# Patient Record
Sex: MALE | URBAN - METROPOLITAN AREA
[De-identification: names, ages, dates, MRNs, and addresses within clinical notes are randomized per-mention and may not be internally consistent; named-entity substitution may affect disease eponyms.]

---

## 2020-07-15 ENCOUNTER — NURSE TRIAGE (OUTPATIENT)
Dept: FAMILY MEDICINE | Facility: CLINIC | Age: 45
End: 2020-07-15

## 2020-07-15 NOTE — TELEPHONE ENCOUNTER
"COVID-19 SCREENING ASSESSMENT    CRITERIA FOR TESTING: Yes to Any Symptoms     SYMPTOM QUESTIONS  Are you experiencing a cough, fever, shortness of breath, chills, repeated shaking with chills, muscle pain, headache, sore throat, nausea, vomiting, diarrhea, congestion, runny nose, fatigue or new loss of taste or smell? Sore Throat  Describe symptoms: cough, headache, \"tightness in chest\", diarrhea  Date of symptom onset: 07/13/20    RISK CRITERIA  Are you a healthcare worker? No  Are you a ? No  Are you aged 65 or older? No  Are you currently pregnant? N/A  Are you a resident of a health care or long term care facility? No  Do you have chronic comorbidities that include: Yes, HTN  • Pulmonary disease (asthma, COPD, pulmonary fibrosis, restrictive lung disease or any other chronic lung disease)  • Cardiac disease (CHF, hypertension, rhythm disorder, valvular disorder, or any other chronic heart disease)  • Immunosuppressed state (cancer treatment, long term corticosteroids, immunodeficiency, history of organ transplant, on other medications that cause immunosuppression)  • Other chronic illnesses (diabetes, chronic kidney disease, on dialysis, chronic liver disease)  Have you had close contact with a lab confirmed case of coronavirus (COVID-19) in the last 14 days? no  Details on close contact: n/a     Pt is here vacationing with his family, from [place], [place].  Pt and his family planned on traveling on to [place] and leaving this morning.  When pt advised of required isolation and test results may take 7 days, he advised he wanted to check on testing turn around times at another place.      Reason for Disposition  • COVID-19 symptoms per CDC guidelines AND risk factors.    Protocols used: COVID-19 EXPOSURE SCREENING [facility]      "

## 2021-01-28 ENCOUNTER — AMBULATORY - HEALTHEAST (OUTPATIENT)
Dept: NURSING | Facility: CLINIC | Age: 46
End: 2021-01-28

## 2021-02-18 ENCOUNTER — AMBULATORY - HEALTHEAST (OUTPATIENT)
Dept: NURSING | Facility: CLINIC | Age: 46
End: 2021-02-18

## 2021-05-26 ENCOUNTER — RECORDS - HEALTHEAST (OUTPATIENT)
Dept: ADMINISTRATIVE | Facility: CLINIC | Age: 46
End: 2021-05-26

## 2021-05-27 ENCOUNTER — RECORDS - HEALTHEAST (OUTPATIENT)
Dept: ADMINISTRATIVE | Facility: CLINIC | Age: 46
End: 2021-05-27

## 2024-06-01 ENCOUNTER — TRANSFERRED RECORDS (OUTPATIENT)
Dept: HEALTH INFORMATION MANAGEMENT | Facility: CLINIC | Age: 49
End: 2024-06-01

## 2024-06-01 ENCOUNTER — APPOINTMENT (OUTPATIENT)
Dept: NEUROLOGY | Facility: CLINIC | Age: 49
DRG: 003 | End: 2024-06-01
Attending: INTERNAL MEDICINE
Payer: COMMERCIAL

## 2024-06-01 ENCOUNTER — APPOINTMENT (OUTPATIENT)
Dept: GENERAL RADIOLOGY | Facility: CLINIC | Age: 49
DRG: 003 | End: 2024-06-01
Attending: STUDENT IN AN ORGANIZED HEALTH CARE EDUCATION/TRAINING PROGRAM
Payer: COMMERCIAL

## 2024-06-01 ENCOUNTER — APPOINTMENT (OUTPATIENT)
Dept: GENERAL RADIOLOGY | Facility: CLINIC | Age: 49
DRG: 003 | End: 2024-06-01
Attending: INTERNAL MEDICINE
Payer: COMMERCIAL

## 2024-06-01 ENCOUNTER — APPOINTMENT (OUTPATIENT)
Dept: CT IMAGING | Facility: CLINIC | Age: 49
DRG: 003 | End: 2024-06-01
Attending: INTERNAL MEDICINE
Payer: COMMERCIAL

## 2024-06-01 ENCOUNTER — HOSPITAL ENCOUNTER (INPATIENT)
Facility: CLINIC | Age: 49
LOS: 14 days | Discharge: NURSING, REHAB OR LONG-TERM CARE | DRG: 003 | End: 2024-06-15
Attending: INTERNAL MEDICINE | Admitting: STUDENT IN AN ORGANIZED HEALTH CARE EDUCATION/TRAINING PROGRAM
Payer: COMMERCIAL

## 2024-06-01 DIAGNOSIS — E87.6 HYPOKALEMIA: ICD-10-CM

## 2024-06-01 DIAGNOSIS — Z91.89 AT RISK FOR IMPAIRED SKIN INTEGRITY: ICD-10-CM

## 2024-06-01 DIAGNOSIS — L08.9 LOCAL INFECTION OF SKIN AND SUBCUTANEOUS TISSUE: ICD-10-CM

## 2024-06-01 DIAGNOSIS — N40.0 BENIGN PROSTATIC HYPERPLASIA, UNSPECIFIED WHETHER LOWER URINARY TRACT SYMPTOMS PRESENT: ICD-10-CM

## 2024-06-01 DIAGNOSIS — F41.9 ANXIETY: ICD-10-CM

## 2024-06-01 DIAGNOSIS — I25.10 CORONARY ARTERY DISEASE INVOLVING NATIVE CORONARY ARTERY OF NATIVE HEART WITHOUT ANGINA PECTORIS: ICD-10-CM

## 2024-06-01 DIAGNOSIS — R73.9 HYPERGLYCEMIA: ICD-10-CM

## 2024-06-01 DIAGNOSIS — I50.23 ACUTE ON CHRONIC SYSTOLIC HEART FAILURE (H): ICD-10-CM

## 2024-06-01 DIAGNOSIS — Z98.61 STATUS POST PERCUTANEOUS TRANSLUMINAL CORONARY ANGIOPLASTY: ICD-10-CM

## 2024-06-01 DIAGNOSIS — R11.0 NAUSEA: ICD-10-CM

## 2024-06-01 DIAGNOSIS — G47.00 INSOMNIA, UNSPECIFIED TYPE: ICD-10-CM

## 2024-06-01 DIAGNOSIS — K59.03 DRUG-INDUCED CONSTIPATION: ICD-10-CM

## 2024-06-01 DIAGNOSIS — I46.9 CARDIAC ARREST (H): Primary | ICD-10-CM

## 2024-06-01 LAB
ABO/RH(D): NORMAL
ACT BLD: 199 SECONDS (ref 74–150)
ALBUMIN SERPL BCG-MCNC: 3.7 G/DL (ref 3.5–5.2)
ALBUMIN SERPL BCG-MCNC: 3.7 G/DL (ref 3.5–5.2)
ALLEN'S TEST: ABNORMAL
ALLEN'S TEST: ABNORMAL
ALP SERPL-CCNC: 54 U/L (ref 40–150)
ALP SERPL-CCNC: 55 U/L (ref 40–150)
ALT SERPL W P-5'-P-CCNC: 254 U/L (ref 0–70)
ALT SERPL W P-5'-P-CCNC: 258 U/L (ref 0–70)
ANION GAP SERPL CALCULATED.3IONS-SCNC: 14 MMOL/L (ref 7–15)
ANION GAP SERPL CALCULATED.3IONS-SCNC: 15 MMOL/L (ref 7–15)
ANTIBODY SCREEN: NEGATIVE
APTT PPP: >240 SECONDS (ref 22–38)
AST SERPL W P-5'-P-CCNC: 370 U/L (ref 0–45)
AST SERPL W P-5'-P-CCNC: 374 U/L (ref 0–45)
BASE EXCESS BLDA CALC-SCNC: -0.7 MMOL/L (ref -3–3)
BASE EXCESS BLDA CALC-SCNC: -2.5 MMOL/L (ref -3–3)
BASE EXCESS BLDA CALC-SCNC: -3 MMOL/L (ref -3–3)
BASE EXCESS BLDV CALC-SCNC: -2.3 MMOL/L (ref -3–3)
BASOPHILS # BLD AUTO: 0 10E3/UL (ref 0–0.2)
BASOPHILS NFR BLD AUTO: 0 %
BILIRUB SERPL-MCNC: 0.6 MG/DL
BILIRUB SERPL-MCNC: 0.6 MG/DL
BUN SERPL-MCNC: 20.3 MG/DL (ref 6–20)
BUN SERPL-MCNC: 20.3 MG/DL (ref 6–20)
CA-I BLD-MCNC: 4.1 MG/DL (ref 4.4–5.2)
CALCIUM SERPL-MCNC: 7.6 MG/DL (ref 8.6–10)
CALCIUM SERPL-MCNC: 7.7 MG/DL (ref 8.6–10)
CHLORIDE SERPL-SCNC: 103 MMOL/L (ref 98–107)
CHLORIDE SERPL-SCNC: 103 MMOL/L (ref 98–107)
COHGB MFR BLD: 99.7 % (ref 96–97)
COHGB MFR BLD: >100 % (ref 96–97)
CORTIS SERPL-MCNC: 43.5 UG/DL
CREAT SERPL-MCNC: 1.16 MG/DL (ref 0.67–1.17)
CREAT SERPL-MCNC: 1.16 MG/DL (ref 0.67–1.17)
CRP SERPL-MCNC: 3.61 MG/L
D DIMER PPP FEU-MCNC: 8.83 UG/ML FEU (ref 0–0.5)
DEPRECATED HCO3 PLAS-SCNC: 20 MMOL/L (ref 22–29)
DEPRECATED HCO3 PLAS-SCNC: 21 MMOL/L (ref 22–29)
EGFRCR SERPLBLD CKD-EPI 2021: 77 ML/MIN/1.73M2
EGFRCR SERPLBLD CKD-EPI 2021: 77 ML/MIN/1.73M2
EOSINOPHIL # BLD AUTO: 0 10E3/UL (ref 0–0.7)
EOSINOPHIL NFR BLD AUTO: 0 %
ERYTHROCYTE [DISTWIDTH] IN BLOOD BY AUTOMATED COUNT: 12.9 % (ref 10–15)
ERYTHROCYTE [SEDIMENTATION RATE] IN BLOOD BY WESTERGREN METHOD: 12 MM/HR (ref 0–15)
GLUCOSE BLD-MCNC: 257 MG/DL (ref 70–99)
GLUCOSE BLDC GLUCOMTR-MCNC: 194 MG/DL (ref 70–99)
GLUCOSE BLDC GLUCOMTR-MCNC: 237 MG/DL (ref 70–99)
GLUCOSE SERPL-MCNC: 264 MG/DL (ref 70–99)
GLUCOSE SERPL-MCNC: 267 MG/DL (ref 70–99)
HBA1C MFR BLD: 6.1 %
HCO3 BLD-SCNC: 23 MMOL/L (ref 21–28)
HCO3 BLD-SCNC: 24 MMOL/L (ref 21–28)
HCO3 BLDA-SCNC: 24 MMOL/L (ref 21–28)
HCO3 BLDV-SCNC: 25 MMOL/L (ref 21–28)
HCT VFR BLD AUTO: 44.2 % (ref 40–53)
HGB BLD-MCNC: 16 G/DL (ref 13.3–17.7)
HGB FREE PLAS-MCNC: 30 MG/DL
HOLD SPECIMEN: NORMAL
IMM GRANULOCYTES # BLD: 0.1 10E3/UL
IMM GRANULOCYTES NFR BLD: 1 %
INR PPP: 1.24 (ref 0.85–1.15)
LACTATE SERPL-SCNC: 3.5 MMOL/L (ref 0.7–2)
LDH SERPL L TO P-CCNC: 630 U/L (ref 0–250)
LYMPHOCYTES # BLD AUTO: 0.9 10E3/UL (ref 0.8–5.3)
LYMPHOCYTES NFR BLD AUTO: 5 %
MAGNESIUM SERPL-MCNC: 2.2 MG/DL (ref 1.7–2.3)
MCH RBC QN AUTO: 31 PG (ref 26.5–33)
MCHC RBC AUTO-ENTMCNC: 36.2 G/DL (ref 31.5–36.5)
MCV RBC AUTO: 86 FL (ref 78–100)
MONOCYTES # BLD AUTO: 1.4 10E3/UL (ref 0–1.3)
MONOCYTES NFR BLD AUTO: 8 %
NEUTROPHILS # BLD AUTO: 14.4 10E3/UL (ref 1.6–8.3)
NEUTROPHILS NFR BLD AUTO: 86 %
NRBC # BLD AUTO: 0 10E3/UL
NRBC BLD AUTO-RTO: 0 /100
O2/TOTAL GAS SETTING VFR VENT: 100 %
O2/TOTAL GAS SETTING VFR VENT: 50 %
O2/TOTAL GAS SETTING VFR VENT: 60 %
O2/TOTAL GAS SETTING VFR VENT: 60 %
OXYHGB MFR BLDA: 100 % (ref 75–100)
OXYHGB MFR BLDV: 72 %
PCO2 BLD: 38 MM HG (ref 35–45)
PCO2 BLD: 44 MM HG (ref 35–45)
PCO2 BLDA: 45 MM HG (ref 35–45)
PCO2 BLDV: 51 MM HG (ref 40–50)
PH BLD: 7.33 [PH] (ref 7.35–7.45)
PH BLD: 7.4 [PH] (ref 7.35–7.45)
PH BLDA: 7.33 [PH] (ref 7.35–7.45)
PH BLDV: 7.3 [PH] (ref 7.32–7.43)
PLATELET # BLD AUTO: 224 10E3/UL (ref 150–450)
PO2 BLD: 136 MM HG (ref 80–105)
PO2 BLD: 385 MM HG (ref 80–105)
PO2 BLDA: 412 MM HG (ref 80–105)
PO2 BLDV: 44 MM HG (ref 25–47)
POTASSIUM SERPL-SCNC: 3.6 MMOL/L (ref 3.4–5.3)
POTASSIUM SERPL-SCNC: 3.7 MMOL/L (ref 3.4–5.3)
PROCALCITONIN SERPL IA-MCNC: 0.5 NG/ML
PROT SERPL-MCNC: 6.2 G/DL (ref 6.4–8.3)
PROT SERPL-MCNC: 6.2 G/DL (ref 6.4–8.3)
RBC # BLD AUTO: 5.16 10E6/UL (ref 4.4–5.9)
SAO2 % BLDA: 98 % (ref 92–100)
SAO2 % BLDA: 98 % (ref 92–100)
SAO2 % BLDA: >100 % (ref 96–97)
SAO2 % BLDV: 72.7 % (ref 70–75)
SARS-COV-2 RNA RESP QL NAA+PROBE: NEGATIVE
SODIUM SERPL-SCNC: 138 MMOL/L (ref 135–145)
SODIUM SERPL-SCNC: 138 MMOL/L (ref 135–145)
SPECIMEN EXPIRATION DATE: NORMAL
T3 SERPL-MCNC: 111 NG/DL (ref 85–202)
T3FREE SERPL-MCNC: 3.1 PG/ML (ref 2–4.4)
T4 FREE SERPL-MCNC: 1.45 NG/DL (ref 0.9–1.7)
TROPONIN T SERPL HS-MCNC: 4931 NG/L
TSH SERPL DL<=0.005 MIU/L-ACNC: 1.54 UIU/ML (ref 0.3–4.2)
UFH PPP CHRO-ACNC: >1.1 IU/ML
WBC # BLD AUTO: 17 10E3/UL (ref 4–11)

## 2024-06-01 PROCEDURE — 95711 VEEG 2-12 HR UNMONITORED: CPT

## 2024-06-01 PROCEDURE — 272N000232 HC CANNULA, VENOUS FEMORAL, ADULT

## 2024-06-01 PROCEDURE — 85610 PROTHROMBIN TIME: CPT | Performed by: INTERNAL MEDICINE

## 2024-06-01 PROCEDURE — 84484 ASSAY OF TROPONIN QUANT: CPT | Performed by: INTERNAL MEDICINE

## 2024-06-01 PROCEDURE — 87635 SARS-COV-2 COVID-19 AMP PRB: CPT | Performed by: INTERNAL MEDICINE

## 2024-06-01 PROCEDURE — 3E043XZ INTRODUCTION OF VASOPRESSOR INTO CENTRAL VEIN, PERCUTANEOUS APPROACH: ICD-10-PCS | Performed by: STUDENT IN AN ORGANIZED HEALTH CARE EDUCATION/TRAINING PROGRAM

## 2024-06-01 PROCEDURE — 82247 BILIRUBIN TOTAL: CPT | Performed by: INTERNAL MEDICINE

## 2024-06-01 PROCEDURE — 85347 COAGULATION TIME ACTIVATED: CPT

## 2024-06-01 PROCEDURE — 83036 HEMOGLOBIN GLYCOSYLATED A1C: CPT | Performed by: INTERNAL MEDICINE

## 2024-06-01 PROCEDURE — 86140 C-REACTIVE PROTEIN: CPT | Performed by: INTERNAL MEDICINE

## 2024-06-01 PROCEDURE — 71250 CT THORAX DX C-: CPT

## 2024-06-01 PROCEDURE — 80365 DRUG SCREENING OXYCODONE: CPT | Performed by: INTERNAL MEDICINE

## 2024-06-01 PROCEDURE — 82330 ASSAY OF CALCIUM: CPT | Performed by: INTERNAL MEDICINE

## 2024-06-01 PROCEDURE — 80307 DRUG TEST PRSMV CHEM ANLYZR: CPT | Performed by: INTERNAL MEDICINE

## 2024-06-01 PROCEDURE — 999N000128 HC STATISTIC PERIPHERAL IV START W/O US GUIDANCE

## 2024-06-01 PROCEDURE — 83051 HEMOGLOBIN PLASMA: CPT | Performed by: INTERNAL MEDICINE

## 2024-06-01 PROCEDURE — 87641 MR-STAPH DNA AMP PROBE: CPT | Performed by: INTERNAL MEDICINE

## 2024-06-01 PROCEDURE — 85025 COMPLETE CBC W/AUTO DIFF WBC: CPT | Performed by: INTERNAL MEDICINE

## 2024-06-01 PROCEDURE — 272N000053 HC CANNULA, ARTERIAL FEMORAL

## 2024-06-01 PROCEDURE — 86316 IMMUNOASSAY TUMOR OTHER: CPT | Performed by: INTERNAL MEDICINE

## 2024-06-01 PROCEDURE — 84443 ASSAY THYROID STIM HORMONE: CPT | Performed by: INTERNAL MEDICINE

## 2024-06-01 PROCEDURE — 84480 ASSAY TRIIODOTHYRONINE (T3): CPT | Performed by: INTERNAL MEDICINE

## 2024-06-01 PROCEDURE — 74018 RADEX ABDOMEN 1 VIEW: CPT | Mod: 26 | Performed by: RADIOLOGY

## 2024-06-01 PROCEDURE — 94002 VENT MGMT INPAT INIT DAY: CPT

## 2024-06-01 PROCEDURE — 95718 EEG PHYS/QHP 2-12 HR W/VEEG: CPT | Performed by: PSYCHIATRY & NEUROLOGY

## 2024-06-01 PROCEDURE — 70450 CT HEAD/BRAIN W/O DYE: CPT | Mod: 26 | Performed by: STUDENT IN AN ORGANIZED HEALTH CARE EDUCATION/TRAINING PROGRAM

## 2024-06-01 PROCEDURE — 272N000237 HC CARDIOHELP CIRCUIT

## 2024-06-01 PROCEDURE — 83735 ASSAY OF MAGNESIUM: CPT | Performed by: INTERNAL MEDICINE

## 2024-06-01 PROCEDURE — 80361 OPIATES 1 OR MORE: CPT | Performed by: INTERNAL MEDICINE

## 2024-06-01 PROCEDURE — 93005 ELECTROCARDIOGRAM TRACING: CPT

## 2024-06-01 PROCEDURE — 84145 PROCALCITONIN (PCT): CPT | Performed by: INTERNAL MEDICINE

## 2024-06-01 PROCEDURE — 85379 FIBRIN DEGRADATION QUANT: CPT | Performed by: INTERNAL MEDICINE

## 2024-06-01 PROCEDURE — 71250 CT THORAX DX C-: CPT | Mod: 26 | Performed by: RADIOLOGY

## 2024-06-01 PROCEDURE — 85520 HEPARIN ASSAY: CPT | Performed by: INTERNAL MEDICINE

## 2024-06-01 PROCEDURE — 83605 ASSAY OF LACTIC ACID: CPT | Performed by: INTERNAL MEDICINE

## 2024-06-01 PROCEDURE — 82805 BLOOD GASES W/O2 SATURATION: CPT | Performed by: INTERNAL MEDICINE

## 2024-06-01 PROCEDURE — 84481 FREE ASSAY (FT-3): CPT | Performed by: INTERNAL MEDICINE

## 2024-06-01 PROCEDURE — 85396 CLOTTING ASSAY WHOLE BLOOD: CPT | Performed by: INTERNAL MEDICINE

## 2024-06-01 PROCEDURE — 999N000065 XR CHEST PORT 1 VIEW

## 2024-06-01 PROCEDURE — 999N000185 HC STATISTIC TRANSPORT TIME EA 15 MIN

## 2024-06-01 PROCEDURE — 83615 LACTATE (LD) (LDH) ENZYME: CPT | Performed by: INTERNAL MEDICINE

## 2024-06-01 PROCEDURE — 200N000002 HC R&B ICU UMMC

## 2024-06-01 PROCEDURE — C9113 INJ PANTOPRAZOLE SODIUM, VIA: HCPCS | Performed by: INTERNAL MEDICINE

## 2024-06-01 PROCEDURE — 85730 THROMBOPLASTIN TIME PARTIAL: CPT | Performed by: INTERNAL MEDICINE

## 2024-06-01 PROCEDURE — 250N000009 HC RX 250: Performed by: INTERNAL MEDICINE

## 2024-06-01 PROCEDURE — 33947 ECMO/ECLS INITIATION ARTERY: CPT

## 2024-06-01 PROCEDURE — 84155 ASSAY OF PROTEIN SERUM: CPT | Performed by: INTERNAL MEDICINE

## 2024-06-01 PROCEDURE — 33949 ECMO/ECLS DAILY MGMT ARTERY: CPT

## 2024-06-01 PROCEDURE — 80347 BENZODIAZEPINES 13 OR MORE: CPT | Performed by: INTERNAL MEDICINE

## 2024-06-01 PROCEDURE — 87149 DNA/RNA DIRECT PROBE: CPT | Performed by: STUDENT IN AN ORGANIZED HEALTH CARE EDUCATION/TRAINING PROGRAM

## 2024-06-01 PROCEDURE — 85652 RBC SED RATE AUTOMATED: CPT | Performed by: INTERNAL MEDICINE

## 2024-06-01 PROCEDURE — 99207 EEG VIDEO 2-12 HRS UNMONITORED: CPT | Performed by: PSYCHIATRY & NEUROLOGY

## 2024-06-01 PROCEDURE — 74176 CT ABD & PELVIS W/O CONTRAST: CPT | Mod: 26 | Performed by: RADIOLOGY

## 2024-06-01 PROCEDURE — 87076 CULTURE ANAEROBE IDENT EACH: CPT | Performed by: STUDENT IN AN ORGANIZED HEALTH CARE EDUCATION/TRAINING PROGRAM

## 2024-06-01 PROCEDURE — 250N000011 HC RX IP 250 OP 636: Mod: JZ | Performed by: STUDENT IN AN ORGANIZED HEALTH CARE EDUCATION/TRAINING PROGRAM

## 2024-06-01 PROCEDURE — 0HQ0XZZ REPAIR SCALP SKIN, EXTERNAL APPROACH: ICD-10-PCS | Performed by: STUDENT IN AN ORGANIZED HEALTH CARE EDUCATION/TRAINING PROGRAM

## 2024-06-01 PROCEDURE — 84450 TRANSFERASE (AST) (SGOT): CPT | Performed by: INTERNAL MEDICINE

## 2024-06-01 PROCEDURE — 5A1955Z RESPIRATORY VENTILATION, GREATER THAN 96 CONSECUTIVE HOURS: ICD-10-PCS | Performed by: STUDENT IN AN ORGANIZED HEALTH CARE EDUCATION/TRAINING PROGRAM

## 2024-06-01 PROCEDURE — 82533 TOTAL CORTISOL: CPT | Performed by: INTERNAL MEDICINE

## 2024-06-01 PROCEDURE — 36415 COLL VENOUS BLD VENIPUNCTURE: CPT | Performed by: STUDENT IN AN ORGANIZED HEALTH CARE EDUCATION/TRAINING PROGRAM

## 2024-06-01 PROCEDURE — 258N000003 HC RX IP 258 OP 636: Performed by: INTERNAL MEDICINE

## 2024-06-01 PROCEDURE — 84439 ASSAY OF FREE THYROXINE: CPT | Performed by: INTERNAL MEDICINE

## 2024-06-01 PROCEDURE — 70450 CT HEAD/BRAIN W/O DYE: CPT

## 2024-06-01 PROCEDURE — 5A1522G EXTRACORPOREAL OXYGENATION, MEMBRANE, PERIPHERAL VENO-ARTERIAL: ICD-10-PCS | Performed by: STUDENT IN AN ORGANIZED HEALTH CARE EDUCATION/TRAINING PROGRAM

## 2024-06-01 PROCEDURE — 999N000157 HC STATISTIC RCP TIME EA 10 MIN

## 2024-06-01 PROCEDURE — 250N000011 HC RX IP 250 OP 636: Performed by: INTERNAL MEDICINE

## 2024-06-01 PROCEDURE — 86900 BLOOD TYPING SEROLOGIC ABO: CPT | Performed by: INTERNAL MEDICINE

## 2024-06-01 PROCEDURE — 82947 ASSAY GLUCOSE BLOOD QUANT: CPT | Performed by: INTERNAL MEDICINE

## 2024-06-01 PROCEDURE — 999N000065 XR ABDOMEN PORT 1 VIEW

## 2024-06-01 PROCEDURE — 71045 X-RAY EXAM CHEST 1 VIEW: CPT | Mod: 26 | Performed by: RADIOLOGY

## 2024-06-01 PROCEDURE — 272N000555 HC SENSOR NIRS OXIMETER, ADULT

## 2024-06-01 RX ORDER — NALOXONE HYDROCHLORIDE 0.4 MG/ML
0.4 INJECTION, SOLUTION INTRAMUSCULAR; INTRAVENOUS; SUBCUTANEOUS
Status: DISCONTINUED | OUTPATIENT
Start: 2024-06-01 | End: 2024-06-15 | Stop reason: HOSPADM

## 2024-06-01 RX ORDER — MAGNESIUM SULFATE HEPTAHYDRATE 40 MG/ML
2 INJECTION, SOLUTION INTRAVENOUS DAILY PRN
Status: DISCONTINUED | OUTPATIENT
Start: 2024-06-01 | End: 2024-06-02

## 2024-06-01 RX ORDER — NALOXONE HYDROCHLORIDE 0.4 MG/ML
0.2 INJECTION, SOLUTION INTRAMUSCULAR; INTRAVENOUS; SUBCUTANEOUS
Status: DISCONTINUED | OUTPATIENT
Start: 2024-06-01 | End: 2024-06-15 | Stop reason: HOSPADM

## 2024-06-01 RX ORDER — ACETAMINOPHEN 650 MG/1
650 SUPPOSITORY RECTAL EVERY 4 HOURS PRN
Status: DISCONTINUED | OUTPATIENT
Start: 2024-06-01 | End: 2024-06-15 | Stop reason: HOSPADM

## 2024-06-01 RX ORDER — ACETAMINOPHEN 325 MG/1
650 TABLET ORAL EVERY 4 HOURS PRN
Status: DISCONTINUED | OUTPATIENT
Start: 2024-06-01 | End: 2024-06-15 | Stop reason: HOSPADM

## 2024-06-01 RX ORDER — NICOTINE POLACRILEX 4 MG
15-30 LOZENGE BUCCAL
Status: DISCONTINUED | OUTPATIENT
Start: 2024-06-01 | End: 2024-06-15 | Stop reason: HOSPADM

## 2024-06-01 RX ORDER — POTASSIUM CHLORIDE 29.8 MG/ML
20 INJECTION INTRAVENOUS
Status: DISCONTINUED | OUTPATIENT
Start: 2024-06-01 | End: 2024-06-02

## 2024-06-01 RX ORDER — MAGNESIUM SULFATE HEPTAHYDRATE 40 MG/ML
4 INJECTION, SOLUTION INTRAVENOUS EVERY 4 HOURS PRN
Status: DISCONTINUED | OUTPATIENT
Start: 2024-06-01 | End: 2024-06-02

## 2024-06-01 RX ORDER — DEXTROSE MONOHYDRATE 25 G/50ML
25-50 INJECTION, SOLUTION INTRAVENOUS
Status: DISCONTINUED | OUTPATIENT
Start: 2024-06-01 | End: 2024-06-15 | Stop reason: HOSPADM

## 2024-06-01 RX ORDER — MIDAZOLAM HCL IN 0.9 % NACL/PF 1 MG/ML
1-8 PLASTIC BAG, INJECTION (ML) INTRAVENOUS CONTINUOUS
Status: DISCONTINUED | OUTPATIENT
Start: 2024-06-01 | End: 2024-06-06

## 2024-06-01 RX ORDER — NOREPINEPHRINE BITARTRATE 0.06 MG/ML
.01-.6 INJECTION, SOLUTION INTRAVENOUS CONTINUOUS
Status: DISCONTINUED | OUTPATIENT
Start: 2024-06-01 | End: 2024-06-08

## 2024-06-01 RX ORDER — CEFTRIAXONE 2 G/1
2 INJECTION, POWDER, FOR SOLUTION INTRAMUSCULAR; INTRAVENOUS EVERY 24 HOURS
Status: DISCONTINUED | OUTPATIENT
Start: 2024-06-01 | End: 2024-06-05

## 2024-06-01 RX ORDER — HEPARIN SODIUM 10000 [USP'U]/100ML
10-50 INJECTION, SOLUTION INTRAVENOUS CONTINUOUS
Status: DISCONTINUED | OUTPATIENT
Start: 2024-06-01 | End: 2024-06-01

## 2024-06-01 RX ORDER — HEPARIN SODIUM 10000 [USP'U]/100ML
10-50 INJECTION, SOLUTION INTRAVENOUS CONTINUOUS
Status: DISCONTINUED | OUTPATIENT
Start: 2024-06-01 | End: 2024-06-05

## 2024-06-01 RX ORDER — CHLORHEXIDINE GLUCONATE ORAL RINSE 1.2 MG/ML
15 SOLUTION DENTAL EVERY 12 HOURS
Status: DISCONTINUED | OUTPATIENT
Start: 2024-06-01 | End: 2024-06-11

## 2024-06-01 RX ORDER — LIDOCAINE 40 MG/G
CREAM TOPICAL
Status: DISCONTINUED | OUTPATIENT
Start: 2024-06-01 | End: 2024-06-15 | Stop reason: HOSPADM

## 2024-06-01 RX ORDER — ASPIRIN 81 MG/1
81 TABLET, CHEWABLE ORAL DAILY
Status: DISCONTINUED | OUTPATIENT
Start: 2024-06-02 | End: 2024-06-06

## 2024-06-01 RX ORDER — HEPARIN SODIUM 200 [USP'U]/100ML
3 INJECTION, SOLUTION INTRAVENOUS CONTINUOUS
Status: DISCONTINUED | OUTPATIENT
Start: 2024-06-01 | End: 2024-06-05

## 2024-06-01 RX ORDER — AMOXICILLIN 250 MG
1 CAPSULE ORAL 2 TIMES DAILY PRN
Status: DISCONTINUED | OUTPATIENT
Start: 2024-06-03 | End: 2024-06-11

## 2024-06-01 RX ORDER — DEXTROSE MONOHYDRATE 100 MG/ML
INJECTION, SOLUTION INTRAVENOUS CONTINUOUS PRN
Status: DISCONTINUED | OUTPATIENT
Start: 2024-06-01 | End: 2024-06-13

## 2024-06-01 RX ORDER — POLYETHYLENE GLYCOL 3350 17 G/17G
17 POWDER, FOR SOLUTION ORAL DAILY PRN
Status: DISCONTINUED | OUTPATIENT
Start: 2024-06-01 | End: 2024-06-11

## 2024-06-01 RX ORDER — CHLORHEXIDINE GLUCONATE ORAL RINSE 1.2 MG/ML
15 SOLUTION DENTAL 2 TIMES DAILY
Status: DISCONTINUED | OUTPATIENT
Start: 2024-06-01 | End: 2024-06-01

## 2024-06-01 RX ADMIN — CALCIUM CHLORIDE 1 G: 100 INJECTION, SOLUTION INTRAVENOUS at 22:09

## 2024-06-01 RX ADMIN — INSULIN HUMAN 3 UNITS/HR: 1 INJECTION, SOLUTION INTRAVENOUS at 21:13

## 2024-06-01 RX ADMIN — NOREPINEPHRINE BITARTRATE 0.08 MCG/KG/MIN: 0.06 INJECTION, SOLUTION INTRAVENOUS at 20:57

## 2024-06-01 RX ADMIN — MIDAZOLAM IN SODIUM CHLORIDE 8 MG/HR: 1 INJECTION INTRAVENOUS at 22:43

## 2024-06-01 RX ADMIN — PANTOPRAZOLE SODIUM 40 MG: 40 INJECTION, POWDER, FOR SOLUTION INTRAVENOUS at 23:26

## 2024-06-01 RX ADMIN — CEFTRIAXONE SODIUM 2 G: 2 INJECTION, POWDER, FOR SOLUTION INTRAMUSCULAR; INTRAVENOUS at 21:21

## 2024-06-01 RX ADMIN — HEPARIN SODIUM 3 ML/HR: 200 INJECTION, SOLUTION INTRAVENOUS at 21:00

## 2024-06-01 RX ADMIN — AMIODARONE HYDROCHLORIDE 150 MG: 1.5 INJECTION, SOLUTION INTRAVENOUS at 23:30

## 2024-06-01 RX ADMIN — Medication 100 MCG/HR: at 20:54

## 2024-06-01 RX ADMIN — AMIODARONE HYDROCHLORIDE 1 MG/MIN: 50 INJECTION, SOLUTION INTRAVENOUS at 23:00

## 2024-06-01 ASSESSMENT — ACTIVITIES OF DAILY LIVING (ADL)
ADLS_ACUITY_SCORE: 35

## 2024-06-01 NOTE — Clinical Note
The first balloon was inserted into the left anterior descending and left anterior descending diagonal.Max pressure = 12 nick. Total duration = 10 seconds.     Max pressure = 14 nick. Total duration = 10 seconds.    Balloon reinflated a second time: Max pressure = 14 nick. Total duration = 10 seconds.  Balloon reinflated a third time: Max pressure = 12 nick. Total duration = 10 seconds.

## 2024-06-01 NOTE — Clinical Note
Prepped: left groin. Prepped with: ChloraPrep. The patient was draped. .Pre-procedure site marking:Insertion site not predetermined

## 2024-06-01 NOTE — Clinical Note
Stent deployed in the left anterior descending diagonal. Max pressure = 11 nick. Total duration = 10 seconds.

## 2024-06-01 NOTE — LETTER
Transition Communication Hand-off for Care Transitions to Next Level of Care Provider    Name: Cullen Reardon  : 1975  MRN #: [id number]  Primary Care Provider: Teresita Velásquez     Primary Clinic: [address] [place] [id number]     Reason for Hospitalization:  ECMO  Cardiac arrest (H)  Admit Date/Time: 2024  7:31 PM  Discharge Date: 06/15/24   Payor Source: Payor: [facility] / Plan: [employer] [facility] CARE / Product Type: HMO /     Readmission Assessment Measure (ADITI) Risk Score/category: 26    Plan of Care Goals/Milestone Events:   Patient Concerns: n/a   Patient Goals:   Short-term  rehabilitate to independence.    Long-term independence            Reason for Communication Hand-off Referral: Multiple providers/specialties    Discharge Plan:   [facility]  [phone]       Concern for non-adherence with plan of care:   Y/N No  Discharge Needs Assessment:  Needs      Flowsheet Row Most Recent Value   Anticipated Changes Related to Illness inability to care for self, inability to work   Equipment Currently Used at Home none            Already enrolled in Tele-monitoring program and name of program:  n/a  Follow-up specialty is recommended: Yes    Follow-up plan:    Future Appointments   Date Time Provider Department Center   2024  2:30 PM  CRITICAL CARE [facility]       Any outstanding tests or procedures:        Referrals       Future Labs/Procedures    Cardiac Rehab  Referral     Process Instructions:    Advance to Wellness and Exercise for Life (WEL) Program or to another maintenance exercise program upon completion of phase 2 cardiac rehab.        Comments:    Patient may choose their preference of the site for Cardiac Rehab.  If you have not heard from the scheduling office within 2 business days, please call [phone] for [facility], [phone] for [place] and [phone] for [place].    POST ICU [facility] REFERRAL      Comments:    If you have not heard from the scheduling office within 2 business days, please call  [phone] to schedule your appointment.    Please be aware that coverage of these services is subject to the terms and limitations of your health insurance plan.  Call member services at your health plan with any benefit or coverage questions.          Follow-Up with Cardiology ALIZA     Comments:    Follow up appointment should be made in 2 weeks after discharge  [facility] will call you to coordinate your care as prescribed by your provider. If you have concerns about scheduling, please call [phone].                [place] Recommendations:      Cynthia Nye, ALEKSANDR    AVS/Discharge Summary is the source of truth; this is a helpful guide for improved communication of patient story

## 2024-06-01 NOTE — Clinical Note
The first balloon was inserted into the left anterior descending.Max pressure = 12 nick. Total duration = 10 seconds.     Max pressure = 12 nick. Total duration = 10 seconds.    Balloon reinflated a second time: Max pressure = 12 nick. Total duration = 10 seconds.

## 2024-06-01 NOTE — Clinical Note
The first balloon was inserted into the left anterior descending and left anterior descending diagonal.Max pressure = 12 nick. Total duration = 10 seconds.     Max pressure = 12 nick. Total duration = 10 seconds.    Balloon reinflated a second time: Max pressure = 12 nick. Total duration = 10 seconds.

## 2024-06-02 ENCOUNTER — APPOINTMENT (OUTPATIENT)
Dept: CARDIOLOGY | Facility: CLINIC | Age: 49
DRG: 003 | End: 2024-06-02
Attending: INTERNAL MEDICINE
Payer: COMMERCIAL

## 2024-06-02 ENCOUNTER — APPOINTMENT (OUTPATIENT)
Dept: NEUROLOGY | Facility: CLINIC | Age: 49
DRG: 003 | End: 2024-06-02
Attending: INTERNAL MEDICINE
Payer: COMMERCIAL

## 2024-06-02 ENCOUNTER — APPOINTMENT (OUTPATIENT)
Dept: ULTRASOUND IMAGING | Facility: CLINIC | Age: 49
DRG: 003 | End: 2024-06-02
Attending: INTERNAL MEDICINE
Payer: COMMERCIAL

## 2024-06-02 LAB
ACT BLD: 140 SECONDS (ref 74–150)
ACT BLD: 140 SECONDS (ref 74–150)
ACT BLD: 144 SECONDS (ref 74–150)
ACT BLD: 144 SECONDS (ref 74–150)
ACT BLD: 153 SECONDS (ref 74–150)
ACT BLD: 165 SECONDS (ref 74–150)
ACT BLD: 182 SECONDS (ref 74–150)
ACT BLD: 194 SECONDS (ref 74–150)
ACT BLD: 199 SECONDS (ref 74–150)
ACT BLD: 203 SECONDS (ref 74–150)
ALBUMIN SERPL BCG-MCNC: 3.3 G/DL (ref 3.5–5.2)
ALBUMIN SERPL BCG-MCNC: 3.4 G/DL (ref 3.5–5.2)
ALBUMIN SERPL BCG-MCNC: 3.4 G/DL (ref 3.5–5.2)
ALBUMIN SERPL BCG-MCNC: 3.5 G/DL (ref 3.5–5.2)
ALBUMIN UR-MCNC: NEGATIVE MG/DL
ALLEN'S TEST: ABNORMAL
ALP SERPL-CCNC: 42 U/L (ref 40–150)
ALP SERPL-CCNC: 42 U/L (ref 40–150)
ALP SERPL-CCNC: 43 U/L (ref 40–150)
ALP SERPL-CCNC: 44 U/L (ref 40–150)
ALT SERPL W P-5'-P-CCNC: 258 U/L (ref 0–70)
ALT SERPL W P-5'-P-CCNC: 272 U/L (ref 0–70)
ALT SERPL W P-5'-P-CCNC: 277 U/L (ref 0–70)
ALT SERPL W P-5'-P-CCNC: 279 U/L (ref 0–70)
AMPHETAMINES UR QL SCN: ABNORMAL
ANION GAP SERPL CALCULATED.3IONS-SCNC: 10 MMOL/L (ref 7–15)
ANION GAP SERPL CALCULATED.3IONS-SCNC: 13 MMOL/L (ref 7–15)
ANION GAP SERPL CALCULATED.3IONS-SCNC: 9 MMOL/L (ref 7–15)
ANION GAP SERPL CALCULATED.3IONS-SCNC: 9 MMOL/L (ref 7–15)
APPEARANCE UR: ABNORMAL
APTT PPP: 100 SECONDS (ref 22–38)
APTT PPP: 228 SECONDS (ref 22–38)
APTT PPP: 63 SECONDS (ref 22–38)
APTT PPP: >240 SECONDS (ref 22–38)
AST SERPL W P-5'-P-CCNC: 836 U/L (ref 0–45)
AST SERPL W P-5'-P-CCNC: 853 U/L (ref 0–45)
AST SERPL W P-5'-P-CCNC: 950 U/L (ref 0–45)
AST SERPL W P-5'-P-CCNC: 955 U/L (ref 0–45)
AT III ACT/NOR PPP CHRO: 72 % (ref 85–135)
BARBITURATES UR QL SCN: ABNORMAL
BASE EXCESS BLDA CALC-SCNC: -0.3 MMOL/L (ref -3–3)
BASE EXCESS BLDA CALC-SCNC: -0.4 MMOL/L (ref -3–3)
BASE EXCESS BLDA CALC-SCNC: -0.7 MMOL/L (ref -3–3)
BASE EXCESS BLDA CALC-SCNC: 0 MMOL/L (ref -3–3)
BASE EXCESS BLDA CALC-SCNC: 0.1 MMOL/L (ref -3–3)
BASE EXCESS BLDA CALC-SCNC: 0.2 MMOL/L (ref -3–3)
BASE EXCESS BLDA CALC-SCNC: 0.3 MMOL/L (ref -3–3)
BASE EXCESS BLDA CALC-SCNC: 0.5 MMOL/L (ref -3–3)
BASE EXCESS BLDA CALC-SCNC: 0.9 MMOL/L (ref -3–3)
BASE EXCESS BLDA CALC-SCNC: 1.1 MMOL/L (ref -3–3)
BASE EXCESS BLDA CALC-SCNC: 1.1 MMOL/L (ref -3–3)
BASE EXCESS BLDA CALC-SCNC: 2.2 MMOL/L (ref -3–3)
BASE EXCESS BLDA CALC-SCNC: 2.7 MMOL/L (ref -3–3)
BASE EXCESS BLDA CALC-SCNC: 2.9 MMOL/L (ref -3–3)
BASE EXCESS BLDA CALC-SCNC: 2.9 MMOL/L (ref -3–3)
BASE EXCESS BLDV CALC-SCNC: 1.6 MMOL/L (ref -3–3)
BASE EXCESS BLDV CALC-SCNC: 1.6 MMOL/L (ref -3–3)
BASE EXCESS BLDV CALC-SCNC: 1.9 MMOL/L (ref -3–3)
BENZODIAZ UR QL SCN: ABNORMAL
BILIRUB SERPL-MCNC: 0.6 MG/DL
BILIRUB SERPL-MCNC: 0.7 MG/DL
BILIRUB UR QL STRIP: NEGATIVE
BUN SERPL-MCNC: 22.6 MG/DL (ref 6–20)
BUN SERPL-MCNC: 24.1 MG/DL (ref 6–20)
BUN SERPL-MCNC: 25.7 MG/DL (ref 6–20)
BUN SERPL-MCNC: 25.9 MG/DL (ref 6–20)
BZE UR QL SCN: ABNORMAL
CA-I BLD-MCNC: 4.3 MG/DL (ref 4.4–5.2)
CA-I BLD-MCNC: 4.6 MG/DL (ref 4.4–5.2)
CA-I BLD-MCNC: 4.7 MG/DL (ref 4.4–5.2)
CA-I BLD-MCNC: 4.7 MG/DL (ref 4.4–5.2)
CALCIUM SERPL-MCNC: 8.1 MG/DL (ref 8.6–10)
CALCIUM SERPL-MCNC: 8.6 MG/DL (ref 8.6–10)
CANNABINOIDS UR QL SCN: ABNORMAL
CF REDUC 30M P MA P HEP LENFR BLD TEG: 0.5 % (ref 0–8)
CF REDUC 30M P MA P HEP LENFR BLD TEG: 0.9 % (ref 0–8)
CF REDUC 60M P MA P HEPASE LENFR BLD TEG: 2.8 % (ref 0–15)
CF REDUC 60M P MA P HEPASE LENFR BLD TEG: 3.2 % (ref 0–15)
CFT P HPASE BLD TEG: 1.4 MINUTE (ref 1–3)
CFT P HPASE BLD TEG: 1.9 MINUTE (ref 1–3)
CHLORIDE SERPL-SCNC: 106 MMOL/L (ref 98–107)
CHLORIDE SERPL-SCNC: 108 MMOL/L (ref 98–107)
CI (COAGULATION INDEX)(Z): -1.8 (ref -3–3)
CI (COAGULATION INDEX)(Z): 0.4 (ref -3–3)
CLOT ANGLE P HPASE BLD TEG: 60.7 DEGREES (ref 53–72)
CLOT ANGLE P HPASE BLD TEG: 68.3 DEGREES (ref 53–72)
CLOT INIT P HPASE BLD TEG: 5.7 MINUTE (ref 5–10)
CLOT INIT P HPASE BLD TEG: 8.2 MINUTE (ref 5–10)
CLOT STRENGTH P HPASE BLD TEG: 7.3 KD/SC (ref 4.5–11)
CLOT STRENGTH P HPASE BLD TEG: 7.8 KD/SC (ref 4.5–11)
COHGB MFR BLD: 100 % (ref 96–97)
COHGB MFR BLD: 98.1 % (ref 96–97)
COHGB MFR BLD: 98.7 % (ref 96–97)
COHGB MFR BLD: 98.7 % (ref 96–97)
COHGB MFR BLD: 98.8 % (ref 96–97)
COHGB MFR BLD: 98.9 % (ref 96–97)
COHGB MFR BLD: 99 % (ref 96–97)
COHGB MFR BLD: 99 % (ref 96–97)
COHGB MFR BLD: 99.1 % (ref 96–97)
COHGB MFR BLD: 99.4 % (ref 96–97)
COHGB MFR BLD: 99.5 % (ref 96–97)
COHGB MFR BLD: 99.6 % (ref 96–97)
COHGB MFR BLD: 99.9 % (ref 96–97)
COLOR UR AUTO: YELLOW
CREAT SERPL-MCNC: 1.13 MG/DL (ref 0.67–1.17)
CREAT SERPL-MCNC: 1.38 MG/DL (ref 0.67–1.17)
CREAT SERPL-MCNC: 1.42 MG/DL (ref 0.67–1.17)
CREAT SERPL-MCNC: 1.53 MG/DL (ref 0.67–1.17)
CREAT UR-MCNC: 177 MG/DL
CRP SERPL-MCNC: 19.7 MG/L
D DIMER PPP FEU-MCNC: 2.63 UG/ML FEU (ref 0–0.5)
D DIMER PPP FEU-MCNC: 5.02 UG/ML FEU (ref 0–0.5)
DEPRECATED HCO3 PLAS-SCNC: 21 MMOL/L (ref 22–29)
DEPRECATED HCO3 PLAS-SCNC: 23 MMOL/L (ref 22–29)
DEPRECATED HCO3 PLAS-SCNC: 25 MMOL/L (ref 22–29)
DEPRECATED HCO3 PLAS-SCNC: 25 MMOL/L (ref 22–29)
EGFRCR SERPLBLD CKD-EPI 2021: 55 ML/MIN/1.73M2
EGFRCR SERPLBLD CKD-EPI 2021: 61 ML/MIN/1.73M2
EGFRCR SERPLBLD CKD-EPI 2021: 63 ML/MIN/1.73M2
EGFRCR SERPLBLD CKD-EPI 2021: 80 ML/MIN/1.73M2
ERYTHROCYTE [DISTWIDTH] IN BLOOD BY AUTOMATED COUNT: 13.1 % (ref 10–15)
ERYTHROCYTE [DISTWIDTH] IN BLOOD BY AUTOMATED COUNT: 13.3 % (ref 10–15)
ERYTHROCYTE [DISTWIDTH] IN BLOOD BY AUTOMATED COUNT: 13.3 % (ref 10–15)
ERYTHROCYTE [DISTWIDTH] IN BLOOD BY AUTOMATED COUNT: 13.4 % (ref 10–15)
ERYTHROCYTE [SEDIMENTATION RATE] IN BLOOD BY WESTERGREN METHOD: 6 MM/HR (ref 0–15)
FENTANYL UR QL: ABNORMAL
FIBRINOGEN PPP-MCNC: 278 MG/DL (ref 170–490)
FIBRINOGEN PPP-MCNC: 353 MG/DL (ref 170–490)
GLUCOSE BLD-MCNC: 102 MG/DL (ref 70–99)
GLUCOSE BLD-MCNC: 116 MG/DL (ref 70–99)
GLUCOSE BLD-MCNC: 122 MG/DL (ref 70–99)
GLUCOSE BLD-MCNC: 144 MG/DL (ref 70–99)
GLUCOSE BLDC GLUCOMTR-MCNC: 100 MG/DL (ref 70–99)
GLUCOSE BLDC GLUCOMTR-MCNC: 101 MG/DL (ref 70–99)
GLUCOSE BLDC GLUCOMTR-MCNC: 104 MG/DL (ref 70–99)
GLUCOSE BLDC GLUCOMTR-MCNC: 112 MG/DL (ref 70–99)
GLUCOSE BLDC GLUCOMTR-MCNC: 112 MG/DL (ref 70–99)
GLUCOSE BLDC GLUCOMTR-MCNC: 116 MG/DL (ref 70–99)
GLUCOSE BLDC GLUCOMTR-MCNC: 118 MG/DL (ref 70–99)
GLUCOSE BLDC GLUCOMTR-MCNC: 118 MG/DL (ref 70–99)
GLUCOSE BLDC GLUCOMTR-MCNC: 120 MG/DL (ref 70–99)
GLUCOSE BLDC GLUCOMTR-MCNC: 127 MG/DL (ref 70–99)
GLUCOSE BLDC GLUCOMTR-MCNC: 132 MG/DL (ref 70–99)
GLUCOSE BLDC GLUCOMTR-MCNC: 137 MG/DL (ref 70–99)
GLUCOSE BLDC GLUCOMTR-MCNC: 159 MG/DL (ref 70–99)
GLUCOSE SERPL-MCNC: 107 MG/DL (ref 70–99)
GLUCOSE SERPL-MCNC: 122 MG/DL (ref 70–99)
GLUCOSE SERPL-MCNC: 130 MG/DL (ref 70–99)
GLUCOSE SERPL-MCNC: 149 MG/DL (ref 70–99)
GLUCOSE UR STRIP-MCNC: NEGATIVE MG/DL
HCO3 BLD-SCNC: 23 MMOL/L (ref 21–28)
HCO3 BLD-SCNC: 24 MMOL/L (ref 21–28)
HCO3 BLD-SCNC: 25 MMOL/L (ref 21–28)
HCO3 BLD-SCNC: 26 MMOL/L (ref 21–28)
HCO3 BLD-SCNC: 27 MMOL/L (ref 21–28)
HCO3 BLD-SCNC: 28 MMOL/L (ref 21–28)
HCO3 BLDA-SCNC: 26 MMOL/L (ref 21–28)
HCO3 BLDA-SCNC: 27 MMOL/L (ref 21–28)
HCO3 BLDV-SCNC: 28 MMOL/L (ref 21–28)
HCO3 BLDV-SCNC: 30 MMOL/L (ref 21–28)
HCO3 BLDV-SCNC: 33 MMOL/L (ref 21–28)
HCT VFR BLD AUTO: 38.3 % (ref 40–53)
HCT VFR BLD AUTO: 40 % (ref 40–53)
HCT VFR BLD AUTO: 40.1 % (ref 40–53)
HCT VFR BLD AUTO: 40.4 % (ref 40–53)
HGB BLD-MCNC: 13.4 G/DL (ref 13.3–17.7)
HGB BLD-MCNC: 13.6 G/DL (ref 13.3–17.7)
HGB BLD-MCNC: 13.8 G/DL (ref 13.3–17.7)
HGB BLD-MCNC: 14.3 G/DL (ref 13.3–17.7)
HGB FREE PLAS-MCNC: <30 MG/DL
HGB UR QL STRIP: ABNORMAL
INR PPP: 1.12 (ref 0.85–1.15)
INR PPP: 1.15 (ref 0.85–1.15)
INR PPP: 1.16 (ref 0.85–1.15)
INR PPP: 1.2 (ref 0.85–1.15)
KETONES UR STRIP-MCNC: NEGATIVE MG/DL
LACTATE SERPL-SCNC: 1.2 MMOL/L (ref 0.7–2)
LACTATE SERPL-SCNC: 1.4 MMOL/L (ref 0.7–2)
LACTATE SERPL-SCNC: 1.6 MMOL/L (ref 0.7–2)
LACTATE SERPL-SCNC: 2.3 MMOL/L (ref 0.7–2)
LDH SERPL L TO P-CCNC: 1203 U/L (ref 0–250)
LEUKOCYTE ESTERASE UR QL STRIP: NEGATIVE
LVEF ECHO: NORMAL
MAGNESIUM SERPL-MCNC: 2 MG/DL (ref 1.7–2.3)
MAGNESIUM SERPL-MCNC: 2.1 MG/DL (ref 1.7–2.3)
MCF P HPASE BLD TEG: 59.5 MM (ref 50–70)
MCF P HPASE BLD TEG: 60.9 MM (ref 50–70)
MCH RBC QN AUTO: 30.5 PG (ref 26.5–33)
MCH RBC QN AUTO: 30.7 PG (ref 26.5–33)
MCH RBC QN AUTO: 30.8 PG (ref 26.5–33)
MCH RBC QN AUTO: 31.2 PG (ref 26.5–33)
MCHC RBC AUTO-ENTMCNC: 33.9 G/DL (ref 31.5–36.5)
MCHC RBC AUTO-ENTMCNC: 34.5 G/DL (ref 31.5–36.5)
MCHC RBC AUTO-ENTMCNC: 35 G/DL (ref 31.5–36.5)
MCHC RBC AUTO-ENTMCNC: 35.4 G/DL (ref 31.5–36.5)
MCV RBC AUTO: 86 FL (ref 78–100)
MCV RBC AUTO: 89 FL (ref 78–100)
MCV RBC AUTO: 89 FL (ref 78–100)
MCV RBC AUTO: 91 FL (ref 78–100)
MRSA DNA SPEC QL NAA+PROBE: NEGATIVE
MUCOUS THREADS #/AREA URNS LPF: PRESENT /LPF
NITRATE UR QL: NEGATIVE
O2/TOTAL GAS SETTING VFR VENT: 50 %
O2/TOTAL GAS SETTING VFR VENT: 70 %
O2/TOTAL GAS SETTING VFR VENT: 70 %
OPIATES UR QL SCN: ABNORMAL
OXYHGB MFR BLDA: 98 % (ref 75–100)
OXYHGB MFR BLDA: 98 % (ref 75–100)
OXYHGB MFR BLDV: 69 %
OXYHGB MFR BLDV: 69 %
OXYHGB MFR BLDV: 78 %
PCO2 BLD: 32 MM HG (ref 35–45)
PCO2 BLD: 34 MM HG (ref 35–45)
PCO2 BLD: 35 MM HG (ref 35–45)
PCO2 BLD: 35 MM HG (ref 35–45)
PCO2 BLD: 41 MM HG (ref 35–45)
PCO2 BLD: 42 MM HG (ref 35–45)
PCO2 BLD: 42 MM HG (ref 35–45)
PCO2 BLD: 43 MM HG (ref 35–45)
PCO2 BLD: 45 MM HG (ref 35–45)
PCO2 BLD: 46 MM HG (ref 35–45)
PCO2 BLD: 49 MM HG (ref 35–45)
PCO2 BLD: 50 MM HG (ref 35–45)
PCO2 BLD: 52 MM HG (ref 35–45)
PCO2 BLDA: 42 MM HG (ref 35–45)
PCO2 BLDA: 48 MM HG (ref 35–45)
PCO2 BLDV: 49 MM HG (ref 40–50)
PCO2 BLDV: 65 MM HG (ref 40–50)
PCO2 BLDV: 85 MM HG (ref 40–50)
PCP QUAL URINE (ROCHE): ABNORMAL
PEEP: 7 CM H2O
PH BLD: 7.34 [PH] (ref 7.35–7.45)
PH BLD: 7.35 [PH] (ref 7.35–7.45)
PH BLD: 7.35 [PH] (ref 7.35–7.45)
PH BLD: 7.36 [PH] (ref 7.35–7.45)
PH BLD: 7.37 [PH] (ref 7.35–7.45)
PH BLD: 7.39 [PH] (ref 7.35–7.45)
PH BLD: 7.43 [PH] (ref 7.35–7.45)
PH BLD: 7.44 [PH] (ref 7.35–7.45)
PH BLD: 7.44 [PH] (ref 7.35–7.45)
PH BLD: 7.45 [PH] (ref 7.35–7.45)
PH BLD: 7.45 [PH] (ref 7.35–7.45)
PH BLDA: 7.36 [PH] (ref 7.35–7.45)
PH BLDA: 7.39 [PH] (ref 7.35–7.45)
PH BLDV: 7.2 [PH] (ref 7.32–7.43)
PH BLDV: 7.27 [PH] (ref 7.32–7.43)
PH BLDV: 7.36 [PH] (ref 7.32–7.43)
PH UR STRIP: 5 [PH] (ref 5–7)
PHOSPHATE SERPL-MCNC: 3.9 MG/DL (ref 2.5–4.5)
PLATELET # BLD AUTO: 142 10E3/UL (ref 150–450)
PLATELET # BLD AUTO: 165 10E3/UL (ref 150–450)
PLATELET # BLD AUTO: 172 10E3/UL (ref 150–450)
PLATELET # BLD AUTO: 178 10E3/UL (ref 150–450)
PO2 BLD: 108 MM HG (ref 80–105)
PO2 BLD: 109 MM HG (ref 80–105)
PO2 BLD: 110 MM HG (ref 80–105)
PO2 BLD: 110 MM HG (ref 80–105)
PO2 BLD: 114 MM HG (ref 80–105)
PO2 BLD: 114 MM HG (ref 80–105)
PO2 BLD: 116 MM HG (ref 80–105)
PO2 BLD: 118 MM HG (ref 80–105)
PO2 BLD: 149 MM HG (ref 80–105)
PO2 BLD: 91 MM HG (ref 80–105)
PO2 BLD: 97 MM HG (ref 80–105)
PO2 BLD: 98 MM HG (ref 80–105)
PO2 BLD: 99 MM HG (ref 80–105)
PO2 BLDA: 222 MM HG (ref 80–105)
PO2 BLDA: 348 MM HG (ref 80–105)
PO2 BLDV: 38 MM HG (ref 25–47)
PO2 BLDV: 42 MM HG (ref 25–47)
PO2 BLDV: 46 MM HG (ref 25–47)
POTASSIUM SERPL-SCNC: 3.7 MMOL/L (ref 3.4–5.3)
POTASSIUM SERPL-SCNC: 4 MMOL/L (ref 3.4–5.3)
POTASSIUM SERPL-SCNC: 4.2 MMOL/L (ref 3.4–5.3)
POTASSIUM SERPL-SCNC: 4.4 MMOL/L (ref 3.4–5.3)
PROCALCITONIN SERPL IA-MCNC: 1.78 NG/ML
PROT SERPL-MCNC: 5.6 G/DL (ref 6.4–8.3)
PROT SERPL-MCNC: 5.7 G/DL (ref 6.4–8.3)
RBC # BLD AUTO: 4.29 10E6/UL (ref 4.4–5.9)
RBC # BLD AUTO: 4.42 10E6/UL (ref 4.4–5.9)
RBC # BLD AUTO: 4.49 10E6/UL (ref 4.4–5.9)
RBC # BLD AUTO: 4.69 10E6/UL (ref 4.4–5.9)
RBC URINE: 149 /HPF
SA TARGET DNA: POSITIVE
SAO2 % BLDA: 96 % (ref 92–100)
SAO2 % BLDA: 97 % (ref 92–100)
SAO2 % BLDA: 98 % (ref 92–100)
SAO2 % BLDA: 99 % (ref 92–100)
SAO2 % BLDA: >100 % (ref 96–97)
SAO2 % BLDA: >100 % (ref 96–97)
SAO2 % BLDV: 69.6 % (ref 70–75)
SAO2 % BLDV: 69.9 % (ref 70–75)
SAO2 % BLDV: 79.8 % (ref 70–75)
SODIUM SERPL-SCNC: 140 MMOL/L (ref 135–145)
SODIUM SERPL-SCNC: 141 MMOL/L (ref 135–145)
SODIUM SERPL-SCNC: 142 MMOL/L (ref 135–145)
SODIUM SERPL-SCNC: 142 MMOL/L (ref 135–145)
SP GR UR STRIP: 1.02 (ref 1–1.03)
T3 SERPL-MCNC: 92 NG/DL (ref 85–202)
T3FREE SERPL-MCNC: 2.6 PG/ML (ref 2–4.4)
T4 FREE SERPL-MCNC: 1.24 NG/DL (ref 0.9–1.7)
TROPONIN T SERPL HS-MCNC: ABNORMAL NG/L
TSH SERPL DL<=0.005 MIU/L-ACNC: 0.74 UIU/ML (ref 0.3–4.2)
UFH PPP CHRO-ACNC: 0.35 IU/ML
UFH PPP CHRO-ACNC: 0.57 IU/ML
UFH PPP CHRO-ACNC: >1.1 IU/ML
UFH PPP CHRO-ACNC: >1.1 IU/ML
UROBILINOGEN UR STRIP-MCNC: NORMAL MG/DL
WBC # BLD AUTO: 11.2 10E3/UL (ref 4–11)
WBC # BLD AUTO: 12.2 10E3/UL (ref 4–11)
WBC # BLD AUTO: 13 10E3/UL (ref 4–11)
WBC # BLD AUTO: 13.2 10E3/UL (ref 4–11)
WBC URINE: 0 /HPF

## 2024-06-02 PROCEDURE — 85347 COAGULATION TIME ACTIVATED: CPT

## 2024-06-02 PROCEDURE — 86316 IMMUNOASSAY TUMOR OTHER: CPT | Performed by: INTERNAL MEDICINE

## 2024-06-02 PROCEDURE — 93880 EXTRACRANIAL BILAT STUDY: CPT

## 2024-06-02 PROCEDURE — 85730 THROMBOPLASTIN TIME PARTIAL: CPT | Performed by: INTERNAL MEDICINE

## 2024-06-02 PROCEDURE — 82947 ASSAY GLUCOSE BLOOD QUANT: CPT | Performed by: INTERNAL MEDICINE

## 2024-06-02 PROCEDURE — 36415 COLL VENOUS BLD VENIPUNCTURE: CPT | Performed by: STUDENT IN AN ORGANIZED HEALTH CARE EDUCATION/TRAINING PROGRAM

## 2024-06-02 PROCEDURE — 84439 ASSAY OF FREE THYROXINE: CPT | Performed by: INTERNAL MEDICINE

## 2024-06-02 PROCEDURE — 82805 BLOOD GASES W/O2 SATURATION: CPT | Performed by: INTERNAL MEDICINE

## 2024-06-02 PROCEDURE — 85027 COMPLETE CBC AUTOMATED: CPT | Performed by: INTERNAL MEDICINE

## 2024-06-02 PROCEDURE — 87040 BLOOD CULTURE FOR BACTERIA: CPT | Performed by: STUDENT IN AN ORGANIZED HEALTH CARE EDUCATION/TRAINING PROGRAM

## 2024-06-02 PROCEDURE — 81001 URINALYSIS AUTO W/SCOPE: CPT | Performed by: INTERNAL MEDICINE

## 2024-06-02 PROCEDURE — 84145 PROCALCITONIN (PCT): CPT | Performed by: INTERNAL MEDICINE

## 2024-06-02 PROCEDURE — 85384 FIBRINOGEN ACTIVITY: CPT | Performed by: INTERNAL MEDICINE

## 2024-06-02 PROCEDURE — 95720 EEG PHY/QHP EA INCR W/VEEG: CPT | Mod: GC | Performed by: PSYCHIATRY & NEUROLOGY

## 2024-06-02 PROCEDURE — 250N000009 HC RX 250: Performed by: INTERNAL MEDICINE

## 2024-06-02 PROCEDURE — 999N000157 HC STATISTIC RCP TIME EA 10 MIN

## 2024-06-02 PROCEDURE — 85379 FIBRIN DEGRADATION QUANT: CPT | Performed by: INTERNAL MEDICINE

## 2024-06-02 PROCEDURE — 83605 ASSAY OF LACTIC ACID: CPT | Performed by: INTERNAL MEDICINE

## 2024-06-02 PROCEDURE — 93325 DOPPLER ECHO COLOR FLOW MAPG: CPT

## 2024-06-02 PROCEDURE — 258N000003 HC RX IP 258 OP 636: Performed by: INTERNAL MEDICINE

## 2024-06-02 PROCEDURE — 84443 ASSAY THYROID STIM HORMONE: CPT | Performed by: INTERNAL MEDICINE

## 2024-06-02 PROCEDURE — 0T9B80Z DRAINAGE OF BLADDER WITH DRAINAGE DEVICE, VIA NATURAL OR ARTIFICIAL OPENING ENDOSCOPIC: ICD-10-PCS | Performed by: STUDENT IN AN ORGANIZED HEALTH CARE EDUCATION/TRAINING PROGRAM

## 2024-06-02 PROCEDURE — 85300 ANTITHROMBIN III ACTIVITY: CPT | Performed by: INTERNAL MEDICINE

## 2024-06-02 PROCEDURE — 93321 DOPPLER ECHO F-UP/LMTD STD: CPT | Mod: 26 | Performed by: INTERNAL MEDICINE

## 2024-06-02 PROCEDURE — 200N000002 HC R&B ICU UMMC

## 2024-06-02 PROCEDURE — 93880 EXTRACRANIAL BILAT STUDY: CPT | Mod: 26 | Performed by: RADIOLOGY

## 2024-06-02 PROCEDURE — C9113 INJ PANTOPRAZOLE SODIUM, VIA: HCPCS | Performed by: INTERNAL MEDICINE

## 2024-06-02 PROCEDURE — 33949 ECMO/ECLS DAILY MGMT ARTERY: CPT | Performed by: STUDENT IN AN ORGANIZED HEALTH CARE EDUCATION/TRAINING PROGRAM

## 2024-06-02 PROCEDURE — 93925 LOWER EXTREMITY STUDY: CPT | Mod: 26 | Performed by: RADIOLOGY

## 2024-06-02 PROCEDURE — 83615 LACTATE (LD) (LDH) ENZYME: CPT | Performed by: INTERNAL MEDICINE

## 2024-06-02 PROCEDURE — 84480 ASSAY TRIIODOTHYRONINE (T3): CPT | Performed by: INTERNAL MEDICINE

## 2024-06-02 PROCEDURE — 85610 PROTHROMBIN TIME: CPT | Performed by: INTERNAL MEDICINE

## 2024-06-02 PROCEDURE — 99291 CRITICAL CARE FIRST HOUR: CPT | Performed by: PSYCHIATRY & NEUROLOGY

## 2024-06-02 PROCEDURE — 84484 ASSAY OF TROPONIN QUANT: CPT | Performed by: INTERNAL MEDICINE

## 2024-06-02 PROCEDURE — 33949 ECMO/ECLS DAILY MGMT ARTERY: CPT

## 2024-06-02 PROCEDURE — 93308 TTE F-UP OR LMTD: CPT | Mod: 26 | Performed by: INTERNAL MEDICINE

## 2024-06-02 PROCEDURE — 250N000011 HC RX IP 250 OP 636: Performed by: STUDENT IN AN ORGANIZED HEALTH CARE EDUCATION/TRAINING PROGRAM

## 2024-06-02 PROCEDURE — 250N000013 HC RX MED GY IP 250 OP 250 PS 637: Performed by: INTERNAL MEDICINE

## 2024-06-02 PROCEDURE — 99291 CRITICAL CARE FIRST HOUR: CPT | Mod: 25 | Performed by: STUDENT IN AN ORGANIZED HEALTH CARE EDUCATION/TRAINING PROGRAM

## 2024-06-02 PROCEDURE — 93325 DOPPLER ECHO COLOR FLOW MAPG: CPT | Mod: 26 | Performed by: INTERNAL MEDICINE

## 2024-06-02 PROCEDURE — 84100 ASSAY OF PHOSPHORUS: CPT | Performed by: INTERNAL MEDICINE

## 2024-06-02 PROCEDURE — 250N000011 HC RX IP 250 OP 636: Performed by: INTERNAL MEDICINE

## 2024-06-02 PROCEDURE — A7035 POS AIRWAY PRESS HEADGEAR: HCPCS

## 2024-06-02 PROCEDURE — 82330 ASSAY OF CALCIUM: CPT | Performed by: INTERNAL MEDICINE

## 2024-06-02 PROCEDURE — 95714 VEEG EA 12-26 HR UNMNTR: CPT

## 2024-06-02 PROCEDURE — 83735 ASSAY OF MAGNESIUM: CPT | Performed by: INTERNAL MEDICINE

## 2024-06-02 PROCEDURE — 83051 HEMOGLOBIN PLASMA: CPT | Performed by: INTERNAL MEDICINE

## 2024-06-02 PROCEDURE — 84481 FREE ASSAY (FT-3): CPT | Performed by: INTERNAL MEDICINE

## 2024-06-02 PROCEDURE — 250N000013 HC RX MED GY IP 250 OP 250 PS 637: Performed by: NURSE PRACTITIONER

## 2024-06-02 PROCEDURE — 80053 COMPREHEN METABOLIC PANEL: CPT | Performed by: INTERNAL MEDICINE

## 2024-06-02 PROCEDURE — 87186 SC STD MICRODIL/AGAR DIL: CPT | Performed by: INTERNAL MEDICINE

## 2024-06-02 PROCEDURE — 93005 ELECTROCARDIOGRAM TRACING: CPT

## 2024-06-02 PROCEDURE — 272N000555 HC SENSOR NIRS OXIMETER, ADULT

## 2024-06-02 PROCEDURE — 80307 DRUG TEST PRSMV CHEM ANLYZR: CPT | Performed by: INTERNAL MEDICINE

## 2024-06-02 PROCEDURE — 258N000003 HC RX IP 258 OP 636: Performed by: STUDENT IN AN ORGANIZED HEALTH CARE EDUCATION/TRAINING PROGRAM

## 2024-06-02 PROCEDURE — 250N000013 HC RX MED GY IP 250 OP 250 PS 637: Performed by: STUDENT IN AN ORGANIZED HEALTH CARE EDUCATION/TRAINING PROGRAM

## 2024-06-02 PROCEDURE — 93925 LOWER EXTREMITY STUDY: CPT

## 2024-06-02 PROCEDURE — 80346 BENZODIAZEPINES1-12: CPT | Performed by: INTERNAL MEDICINE

## 2024-06-02 PROCEDURE — 84450 TRANSFERASE (AST) (SGOT): CPT | Performed by: INTERNAL MEDICINE

## 2024-06-02 PROCEDURE — 82040 ASSAY OF SERUM ALBUMIN: CPT | Performed by: INTERNAL MEDICINE

## 2024-06-02 PROCEDURE — 80375 DRUG/SUBSTANCE NOS 1-3: CPT | Performed by: INTERNAL MEDICINE

## 2024-06-02 PROCEDURE — 85520 HEPARIN ASSAY: CPT | Performed by: INTERNAL MEDICINE

## 2024-06-02 PROCEDURE — 94003 VENT MGMT INPAT SUBQ DAY: CPT

## 2024-06-02 PROCEDURE — 99207 PR SERVICE NOT STAFFED W/SUPERV PROV: CPT | Mod: 4UV | Performed by: STUDENT IN AN ORGANIZED HEALTH CARE EDUCATION/TRAINING PROGRAM

## 2024-06-02 PROCEDURE — 85396 CLOTTING ASSAY WHOLE BLOOD: CPT | Performed by: INTERNAL MEDICINE

## 2024-06-02 PROCEDURE — 87205 SMEAR GRAM STAIN: CPT | Performed by: INTERNAL MEDICINE

## 2024-06-02 PROCEDURE — 82805 BLOOD GASES W/O2 SATURATION: CPT | Performed by: STUDENT IN AN ORGANIZED HEALTH CARE EDUCATION/TRAINING PROGRAM

## 2024-06-02 PROCEDURE — 86140 C-REACTIVE PROTEIN: CPT | Performed by: INTERNAL MEDICINE

## 2024-06-02 PROCEDURE — 85652 RBC SED RATE AUTOMATED: CPT | Performed by: INTERNAL MEDICINE

## 2024-06-02 RX ORDER — AMOXICILLIN 250 MG
1 CAPSULE ORAL 2 TIMES DAILY
Status: DISCONTINUED | OUTPATIENT
Start: 2024-06-02 | End: 2024-06-04

## 2024-06-02 RX ORDER — POLYETHYLENE GLYCOL 3350 17 G/17G
17 POWDER, FOR SOLUTION ORAL DAILY
Status: DISCONTINUED | OUTPATIENT
Start: 2024-06-02 | End: 2024-06-04

## 2024-06-02 RX ORDER — LIDOCAINE 40 MG/G
CREAM TOPICAL
Status: ACTIVE | OUTPATIENT
Start: 2024-06-02 | End: 2024-06-05

## 2024-06-02 RX ADMIN — Medication 800 UNITS: at 07:49

## 2024-06-02 RX ADMIN — HEPARIN SODIUM 1900 UNITS/HR: 10000 INJECTION, SOLUTION INTRAVENOUS at 15:23

## 2024-06-02 RX ADMIN — HEPARIN SODIUM 830 UNITS/HR: 10000 INJECTION, SOLUTION INTRAVENOUS at 00:20

## 2024-06-02 RX ADMIN — TICAGRELOR 90 MG: 90 TABLET ORAL at 07:50

## 2024-06-02 RX ADMIN — PANTOPRAZOLE SODIUM 40 MG: 40 INJECTION, POWDER, FOR SOLUTION INTRAVENOUS at 07:50

## 2024-06-02 RX ADMIN — DOCUSATE SODIUM 50 MG AND SENNOSIDES 8.6 MG 1 TABLET: 8.6; 5 TABLET, FILM COATED ORAL at 11:54

## 2024-06-02 RX ADMIN — VANCOMYCIN HYDROCHLORIDE 2000 MG: 1 INJECTION, POWDER, LYOPHILIZED, FOR SOLUTION INTRAVENOUS at 00:04

## 2024-06-02 RX ADMIN — TICAGRELOR 90 MG: 90 TABLET ORAL at 19:40

## 2024-06-02 RX ADMIN — MIDAZOLAM 3 MG: 1 INJECTION INTRAMUSCULAR; INTRAVENOUS at 08:48

## 2024-06-02 RX ADMIN — POLYETHYLENE GLYCOL 3350 17 G: 17 POWDER, FOR SOLUTION ORAL at 11:54

## 2024-06-02 RX ADMIN — MIDAZOLAM IN SODIUM CHLORIDE 8 MG/HR: 1 INJECTION INTRAVENOUS at 08:47

## 2024-06-02 RX ADMIN — MIDAZOLAM 3 MG: 1 INJECTION INTRAMUSCULAR; INTRAVENOUS at 17:11

## 2024-06-02 RX ADMIN — Medication 50 MCG: at 17:12

## 2024-06-02 RX ADMIN — CHLORHEXIDINE GLUCONATE 0.12% ORAL RINSE 15 ML: 1.2 LIQUID ORAL at 19:40

## 2024-06-02 RX ADMIN — MIDAZOLAM 3 MG: 1 INJECTION INTRAMUSCULAR; INTRAVENOUS at 18:07

## 2024-06-02 RX ADMIN — Medication 50 MCG: at 18:07

## 2024-06-02 RX ADMIN — Medication 200 MCG/HR: at 17:14

## 2024-06-02 RX ADMIN — DOCUSATE SODIUM 50 MG AND SENNOSIDES 8.6 MG 1 TABLET: 8.6; 5 TABLET, FILM COATED ORAL at 19:39

## 2024-06-02 RX ADMIN — CALCIUM CHLORIDE 1 G: 100 INJECTION, SOLUTION INTRAVENOUS at 05:55

## 2024-06-02 RX ADMIN — Medication 830 UNITS: at 10:10

## 2024-06-02 RX ADMIN — CHLORHEXIDINE GLUCONATE 0.12% ORAL RINSE 15 ML: 1.2 LIQUID ORAL at 07:50

## 2024-06-02 RX ADMIN — ASPIRIN 81 MG CHEWABLE TABLET 81 MG: 81 TABLET CHEWABLE at 07:50

## 2024-06-02 RX ADMIN — CEFTRIAXONE SODIUM 2 G: 2 INJECTION, POWDER, FOR SOLUTION INTRAMUSCULAR; INTRAVENOUS at 19:39

## 2024-06-02 ASSESSMENT — ACTIVITIES OF DAILY LIVING (ADL)
ADLS_ACUITY_SCORE: 36
ADLS_ACUITY_SCORE: 36
ADLS_ACUITY_SCORE: 32
ADLS_ACUITY_SCORE: 36
ADLS_ACUITY_SCORE: 32
ADLS_ACUITY_SCORE: 36
ADLS_ACUITY_SCORE: 36
ADLS_ACUITY_SCORE: 32
ADLS_ACUITY_SCORE: 32
ADLS_ACUITY_SCORE: 36
ADLS_ACUITY_SCORE: 32
ADLS_ACUITY_SCORE: 36
ADLS_ACUITY_SCORE: 18
ADLS_ACUITY_SCORE: 32
ADLS_ACUITY_SCORE: 32
ADLS_ACUITY_SCORE: 36
ADLS_ACUITY_SCORE: 36
ADLS_ACUITY_SCORE: 32
ADLS_ACUITY_SCORE: 36

## 2024-06-02 NOTE — PLAN OF CARE
Goal Outcome Evaluation:      Plan of Care Reviewed With: patient    Overall Patient Progress: no changeOverall Patient Progress: no change    Outcome Evaluation: Follows simple commands on light sedation. RUDD. ECMO      Major Shift Events:  Titrated Fent gtt up and Versed gtt down. Currently at 200mcg Fent, Versed 4. Does wake up and follows all commands and RUDD. Tries to sit up and pull at tubes, using boluses as needed. ECMO 3.4LPM of flow. 2 Sweep, 70% FiO2. Vent 50%. Amio at 0.5 and 1.5 Insulin gtt. Trial turn down in am.  Plan: Needs PICC line, but per Vasc Access will not place one until blood cultures are negative for 48hrs. Mds are aware. Has one venous femoral access line.   For vital signs and complete assessments, please see documentation flowsheets.
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
## 2024-06-02 NOTE — PROGRESS NOTES
ECMO Attending Progress Note  2024    Cullen Reardon is a 48 year old male who was cannulated for ECMO 24 due to VT/VF arrest    Cannulation Site:  17 Fr in the R femoral artery  25 Fr in the R femoral vein    Interval events: admitted to the icu some initial chugging, follows commands when sedation is lightened.     Physical Exam:  Temp:  [97  F (36.1  C)-99.3  F (37.4  C)] 99.3  F (37.4  C)  Pulse:  [] 99  Resp:  [0-15] 10  MAP:  [64 mmHg-106 mmHg] 74 mmHg  Arterial Line BP: ()/() 94/68  FiO2 (%):  [50 %] 50 %  SpO2:  [91 %-100 %] 98 %    Intake/Output Summary (Last 24 hours) at 2024 0839  Last data filed at 2024 0700  Gross per 24 hour   Intake 1431.16 ml   Output 1750 ml   Net -318.84 ml    Vent Mode: CMV/AC  (Continuous Mandatory Ventilation/ Assist Control)  FiO2 (%): 50 %  Resp Rate (Set): 10 breaths/min  Tidal Volume (Set, mL): 450 mL  PEEP (cm H2O): 7 cmH2O  Inspiratory Pressure (cm H2O) (Drager Myah): 25  Resp: 10       Labs:  Recent Labs   Lab 24  0735 24  0542 24  03324  03324  0224   PH 7.37 7.44  --  7.45 7.43   PCO2 43 35  --  34* 35   PO2 108* 114*  --  149* 98   HCO3 25 24  --  23 23   O2PER 50 50 50  70 50 50      Recent Labs   Lab 24  0331 24  2040   WBC 13.0* 17.0*   HGB 14.3 16.0     Creatinine   Date Value Ref Range Status   2024 1.13 0.67 - 1.17 mg/dL Final   2024 1.16 0.67 - 1.17 mg/dL Final   2024 1.16 0.67 - 1.17 mg/dL Final       Blood Flow (Circuit) LPM: 3.62 LPM  Sweep LPM: 1 LPM  Sweep FiO2   %: 70 %  ACT  (seconds): 140 seconds  Pulse Oximetry  (SpO2%): 99 %  Arterial Pressure  mmH mmHg      ECMO Issues including assessments and plan on DOS 2024:  Neuro: Sedated for mechanical ventilation and ECMO.  No acute distress.  NIRS stable  b/l  RASS goal: -2  CV: Cardiogenic shock.  Hemodynamically stable off pressors, pulse pressure 25mmHg  Pulm: Keep vent settings at rest settings  as above.  FEN/Renal: Electrolytes stable w/ replacement protocols in place, Cr stable, UOP stable  Heme: ACT goal: 180-200, Hemoglobin 16->14 .  Minimal oozing around the ECMO cannulas.  ID: Receiving empiric antibiotics- ctx   Cannulae: Position is acceptable on exam and the available imaging.  Distal perfusion cannula is in place and patent.  Extremities are well-perfused.     I have personally reviewed the ECMO flows, oxygenation and CO2 clearance, anticoagulation, and cannula position.  I have also personally assessed the patient's systemic response with hemodynamics, oxygenation, ventilation, and bleeding.       The patient requires continued ECMO support and management in the ICU.      Florencia Madrid MD  Cardiology critical care  Pager

## 2024-06-02 NOTE — PROGRESS NOTES
[facility]    ECLS Admission Note:     Date Arrived: 6/1/2024  Time Arrived: 1931    Cannulated at: [facility]  Cannulation Date: 6/1/2024  Cannulation Time: 1608    Pre-cannulation ABG: unknown  Arterial Cannula: 17 Fr. long In the Right Femoral Artery  Venous Cannula: 25 Fr. In the Right Femoral Vein  Distal Perfusion Catheter: 11 Fr.short In the Right Superficial Femoral Artery    ECMO components include:  Console Serial Number: [id number]  Circuit Lot Number: [id number]  Oxygenator Lot Number: [id number]    Patient transported to [facility] by [facility].  Met patient in CT and transitioned to  without incident. Cannula placement was verified by CXR, cannula position is acceptable.    Patient's blood type is A, positive.    Genevieve Greene, RRT  ECMO Specialist  6/2/2024 12:20 AM

## 2024-06-02 NOTE — PROGRESS NOTES
"  [facility]  Cardiology ICU History & Physical  June 1, 2024            Assessment and Plan:   Cullen Reardon is a 48 year old male with no known past medical history who was admitted on 6/1/2024 following a witnessed VF cardiac arrest.    He presented to [facility] on 6/1/2024 with chest pain.  He collapsed in front of the triage desk and was found to be pulseless after saying \"I feel like I'm going to die\".  He received immediate bystander CPR in the waiting room of the emergency department.  He was found be in VF, was shocked x 3.  Estimated downtime was approximately 15 minutes per paramedic report.  Was cannulated in [facility] emergency department and was on circuit at 16:08.  He was brought to the Cath Lab for coronary angiogram where he was found to have a 100% ostial LAD thrombotic occlusion status post stenting.  He was residual severe disease in D1 and OM2 that will need staged intervention. He was subsequently brought to [facility] for further care.    Patient was following commands on arrival. Currently clinically improving and off pressors.    Changes today   - Plan for Turn down tomorrow  - Update family.   - Stop Vanco, MRSA negative   - Switch IV PPI to p/o  - PICC line placement  - Consider removal of fem sheath tomorrow     Neuro: #. At risk for anoxic brain injury  --Intubated and sedated. Wean sedation daily to assess neurological status.   --Maintain euthermia/warm by 0.5 C/hr to get to 37C  --continue versed, fentanyl  --RASS goal -3 to -4   --CT head : No acute issues.     CV: #. Cardiogenic Shock s/p VA ECMO  #. ACUTE STEMI s/p KAYLYN to ostial LAD 6/1/24   #. Refractory VF arrest  s/p VA ECMO  --Peripheral V-A ECMO inserted via RCFA (17 Fr) and RCFV (25 Fr).  --Echo ordered  --Continue ASA 81mg and ticagrelor 90mg BID  --Hold temp at 37, rewarm by 0.5 C every hour   --Continue heparin drip. ACT goal 180-200.  --Hold lipitor for now given likely hepatic " injury during arrest  --Hold ACE/ARB for now given likely reduced renal fxn after arrest  --Holding beta blocker given shock      Pulm: #. Acute hypoxic respiratory failure  #. Probable aspiration pneumonia  Vent Mode: CMV/AC  (Continuous Mandatory Ventilation/ Assist Control)  FiO2 (%): 50 %  Resp Rate (Set): 10 breaths/min  Tidal Volume (Set, mL): 450 mL  PEEP (cm H2O): 7 cmH2O  Inspiratory Pressure (cm H2O) (Drager Myah): 25  Resp: 10    --ETT in stable position    --CXR: Lines in stable position.   --Wean vent as able  --Daily CXR  --Continue ctx .  --MRSA nares - neg, vanco stopped 6/1-6/2.  --Q2h ABGs for now     GI: #. Shock Liver 2/2 cardiac arrest  --Monitor LFTs twice a day.  --Consider RUQ US if LFTs do not improve.     Renal: #. JEAN CLAUDE 2/2 cardiac arrest  --Monitor urine output  --Maintain K>3 and Mg>2      ID: #. Probable aspiration pneumonia  - Ctx x5 days 6/1- *  --Daily blood cultures.     Hem/Onc: #. Acute blood loss anemia due to ECMO cannulas.  --Continue Aspirin and Tacagrelor for the stent.  --Continue Heparin gtt for ECMO with ACT goal 180-200  --Cryo PRN fibrinogen < 150; FFP for INR >2  --Transfuse for Hgb<7  --US LE w/ arterial duplex per ECMO protocol   --DVT PPX: Heparin as above     Endo: #. Hyperglycemia  --Insulin as needed.  --HgbA1c - pending.     Checklist: ICU Checklist:  #Feeding: npo  #Analgesia: versed   #Sedation: fentanyl  #Thromboembolism ppx: heparin gtt   #HOB: 30 degrees   #Ulcer ppx: ppi  #Glucose: insulin gtt   #SBT/SAT: in am  #Bowel reg: miralax, senna   #Lines/tubes/drains: 17 Fr RCFA, 25 Fr RCFV, 6 Fr LCFA sheath, 6 Fr LCFV sheath, schafer, ETT, OG  #Code status: full   #Family: will update as able this evening   #Dispo: ICU          Code Status: Full    The pt was discussed with the attending physician.     Jonathan Hernandez MD  Cardiology fellow ([id number])  [id number]           Medications:   I have reviewed this patient's current medications    No past medical history on  "file.    No family history on file.  Social History     Socioeconomic History    Marital status: Single     Spouse name: Not on file    Number of children: Not on file    Years of education: Not on file    Highest education level: Not on file   Occupational History    Not on file   Tobacco Use    Smoking status: Not on file    Smokeless tobacco: Not on file   Substance and Sexual Activity    Alcohol use: Not on file    Drug use: Not on file    Sexual activity: Not on file   Other Topics Concern    Not on file   Social History Narrative    Not on file     Social Determinants of Health     Financial Resource Strain: Not At Risk (7/31/2023)    Received from [facility]    Financial Resource Strain     Is it hard for you to pay for the very basics like food, housing, medical care or heating?: No   Food Insecurity: Not At Risk (7/31/2023)    Received from [facility]    Food Insecurity     Does your food run out before you have the money to buy more?: No   Transportation Needs: Not At Risk (7/31/2023)    Received from [facility]    Transportation Needs     Does a lack of transportation keep you from your medical appointments or from getting your medications?: No   Physical Activity: Not on file   Stress: Not on file   Social Connections: Not on file   Interpersonal Safety: Not on file   Housing Stability: Not on file            Review of Systems:   Unable to obtain due to patients medical condition.            Physical Exam:   Pulse 99   Temp 99.3  F (37.4  C)   Resp 10   Ht 1.886 m (6' 2.25\")   Wt 125.2 kg (276 lb 0.3 oz)   SpO2 98%   BMI 35.20 kg/m        GENERAL: Intubated, sedated. NAD.  HEENT: No icterus. ETT in place, OG tube in place.  CARDIOVASCULAR: RRR. Normal S1 and S2.  RESP: Coarse bilaterally. Mechanical ventilation.    GI Soft, bowel sounds hypoactive but present.  GENITOURINARY: Benjamin in place.  EXTREMITIES: Cool, 1+ edema, pulses dopplered, as above.   NEURO: Sedated and " "intubated.  INTEGUMENTARY: No rashes. Cannula/Line sites CDI.      Resp: 10 SpO2: 98 % O2 Device: Mechanical Ventilator      Arterial Blood Gas:   Recent Labs   Lab 06/02/24  0735 06/02/24  0542 06/02/24 0331 06/02/24 0330 06/02/24  0224   PH 7.37 7.44  --  7.45 7.43   PCO2 43 35  --  34* 35   PO2 108* 114*  --  149* 98   HCO3 25 24  --  23 23   O2PER 50 50 50  70 50 50     Vitals:    06/01/24 2020 06/02/24 0400   Weight: 100 kg (220 lb 7.4 oz) 125.2 kg (276 lb 0.3 oz)   I/O last 3 completed shifts:  In: 1380.74 [I.V.:1380.74]  Out: 1685 [Urine:1485; Emesis/NG output:200]  Recent Labs   Lab 06/02/24  0739 06/02/24  0546 06/02/24 0336 06/02/24 0331 06/01/24 2306 06/01/24 2040   NA  --   --   --  140  --  138  138   POTASSIUM  --   --   --  4.0  --  3.6  3.7   CHLORIDE  --   --   --  106  --  103  103   CO2  --   --   --  21*  --  21*  20*   ANIONGAP  --   --   --  13  --  14  15   * 159*   < > 149*   < > 257*  267*  264*   BUN  --   --   --  22.6*  --  20.3*  20.3*   CR  --   --   --  1.13  --  1.16  1.16   RANDA  --   --   --  8.1*  --  7.6*  7.7*    < > = values in this interval not displayed.     No components found for: \"URINE\"   Recent Labs   Lab 06/02/24 0331 06/01/24 2040   * 370*  374*   * 258*  254*   BILITOTAL 0.6 0.6  0.6   ALBUMIN 3.5 3.7  3.7   PROTTOTAL 5.6* 6.2*  6.2*   ALKPHOS 44 55  54     Temp: 99.3  F (37.4  C) Temp src: BladderTemp  Min: 97  F (36.1  C)  Max: 99.3  F (37.4  C)   Recent Labs   Lab 06/02/24  0331 06/01/24 2040   WBC 13.0* 17.0*   HGB 14.3 16.0   HCT 40.4 44.2   MCV 86 86   RDW 13.1 12.9    224     Recent Labs   Lab 06/02/24  0331 06/01/24 2040   INR 1.15 1.24*   PTT 63* >240*     Recent Labs   Lab 06/02/24  0739 06/02/24  0546 06/02/24  0336 06/02/24  0331 06/02/24  0330   * 159* 137* 149* 144*       Lines:           Data:     Recent Results (from the past 24 hour(s))   Transesophageal Echocardiogram    Narrative    " Alexis Rocha MD     6/1/2024  7:48 PM    [facility]  Point of Care Ultrasound Interpretation    POC US ALEXANDER    Date/Time: 6/1/2024 6:57 PM    Performed by: Alexis Rocha MD  Authorized by: Alexis Rocha MD     Point of Care Ultrasound: Transesophageal echo    Indications: Cardiac Arrest    Window: Adequate for full imaging and interpretation. Transesophageal echo   images obtained.    Findings/ Impression (Within the context of limited point-of-care   ultrasound): Findings consistent with:     Initial rhythm with ventricular fibrillation. Subsequent images show   normal sinus rhythm after defibrillation.    No significant pericardial effusion. No significantly enlarged RV.   XR Chest Port 1 View    Narrative    EXAM: XR PORTABLE CHEST 1 VIEW  LOCATION: [facility]  DATE: 6/1/2024    INDICATION: Traumatic injury, TRAUMA, intubated, pain  COMPARISON: 8/10/2012    IMPRESSION: Chest compression device over the thorax limits evaluation. ETT is 4.7 cm from the lorenzo. Central line projecting over the inferior cavoatrial junction. Lungs are clear. No effusions or pneumothorax. Heart size is accentuated by technique. No convincing acute osseous findings.   XR Surgery LEWIS Fluoro Greater Than 5 Min    Narrative    Fluoroscopy provided by a radiology technologist. Exact fluoroscopy time   is documented in end of exam information in EPIC   CT Head w/o Contrast    Narrative    EXAM: CT HEAD W/O CONTRAST  6/1/2024 8:10 PM     HISTORY: cardiac arrest s/p ecmo       COMPARISON: None    TECHNIQUE: Using multidetector thin collimation helical acquisition  technique, axial, coronal and sagittal CT images from the skull base  to the vertex were obtained without intravenous contrast.   (topogram) image(s) also obtained and reviewed.    FINDINGS:  No acute intracranial hemorrhage, mass effect, or midline shift. No  acute loss of gray-white matter differentiation. Ventricles are  proportionate to the  cerebral sulci. Clear basal cisterns.    Atraumatic calvarium. Left posterior parietal soft tissue hematoma.  Clear paranasal sinuses, mastoid air cells. Nonfocal orbits.       Impression    IMPRESSION:   1. No acute intracranial pathology.  2. Left posterior parietal soft tissue hematoma.    I have personally reviewed the examination and initial interpretation  and I agree with the findings.    ALEXI VAIL MD         SYSTEM ID:  [id number]   CT Chest Abdomen Pelvis w/o Contrast    Narrative    EXAM: Chest/abdomen/pelvis CT without contrast 6/1/2024 8:24 PM    HISTORY: 48 years Male cardiac arrest s/p ecmo.    COMPARISON: None.    TECHNIQUE: Helical CT images from the thoracic inlet through the  symphysis pubis were obtained without IV contrast.    FINDINGS:  Limited evaluation secondary to lack of intravenous contrast.     image(s) are noncontributory.    LINES/TUBES: Endotracheal tube terminates in the mid thoracic trachea.  Gastric tube tip and sidehole projects within the stomach. Right  inferior approach ECMO cannula terminates at the inferior cavoatrial  junction. Right inferior approach ECMO arterial cannula terminates  within the right common iliac artery. Left inferior approach venous  catheter terminates within the proximal left common iliac vein.  Inferior approach left arterial catheter terminates within the left  common iliac artery.    CHEST:  LOWER NECK: Unremarkable thyroid.     PULMONARY: Bilateral dependent atelectasis. No focal pulmonary mass or  nodule. No focal pulmonary opacity. No pleural effusion. Central  tracheobronchial tree is clear. No pneumothorax    CARDIAC: Mildly enlarged cardiac size. Left anterior descending stent.  No pericardial effusion.    MEDIASTINUM: Normal size/configuration of the great vessels. No  suspicious mediastinal, hilar, or axillary lymph nodes.     ESOPHAGUS: Unremarkable.    ABDOMEN/PELVIS:  HEPATIC: No suspicious focal hepatic lesion.    BILIARY: No  calcified intraluminal gallstone. No intrahepatic or  extrahepatic biliary ductal dilation.    PANCREAS: No focal pancreatic lesion. The main pancreatic duct is not  dilated.    SPLEEN: No splenomegaly. No suspicious lesions or masses.    ADRENALS: Unremarkable.    RENAL: No hydronephrosis, calculi, or mass.    URINARY BLADDER: Moderately dilated urinary bladder, unremarkable.    REPRODUCTIVE: Unremarkable.    GASTROINTESTINAL: Unremarkable stomach and duodenum. Normal caliber  large and small bowel. No pneumatosis or portal venous gas.    MESENTERY/PERITONEUM: No ascites or pneumoperitoneum.    VASCULAR: Nonaneurysmal abdominal aorta. Mild atherosclerotic vascular  calcifications.    LYMPH NODES: No mesenteric lymphadenopathy.     MUSCULOSKELETAL: Nondisplaced fractures at the angle of the 10th and  11th ribs.    SOFT TISSUES: Mild soft tissue stranding along the vascular access  sites within the bilateral groins. No focal fluid collection..      Impression    IMPRESSION:   1. Endotracheal tube terminates in the mid thoracic trachea.   2. Gastric tube tip and sidehole projects within the stomach.   3. Right inferior approach ECMO cannula terminates at the inferior  cavoatrial junction.   4. Right inferior approach ECMO arterial cannula terminates within the  right common iliac artery.   5. Left inferior approach venous catheter terminates within the  proximal left common iliac vein.   6. Inferior approach left arterial catheter terminates within the left  common iliac artery.  7. Left 10th and 11th posterior rib fractures and bilateral dependent  atelectasis. No pneumothorax.    I have personally reviewed the examination and initial interpretation  and I agree with the findings.    RONNIE GARZA MD         SYSTEM ID:  [id number]   XR Chest Port 1 View    Impression    RESIDENT PRELIMINARY INTERPRETATION  Impression:   1. ET tube tip at the mid thoracic trachea.  2. ECMO cannula tip at the inferior cavoatrial  junction.  3. Bibasilar dependent atelectasis/consolidation.   XR Abdomen Port 1 View    Impression    RESIDENT PRELIMINARY INTERPRETATION  Impression: Enteric tube tip and side-port project over the stomach.

## 2024-06-02 NOTE — PHARMACY-VANCOMYCIN DOSING SERVICE
Pharmacy Vancomycin Initial Note  Date of Service 2024  Patient's  1975  48 year old, male    Indication: Aspiration Pneumonia, post-arrest/ECMO    Current estimated CrCl = CrCl cannot be calculated (No successful lab value found.).    Creatinine for last 3 days  No results found for requested labs within last 3 days.    Recent Vancomycin Level(s) for last 3 days  No results found for requested labs within last 3 days.      Vancomycin IV Administrations (past 72 hours)        No vancomycin orders with administrations in past 72 hours.                    Nephrotoxins and other renal medications (From now, onward)      Start     Dose/Rate Route Frequency Ordered Stop    24  vancomycin (VANCOCIN) 2,000 mg in sodium chloride 0.9 % 250 mL intermittent infusion         2,000 mg (central catheter)  over 90 Minutes Intravenous EVERY 24 HOURS 24  norepinephrine (LEVOPHED) 16 mg in  mL infusion MAX CONC CENTRAL LINE         0.01-0.6 mcg/kg/min × 100 kg (Dosing Weight)  0.9-56.3 mL/hr  Intravenous CONTINUOUS 24  vasopressin 1 unit/mL MAX Conc (PITRESSIN) infusion         0.5-4 Units/hr  0.5-4 mL/hr  Intravenous CONTINUOUS 24              Contrast Orders - past 72 hours (72h ago, onward)      None                Plan:  Start vancomycin  200 mg IV q24h x 5 days  Vancomycin monitoring method: Trough (Method 2 = manual dose calculation)  Vancomycin therapeutic monitoring goal: 15-20 mg/L  Pharmacy will check vancomycin levels as appropriate in 1-3 Days.    Serum creatinine levels will be ordered daily for the first week of therapy and at least twice weekly for subsequent weeks.      Milli Nugent, PharmD

## 2024-06-02 NOTE — H&P
"  [facility]  Cardiology ICU History & Physical  2024          H&P:   Cullen Reardon is a 48 year old male with no known past medical history who was admitted on 2024 following a witnessed VF cardiac arrest.    He presented to [facility] on 2024 with chest pain.  He collapsed in front of the triage desk and was found to be pulseless after saying \"I feel like I'm going to die\".  He received immediate bystander CPR in the waiting room of the emergency department.  He was found be in VF, was shocked x 3.  Estimated downtime was approximately 15 minutes per paramedic report.  Was cannulated in [facility] emergency department and was on circuit at 16:08.  He was brought to the Cath Lab for coronary angiogram where he was found to have a 100% ostial LAD thrombotic occlusion status post stenting.  He was subsequently brought to [facility] for further care.    Witnessed arest (y/n): y   Home or public location (y/n): public   Bystander CPR (y/n): y  Time of 911 call: n/a   Initial rhythm: VF   Did they have intermittent ROSC (y/n): n   # of shocks: 3  Epi:  1 mg  Amio: 300 mg  Bicarb: 0 amps    Initial rhythm in the cath lab: VF   First AB.34/39/530  First Lactate unknown            Assessment and Plan:       Neuro: #. At risk for anoxic brain injury  --Intubated and sedated. Wean sedation daily to assess neurological status.   --Maintain euthermia/warm by 0.5 C/hr to get to 37C  --continue versed, fentanyl  --RASS goal -3 to -4   --CT head ordered.     CV: #. Cardiogenic Shock s/p VA ECMO  #. ACUTE STEMI s/p KAYLYN to ostial LAD 24   #. Refractory VF arrest  s/p VA ECMO  --Peripheral V-A ECMO inserted via RCFA (17 Fr) and RCFV (25 Fr).  --Echo ordered  --Continue ASA 81mg and ticagrelor 90mg BID  --Hold temp at 37, rewarm by 0.5 C every hour   --Continue heparin drip. ACT goal 180-200.  --Hold lipitor for now given likely hepatic injury during arrest  --Hold ACE/ARB " for now given likely reduced renal fxn after arrest  --Holding beta blocker given shock      Pulm: #. Acute hypoxic respiratory failure  #. Probable aspiration pneumonia  Vent Mode: PCV Plus assist  (Pressure Control Ventilation/ Assist Control)  Resp Rate (Set): 15 breaths/min  PEEP (cm H2O): 5 cmH2O  Inspiratory Pressure (cm H2O) (Drager Myah): 25    --ETT in stable position    --CXR: Lines in stable position.   --Wean vent as able  --Daily CXR  --Continue Vancomycin and ctx .  --MRSA nares - sent.  --Q2h ABGs for now     GI: #. Shock Liver 2/2 cardiac arrest  --Monitor LFTs twice a day.  --Consider RUQ US if LFTs do not improve.     Renal: #. JEAN CLAUDE 2/2 cardiac arrest  --Monitor urine output  --Maintain K>3 and Mg>2      ID: #. Probable aspiration pneumonia  --Vancomycin/ctx x5 days for presumed aspiration pneumonia.  --MRSA nares sent. Stop Vanc if negative.   --Daily blood cultures.     Hem/Onc: #. Acute blood loss anemia due to ECMO cannulas.  --Continue Aspirin and Tacagrelor for the stent.  --Continue Heparin gtt for ECMO with ACT goal 180-200  --Cryo PRN fibrinogen < 150; FFP for INR >2  --Transfuse for Hgb<7  --US LE w/ arterial duplex per ECMO protocol   --DVT PPX: Heparin as above     Endo: #. Hyperglycemia  --Insulin as needed.  --HgbA1c - pending.     Checklist: ICU Checklist:  #Feeding: npo  #Analgesia: versed   #Sedation: fentanyl  #Thromboembolism ppx: heparin gtt   #HOB: 30 degrees   #Ulcer ppx: ppi  #Glucose: insulin gtt   #SBT/SAT: in am  #Bowel reg: miralax, senna   #Lines/tubes/drains: 17 Fr RCFA, 25 Fr RCFV, 6 Fr LCFA sheath, 6 Fr LCFV sheath, schafer, ETT, OG  #Code status: full   #Family: will update as able this evening   #Dispo: ICU          Code Status: Full    The pt was discussed with the attending physician.     Erich Angelo MD  Cardiology fellow         Medications:   I have reviewed this patient's current medications    No past medical history on file.    No family history on  "file.  Social History     Socioeconomic History    Marital status: Single     Spouse name: Not on file    Number of children: Not on file    Years of education: Not on file    Highest education level: Not on file   Occupational History    Not on file   Tobacco Use    Smoking status: Not on file    Smokeless tobacco: Not on file   Substance and Sexual Activity    Alcohol use: Not on file    Drug use: Not on file    Sexual activity: Not on file   Other Topics Concern    Not on file   Social History Narrative    Not on file     Social Determinants of Health     Financial Resource Strain: Not At Risk (7/31/2023)    Received from [facility]    Financial Resource Strain     Is it hard for you to pay for the very basics like food, housing, medical care or heating?: No   Food Insecurity: Not At Risk (7/31/2023)    Received from [facility]    Food Insecurity     Does your food run out before you have the money to buy more?: No   Transportation Needs: Not At Risk (7/31/2023)    Received from [facility]    Transportation Needs     Does a lack of transportation keep you from your medical appointments or from getting your medications?: No   Physical Activity: Not on file   Stress: Not on file   Social Connections: Not on file   Interpersonal Safety: Not on file   Housing Stability: Not on file            Review of Systems:   Unable to obtain due to patients medical condition.            Physical Exam:   Ht 1.886 m (6' 2.25\")   Wt 100 kg (220 lb 7.4 oz)   BMI 28.12 kg/m        GENERAL: Intubated, sedated. NAD.  HEENT: No icterus. ETT in place, OG tube in place.  CARDIOVASCULAR: RRR. Normal S1 and S2.  RESP: Coarse bilaterally. Mechanical ventilation.    GI Soft, bowel sounds hypoactive but present.  GENITOURINARY: Benjamin in place.  EXTREMITIES: Cool, 1+ edema, pulses dopplered, as above.   NEURO: Sedated and intubated.  INTEGUMENTARY: No rashes. Cannula/Line sites CDI.                 Arterial Blood Gas:   Recent " "Labs   Lab 06/01/24 2048 06/01/24 2029   PH  --  7.33*   PCO2  --  44   PO2  --  385*   HCO3  --  23   O2PER 60  100 60     Vitals:    06/01/24 2020   Weight: 100 kg (220 lb 7.4 oz)   No intake/output data recorded.  Recent Labs   Lab 06/01/24 2040 06/01/24 2031   * 237*     No components found for: \"URINE\"   No lab results found in last 7 days.     No data recorded   No lab results found in last 7 days.    Invalid input(s): \"NEUT\", \"LYM\", \"EOS\"  No lab results found in last 7 days.  Recent Labs   Lab 06/01/24 2040 06/01/24 2031   * 237*       Lines:           Data:     Recent Results (from the past 24 hour(s))   Transesophageal Echocardiogram    Narrative    Alexis Rocha MD     6/1/2024  7:48 PM    [facility]  Point of Care Ultrasound Interpretation    POC US ALEXANDER    Date/Time: 6/1/2024 6:57 PM    Performed by: Alexis Rocha MD  Authorized by: Alexis Rocha MD     Point of Care Ultrasound: Transesophageal echo    Indications: Cardiac Arrest    Window: Adequate for full imaging and interpretation. Transesophageal echo   images obtained.    Findings/ Impression (Within the context of limited point-of-care   ultrasound): Findings consistent with:     Initial rhythm with ventricular fibrillation. Subsequent images show   normal sinus rhythm after defibrillation.    No significant pericardial effusion. No significantly enlarged RV.   XR Chest Port 1 View    Narrative    EXAM: XR PORTABLE CHEST 1 VIEW  LOCATION: [facility]  DATE: 6/1/2024    INDICATION: Traumatic injury, TRAUMA, intubated, pain  COMPARISON: 8/10/2012    IMPRESSION: Chest compression device over the thorax limits evaluation. ETT is 4.7 cm from the lorenzo. Central line projecting over the inferior cavoatrial junction. Lungs are clear. No effusions or pneumothorax. Heart size is accentuated by technique. No convincing acute osseous findings.   XR Surgery LEWIS Fluoro Greater Than 5 Min    Narrative    " Fluoroscopy provided by a radiology technologist. Exact fluoroscopy time   is documented in end of exam information in EPIC   CT Head w/o Contrast    Narrative    EXAM: CT HEAD W/O CONTRAST  6/1/2024 8:10 PM     HISTORY: cardiac arrest s/p ecmo       COMPARISON: None    TECHNIQUE: Using multidetector thin collimation helical acquisition  technique, axial, coronal and sagittal CT images from the skull base  to the vertex were obtained without intravenous contrast.   (topogram) image(s) also obtained and reviewed.    FINDINGS:  No acute intracranial hemorrhage, mass effect, or midline shift. No  acute loss of gray-white matter differentiation. Ventricles are  proportionate to the cerebral sulci. Clear basal cisterns.    Atraumatic calvarium. Left posterior parietal soft tissue hematoma.  Clear paranasal sinuses, mastoid air cells. Nonfocal orbits.       Impression    IMPRESSION:   1. No acute intracranial pathology.  2. Left posterior parietal soft tissue hematoma.    I have personally reviewed the examination and initial interpretation  and I agree with the findings.    ALEXI VAIL MD         SYSTEM ID:  [id number]   CT Chest Abdomen Pelvis w/o Contrast    Narrative    EXAM: Chest/abdomen/pelvis CT without contrast 6/1/2024 8:24 PM    HISTORY: 48 years Male cardiac arrest s/p ecmo.    COMPARISON: None.    TECHNIQUE: Helical CT images from the thoracic inlet through the  symphysis pubis were obtained without IV contrast.    FINDINGS:  Limited evaluation secondary to lack of intravenous contrast.     image(s) are noncontributory.    LINES/TUBES: Endotracheal tube terminates in the mid thoracic trachea.  Gastric tube tip and sidehole projects within the stomach. Right  inferior approach ECMO cannula terminates at the inferior cavoatrial  junction. Right inferior approach ECMO arterial cannula terminates  within the right common iliac artery. Left inferior approach venous  catheter terminates within the  proximal left common iliac vein.  Inferior approach left arterial catheter terminates within the left  common iliac artery.    CHEST:  LOWER NECK: Unremarkable thyroid.     PULMONARY: Bilateral dependent atelectasis. No focal pulmonary mass or  nodule. No focal pulmonary opacity. No pleural effusion. Central  tracheobronchial tree is clear. No pneumothorax    CARDIAC: Mildly enlarged cardiac size. Left anterior descending stent.  No pericardial effusion.    MEDIASTINUM: Normal size/configuration of the great vessels. No  suspicious mediastinal, hilar, or axillary lymph nodes.     ESOPHAGUS: Unremarkable.    ABDOMEN/PELVIS:  HEPATIC: No suspicious focal hepatic lesion.    BILIARY: No calcified intraluminal gallstone. No intrahepatic or  extrahepatic biliary ductal dilation.    PANCREAS: No focal pancreatic lesion. The main pancreatic duct is not  dilated.    SPLEEN: No splenomegaly. No suspicious lesions or masses.    ADRENALS: Unremarkable.    RENAL: No hydronephrosis, calculi, or mass.    URINARY BLADDER: Moderately dilated urinary bladder, unremarkable.    REPRODUCTIVE: Unremarkable.    GASTROINTESTINAL: Unremarkable stomach and duodenum. Normal caliber  large and small bowel. No pneumatosis or portal venous gas.    MESENTERY/PERITONEUM: No ascites or pneumoperitoneum.    VASCULAR: Nonaneurysmal abdominal aorta. Mild atherosclerotic vascular  calcifications.    LYMPH NODES: No mesenteric lymphadenopathy.     MUSCULOSKELETAL: Nondisplaced fractures at the angle of the 10th and  11th ribs.    SOFT TISSUES: Mild soft tissue stranding along the vascular access  sites within the bilateral groins. No focal fluid collection..      Impression    IMPRESSION:   1. Endotracheal tube terminates in the mid thoracic trachea.   2. Gastric tube tip and sidehole projects within the stomach.   3. Right inferior approach ECMO cannula terminates at the inferior  cavoatrial junction.   4. Right inferior approach ECMO arterial cannula  terminates within the  right common iliac artery.   5. Left inferior approach venous catheter terminates within the  proximal left common iliac vein.   6. Inferior approach left arterial catheter terminates within the left  common iliac artery.  7. Left 10th and 11th posterior rib fractures and bilateral dependent  atelectasis. No pneumothorax.    I have personally reviewed the examination and initial interpretation  and I agree with the findings.    RONNIE GARZA MD         SYSTEM ID:  [id number]

## 2024-06-02 NOTE — PROGRESS NOTES
[facility]    ECLS Shift Summary:     ECMO Equipment:  Console Serial Number: [id number]  Circuit Lot Number: [id number]  Oxygenator Lot Number: [id number]    Circuit Assessment: Free of fibrin, clot, and air    Arterial ECMO Cannula: 17 Fr in the Right Femoral Artery  Venous ECMO Cannula: 25 Fr in the Right Femoral Vein  Distal Perfusion Catheter: 11 Fr in the Right Superficial Femoral Artery    Distal Perfusion Catheter-Site Assessment: WDL  ECMO Cannula Arterial Right femoral artery-Site Assessment: WDL  ECMO Cannula Right femoral vein-Site Assessment: WDL  Distal Perfusion Catheter-Site Intervention: No intervention needed  ECMO Cannula Arterial Right femoral artery-Site Intervention: No intervention needed  ECMO Cannula Right femoral vein-Site Intervention: No intervention needed    Patient remains on V-A ECMO, all equipment is functioning and alarms are appropriately set. RPM's: 3500 with Blood Flow (Circuit) LPM  Avg: 3.8 LPM  Min: 3.64 LPM  Max: 4.13 LPM L/min. Sweep is at (S) 2 LPM and 70 %. Extremities are warm.     Significant Shift Events:   Patient was brought from outside facility w/out incident. Pt did have some chugging at start of shift, started LR through circuit. 250 mL of LR given, flows reduced from 4.13 to 3.64 and chugging resolved. Heparin started d/t ACT in 140s, adjusted to try to get ACT within ordered goal. Have had to give x2 Heparin bolus. Sweep gradually decreased from 4 to 1 d/t lower CO2.    Vent settings:  Vent Mode: CMV/AC  (Continuous Mandatory Ventilation/ Assist Control)  FiO2 (%): 0 % (50)  Resp Rate (Set): 10 breaths/min  Tidal Volume (Set, mL): 450 mL  PEEP (cm H2O): 7 cmH2O  Inspiratory Pressure (cm H2O) (Drager Myah): 25  Resp: 10      Anticoagulation:  Dose (units/hr) HEParin: 1200 Units/hr  Rate (mL/hr) HEParin: 12 mL/hr  Concentration HEParin: 100 Units/mL        Most recent: ACT  (seconds): 153 seconds    Urine output is  1385 mL.  .  Blood loss was minimal. Product given included 250 mL LR.     Intake/Output Summary (Last 24 hours) at 6/2/2024 0442  Last data filed at 6/2/2024 0400  Gross per 24 hour   Intake 1160.59 ml   Output 1540 ml   Net -379.41 ml       Labs:  Recent Labs   Lab 06/02/24  0331 06/02/24  0330 06/02/24  0224 06/02/24  0046 06/01/24  2248   PH  --  7.45 7.43 7.45 7.40   PCO2  --  34* 35 32* 38   PO2  --  149* 98 97 136*   HCO3  --  23 23 23 24   O2PER 50  70 50 50 50 50       Lab Results   Component Value Date    HGB 14.3 06/02/2024    PHGB <30 06/02/2024     06/02/2024    FIBR 278 06/02/2024    INR 1.15 06/02/2024    PTT 63 (H) 06/02/2024    DD 5.02 (H) 06/02/2024       Plan is continue VA ECMO.    Angie Ramsey RT  ECMO Specialist  6/2/2024 4:42 AM

## 2024-06-02 NOTE — PROGRESS NOTES
INITIAL REPORT of Video-EEG Monitoring              DATE OF RECORDIN2024         I reviewed the first 1 hour of video-EEG monitoring of Cullen Reardon.          The EEG during sedated coma was abnormal due to generalized delta-theta slowing, with intermixture of faster alpha-beta activities.  No electrographic seizures and no interictal epileptiform abnormalities were observed.         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  This recording excludes non-convulsive status epilepticus as a possible cause of this encephalopathy.    Screening for intermittent seizures will require a longer period of recording.   Michael Birmingham M.D.

## 2024-06-02 NOTE — PROGRESS NOTES
Patient has an order for PICC line. Patient has pending blood culture.Spoke with JESSE Hyde and informed standard of practice, has to wait 48 hours for blood culture to come back negative before placing a line. She will talk to the provider and let us know if there's any changes.

## 2024-06-02 NOTE — CONSULTS
Neurocritical Care Consultation    Reason for critical care admission: Cardiac Arrest  Admitting Team: CICU  Date of Service:  06/02/2024  Date of Admission:  6/1/2024  Hospital Day: 2    Assessment/Plan  Cullen Reardon is a 48 year old male with no known past medical history of admitted on 6/1/2024 following a witnessed VF cardiac arrest.     24 hour events: See HPI.  Briefly he collapsed in front of the triage desk at [facility], received immediate bystander CPR, shocked 3 times, VF initial rhythm, cannulated in [facility] ER at 1608 (estimated downtime 15 minutes).  Found to have 100% ostial LAD thrombotic occlusion s/p stenting.    Neuro  #Hypoxic encephalopathy 2.2 cardiac arrest   Post arrest day: 1  Length of downtime: 15 min  -Witnessed arrest: Yes  -ROSC: No, rhythm: VF, was shocked x3.  - Cannulated at 1608, downtime estimated to be 15 minutes.   -Goal normothermia   -Current temp: 97.5     Analgesics & sedation  Prior sedation: Fentanyl and Versed  Current sedation: Fentanyl 100 and Versed 8    Exam findings   Off sedation (Versed 8 and fentanyl 100) for 10 minutes.  -Cough: No  -Gag: No  -Pupils: 2 mm, NPI 3-4  -Per nursing when he initially arrived, he was noted to be restless with movement in all 4 extremities.    Neuroimaging  -Day 1 CTH shows: No acute intracranial pathology.   -Consider repeating CTH on day 3     Neurophysiology  -start/continue vEEG with VA ECMO  -vEEG shows:The EEG during sedated coma was abnormal due to generalized delta-theta slowing, with intermixture of faster alpha-beta activities.  No electrographic seizures and no interictal epileptiform abnormalities were observed.      Biomarkers  -Neuron-specific Enolase (NSE) results: pending   -S 100B protein: pending     Recommendations  -Avoid hypotonic solutions as they may worsen cerebral edema   -Avoid nitroprusside or nitroglycerin as they may also worsen cerbral edema  -Advise slow correction of hypernatremia if needed  -When safe  "to do so, please limit use of CNS acting medications such as benzodiazepines, opiates, anticholinergics, which will permit a more accurate neurological assessment  -NCC will continue to follow, please call [phone] with any questions    Clinically Significant Risk Factors Present on Admission          # Hypocalcemia: Lowest Ca = 7.6 mg/dL in last 2 days, will monitor and replace as appropriate      # Coagulation Defect: INR = 1.24 (Ref range: 0.85 - 1.15) and/or PTT = >240 Seconds (Ref range: 22 - 38 Seconds), will monitor for bleeding      # Circulatory Shock: required vasopressors within past 24 hours       # Overweight: Estimated body mass index is 28.12 kg/m  as calculated from the following:    Height as of this encounter: 1.886 m (6' 2.25\").    Weight as of this encounter: 100 kg (220 lb 7.4 oz).                  To be formally staffed in am. The NCC attending is Dr. Anton.    LEXA Zhu  PGY2 Resident, Neurology     History of Present Illness:  Cullen Reardon is a 48 year old male with no past medical history of admitted on 2024 for witnessed cardiac arrest now on ECMO.     Per chart review: He presented to [facility] on 2024 with chest pain.  He collapsed in front of the triage desk and was found to be pulseless after saying \"I feel like I'm going to die\".  He received immediate bystander CPR in the waiting room of the emergency department.  He was found be in VF, was shocked x 3.  Estimated downtime was approximately 15 minutes per paramedic report.  Was cannulated in [facility] emergency department and was on circuit at 16:08.  He was brought to the Cath Lab for coronary angiogram where he was found to have a 100% ostial LAD thrombotic occlusion status post stenting.  He was subsequently brought to [facility] for further care.     First AB.34/39/530  First Lactate unknown. Lactate here was 3.5.      Physical Examination:  Vitals: Pulse 103   Temp 97.5  F (36.4  C)   Resp (!) 0   Ht " "1.886 m (6' 2.25\")   Wt 100 kg (220 lb 7.4 oz)   SpO2 99%   BMI 28.12 kg/m    General: Adult male patient, lying in bed, critically-ill  HEENT: Normocephalic, atraumatic, no icterus, oral cavity/oropharynx pink and moist  Cardiac: RRR, s1/s2 auscultated without murmur   Pulm: CTAB, unlabored, expansion symmetric, no retractions or use of accessory muscles  Abdomen: Soft, non-distended, bowel sounds present  Extremities: Warm, no edema, distal pulses +2, well perfused  Skin: No rash or lesion  Psych: Calm and cooperative  Neuro: Exam was performed off sedation from 8 of Versed and 100 of fentanyl for 10 minutes  Mental status: Eyes closed, unresponsive.  Not following any commands.  Cranial nerves: Pupils 2 mm, right pupil NPI 3.8, left pupil NPI 3.4.  VOR sluggish not crossing midline.  Corneals intact.  No cough noted.  Motor/sensory: No movement on noxious stimulus in all 4 extremities.  Toes mute  Could not assess coordination and gait.    No Known Allergies    Current Medications:  Current Facility-Administered Medications   Medication Dose Route Frequency Provider Last Rate Last Admin    artificial tears ophthalmic ointment   Both Eyes Q8H Erich Angelo MD        aspirin (ASA) chewable tablet 81 mg  81 mg Per Feeding Tube Daily Erich Angelo MD        cefTRIAXone (ROCEPHIN) 2 g vial to attach to  ml bag for ADULTS or NS 50 ml bag for PEDS  2 g Intravenous Q24H Erich Angelo MD   2 g at 06/01/24 2121    chlorhexidine (PERIDEX) 0.12 % solution 15 mL  15 mL Mouth/Throat Q12H Erich Angelo MD        pantoprazole (PROTONIX) IV push injection 40 mg  40 mg Intravenous Daily Erich Angelo MD   40 mg at 06/01/24 2326    ticagrelor (BRILINTA) tablet 90 mg  90 mg Oral BID Florencia Salomon MD        vancomycin (VANCOCIN) 2,000 mg in sodium chloride 0.9 % 500 mL intermittent infusion  2,000 mg Intravenous At Bedtime Florencia Salomon MD   2,000 mg at 06/02/24 0004       PRN Medications:  Current " Facility-Administered Medications   Medication Dose Route Frequency Provider Last Rate Last Admin    acetaminophen (TYLENOL) tablet 650 mg  650 mg Oral Q4H PRN Erich Angelo MD        Or    acetaminophen (TYLENOL) Suppository 650 mg  650 mg Rectal Q4H PRN Erich Angelo MD        calcium chloride 1 g in sodium chloride 0.9 % 100 mL intermittent infusion  1 g Intravenous Q6H PRN Erich Angelo MD   1 g at 06/01/24 2209    dextrose 10% infusion   Intravenous Continuous PRN Erich Angelo MD        glucose gel 15-30 g  15-30 g Oral Q15 Min PRN Erich Angelo MD        Or    dextrose 50 % injection 25-50 mL  25-50 mL Intravenous Q15 Min PRN Erich Angelo MD        Or    glucagon injection 1 mg  1 mg Subcutaneous Q15 Min PRN Erich Angelo MD        fentaNYL (SUBLIMAZE) 50 mcg/mL bolus from pump  50 mcg Intravenous Q30 Min PRN Erich Angelo MD        heparin ANTICOAGULANT bolus dose from infusion pump [id number] Units  5-10 Units/kg (Ideal) Other Q30 Min PRN Florencia Salomon MD        lidocaine (LMX4) cream   Topical Q1H PRN Erich Angelo MD        lidocaine 1 % 0.1-1 mL  0.1-1 mL Other Q1H PRN Erich Angelo MD        magnesium sulfate 2 g in 50 mL sterile water intermittent infusion  2 g Intravenous Daily PRN Erich Angelo MD        magnesium sulfate 4 g in 100 mL sterile water intermittent infusion  4 g Intravenous Q4H PRN Erich Angelo MD        melatonin liquid 10 mg  10 mg Oral At Bedtime PRN Erich Angelo MD        midazolam (VERSED) injection 1-3 mg  1-3 mg Intravenous Q1H PRN Erich Angelo MD        naloxone (NARCAN) injection 0.2 mg  0.2 mg Intravenous Q2 Min PRN Florencia Salomon MD        Or    naloxone (NARCAN) injection 0.4 mg  0.4 mg Intravenous Q2 Min PRN Florencia Salomon MD        Or    naloxone (NARCAN) injection 0.2 mg  0.2 mg Intramuscular Q2 Min PRN Florencia Salomon MD        Or    naloxone (NARCAN) injection 0.4 mg  0.4 mg Intramuscular Q2 Min PRN Florencia Salomon  MD        polyethylene glycol (MIRALAX) Packet 17 g  17 g Oral Daily PRN Erich Angelo MD        potassium chloride 20 mEq in 50 mL intermittent infusion  20 mEq Intravenous Q1H PRN Erich Angelo MD        [START ON 6/3/2024] senna-docusate (SENOKOT-S/PERICOLACE) 8.6-50 MG per tablet 1 tablet  1 tablet Oral BID PRN Erich Angelo MD        sodium chloride (PF) 0.9% PF flush 3 mL  3 mL Intracatheter Q8H PRN Erich Angelo MD        sodium chloride (PF) 0.9% PF flush 3 mL  3 mL Intracatheter q1 min prn Erich Angelo MD        sodium phosphate 10 mmol in sodium chloride 0.9 % 250 mL intermittent infusion  10 mmol Intravenous Daily PRN Erich Angelo MD        sodium phosphate 15 mmol in sodium chloride 0.9 % 250 mL intermittent infusion  15 mmol Intravenous Daily PRN Erich Angelo MD        sodium phosphate 20 mmol in sodium chloride 0.9 % 250 mL intermittent infusion  20 mmol Intravenous Q6H PRN Erich Angelo MD        sodium phosphate 25 mmol in sodium chloride 0.9 % 500 mL intermittent infusion  25 mmol Intravenous Q8H PRN Erich Angelo MD           Infusions:  Current Facility-Administered Medications   Medication Dose Route Frequency Provider Last Rate Last Admin    amiodarone (NEXTERONE) 1.8 mg/mL in sodium chloride 0.9% in non-PVC container 500 mL ADULT STANDARD infusion  1 mg/min Intravenous Continuous Erich Angelo MD 33.33 mL/hr at 06/01/24 2300 1 mg/min at 06/01/24 2300    amiodarone (NEXTERONE) 1.8 mg/mL in sodium chloride 0.9% in non-PVC container 500 mL ADULT STANDARD infusion  0.5 mg/min Intravenous Continuous Erich Angelo MD        dextrose 10% infusion   Intravenous Continuous PRN Erich Angelo MD        fentaNYL (SUBLIMAZE) infusion   mcg/hr Intravenous Continuous Erich Angelo MD 2 mL/hr at 06/01/24 2300 100 mcg/hr at 06/01/24 2300    heparin (porcine) 100 unit/mL in 0.45% Sodium Chloride ANTICOAGULANT infusion  10-50 Units/kg/hr (Ideal) Other Continuous Florencia Salomon MD        heparin 2  unit/mL in 0.9% NaCl (for REPERFUSION CATHETER)  3 mL/hr Intravenous Continuous Erich Angelo MD 3 mL/hr at 06/01/24 2300 3 mL/hr at 06/01/24 2300    insulin regular (MYXREDLIN) 1 unit/mL infusion  0-24 Units/hr Intravenous Continuous Erich Angelo MD 3 mL/hr at 06/01/24 2300 3 Units/hr at 06/01/24 2300    midazolam (VERSED) 100 mg/100 mL NS infusion - ADULT  1-8 mg/hr Intravenous Continuous Erich Angelo MD 8 mL/hr at 06/01/24 2300 8 mg/hr at 06/01/24 2300    norepinephrine (LEVOPHED) 16 mg in  mL infusion MAX CONC CENTRAL LINE  0.01-0.6 mcg/kg/min (Dosing Weight) Intravenous Continuous Erich Angelo MD 3.8 mL/hr at 06/01/24 2300 0.04 mcg/kg/min at 06/01/24 2300    vasopressin 1 unit/mL MAX Conc (PITRESSIN) infusion  0.5-4 Units/hr Intravenous Continuous Erich Angelo MD   Held at 06/01/24 2058         Labs and Imaging:    Results for orders placed or performed during the hospital encounter of 06/01/24 (from the past 24 hour(s))   CT Head w/o Contrast    Narrative    EXAM: CT HEAD W/O CONTRAST  6/1/2024 8:10 PM     HISTORY: cardiac arrest s/p ecmo       COMPARISON: None    TECHNIQUE: Using multidetector thin collimation helical acquisition  technique, axial, coronal and sagittal CT images from the skull base  to the vertex were obtained without intravenous contrast.   (topogram) image(s) also obtained and reviewed.    FINDINGS:  No acute intracranial hemorrhage, mass effect, or midline shift. No  acute loss of gray-white matter differentiation. Ventricles are  proportionate to the cerebral sulci. Clear basal cisterns.    Atraumatic calvarium. Left posterior parietal soft tissue hematoma.  Clear paranasal sinuses, mastoid air cells. Nonfocal orbits.       Impression    IMPRESSION:   1. No acute intracranial pathology.  2. Left posterior parietal soft tissue hematoma.    I have personally reviewed the examination and initial interpretation  and I agree with the findings.    ALEXI VAIL MD         SYSTEM ID:   [id number]   CT Chest Abdomen Pelvis w/o Contrast    Narrative    EXAM: Chest/abdomen/pelvis CT without contrast 6/1/2024 8:24 PM    HISTORY: 48 years Male cardiac arrest s/p ecmo.    COMPARISON: None.    TECHNIQUE: Helical CT images from the thoracic inlet through the  symphysis pubis were obtained without IV contrast.    FINDINGS:  Limited evaluation secondary to lack of intravenous contrast.     image(s) are noncontributory.    LINES/TUBES: Endotracheal tube terminates in the mid thoracic trachea.  Gastric tube tip and sidehole projects within the stomach. Right  inferior approach ECMO cannula terminates at the inferior cavoatrial  junction. Right inferior approach ECMO arterial cannula terminates  within the right common iliac artery. Left inferior approach venous  catheter terminates within the proximal left common iliac vein.  Inferior approach left arterial catheter terminates within the left  common iliac artery.    CHEST:  LOWER NECK: Unremarkable thyroid.     PULMONARY: Bilateral dependent atelectasis. No focal pulmonary mass or  nodule. No focal pulmonary opacity. No pleural effusion. Central  tracheobronchial tree is clear. No pneumothorax    CARDIAC: Mildly enlarged cardiac size. Left anterior descending stent.  No pericardial effusion.    MEDIASTINUM: Normal size/configuration of the great vessels. No  suspicious mediastinal, hilar, or axillary lymph nodes.     ESOPHAGUS: Unremarkable.    ABDOMEN/PELVIS:  HEPATIC: No suspicious focal hepatic lesion.    BILIARY: No calcified intraluminal gallstone. No intrahepatic or  extrahepatic biliary ductal dilation.    PANCREAS: No focal pancreatic lesion. The main pancreatic duct is not  dilated.    SPLEEN: No splenomegaly. No suspicious lesions or masses.    ADRENALS: Unremarkable.    RENAL: No hydronephrosis, calculi, or mass.    URINARY BLADDER: Moderately dilated urinary bladder, unremarkable.    REPRODUCTIVE: Unremarkable.    GASTROINTESTINAL:  Unremarkable stomach and duodenum. Normal caliber  large and small bowel. No pneumatosis or portal venous gas.    MESENTERY/PERITONEUM: No ascites or pneumoperitoneum.    VASCULAR: Nonaneurysmal abdominal aorta. Mild atherosclerotic vascular  calcifications.    LYMPH NODES: No mesenteric lymphadenopathy.     MUSCULOSKELETAL: Nondisplaced fractures at the angle of the 10th and  11th ribs.    SOFT TISSUES: Mild soft tissue stranding along the vascular access  sites within the bilateral groins. No focal fluid collection..      Impression    IMPRESSION:   1. Endotracheal tube terminates in the mid thoracic trachea.   2. Gastric tube tip and sidehole projects within the stomach.   3. Right inferior approach ECMO cannula terminates at the inferior  cavoatrial junction.   4. Right inferior approach ECMO arterial cannula terminates within the  right common iliac artery.   5. Left inferior approach venous catheter terminates within the  proximal left common iliac vein.   6. Inferior approach left arterial catheter terminates within the left  common iliac artery.  7. Left 10th and 11th posterior rib fractures and bilateral dependent  atelectasis. No pneumothorax.    I have personally reviewed the examination and initial interpretation  and I agree with the findings.    RONNIE GARZA MD         SYSTEM ID:  [id number]   Blood gas arterial   Result Value Ref Range    pH Arterial 7.33 (L) 7.35 - 7.45    pCO2 Arterial 44 35 - 45 mm Hg    pO2 Arterial 385 (H) 80 - 105 mm Hg    FIO2 60     Bicarbonate Arterial 23 21 - 28 mmol/L    Base Excess/Deficit Arterial -3.0 -3.0 - 3.0 mmol/L    Geovanni's Test Artline     Oxyhemoglobin Arterial 98 92 - 100 %    O2 Sat, Arterial >100.0 (H) 96.0 - 97.0 %    Narrative    In healthy individuals, oxyhemoglobin (O2Hb) and oxygen saturation (SO2) are approximately equal. In the presence of dyshemoglobins, oxyhemoglobin can be considerably lower than oxygen saturation.   Glucose by meter   Result  Value Ref Range    GLUCOSE BY METER POCT 237 (H) 70 - 99 mg/dL   Hemoglobin A1c   Result Value Ref Range    Hemoglobin A1C 6.1 (H) <5.7 %   ABO/Rh type and screen    Narrative    The following orders were created for panel order ABO/Rh type and screen.  Procedure                               Abnormality         Status                     ---------                               -----------         ------                     Adult Type and Screen[[id number]]                            Final result                 Please view results for these tests on the individual orders.   Calcium ionized whole blood   Result Value Ref Range    Calcium Ionized Whole Blood 4.1 (L) 4.4 - 5.2 mg/dL   CRP inflammation   Result Value Ref Range    CRP Inflammation 3.61 <5.00 mg/L   CBC with platelets differential    Narrative    The following orders were created for panel order CBC with platelets differential.  Procedure                               Abnormality         Status                     ---------                               -----------         ------                     CBC with platelets and d...[[id number]]  Abnormal            Final result                 Please view results for these tests on the individual orders.   Comprehensive metabolic panel   Result Value Ref Range    Sodium 138 135 - 145 mmol/L    Potassium 3.7 3.4 - 5.3 mmol/L    Carbon Dioxide (CO2) 20 (L) 22 - 29 mmol/L    Anion Gap 15 7 - 15 mmol/L    Urea Nitrogen 20.3 (H) 6.0 - 20.0 mg/dL    Creatinine 1.16 0.67 - 1.17 mg/dL    GFR Estimate 77 >60 mL/min/1.73m2    Calcium 7.7 (L) 8.6 - 10.0 mg/dL    Chloride 103 98 - 107 mmol/L    Glucose 264 (H) 70 - 99 mg/dL    Alkaline Phosphatase 54 40 - 150 U/L     (H) 0 - 45 U/L     (H) 0 - 70 U/L    Protein Total 6.2 (L) 6.4 - 8.3 g/dL    Albumin 3.7 3.5 - 5.2 g/dL    Bilirubin Total 0.6 <=1.2 mg/dL   Cortisol   Result Value Ref Range    Cortisol 43.5   ug/dL   D dimer quantitative   Result Value Ref Range     D-Dimer Quantitative 8.83 (H) 0.00 - 0.50 ug/mL FEU    Narrative    This D-dimer assay is intended for use in conjunction with a clinical pretest probability assessment model to exclude pulmonary embolism (PE) and deep venous thrombosis (DVT) in outpatients suspected of PE or DVT. The cut-off value is 0.50 ug/mL FEU.   Erythrocyte sedimentation rate auto   Result Value Ref Range    Erythrocyte Sedimentation Rate 12 0 - 15 mm/hr   Hemoglobin plasma   Result Value Ref Range    Hemoglobin Plasma 30 (H) <30 mg/dL   Lactic acid whole blood   Result Value Ref Range    Lactic Acid 3.5 (H) 0.7 - 2.0 mmol/L   Lactate Dehydrogenase   Result Value Ref Range    Lactate Dehydrogenase 630 (H) 0 - 250 U/L   Procalcitonin   Result Value Ref Range    Procalcitonin 0.50 (H) <0.50 ng/mL   Troponin T, High Sensitivity   Result Value Ref Range    Troponin T, High Sensitivity 4,931 (HH) <=22 ng/L   TSH   Result Value Ref Range    TSH 1.54 0.30 - 4.20 uIU/mL   T3 Free   Result Value Ref Range    T3 Free 3.1 2.0 - 4.4 pg/mL   T3 total   Result Value Ref Range    T3 Total 111 85 - 202 ng/dL   T4 free   Result Value Ref Range    Free T4 1.45 0.90 - 1.70 ng/dL   Comprehensive metabolic panel   Result Value Ref Range    Sodium 138 135 - 145 mmol/L    Potassium 3.6 3.4 - 5.3 mmol/L    Carbon Dioxide (CO2) 21 (L) 22 - 29 mmol/L    Anion Gap 14 7 - 15 mmol/L    Urea Nitrogen 20.3 (H) 6.0 - 20.0 mg/dL    Creatinine 1.16 0.67 - 1.17 mg/dL    GFR Estimate 77 >60 mL/min/1.73m2    Calcium 7.6 (L) 8.6 - 10.0 mg/dL    Chloride 103 98 - 107 mmol/L    Glucose 267 (H) 70 - 99 mg/dL    Alkaline Phosphatase 55 40 - 150 U/L     (H) 0 - 45 U/L     (H) 0 - 70 U/L    Protein Total 6.2 (L) 6.4 - 8.3 g/dL    Albumin 3.7 3.5 - 5.2 g/dL    Bilirubin Total 0.6 <=1.2 mg/dL   Glucose whole blood   Result Value Ref Range    Glucose 257 (H) 70 - 99 mg/dL   Heparin Unfractionated Anti Xa Level   Result Value Ref Range    Anti Xa Unfractionated Heparin  >1.10 (HH) For Reference Range, See Comment IU/mL    Narrative    Therapeutic Range: UFH: 0.25-0.50 IU/mL for low intensity dosing,  0.30-0.70 IU/mL for high intensity dosing DVT and PE.  This test is not validated for other direct factor X inhibitors (e.g. rivaroxaban, apixaban, edoxaban, betrixaban, fondaparinux) and should not be used for monitoring of other medications.   INR   Result Value Ref Range    INR 1.24 (H) 0.85 - 1.15   Partial thromboplastin time   Result Value Ref Range    aPTT >240 (HH) 22 - 38 Seconds   Magnesium   Result Value Ref Range    Magnesium 2.2 1.7 - 2.3 mg/dL   Adult Type and Screen   Result Value Ref Range    ABO/RH(D) A POS     Antibody Screen Negative Negative    SPECIMEN EXPIRATION DATE [id number]    CBC with platelets and differential   Result Value Ref Range    WBC Count 17.0 (H) 4.0 - 11.0 10e3/uL    RBC Count 5.16 4.40 - 5.90 10e6/uL    Hemoglobin 16.0 13.3 - 17.7 g/dL    Hematocrit 44.2 40.0 - 53.0 %    MCV 86 78 - 100 fL    MCH 31.0 26.5 - 33.0 pg    MCHC 36.2 31.5 - 36.5 g/dL    RDW 12.9 10.0 - 15.0 %    Platelet Count 224 150 - 450 10e3/uL    % Neutrophils 86 %    % Lymphocytes 5 %    % Monocytes 8 %    % Eosinophils 0 %    % Basophils 0 %    % Immature Granulocytes 1 %    NRBCs per 100 WBC 0 <1 /100    Absolute Neutrophils 14.4 (H) 1.6 - 8.3 10e3/uL    Absolute Lymphocytes 0.9 0.8 - 5.3 10e3/uL    Absolute Monocytes 1.4 (H) 0.0 - 1.3 10e3/uL    Absolute Eosinophils 0.0 0.0 - 0.7 10e3/uL    Absolute Basophils 0.0 0.0 - 0.2 10e3/uL    Absolute Immature Granulocytes 0.1 <=0.4 10e3/uL    Absolute NRBCs 0.0 10e3/uL   Blood gas ELS arterial   Result Value Ref Range    pH ELS Arterial 7.33 (L) 7.35 - 7.45    pCO2 ELS Arterial 45 35 - 45 mm Hg    pO2 ELS Arterial 412 (H) 80 - 105 mm Hg    Bicarbonate ELS Arterial 24 21 - 28 mmol/L    Base Excess/Deficit Arterial -2.5 -3.0 - 3.0 mmol/L    FIO2 100     Oxyhemoglobin ELS Arterial 100 75 - 100 %    O2 Saturation ELS Arterial >100.0  (H) 96.0 - 97.0 %    Narrative    In healthy individuals, oxyhemoglobin (O2Hb) and oxygen saturation (SO2) are approximately equal. In the presence of dyshemoglobins, oxyhemoglobin can be considerably lower than oxygen saturation.   Blood gas ELS venous   Result Value Ref Range    pH ELS Venous 7.30 (L) 7.32 - 7.43    pCO2 ELS Venous 51 (H) 40 - 50 mm Hg    pO2 ELS Venous 44 25 - 47 mm Hg    Bicarbonate ELS Venous 25 21 - 28 mmol/L    Base Excess/Deficit Venous -2.3 -3.0 - 3.0 mmol/L    FIO2 60     Oxyhemoglobin ELS Venous 72 %    O2 Saturation ELS Venous 72.7 70.0 - 75.0 %    Narrative    In healthy individuals, oxyhemoglobin (O2Hb) and oxygen saturation (SO2) are approximately equal. In the presence of dyshemoglobins, oxyhemoglobin can be considerably lower than oxygen saturation.   Activated clotting time celite, POCT   Result Value Ref Range    Activated Clotting Time (Celite) POCT 199 (H) 74 - 150 seconds   EKG 12-lead, tracing only   Result Value Ref Range    Systolic Blood Pressure  mmHg    Diastolic Blood Pressure  mmHg    Ventricular Rate 91 BPM    Atrial Rate 91 BPM    NJ Interval 146 ms    QRS Duration 86 ms     ms    QTc 462 ms    P Axis 44 degrees    R AXIS 39 degrees    T Axis 83 degrees    Interpretation ECG       Sinus rhythm  Low voltage QRS  ST elevation consider lateral injury or acute infarct  ** ** ACUTE MI / STEMI ** **  Abnormal ECG  No previous ECGs available     Laceration repair    Narrative    Erich Angelo MD     6/1/2024  9:26 PM  [facility]    Laceration repair    Date/Time: 6/1/2024 9:24 PM    Performed by: Erich Angelo MD  Authorized by: Erich Angelo MD  Location details: scalp      UNIVERSAL PROTOCOL   Site Marked: NA  Prior Images Obtained and Reviewed:  NA  Required items: Required blood products, implants, devices and special   equipment available    Patient identity confirmed:  Anonymous protocol, patient    vented/unresponsive  Patient was reevaluated immediately before administering moderate or deep   sedation or anesthesia  Confirmation Checklist:  Procedure was appropriate and matched the consent   or emergent situation  Universal Protocol: the Joint Commission Universal Protocol was followed      ANESTHESIA    Local anesthesia used: no    Body area: head/neck  Laceration length: 4 cm  Foreign bodies: no foreign bodies  Amount of cleaning: standard  Debridement: none  Degree of undermining: none  Skin closure: staples  Number of sutures: 6      SEDATION  Patient Sedated: Yes    Sedation Type:  Deep  Vital signs: Vital signs monitored during sedation      PROCEDURE    Length of time physician/provider present for 1:1 monitoring during   sedation: 15   Extra Tube ([place] Draw)    Narrative    The following orders were created for panel order Extra Tube ([place] Draw).  Procedure                               Abnormality         Status                     ---------                               -----------         ------                     Extra Red Top Tube[[id number]]                               Final result                 Please view results for these tests on the individual orders.   Extra Red Top Tube   Result Value Ref Range    Hold Specimen JIC    XR Chest Port 1 View    Impression    RESIDENT PRELIMINARY INTERPRETATION  Impression:   1. ET tube tip at the mid thoracic trachea.  2. ECMO cannula tip at the inferior cavoatrial junction.  3. Bibasilar dependent atelectasis/consolidation.   XR Abdomen Port 1 View    Impression    RESIDENT PRELIMINARY INTERPRETATION  Impression: Enteric tube tip and side-port project over the stomach.   Asymptomatic COVID-19 Virus (Coronavirus) by PCR Nose    Specimen: Nose; Swab   Result Value Ref Range    SARS CoV2 PCR Negative Negative    Narrative    Testing was performed using the Xpert Xpress SARS-CoV-2 Assay on the Cepheid Gene-Xpert Instrument Systems. Additional  information about this Emergency Use Authorization (EUA) assay can be found via the Lab Guide. This test should be ordered for the detection of SARS-CoV-2 in individuals who meet SARS-CoV-2 clinical and/or epidemiological criteria as well as from individuals without symptoms or other reasons to suspect COVID-19. Test performance for asymptomatic patients has only been established in anterior nasal swab specimens. This test is for in vitro diagnostic use under the FDA EUA for laboratories certified under CLIA to perform high complexity testing. This test has not been FDA cleared or approved. A negative result does not rule out the presence of PCR inhibitors in the specimen or target RNA concentration below the limit of detection for the assay. The possibility of a false negative should be considered if the patient's recent exposure or clinical presentation suggests COVID-19. This test was validated by [facility]. These Laboratories are certified under the Clinical Laboratory Improvement Amendments (CLIA) as qualified to perform high complexity testing.     MRSA MSSA PCR, Nasal Swab    Specimen: Nose; Swab   Result Value Ref Range    MRSA Target DNA Negative Negative    SA Target DNA Positive     Narrative    The [employer]  Xpert SA Nasal Complete assay performed in the Gene[employer].8 Interactive  Dx System is a qualitative in vitro diagnostic test designed for rapid detection of Staphylococcus aureus (SA) and methicillin-resistant Staphylococcus aureus (MRSA) from nasal swabs in patients at risk for nasal colonization. The test utilizes automated real-time polymerase chain reaction (PCR) to detect MRSA/SA DNA. The Xpert SA Nasal Complete assay is intended to aid in the prevention and control of MRSA/SA infections in healthcare settings. The assay is not intended to diagnose, guide or monitor treatment for MRSA/SA infections, or provide results of susceptibility to methicillin. A negative result does not preclude  MRSA/SA nasal colonization.    Blood gas arterial   Result Value Ref Range    pH Arterial 7.40 7.35 - 7.45    pCO2 Arterial 38 35 - 45 mm Hg    pO2 Arterial 136 (H) 80 - 105 mm Hg    FIO2 50     Bicarbonate Arterial 24 21 - 28 mmol/L    Base Excess/Deficit Arterial -0.7 -3.0 - 3.0 mmol/L    Geovanni's Test Artline     Oxyhemoglobin Arterial 98 92 - 100 %    O2 Sat, Arterial 99.7 (H) 96.0 - 97.0 %    Narrative    In healthy individuals, oxyhemoglobin (O2Hb) and oxygen saturation (SO2) are approximately equal. In the presence of dyshemoglobins, oxyhemoglobin can be considerably lower than oxygen saturation.   Glucose by meter   Result Value Ref Range    GLUCOSE BY METER POCT 194 (H) 70 - 99 mg/dL   Activated clotting time celite, POCT   Result Value Ref Range    Activated Clotting Time (Celite) POCT 144 74 - 150 seconds       All relevant imaging and laboratory values personally reviewed.

## 2024-06-02 NOTE — CONSULTS
Urology Consult History and Physical    Name: Cullen Reardon    MRN: [id number]   YOB: 1975       We were asked to see Cullen Reardon at the request of Dr. Angelo for evaluation and treatment of difficult catheter.        Chief Complaint:   Difficult catheter    Unable to obtain a history from the patient due to intubation/sedation          History of Present Illness:   Cullen Reardon is a 48 year old male with no known past medical history who was admitted on 6/1/2024 following a witnessed VF cardiac arrest. Transferred from [facility] after coronary stenting, now on ECMO in cardiac ICU. Catheter initially placed in ED, noted distended bladder on CT scan. RN attempted replacement and catheterization with 14Fr coude, unable to hub catheter and with bloody output. Urology consulted for further evaluation and management.     No urology history noted in chart. Unable to obtain further history due to patient intubated/sedated.            Past Medical History:   No past medical history on file.         Past Surgical History:   No past surgical history on file.         Social History:     Social History     Tobacco Use    Smoking status: Not on file    Smokeless tobacco: Not on file   Substance Use Topics    Alcohol use: Not on file            Family History:   No family history on file.           Allergies:   No Known Allergies         Medications:     Current Facility-Administered Medications   Medication Dose Route Frequency Provider Last Rate Last Admin    acetaminophen (TYLENOL) tablet 650 mg  650 mg Oral Q4H PRN Erich Angelo MD        Or    acetaminophen (TYLENOL) Suppository 650 mg  650 mg Rectal Q4H PRN Erich Angelo MD        amiodarone (NEXTERONE) 1.8 mg/mL in sodium chloride 0.9% in non-PVC container 500 mL ADULT STANDARD infusion  1 mg/min Intravenous Continuous Erich Angelo MD 33.33 mL/hr at 06/02/24 0000 1 mg/min at 06/02/24 0000    amiodarone (NEXTERONE) 1.8 mg/mL in sodium chloride 0.9% in  non-PVC container 500 mL ADULT STANDARD infusion  0.5 mg/min Intravenous Continuous Erich Angelo MD        artificial tears ophthalmic ointment   Both Eyes Q8H Erich Angelo MD        aspirin (ASA) chewable tablet 81 mg  81 mg Per Feeding Tube Daily Erich Angelo MD        calcium chloride 1 g in sodium chloride 0.9 % 100 mL intermittent infusion  1 g Intravenous Q6H PRN Erich Angelo MD   1 g at 06/01/24 2209    cefTRIAXone (ROCEPHIN) 2 g vial to attach to  ml bag for ADULTS or NS 50 ml bag for PEDS  2 g Intravenous Q24H Erich Angelo MD   2 g at 06/01/24 2121    chlorhexidine (PERIDEX) 0.12 % solution 15 mL  15 mL Mouth/Throat Q12H Erich Angelo MD        dextrose 10% infusion   Intravenous Continuous PRN Erich Angelo MD        glucose gel 15-30 g  15-30 g Oral Q15 Min PRN Erich Angelo MD        Or    dextrose 50 % injection 25-50 mL  25-50 mL Intravenous Q15 Min PRN Erich Angelo MD        Or    glucagon injection 1 mg  1 mg Subcutaneous Q15 Min PRN Erich Angelo MD        fentaNYL (SUBLIMAZE) 50 mcg/mL bolus from pump  50 mcg Intravenous Q30 Min PRN Erich Angelo MD        fentaNYL (SUBLIMAZE) infusion   mcg/hr Intravenous Continuous Erich Angelo MD 2 mL/hr at 06/02/24 0000 100 mcg/hr at 06/02/24 0000    heparin (porcine) 100 unit/mL in 0.45% Sodium Chloride ANTICOAGULANT infusion  10-50 Units/kg/hr (Ideal) Other Continuous Florencia Saolmon MD 8.3 mL/hr at 06/02/24 0020 830 Units/hr at 06/02/24 0020    heparin 2 unit/mL in 0.9% NaCl (for REPERFUSION CATHETER)  3 mL/hr Intravenous Continuous Erich Angelo MD 3 mL/hr at 06/02/24 0000 3 mL/hr at 06/02/24 0000    heparin ANTICOAGULANT bolus dose from infusion pump [phone] Units  5-10 Units/kg (Ideal) Other Q30 Min PRN Florencia Salomon MD        insulin regular (MYXREDLIN) 1 unit/mL infusion  0-24 Units/hr Intravenous Continuous Erich Angelo MD 3 mL/hr at 06/02/24 0000 3 Units/hr at 06/02/24 0000    lidocaine (LMX4) cream   Topical Q1H PRN  Erich Angelo MD        lidocaine 1 % 0.1-1 mL  0.1-1 mL Other Q1H PRN Erich Angelo MD        magnesium sulfate 2 g in 50 mL sterile water intermittent infusion  2 g Intravenous Daily PRN Erich Angelo MD        magnesium sulfate 4 g in 100 mL sterile water intermittent infusion  4 g Intravenous Q4H PRN Erich Angelo MD        melatonin liquid 10 mg  10 mg Oral At Bedtime PRN Erich Angelo MD        midazolam (VERSED) 100 mg/100 mL NS infusion - ADULT  1-8 mg/hr Intravenous Continuous Erich Angelo MD   Stopped at 06/02/24 0019    midazolam (VERSED) injection 1-3 mg  1-3 mg Intravenous Q1H PRN Erich Angelo MD        naloxone (NARCAN) injection 0.2 mg  0.2 mg Intravenous Q2 Min PRN Florencia Salomon MD        Or    naloxone (NARCAN) injection 0.4 mg  0.4 mg Intravenous Q2 Min PRN Florencia Salomon MD        Or    naloxone (NARCAN) injection 0.2 mg  0.2 mg Intramuscular Q2 Min PRN Florencia Salomon MD        Or    naloxone (NARCAN) injection 0.4 mg  0.4 mg Intramuscular Q2 Min PRN Florencia Salomon MD        norepinephrine (LEVOPHED) 16 mg in  mL infusion MAX CONC CENTRAL LINE  0.01-0.6 mcg/kg/min (Dosing Weight) Intravenous Continuous Erich Angelo MD 3.8 mL/hr at 06/02/24 0000 0.04 mcg/kg/min at 06/02/24 0000    pantoprazole (PROTONIX) IV push injection 40 mg  40 mg Intravenous Daily Erich Angelo MD   40 mg at 06/01/24 2326    polyethylene glycol (MIRALAX) Packet 17 g  17 g Oral Daily PRN Erich Angelo MD        potassium chloride 20 mEq in 50 mL intermittent infusion  20 mEq Intravenous Q1H PRN Erich Angelo MD        [START ON 6/3/2024] senna-docusate (SENOKOT-S/PERICOLACE) 8.6-50 MG per tablet 1 tablet  1 tablet Oral BID PRN Erich Angelo MD        sodium chloride (PF) 0.9% PF flush 3 mL  3 mL Intracatheter Q8H PRN Erich Angelo MD        sodium chloride (PF) 0.9% PF flush 3 mL  3 mL Intracatheter q1 min prn Erich Angelo MD        sodium phosphate 10 mmol in sodium chloride  0.9 % 250 mL intermittent infusion  10 mmol Intravenous Daily PRN Erich Angelo MD        sodium phosphate 15 mmol in sodium chloride 0.9 % 250 mL intermittent infusion  15 mmol Intravenous Daily PRN Erich Angelo MD        sodium phosphate 20 mmol in sodium chloride 0.9 % 250 mL intermittent infusion  20 mmol Intravenous Q6H PRN Erich Angelo MD        sodium phosphate 25 mmol in sodium chloride 0.9 % 500 mL intermittent infusion  25 mmol Intravenous Q8H PRN Erich Angelo MD        ticagrelor (BRILINTA) tablet 90 mg  90 mg Oral BID Florencia Salomon MD        vancomycin (VANCOCIN) 2,000 mg in sodium chloride 0.9 % 500 mL intermittent infusion  2,000 mg Intravenous At Bedtime Florencia Salomon MD   2,000 mg at 06/02/24 0004    vasopressin 1 unit/mL MAX Conc (PITRESSIN) infusion  0.5-4 Units/hr Intravenous Continuous Erich Angelo MD   Held at 06/01/24 2058             Review of Systems:    ROS: 10 point ROS neg other than the symptoms noted above in the HPI.          Physical Exam:   Patient Vitals for the past 24 hrs:   Temp Temp src Pulse Resp SpO2 Height Weight   06/02/24 0015 98.2  F (36.8  C) -- 101 -- 98 % -- --   06/02/24 0000 98.1  F (36.7  C) Esophageal 102 10 97 % -- --   06/01/24 2345 97.9  F (36.6  C) -- 97 -- 96 % -- --   06/01/24 2330 97.9  F (36.6  C) -- 100 -- 95 % -- --   06/01/24 2315 97.7  F (36.5  C) -- 103 -- 99 % -- --   06/01/24 2300 97.5  F (36.4  C) -- 103 -- 99 % -- --   06/01/24 2245 97.5  F (36.4  C) -- 99 -- 96 % -- --   06/01/24 2230 97.5  F (36.4  C) -- 102 -- 91 % -- --   06/01/24 2215 97.7  F (36.5  C) -- 101 (!) 0 99 % -- --   06/01/24 2200 97.5  F (36.4  C) -- 102 15 98 % -- --   06/01/24 2145 97.2  F (36.2  C) -- 99 15 99 % -- --   06/01/24 2130 97  F (36.1  C) -- 95 15 99 % -- --   06/01/24 2115 -- -- 88 14 98 % -- --   06/01/24 2100 -- -- 90 15 97 % -- --   06/01/24 2045 -- -- 83 14 99 % -- --   06/01/24 2030 -- -- 76 15 100 % -- --   06/01/24 2020 -- -- -- --  "-- 1.886 m (6' 2.25\") 100 kg (220 lb 7.4 oz)   06/01/24 2015 -- -- 70 15 -- -- --     General: age-appropriate appearing male intubated/sedated  HEENT: Head AT/NC  Lungs: vented  Heart: No obvious jugular venous distension present  Abdomen: soft, non-distended  : normal male external genitalia, dried blood at meatus. Meatus in appropriate position on glans, circumcised, no other visible urologic anomaly.   Musculoskeltal: extremities normal, no peripheral edema  Skin: no suspicious lesions or rashes  Neuro: sedated          Data:   All laboratory data reviewed:    Recent Labs   Lab 06/01/24 2040   WBC 17.0*   HGB 16.0          Recent Labs   Lab 06/01/24 2306 06/01/24 2040 06/01/24 2031   NA  --  138  138  --    POTASSIUM  --  3.6  3.7  --    CHLORIDE  --  103  103  --    CO2  --  21*  20*  --    BUN  --  20.3*  20.3*  --    CR  --  1.16  1.16  --    * 257*  267*  264* 237*   RANDA  --  7.6*  7.7*  --    MAG  --  2.2  --      No lab results found in last 7 days.    Invalid input(s): \"URINEBLOOD\"    All pertinent imaging reviewed:  CT A/P -   IMPRESSION:   1. Endotracheal tube terminates in the mid thoracic trachea.   2. Gastric tube tip and sidehole projects within the stomach.   3. Right inferior approach ECMO cannula terminates at the inferior  cavoatrial junction.   4. Right inferior approach ECMO arterial cannula terminates within the  right common iliac artery.   5. Left inferior approach venous catheter terminates within the  proximal left common iliac vein.   6. Inferior approach left arterial catheter terminates within the left  common iliac artery.  7. Left 10th and 11th posterior rib fractures and bilateral dependent  atelectasis. No pneumothorax.         Impression and Plan:   Impression:   48yoM with no known medical or urologic history, on ECMO after cardiac arrest in cardiac ICU, with difficult Benjamin placement. Benjamin catheter placed cystoscopically over a wire (see procedure " note below). No noted urethral stricture seen on cystoscopy, prostate moderately obstructing with high bladder neck. No false passage clearly seen.       Procedure:   Verbal consent unable to be obtained due to patient sedation. Patient prepped and draped in standard sterile fashion. Injected 10cc urojet into urethra. Attempted to pass 18Fr coude catheter into bladder, without success, meeting resistance at prostatic urethra with return of bloody output per urethra. Passed 16Fr flexible cystoscope into urethra and navigated into bladder. No noted urethral stricture seen on cystoscopy, prostate moderately obstructing with high bladder neck. No false passage clearly seen. Passed wire through scope and removed scope. 16Fr Benjamin catheter with temperature probe (per CVICU team request) fashioned into Wales-tip catheter and passed over wire into the bladder, with return of clear yellow urine. 10cc in balloon, secured to leg with stat-lock. 800cc clear yellow urine drained. Catheter flushed with saline without return of bloody urine or clot.     Plan:   - Catheter management per primary  - Recommend keeping in place for at least 3 days, given presumed urethral trauma from previous catheterization attempts  - Recommend keeping Benjamin in place until patient awake and off all sedation, preferably when ambulatory and returning to baseline functional status   - Flush with saline PRN if not draining appropriately   - Bleeding per meatus around catheter is normal/expected, especially if patient is medically anticoagulated; the catheter is acting as a tamponade and this typically resolves spontaneously  - Consider starting Flomax once patient is awake/tolerating PO meds, prior to trial of void   - For catheter removal, please perform trial of void on a weekday AM so that urology team is in-house to assist with catheter replacement should patient fail TOV   - Urology will sign off at this time; please page with questions or  concerns      Discussed with chief resident and staff on call, Dr. Chirinos.     Niesha Le MD  Urology Resident, PGY-3

## 2024-06-02 NOTE — PROGRESS NOTES
Neuro:   Off sedation pt was able to follow commands and RUDD appropriately. Pt seemed to understand fully of his situation. PERRLA positive and afebrile.     CV:   Rate/rhythm:  SR to Sinus tach with Occasional to frequent V tach runs with an effect on MAP throughout the night, but has slowed down this AM. Amio maintenance and bolus given.     Mechanical:    ECMO  F - 3.5  S- 1  O2 - 70%  ACT goal 180-200    Pulm:    /10/7/50%  ETT 28 at the lip    GI:   OGT confirmed at 74 at the lip to LIS  No audible Bowel sounds yet.    :   Difficult schafer placement. Trauma caused to urethra. UROLOGY consulted. Scoped and placement of schafer with some bleeding. Bleeding has slowed down this AM.  ml/hr    Skin:   Head laceration from trauma  Cannulation sites  Old  IO on R shin  Bruised tongue  Drains:   Schafer   LIS OGT    Drips:   Versed - 5  Insulin - 1.5  LEVO - OFF  Fent - 50  Amio - 0.5      Labs:   Carlos replaced this AM    Other:   250 ml LR bolus given for chugging.

## 2024-06-02 NOTE — PROCEDURES
Cardiac ICU Procedure Note  Arterial Line Placement     Service performing procedure: Cardiac ICU  Indication for procedure:Hemodynamic monitoring, frequent ABGs    Acuity:Elective   Procedure date: 06/01/24     A Time Out was performed immediately prior to the procedure to confirm correct patient, procedure, and site (site marked if applicable): yes     Preparation for Procedure:   Hand hygiene performed prior to insertion: yes   Barrier precautions used (mask, sterile gloves, cap): yes   Skin preparation with chlorhexidine gluconate: yes   Skin preparation agent was allowed to dry: yes   Anesthesia used? Local     Arterial line placed   Side: right   Site:radial  Ultrasound used? Yes  Type of catheter placed:arterial line   Number of attempts:1     The patient tolerated the procedure well except for complications as noted. Immediate complications:NONE     Erich Angelo MD  Cardiology Fellow

## 2024-06-02 NOTE — PROCEDURES
[facility]    Laceration repair    Date/Time: 6/1/2024 9:24 PM    Performed by: Erich Angelo MD  Authorized by: Erich Angelo MD  Location details: scalp      UNIVERSAL PROTOCOL   Site Marked: NA  Prior Images Obtained and Reviewed:  NA  Required items: Required blood products, implants, devices and special equipment available    Patient identity confirmed:  Anonymous protocol, patient vented/unresponsive  Patient was reevaluated immediately before administering moderate or deep sedation or anesthesia  Confirmation Checklist:  Procedure was appropriate and matched the consent or emergent situation  Universal Protocol: the Joint Commission Universal Protocol was followed      ANESTHESIA    Local anesthesia used: no    Body area: head/neck  Laceration length: 4 cm  Foreign bodies: no foreign bodies  Amount of cleaning: standard  Debridement: none  Degree of undermining: none  Skin closure: staples  Number of sutures: 6      SEDATION  Patient Sedated: Yes    Sedation Type:  Deep  Vital signs: Vital signs monitored during sedation      PROCEDURE    Length of time physician/provider present for 1:1 monitoring during sedation: 15

## 2024-06-03 ENCOUNTER — APPOINTMENT (OUTPATIENT)
Dept: NEUROLOGY | Facility: CLINIC | Age: 49
DRG: 003 | End: 2024-06-03
Attending: INTERNAL MEDICINE
Payer: COMMERCIAL

## 2024-06-03 ENCOUNTER — PREP FOR PROCEDURE (OUTPATIENT)
Dept: CARDIOLOGY | Facility: CLINIC | Age: 49
End: 2024-06-03

## 2024-06-03 ENCOUNTER — APPOINTMENT (OUTPATIENT)
Dept: GENERAL RADIOLOGY | Facility: CLINIC | Age: 49
DRG: 003 | End: 2024-06-03
Attending: INTERNAL MEDICINE
Payer: COMMERCIAL

## 2024-06-03 ENCOUNTER — APPOINTMENT (OUTPATIENT)
Dept: CARDIOLOGY | Facility: CLINIC | Age: 49
DRG: 003 | End: 2024-06-03
Attending: STUDENT IN AN ORGANIZED HEALTH CARE EDUCATION/TRAINING PROGRAM
Payer: COMMERCIAL

## 2024-06-03 DIAGNOSIS — I21.3 ST ELEVATION MYOCARDIAL INFARCTION (STEMI), UNSPECIFIED ARTERY (H): Primary | ICD-10-CM

## 2024-06-03 LAB
ACT BLD: 157 SECONDS (ref 74–150)
ACT BLD: 165 SECONDS (ref 74–150)
ACT BLD: 169 SECONDS (ref 74–150)
ACT BLD: 186 SECONDS (ref 74–150)
ACT BLD: 190 SECONDS (ref 74–150)
ACT BLD: 195 SECONDS (ref 74–150)
ACT BLD: 199 SECONDS (ref 74–150)
ACT BLD: 207 SECONDS (ref 74–150)
ALBUMIN SERPL BCG-MCNC: 3.1 G/DL (ref 3.5–5.2)
ALBUMIN SERPL BCG-MCNC: 3.1 G/DL (ref 3.5–5.2)
ALBUMIN SERPL BCG-MCNC: 3.3 G/DL (ref 3.5–5.2)
ALBUMIN SERPL BCG-MCNC: 3.3 G/DL (ref 3.5–5.2)
ALLEN'S TEST: ABNORMAL
ALP SERPL-CCNC: 41 U/L (ref 40–150)
ALP SERPL-CCNC: 41 U/L (ref 40–150)
ALP SERPL-CCNC: 42 U/L (ref 40–150)
ALP SERPL-CCNC: 43 U/L (ref 40–150)
ALT SERPL W P-5'-P-CCNC: 180 U/L (ref 0–70)
ALT SERPL W P-5'-P-CCNC: 207 U/L (ref 0–70)
ALT SERPL W P-5'-P-CCNC: 220 U/L (ref 0–70)
ALT SERPL W P-5'-P-CCNC: 245 U/L (ref 0–70)
ANION GAP SERPL CALCULATED.3IONS-SCNC: 10 MMOL/L (ref 7–15)
ANION GAP SERPL CALCULATED.3IONS-SCNC: 7 MMOL/L (ref 7–15)
ANION GAP SERPL CALCULATED.3IONS-SCNC: 8 MMOL/L (ref 7–15)
ANION GAP SERPL CALCULATED.3IONS-SCNC: 8 MMOL/L (ref 7–15)
APTT PPP: 112 SECONDS (ref 22–38)
APTT PPP: 136 SECONDS (ref 22–38)
APTT PPP: 196 SECONDS (ref 22–38)
APTT PPP: 91 SECONDS (ref 22–38)
AST SERPL W P-5'-P-CCNC: 460 U/L (ref 0–45)
AST SERPL W P-5'-P-CCNC: 577 U/L (ref 0–45)
AST SERPL W P-5'-P-CCNC: 646 U/L (ref 0–45)
AST SERPL W P-5'-P-CCNC: 732 U/L (ref 0–45)
AT III ACT/NOR PPP CHRO: 63 % (ref 85–135)
ATRIAL RATE - MUSE: 90 BPM
ATRIAL RATE - MUSE: 91 BPM
ATRIAL RATE - MUSE: 94 BPM
BASE EXCESS BLDA CALC-SCNC: 2.7 MMOL/L (ref -3–3)
BASE EXCESS BLDA CALC-SCNC: 2.7 MMOL/L (ref -3–3)
BASE EXCESS BLDA CALC-SCNC: 3.7 MMOL/L (ref -3–3)
BASE EXCESS BLDA CALC-SCNC: 3.7 MMOL/L (ref -3–3)
BASE EXCESS BLDA CALC-SCNC: 4.4 MMOL/L (ref -3–3)
BASE EXCESS BLDA CALC-SCNC: 4.6 MMOL/L (ref -3–3)
BASE EXCESS BLDA CALC-SCNC: 4.7 MMOL/L (ref -3–3)
BASE EXCESS BLDA CALC-SCNC: 4.9 MMOL/L (ref -3–3)
BASE EXCESS BLDA CALC-SCNC: 5.4 MMOL/L (ref -3–3)
BASE EXCESS BLDA CALC-SCNC: 6.2 MMOL/L (ref -3–3)
BASE EXCESS BLDA CALC-SCNC: 6.4 MMOL/L (ref -3–3)
BASE EXCESS BLDV CALC-SCNC: 4.2 MMOL/L (ref -3–3)
BASE EXCESS BLDV CALC-SCNC: 4.5 MMOL/L (ref -3–3)
BILIRUB SERPL-MCNC: 0.5 MG/DL
BILIRUB SERPL-MCNC: 0.7 MG/DL
BUN SERPL-MCNC: 25.9 MG/DL (ref 6–20)
BUN SERPL-MCNC: 28.1 MG/DL (ref 6–20)
BUN SERPL-MCNC: 30.2 MG/DL (ref 6–20)
BUN SERPL-MCNC: 33 MG/DL (ref 6–20)
CA-I BLD-MCNC: 4.7 MG/DL (ref 4.4–5.2)
CALCIUM SERPL-MCNC: 8.5 MG/DL (ref 8.6–10)
CALCIUM SERPL-MCNC: 8.5 MG/DL (ref 8.6–10)
CALCIUM SERPL-MCNC: 8.6 MG/DL (ref 8.6–10)
CALCIUM SERPL-MCNC: 8.6 MG/DL (ref 8.6–10)
CF REDUC 30M P MA P HEP LENFR BLD TEG: 2.5 % (ref 0–8)
CF REDUC 60M P MA P HEPASE LENFR BLD TEG: 6.3 % (ref 0–15)
CFT P HPASE BLD TEG: 1.8 MINUTE (ref 1–3)
CHLORIDE SERPL-SCNC: 106 MMOL/L (ref 98–107)
CHLORIDE SERPL-SCNC: 107 MMOL/L (ref 98–107)
CHOLEST SERPL-MCNC: 149 MG/DL
CI (COAGULATION INDEX)(Z): -1.5 (ref -3–3)
CLOT ANGLE P HPASE BLD TEG: 63.5 DEGREES (ref 53–72)
CLOT INIT P HPASE BLD TEG: 8.2 MINUTE (ref 5–10)
CLOT STRENGTH P HPASE BLD TEG: 8 KD/SC (ref 4.5–11)
COHGB MFR BLD: 98.7 % (ref 96–97)
COHGB MFR BLD: 98.8 % (ref 96–97)
COHGB MFR BLD: 98.8 % (ref 96–97)
COHGB MFR BLD: 98.9 % (ref 96–97)
COHGB MFR BLD: 99 % (ref 96–97)
COHGB MFR BLD: 99 % (ref 96–97)
COHGB MFR BLD: 99.1 % (ref 96–97)
COHGB MFR BLD: 99.5 % (ref 96–97)
COHGB MFR BLD: 99.7 % (ref 96–97)
COHGB MFR BLD: 99.8 % (ref 96–97)
COHGB MFR BLD: 99.9 % (ref 96–97)
CREAT SERPL-MCNC: 1.05 MG/DL (ref 0.67–1.17)
CREAT SERPL-MCNC: 1.11 MG/DL (ref 0.67–1.17)
CREAT SERPL-MCNC: 1.31 MG/DL (ref 0.67–1.17)
CREAT SERPL-MCNC: 1.32 MG/DL (ref 0.67–1.17)
CRP SERPL-MCNC: 139 MG/L
D DIMER PPP FEU-MCNC: 1.03 UG/ML FEU (ref 0–0.5)
D DIMER PPP FEU-MCNC: 1.28 UG/ML FEU (ref 0–0.5)
DEPRECATED HCO3 PLAS-SCNC: 25 MMOL/L (ref 22–29)
DEPRECATED HCO3 PLAS-SCNC: 27 MMOL/L (ref 22–29)
DEPRECATED HCO3 PLAS-SCNC: 27 MMOL/L (ref 22–29)
DEPRECATED HCO3 PLAS-SCNC: 28 MMOL/L (ref 22–29)
DIASTOLIC BLOOD PRESSURE - MUSE: NORMAL MMHG
EGFRCR SERPLBLD CKD-EPI 2021: 66 ML/MIN/1.73M2
EGFRCR SERPLBLD CKD-EPI 2021: 67 ML/MIN/1.73M2
EGFRCR SERPLBLD CKD-EPI 2021: 81 ML/MIN/1.73M2
EGFRCR SERPLBLD CKD-EPI 2021: 87 ML/MIN/1.73M2
ERYTHROCYTE [DISTWIDTH] IN BLOOD BY AUTOMATED COUNT: 13.3 % (ref 10–15)
ERYTHROCYTE [DISTWIDTH] IN BLOOD BY AUTOMATED COUNT: 13.4 % (ref 10–15)
ERYTHROCYTE [SEDIMENTATION RATE] IN BLOOD BY WESTERGREN METHOD: 16 MM/HR (ref 0–15)
FIBRINOGEN PPP-MCNC: 436 MG/DL (ref 170–490)
FIBRINOGEN PPP-MCNC: 518 MG/DL (ref 170–490)
GLUCOSE BLD-MCNC: 123 MG/DL (ref 70–99)
GLUCOSE BLD-MCNC: 125 MG/DL (ref 70–99)
GLUCOSE BLD-MCNC: 128 MG/DL (ref 70–99)
GLUCOSE BLD-MCNC: 143 MG/DL (ref 70–99)
GLUCOSE BLDC GLUCOMTR-MCNC: 114 MG/DL (ref 70–99)
GLUCOSE BLDC GLUCOMTR-MCNC: 117 MG/DL (ref 70–99)
GLUCOSE BLDC GLUCOMTR-MCNC: 121 MG/DL (ref 70–99)
GLUCOSE BLDC GLUCOMTR-MCNC: 126 MG/DL (ref 70–99)
GLUCOSE BLDC GLUCOMTR-MCNC: 127 MG/DL (ref 70–99)
GLUCOSE BLDC GLUCOMTR-MCNC: 130 MG/DL (ref 70–99)
GLUCOSE BLDC GLUCOMTR-MCNC: 133 MG/DL (ref 70–99)
GLUCOSE BLDC GLUCOMTR-MCNC: 141 MG/DL (ref 70–99)
GLUCOSE BLDC GLUCOMTR-MCNC: 146 MG/DL (ref 70–99)
GLUCOSE BLDC GLUCOMTR-MCNC: 157 MG/DL (ref 70–99)
GLUCOSE SERPL-MCNC: 130 MG/DL (ref 70–99)
GLUCOSE SERPL-MCNC: 133 MG/DL (ref 70–99)
GLUCOSE SERPL-MCNC: 137 MG/DL (ref 70–99)
GLUCOSE SERPL-MCNC: 151 MG/DL (ref 70–99)
HCO3 BLD-SCNC: 28 MMOL/L (ref 21–28)
HCO3 BLD-SCNC: 29 MMOL/L (ref 21–28)
HCO3 BLD-SCNC: 29 MMOL/L (ref 21–28)
HCO3 BLD-SCNC: 30 MMOL/L (ref 21–28)
HCO3 BLD-SCNC: 31 MMOL/L (ref 21–28)
HCO3 BLD-SCNC: 32 MMOL/L (ref 21–28)
HCO3 BLD-SCNC: 32 MMOL/L (ref 21–28)
HCO3 BLDA-SCNC: 28 MMOL/L (ref 21–28)
HCO3 BLDA-SCNC: 30 MMOL/L (ref 21–28)
HCO3 BLDV-SCNC: 31 MMOL/L (ref 21–28)
HCO3 BLDV-SCNC: 32 MMOL/L (ref 21–28)
HCT VFR BLD AUTO: 34.1 % (ref 40–53)
HCT VFR BLD AUTO: 35.9 % (ref 40–53)
HCT VFR BLD AUTO: 36 % (ref 40–53)
HCT VFR BLD AUTO: 37.8 % (ref 40–53)
HDLC SERPL-MCNC: 47 MG/DL
HGB BLD-MCNC: 11.5 G/DL (ref 13.3–17.7)
HGB BLD-MCNC: 12.4 G/DL (ref 13.3–17.7)
HGB BLD-MCNC: 12.4 G/DL (ref 13.3–17.7)
HGB BLD-MCNC: 12.9 G/DL (ref 13.3–17.7)
HGB FREE PLAS-MCNC: 30 MG/DL
INR PPP: 1.18 (ref 0.85–1.15)
INR PPP: 1.18 (ref 0.85–1.15)
INR PPP: 1.19 (ref 0.85–1.15)
INR PPP: 1.21 (ref 0.85–1.15)
INTERPRETATION ECG - MUSE: NORMAL
INTERPRETATION TEGPIA: NORMAL
INTERPRETATION TEGPIA: NORMAL
LACTATE SERPL-SCNC: 1.1 MMOL/L (ref 0.7–2)
LACTATE SERPL-SCNC: 1.3 MMOL/L (ref 0.7–2)
LDH SERPL L TO P-CCNC: 1660 U/L (ref 0–250)
LDLC SERPL CALC-MCNC: 82 MG/DL
MAGNESIUM SERPL-MCNC: 1.9 MG/DL (ref 1.7–2.3)
MAGNESIUM SERPL-MCNC: 1.9 MG/DL (ref 1.7–2.3)
MAGNESIUM SERPL-MCNC: 2 MG/DL (ref 1.7–2.3)
MAGNESIUM SERPL-MCNC: 2 MG/DL (ref 1.7–2.3)
MCF P HPASE BLD TEG: 61.4 MM (ref 50–70)
MCH RBC QN AUTO: 30.8 PG (ref 26.5–33)
MCH RBC QN AUTO: 31.1 PG (ref 26.5–33)
MCH RBC QN AUTO: 31.3 PG (ref 26.5–33)
MCH RBC QN AUTO: 31.3 PG (ref 26.5–33)
MCHC RBC AUTO-ENTMCNC: 33.7 G/DL (ref 31.5–36.5)
MCHC RBC AUTO-ENTMCNC: 34.1 G/DL (ref 31.5–36.5)
MCHC RBC AUTO-ENTMCNC: 34.4 G/DL (ref 31.5–36.5)
MCHC RBC AUTO-ENTMCNC: 34.5 G/DL (ref 31.5–36.5)
MCV RBC AUTO: 91 FL (ref 78–100)
NONHDLC SERPL-MCNC: 102 MG/DL
NSE SERPL IA-MCNC: 46.3 NG/ML
NSE SERPL IA-MCNC: 47.8 NG/ML
O2/TOTAL GAS SETTING VFR VENT: 50 %
O2/TOTAL GAS SETTING VFR VENT: 60 %
O2/TOTAL GAS SETTING VFR VENT: 70 %
OXYHGB MFR BLDA: 98 % (ref 75–100)
OXYHGB MFR BLDA: 99 % (ref 75–100)
OXYHGB MFR BLDV: 75 %
OXYHGB MFR BLDV: 77 %
P AXIS - MUSE: 30 DEGREES
P AXIS - MUSE: 44 DEGREES
P AXIS - MUSE: 54 DEGREES
PA AA BLD-ACNC: 47 %
PA AA BLD-ACNC: 71 %
PA ADP BLD-ACNC: 47 %
PA ADP BLD-ACNC: 66 %
PCO2 BLD: 43 MM HG (ref 35–45)
PCO2 BLD: 44 MM HG (ref 35–45)
PCO2 BLD: 44 MM HG (ref 35–45)
PCO2 BLD: 45 MM HG (ref 35–45)
PCO2 BLD: 45 MM HG (ref 35–45)
PCO2 BLD: 46 MM HG (ref 35–45)
PCO2 BLD: 48 MM HG (ref 35–45)
PCO2 BLD: 49 MM HG (ref 35–45)
PCO2 BLD: 49 MM HG (ref 35–45)
PCO2 BLD: 50 MM HG (ref 35–45)
PCO2 BLD: 52 MM HG (ref 35–45)
PCO2 BLDA: 45 MM HG (ref 35–45)
PCO2 BLDA: 50 MM HG (ref 35–45)
PCO2 BLDV: 55 MM HG (ref 40–50)
PCO2 BLDV: 59 MM HG (ref 40–50)
PEEP: 7 CM H2O
PH BLD: 7.38 [PH] (ref 7.35–7.45)
PH BLD: 7.4 [PH] (ref 7.35–7.45)
PH BLD: 7.4 [PH] (ref 7.35–7.45)
PH BLD: 7.41 [PH] (ref 7.35–7.45)
PH BLD: 7.42 [PH] (ref 7.35–7.45)
PH BLD: 7.42 [PH] (ref 7.35–7.45)
PH BLD: 7.43 [PH] (ref 7.35–7.45)
PH BLDA: 7.39 [PH] (ref 7.35–7.45)
PH BLDA: 7.41 [PH] (ref 7.35–7.45)
PH BLDV: 7.34 [PH] (ref 7.32–7.43)
PH BLDV: 7.36 [PH] (ref 7.32–7.43)
PHOSPHATE SERPL-MCNC: 2 MG/DL (ref 2.5–4.5)
PLATELET # BLD AUTO: 128 10E3/UL (ref 150–450)
PLATELET # BLD AUTO: 134 10E3/UL (ref 150–450)
PLATELET # BLD AUTO: 136 10E3/UL (ref 150–450)
PLATELET # BLD AUTO: 152 10E3/UL (ref 150–450)
PO2 BLD: 102 MM HG (ref 80–105)
PO2 BLD: 108 MM HG (ref 80–105)
PO2 BLD: 122 MM HG (ref 80–105)
PO2 BLD: 122 MM HG (ref 80–105)
PO2 BLD: 127 MM HG (ref 80–105)
PO2 BLD: 130 MM HG (ref 80–105)
PO2 BLD: 93 MM HG (ref 80–105)
PO2 BLD: 98 MM HG (ref 80–105)
PO2 BLD: 98 MM HG (ref 80–105)
PO2 BLD: 99 MM HG (ref 80–105)
PO2 BLD: 99 MM HG (ref 80–105)
PO2 BLDA: 301 MM HG (ref 80–105)
PO2 BLDA: 313 MM HG (ref 80–105)
PO2 BLDV: 42 MM HG (ref 25–47)
PO2 BLDV: 42 MM HG (ref 25–47)
POTASSIUM SERPL-SCNC: 3.7 MMOL/L (ref 3.4–5.3)
POTASSIUM SERPL-SCNC: 3.8 MMOL/L (ref 3.4–5.3)
POTASSIUM SERPL-SCNC: 3.9 MMOL/L (ref 3.4–5.3)
POTASSIUM SERPL-SCNC: 4 MMOL/L (ref 3.4–5.3)
PR INTERVAL - MUSE: 142 MS
PR INTERVAL - MUSE: 146 MS
PR INTERVAL - MUSE: 148 MS
PROT SERPL-MCNC: 5.4 G/DL (ref 6.4–8.3)
PROT SERPL-MCNC: 5.6 G/DL (ref 6.4–8.3)
PROT SERPL-MCNC: 5.6 G/DL (ref 6.4–8.3)
PROT SERPL-MCNC: 5.7 G/DL (ref 6.4–8.3)
QRS DURATION - MUSE: 82 MS
QRS DURATION - MUSE: 86 MS
QRS DURATION - MUSE: 88 MS
QT - MUSE: 304 MS
QT - MUSE: 344 MS
QT - MUSE: 376 MS
QTC - MUSE: 371 MS
QTC - MUSE: 430 MS
QTC - MUSE: 462 MS
R AXIS - MUSE: 39 DEGREES
R AXIS - MUSE: 59 DEGREES
R AXIS - MUSE: 59 DEGREES
RBC # BLD AUTO: 3.73 10E6/UL (ref 4.4–5.9)
RBC # BLD AUTO: 3.96 10E6/UL (ref 4.4–5.9)
RBC # BLD AUTO: 3.96 10E6/UL (ref 4.4–5.9)
RBC # BLD AUTO: 4.15 10E6/UL (ref 4.4–5.9)
SAO2 % BLDA: 96 % (ref 92–100)
SAO2 % BLDA: 97 % (ref 92–100)
SAO2 % BLDA: 98 % (ref 92–100)
SAO2 % BLDA: 99.5 % (ref 96–97)
SAO2 % BLDA: >100 % (ref 96–97)
SAO2 % BLDV: 76.3 % (ref 70–75)
SAO2 % BLDV: 78.1 % (ref 70–75)
SODIUM SERPL-SCNC: 141 MMOL/L (ref 135–145)
SODIUM SERPL-SCNC: 142 MMOL/L (ref 135–145)
SYSTOLIC BLOOD PRESSURE - MUSE: NORMAL MMHG
T AXIS - MUSE: 69 DEGREES
T AXIS - MUSE: 83 DEGREES
T AXIS - MUSE: 89 DEGREES
T3 SERPL-MCNC: 80 NG/DL (ref 85–202)
T3FREE SERPL-MCNC: 2.3 PG/ML (ref 2–4.4)
T4 FREE SERPL-MCNC: 1.18 NG/DL (ref 0.9–1.7)
TRIGL SERPL-MCNC: 99 MG/DL
TROPONIN T SERPL HS-MCNC: 9165 NG/L
TROPONIN T SERPL HS-MCNC: ABNORMAL NG/L
TSH SERPL DL<=0.005 MIU/L-ACNC: 0.6 UIU/ML (ref 0.3–4.2)
UFH PPP CHRO-ACNC: 0.52 IU/ML
UFH PPP CHRO-ACNC: 0.54 IU/ML
UFH PPP CHRO-ACNC: 0.76 IU/ML
UFH PPP CHRO-ACNC: 1.08 IU/ML
VENTRICULAR RATE- MUSE: 90 BPM
VENTRICULAR RATE- MUSE: 91 BPM
VENTRICULAR RATE- MUSE: 94 BPM
WBC # BLD AUTO: 10.7 10E3/UL (ref 4–11)
WBC # BLD AUTO: 10.9 10E3/UL (ref 4–11)
WBC # BLD AUTO: 11.2 10E3/UL (ref 4–11)
WBC # BLD AUTO: 11.8 10E3/UL (ref 4–11)

## 2024-06-03 PROCEDURE — 87077 CULTURE AEROBIC IDENTIFY: CPT | Performed by: SURGERY

## 2024-06-03 PROCEDURE — 94003 VENT MGMT INPAT SUBQ DAY: CPT

## 2024-06-03 PROCEDURE — 83605 ASSAY OF LACTIC ACID: CPT | Performed by: INTERNAL MEDICINE

## 2024-06-03 PROCEDURE — 85730 THROMBOPLASTIN TIME PARTIAL: CPT | Performed by: INTERNAL MEDICINE

## 2024-06-03 PROCEDURE — 84481 FREE ASSAY (FT-3): CPT | Performed by: INTERNAL MEDICINE

## 2024-06-03 PROCEDURE — 82805 BLOOD GASES W/O2 SATURATION: CPT | Performed by: INTERNAL MEDICINE

## 2024-06-03 PROCEDURE — 84484 ASSAY OF TROPONIN QUANT: CPT | Performed by: INTERNAL MEDICINE

## 2024-06-03 PROCEDURE — G0463 HOSPITAL OUTPT CLINIC VISIT: HCPCS

## 2024-06-03 PROCEDURE — 84443 ASSAY THYROID STIM HORMONE: CPT | Performed by: INTERNAL MEDICINE

## 2024-06-03 PROCEDURE — 80053 COMPREHEN METABOLIC PANEL: CPT | Performed by: INTERNAL MEDICINE

## 2024-06-03 PROCEDURE — 250N000011 HC RX IP 250 OP 636: Performed by: INTERNAL MEDICINE

## 2024-06-03 PROCEDURE — 86140 C-REACTIVE PROTEIN: CPT | Performed by: INTERNAL MEDICINE

## 2024-06-03 PROCEDURE — 36569 INSJ PICC 5 YR+ W/O IMAGING: CPT

## 2024-06-03 PROCEDURE — 85379 FIBRIN DEGRADATION QUANT: CPT | Performed by: INTERNAL MEDICINE

## 2024-06-03 PROCEDURE — 71045 X-RAY EXAM CHEST 1 VIEW: CPT | Mod: 26 | Performed by: RADIOLOGY

## 2024-06-03 PROCEDURE — 99291 CRITICAL CARE FIRST HOUR: CPT | Mod: 25 | Performed by: INTERNAL MEDICINE

## 2024-06-03 PROCEDURE — 93005 ELECTROCARDIOGRAM TRACING: CPT

## 2024-06-03 PROCEDURE — 82947 ASSAY GLUCOSE BLOOD QUANT: CPT | Performed by: INTERNAL MEDICINE

## 2024-06-03 PROCEDURE — 250N000013 HC RX MED GY IP 250 OP 250 PS 637: Performed by: NURSE PRACTITIONER

## 2024-06-03 PROCEDURE — 85610 PROTHROMBIN TIME: CPT | Performed by: INTERNAL MEDICINE

## 2024-06-03 PROCEDURE — 272N000452 HC KIT SHRLOCK 5FR POWER PICC TRIPLE LUMEN

## 2024-06-03 PROCEDURE — 82330 ASSAY OF CALCIUM: CPT | Performed by: INTERNAL MEDICINE

## 2024-06-03 PROCEDURE — 87040 BLOOD CULTURE FOR BACTERIA: CPT | Performed by: STUDENT IN AN ORGANIZED HEALTH CARE EDUCATION/TRAINING PROGRAM

## 2024-06-03 PROCEDURE — 83735 ASSAY OF MAGNESIUM: CPT | Performed by: INTERNAL MEDICINE

## 2024-06-03 PROCEDURE — 95711 VEEG 2-12 HR UNMONITORED: CPT

## 2024-06-03 PROCEDURE — 85384 FIBRINOGEN ACTIVITY: CPT | Performed by: INTERNAL MEDICINE

## 2024-06-03 PROCEDURE — 250N000013 HC RX MED GY IP 250 OP 250 PS 637: Performed by: STUDENT IN AN ORGANIZED HEALTH CARE EDUCATION/TRAINING PROGRAM

## 2024-06-03 PROCEDURE — 85049 AUTOMATED PLATELET COUNT: CPT | Performed by: INTERNAL MEDICINE

## 2024-06-03 PROCEDURE — 85396 CLOTTING ASSAY WHOLE BLOOD: CPT | Performed by: INTERNAL MEDICINE

## 2024-06-03 PROCEDURE — 85520 HEPARIN ASSAY: CPT | Performed by: INTERNAL MEDICINE

## 2024-06-03 PROCEDURE — 83051 HEMOGLOBIN PLASMA: CPT | Performed by: INTERNAL MEDICINE

## 2024-06-03 PROCEDURE — 85347 COAGULATION TIME ACTIVATED: CPT

## 2024-06-03 PROCEDURE — 250N000013 HC RX MED GY IP 250 OP 250 PS 637: Performed by: INTERNAL MEDICINE

## 2024-06-03 PROCEDURE — 84439 ASSAY OF FREE THYROXINE: CPT | Performed by: INTERNAL MEDICINE

## 2024-06-03 PROCEDURE — 999N000157 HC STATISTIC RCP TIME EA 10 MIN

## 2024-06-03 PROCEDURE — 33949 ECMO/ECLS DAILY MGMT ARTERY: CPT

## 2024-06-03 PROCEDURE — 86900 BLOOD TYPING SEROLOGIC ABO: CPT | Performed by: INTERNAL MEDICINE

## 2024-06-03 PROCEDURE — 87070 CULTURE OTHR SPECIMN AEROBIC: CPT | Performed by: INTERNAL MEDICINE

## 2024-06-03 PROCEDURE — 71045 X-RAY EXAM CHEST 1 VIEW: CPT

## 2024-06-03 PROCEDURE — 93306 TTE W/DOPPLER COMPLETE: CPT

## 2024-06-03 PROCEDURE — 33949 ECMO/ECLS DAILY MGMT ARTERY: CPT | Performed by: INTERNAL MEDICINE

## 2024-06-03 PROCEDURE — 93306 TTE W/DOPPLER COMPLETE: CPT | Mod: 26 | Performed by: INTERNAL MEDICINE

## 2024-06-03 PROCEDURE — 95718 EEG PHYS/QHP 2-12 HR W/VEEG: CPT | Performed by: PSYCHIATRY & NEUROLOGY

## 2024-06-03 PROCEDURE — 258N000003 HC RX IP 258 OP 636: Performed by: STUDENT IN AN ORGANIZED HEALTH CARE EDUCATION/TRAINING PROGRAM

## 2024-06-03 PROCEDURE — 272N000019 HC KIT OPEN ENDED DOUBLE LUMEN

## 2024-06-03 PROCEDURE — 250N000009 HC RX 250: Performed by: STUDENT IN AN ORGANIZED HEALTH CARE EDUCATION/TRAINING PROGRAM

## 2024-06-03 PROCEDURE — 85027 COMPLETE CBC AUTOMATED: CPT | Performed by: INTERNAL MEDICINE

## 2024-06-03 PROCEDURE — 85300 ANTITHROMBIN III ACTIVITY: CPT | Performed by: INTERNAL MEDICINE

## 2024-06-03 PROCEDURE — 83718 ASSAY OF LIPOPROTEIN: CPT | Performed by: STUDENT IN AN ORGANIZED HEALTH CARE EDUCATION/TRAINING PROGRAM

## 2024-06-03 PROCEDURE — 85652 RBC SED RATE AUTOMATED: CPT | Performed by: INTERNAL MEDICINE

## 2024-06-03 PROCEDURE — 84460 ALANINE AMINO (ALT) (SGPT): CPT | Performed by: INTERNAL MEDICINE

## 2024-06-03 PROCEDURE — 83615 LACTATE (LD) (LDH) ENZYME: CPT | Performed by: INTERNAL MEDICINE

## 2024-06-03 PROCEDURE — 272N000278 HC DEVICE 5FR SECURACATH

## 2024-06-03 PROCEDURE — 84450 TRANSFERASE (AST) (SGOT): CPT | Performed by: INTERNAL MEDICINE

## 2024-06-03 PROCEDURE — 84100 ASSAY OF PHOSPHORUS: CPT | Performed by: INTERNAL MEDICINE

## 2024-06-03 PROCEDURE — 36415 COLL VENOUS BLD VENIPUNCTURE: CPT | Performed by: STUDENT IN AN ORGANIZED HEALTH CARE EDUCATION/TRAINING PROGRAM

## 2024-06-03 PROCEDURE — 99222 1ST HOSP IP/OBS MODERATE 55: CPT | Performed by: STUDENT IN AN ORGANIZED HEALTH CARE EDUCATION/TRAINING PROGRAM

## 2024-06-03 PROCEDURE — 250N000011 HC RX IP 250 OP 636: Performed by: STUDENT IN AN ORGANIZED HEALTH CARE EDUCATION/TRAINING PROGRAM

## 2024-06-03 PROCEDURE — 84480 ASSAY TRIIODOTHYRONINE (T3): CPT | Performed by: INTERNAL MEDICINE

## 2024-06-03 PROCEDURE — 200N000002 HC R&B ICU UMMC

## 2024-06-03 RX ORDER — TESTOSTERONE CYPIONATE 200 MG/ML
140 INJECTION, SOLUTION INTRAMUSCULAR
Status: ON HOLD | COMMUNITY
End: 2024-06-15

## 2024-06-03 RX ORDER — AMINO ACIDS/PROTEIN HYDROLYS 11G-40/45
1 LIQUID IN PACKET (ML) ORAL 4 TIMES DAILY
Status: DISCONTINUED | OUTPATIENT
Start: 2024-06-03 | End: 2024-06-05

## 2024-06-03 RX ORDER — GUAIFENESIN 600 MG/1
15 TABLET, EXTENDED RELEASE ORAL DAILY
Status: DISCONTINUED | OUTPATIENT
Start: 2024-06-03 | End: 2024-06-14

## 2024-06-03 RX ORDER — LOSARTAN POTASSIUM AND HYDROCHLOROTHIAZIDE 12.5; 5 MG/1; MG/1
2 TABLET ORAL DAILY
Status: ON HOLD | COMMUNITY
End: 2024-06-15

## 2024-06-03 RX ORDER — TADALAFIL 2.5 MG/1
2.5 TABLET ORAL DAILY
Status: ON HOLD | COMMUNITY
End: 2024-06-15

## 2024-06-03 RX ORDER — POTASSIUM PHOS IN 0.9 % NACL 15MMOL/250
15 PLASTIC BAG, INJECTION (ML) INTRAVENOUS ONCE
Status: COMPLETED | OUTPATIENT
Start: 2024-06-03 | End: 2024-06-03

## 2024-06-03 RX ORDER — VENLAFAXINE HYDROCHLORIDE 75 MG/1
75 CAPSULE, EXTENDED RELEASE ORAL DAILY
Status: ON HOLD | COMMUNITY
End: 2024-06-15

## 2024-06-03 RX ORDER — DEXTROSE MONOHYDRATE 100 MG/ML
INJECTION, SOLUTION INTRAVENOUS CONTINUOUS PRN
Status: DISCONTINUED | OUTPATIENT
Start: 2024-06-03 | End: 2024-06-15 | Stop reason: HOSPADM

## 2024-06-03 RX ORDER — AMIODARONE HYDROCHLORIDE 200 MG/1
400 TABLET ORAL 2 TIMES DAILY
Status: DISCONTINUED | OUTPATIENT
Start: 2024-06-03 | End: 2024-06-11

## 2024-06-03 RX ADMIN — POTASSIUM PHOSPHATE, MONOBASIC AND POTASSIUM PHOSPHATE, DIBASIC 15 MMOL: 224; 236 INJECTION, SOLUTION, CONCENTRATE INTRAVENOUS at 05:32

## 2024-06-03 RX ADMIN — MIDAZOLAM IN SODIUM CHLORIDE 8 MG/HR: 1 INJECTION INTRAVENOUS at 23:48

## 2024-06-03 RX ADMIN — Medication 50 MCG: at 10:02

## 2024-06-03 RX ADMIN — Medication 200 MCG/HR: at 03:21

## 2024-06-03 RX ADMIN — AMIODARONE HYDROCHLORIDE 400 MG: 200 TABLET ORAL at 19:59

## 2024-06-03 RX ADMIN — Medication 40 MG: at 08:03

## 2024-06-03 RX ADMIN — Medication 50 MCG: at 13:48

## 2024-06-03 RX ADMIN — ASPIRIN 81 MG CHEWABLE TABLET 81 MG: 81 TABLET CHEWABLE at 08:03

## 2024-06-03 RX ADMIN — DOCUSATE SODIUM 50 MG AND SENNOSIDES 8.6 MG 1 TABLET: 8.6; 5 TABLET, FILM COATED ORAL at 08:04

## 2024-06-03 RX ADMIN — MIDAZOLAM IN SODIUM CHLORIDE 8 MG/HR: 1 INJECTION INTRAVENOUS at 00:56

## 2024-06-03 RX ADMIN — Medication 15 ML: at 12:17

## 2024-06-03 RX ADMIN — MIDAZOLAM 3 MG: 1 INJECTION INTRAMUSCULAR; INTRAVENOUS at 13:48

## 2024-06-03 RX ADMIN — CEFTRIAXONE SODIUM 2 G: 2 INJECTION, POWDER, FOR SOLUTION INTRAMUSCULAR; INTRAVENOUS at 19:59

## 2024-06-03 RX ADMIN — TICAGRELOR 90 MG: 90 TABLET ORAL at 19:59

## 2024-06-03 RX ADMIN — HEPARIN SODIUM 1800 UNITS/HR: 10000 INJECTION, SOLUTION INTRAVENOUS at 19:59

## 2024-06-03 RX ADMIN — TICAGRELOR 90 MG: 90 TABLET ORAL at 08:03

## 2024-06-03 RX ADMIN — CHLORHEXIDINE GLUCONATE 0.12% ORAL RINSE 15 ML: 1.2 LIQUID ORAL at 19:59

## 2024-06-03 RX ADMIN — Medication 200 MCG/HR: at 13:46

## 2024-06-03 RX ADMIN — MIDAZOLAM 3 MG: 1 INJECTION INTRAMUSCULAR; INTRAVENOUS at 10:02

## 2024-06-03 RX ADMIN — Medication 1 PACKET: at 20:01

## 2024-06-03 RX ADMIN — Medication 1 PACKET: at 15:46

## 2024-06-03 RX ADMIN — MIDAZOLAM 3 MG: 1 INJECTION INTRAMUSCULAR; INTRAVENOUS at 08:16

## 2024-06-03 RX ADMIN — Medication 200 MCG/HR: at 23:49

## 2024-06-03 RX ADMIN — MIDAZOLAM IN SODIUM CHLORIDE 4 MG/HR: 1 INJECTION INTRAVENOUS at 13:45

## 2024-06-03 RX ADMIN — Medication 1 PACKET: at 12:17

## 2024-06-03 RX ADMIN — CHLORHEXIDINE GLUCONATE 0.12% ORAL RINSE 15 ML: 1.2 LIQUID ORAL at 08:03

## 2024-06-03 RX ADMIN — Medication 50 MCG: at 08:16

## 2024-06-03 RX ADMIN — POLYETHYLENE GLYCOL 3350 17 G: 17 POWDER, FOR SOLUTION ORAL at 08:03

## 2024-06-03 RX ADMIN — AMIODARONE HYDROCHLORIDE 400 MG: 200 TABLET ORAL at 11:13

## 2024-06-03 RX ADMIN — HEPARIN SODIUM 1600 UNITS/HR: 10000 INJECTION, SOLUTION INTRAVENOUS at 03:59

## 2024-06-03 RX ADMIN — DOCUSATE SODIUM 50 MG AND SENNOSIDES 8.6 MG 1 TABLET: 8.6; 5 TABLET, FILM COATED ORAL at 19:59

## 2024-06-03 ASSESSMENT — ACTIVITIES OF DAILY LIVING (ADL)
ADLS_ACUITY_SCORE: 36
ADLS_ACUITY_SCORE: 36
ADLS_ACUITY_SCORE: 32
ADLS_ACUITY_SCORE: 36
ADLS_ACUITY_SCORE: 36
ADLS_ACUITY_SCORE: 32
ADLS_ACUITY_SCORE: 32
ADLS_ACUITY_SCORE: 36
ADLS_ACUITY_SCORE: 32
ADLS_ACUITY_SCORE: 36
ADLS_ACUITY_SCORE: 32
ADLS_ACUITY_SCORE: 36
DEPENDENT_IADLS:: INDEPENDENT
ADLS_ACUITY_SCORE: 36
ADLS_ACUITY_SCORE: 32
ADLS_ACUITY_SCORE: 36
ADLS_ACUITY_SCORE: 32

## 2024-06-03 NOTE — PLAN OF CARE
Goal Outcome Evaluation:      Plan of Care Reviewed With: patient    Overall Patient Progress: improvingOverall Patient Progress: improving    Outcome Evaluation: TD went well; PICC line placed; TF started    Major Shift Events:  TD completed at bedside and went well. Turned down ECMO to  2lpm of flow, 2 Sweep and 50%. Increased TV to 550 from 450 for decrease in ECMO flow. Started TF via OG at 20cc/hr, with q8h increase by 10cc with goal of 65cc/hr. SFWF. PICC line placed and both L femoral sheaths removed, 4 hour bedrest after. Amio gtt stopped after transitioning to oral Amio. Insulin off since last night. Tried to pull out ETT this morning, mitts placed on pt and increased sedation as he is very strong and will move/bend ECMO leg and try to sit straight up in bed. Flushing schafer frequently due to concentration and urine sediment. No BM, but on meds and just started TF.  Plan: Possible decann tomorrow. Consult in place.   For vital signs and complete assessments, please see documentation flowsheets. .

## 2024-06-03 NOTE — PROGRESS NOTES
"  [facility]  Cardiology ICU History & Physical  June 1, 2024            Assessment and Plan:   Cullen Reardon is a 48 year old male with no known past medical history who was admitted on 6/1/2024 following a witnessed VF cardiac arrest.    He presented to [facility] on 6/1/2024 with chest pain.  He collapsed in front of the triage desk and was found to be pulseless after saying \"I feel like I'm going to die\".  He received immediate bystander CPR in the waiting room of the emergency department.  He was found be in VF, was shocked x 3.  Estimated downtime was approximately 15 minutes per paramedic report.  Was cannulated in [facility] emergency department and was on circuit at 16:08.  He was brought to the Cath Lab for coronary angiogram where he was found to have a 100% ostial LAD thrombotic occlusion status post stenting.  He was residual severe disease in D1 and OM2 that will need staged intervention. He was subsequently brought to [facility] for further care.    Patient was following commands on arrival. Currently clinically improving and off pressors.    Interval changes   In the interim lactate normalized to 1.3, LFTs currently stable to downtrending.  Making good urine.  Following commands. Red urine likely traumatic schafer+ ATN sediments.     Changes today   - Plan for Turn down  - PICC line placement  - Consider removal of fem sheath tomorrow   -  Discontinue  EEG   - Nutrition consult   - Increase minute ventilation   - Discontinue IV amio - 400 BID P/o    Neuro: #. At risk for anoxic brain injury  --Intubated and sedated. Wean sedation daily to assess neurological status.   --Maintain euthermia/warm by 0.5 C/hr to get to 37C  --continue versed, fentanyl  --RASS goal -3 to -4   --CT head : No acute issues.     CV: #. Cardiogenic Shock s/p VA ECMO  #. ACUTE STEMI s/p KAYLYN to ostial LAD 6/1/24   #. Refractory VF arrest  s/p VA ECMO  --Peripheral V-A ECMO inserted via RCFA (17 " Fr) and RCFV (25 Fr).  --Echo ordered  --Continue ASA 81mg and ticagrelor 90mg BID  --Hold temp at 37, rewarm by 0.5 C every hour   --Continue heparin drip. ACT goal 180-200.  --Hold lipitor for now given likely hepatic injury during arrest  --Hold ACE/ARB for now given likely reduced renal fxn after arrest  --Holding beta blocker given shock      Pulm: #. Acute hypoxic respiratory failure  #. Probable aspiration pneumonia  Vent Mode: CMV/AC  (Continuous Mandatory Ventilation/ Assist Control)  FiO2 (%): 50 %  Resp Rate (Set): 10 breaths/min  Tidal Volume (Set, mL): 450 mL  PEEP (cm H2O): 7 cmH2O  Resp: 10    --ETT in stable position    --CXR: Lines in stable position.   --Wean vent as able  --Daily CXR  --Continue ctx .  --MRSA nares - neg, vanco stopped 6/1-6/2.  --Q2h ABGs for now     GI: #. Shock Liver 2/2 cardiac arrest  --Monitor LFTs twice a day.  --Consider RUQ US if LFTs do not improve.     Renal: #. JEAN CLAUDE 2/2 cardiac arrest  Hematuria 2/2 traumatic schafer insertion   --Monitor urine output  --Maintain K>3 and Mg>2      ID: #. Probable aspiration pneumonia  - Ctx x5 days 6/1- *  --Daily blood cultures.     Hem/Onc: #. Acute blood loss anemia due to ECMO cannulas.  --Continue Aspirin and Tacagrelor for the stent.  --Continue Heparin gtt for ECMO with ACT goal 180-200  --Cryo PRN fibrinogen < 150; FFP for INR >2  --Transfuse for Hgb<7  --US LE w/ arterial duplex per ECMO protocol   --DVT PPX: Heparin as above     Endo: #. Hyperglycemia  --Insulin as needed.  --HgbA1c - pending.     Checklist: ICU Checklist:  #Feeding: npo  #Analgesia: versed   #Sedation: fentanyl  #Thromboembolism ppx: heparin gtt   #HOB: 30 degrees   #Ulcer ppx: ppi  #Glucose: insulin gtt   #SBT/SAT: in am  #Bowel reg: miralax, senna   #Lines/tubes/drains: 17 Fr RCFA, 25 Fr RCFV, 6 Fr LCFA sheath, 6 Fr LCFV sheath, schafer, ETT, OG  #Code status: full   #Family: will update as able this evening   #Dispo: ICU          Code Status: Full    The pt  "was discussed with the attending physician. Dr. Reza Hernandez MD  Cardiology fellow ([id number])  1680           Medications:   I have reviewed this patient's current medications    No past medical history on file.    No family history on file.  Social History     Socioeconomic History    Marital status: Single     Spouse name: Not on file    Number of children: Not on file    Years of education: Not on file    Highest education level: Not on file   Occupational History    Not on file   Tobacco Use    Smoking status: Not on file    Smokeless tobacco: Not on file   Substance and Sexual Activity    Alcohol use: Not on file    Drug use: Not on file    Sexual activity: Not on file   Other Topics Concern    Not on file   Social History Narrative    Not on file     Social Determinants of Health     Financial Resource Strain: Not At Risk (7/31/2023)    Received from [employer]    Financial Resource Strain     Is it hard for you to pay for the very basics like food, housing, medical care or heating?: No   Food Insecurity: Not At Risk (7/31/2023)    Received from [employer]    Food Insecurity     Does your food run out before you have the money to buy more?: No   Transportation Needs: Not At Risk (7/31/2023)    Received from [employer]    Transportation Needs     Does a lack of transportation keep you from your medical appointments or from getting your medications?: No   Physical Activity: Not on file   Stress: Not on file   Social Connections: Not on file   Interpersonal Safety: Not on file   Housing Stability: Not on file            Review of Systems:   Unable to obtain due to patients medical condition.            Physical Exam:   Pulse 91   Temp 98.4  F (36.9  C)   Resp 10   Ht 1.886 m (6' 2.25\")   Wt 126.1 kg (278 lb 1.6 oz)   SpO2 99%   BMI 35.47 kg/m        GENERAL: Intubated, sedated. NAD.  HEENT: No icterus. ETT in place, OG tube in place.  CARDIOVASCULAR: RRR. Normal S1 and S2.  RESP: " "Coarse bilaterally. Mechanical ventilation.    GI Soft, bowel sounds hypoactive but present.  GENITOURINARY: Benjamin in place.  EXTREMITIES: Cool, 1+ edema, pulses dopplered, as above.   NEURO: Sedated and intubated.  INTEGUMENTARY: No rashes. Cannula/Line sites CDI.      Resp: 10 SpO2: 99 % O2 Device: Mechanical Ventilator      Arterial Blood Gas:   Recent Labs   Lab 06/03/24  0741 06/03/24  0543 06/03/24  0323 06/03/24  0322 06/03/24  0128   PH 7.43 7.43 7.43  --  7.41   PCO2 45 44 43  --  44   PO2 122* 98 102  --  99   HCO3 30* 29* 29*  --  28   O2PER 50 50 50 70  50 50     Vitals:    06/01/24 2020 06/02/24 0400 06/03/24 0000   Weight: 100 kg (220 lb 7.4 oz) 125.2 kg (276 lb 0.3 oz) 126.1 kg (278 lb 1.6 oz)   I/O last 3 completed shifts:  In: 2015.9 [I.V.:1575.9; NG/GT:440]  Out: 1724 [Urine:1199; Emesis/NG output:525]  Recent Labs   Lab 06/03/24  0744 06/03/24  0546 06/03/24 0323 06/03/24 0322 06/02/24 2142 06/02/24 2141   NA  --   --   --  141  --  142   POTASSIUM  --   --   --  3.7  --  3.7   CHLORIDE  --   --   --  106  --  108*   CO2  --   --   --  25  --  25   ANIONGAP  --   --   --  10  --  9   * 130*   < > 130*   < > 107*   BUN  --   --   --  25.9*  --  25.7*   CR  --   --   --  1.32*  --  1.42*   RANDA  --   --   --  8.6  --  8.6    < > = values in this interval not displayed.     No components found for: \"URINE\"   Recent Labs   Lab 06/03/24 0322 06/02/24 2141 06/02/24  1538   * 853* 950*   * 258* 277*   BILITOTAL 0.7 0.7 0.7   ALBUMIN 3.3* 3.3* 3.4*   PROTTOTAL 5.6* 5.6* 5.7*   ALKPHOS 43 42 43     Temp: 98.4  F (36.9  C) Temp src: BladderTemp  Min: 97.5  F (36.4  C)  Max: 98.8  F (37.1  C)   Recent Labs   Lab 06/03/24  0322 06/02/24  2141 06/02/24  1538 06/02/24  1005 06/02/24  0331   WBC 11.2* 11.2* 13.2* 12.2* 13.0*   HGB 12.9* 13.4 13.6 13.8 14.3   HCT 37.8* 38.3* 40.1 40.0 40.4   MCV 91 89 91 89 86   RDW 13.4 13.3 13.4 13.3 13.1   * 142* 165 172 178     Recent Labs "   Lab 06/03/24  0322 06/02/24  2141 06/02/24  1538 06/02/24  1005 06/02/24  0331   INR 1.18* 1.20* 1.16* 1.12 1.15   * >240* 228* 100* 63*     Recent Labs   Lab 06/03/24  0744 06/03/24  0546 06/03/24  0328 06/03/24  0323 06/03/24  0322   * 130* 121* 125* 130*       Lines:           Data:     Recent Results (from the past 24 hour(s))   US Lower Extremity Arterial Duplex Bilateral    Narrative    ULTRASOUND LOWER EXTREMITY ARTERIAL DUPLEX BILATERAL  6/2/2024 9:46 AM    CLINICAL HISTORY: Baseline to assess blood flow to Lower Extremities  (VA ECMO).     COMPARISONS: None available.    REFERRING PROVIDER: SANDOR HIDALGO    TECHNIQUE: Bilateral leg arteries evaluated with color Doppler and  Doppler waveform ultrasound    FINDINGS:     RIGHT:       Common femoral artery: OBSCURED       Profundus femoral artery: OBSCURED         Superficial femoral artery, origin: OBSCURED       Superficial femoral artery, mid thigh: 50/16 cm/s, arterial  monophasic       Superficial femoral artery, distal thigh: 49/18 cm/s, arterial  monophasic         Popliteal artery: 49/20 cm/s, arterial monophasic         Posterior tibial artery, ankle: 74/39 cm/s, arterial monophasic       Anterior tibial artery, ankle: 45/21 cm/s, arterial monophasic    LEFT:       Common femoral artery: OBSCURED       Profundus femoral artery: 48/14 cm/s, arterial monophasic         Superficial femoral artery, origin: 78/35 cm/s, arterial  monophasic       Superficial femoral artery, mid thigh: 53/21 cm/s, arterial  monophasic       Superficial femoral artery, distal thigh: 53/26 cm/s, arterial  monophasic         Popliteal artery: 46/23 cm/s, arterial monophasic         Posterior tibial artery, ankle: 63/32 cm/s, arterial monophasic       Anterior tibial artery, ankle: 67/40 cm/s, arterial monophasic      Impression    IMPRESSION: Dopplerable flow in bilateral anterior and posterior  tibial arteries at the ankles.    I have personally reviewed the  examination and initial interpretation  and I agree with the findings.    FAY ZHOU MD         SYSTEM ID:  [id number]   US Carotid Bilateral    Narrative    BILATERAL CAROTID DUPLEX DOPPLER ULTRASOUND 6/2/2024 9:46 AM    CLINICAL HISTORY: Cardiac Arrest on ECMO    COMPARISON: None available.    REFERRING PROVIDER: SANDOR HIDALGO    TECHNIQUE: Grayscale (B-mode) and duplex and spectral Doppler  ultrasound of the common carotid, extracranial internal carotid,  external carotid, and vertebral artery origins. Velocity measurements  obtained with angle correction at or less than 60 degrees.    FINDINGS:    RIGHT SIDE:   Mild plaque burden. Normal velocities and wave forms throughout.    LEFT SIDE: Mild plaque burden. Normal waveforms throughout.       Impression    IMPRESSION:    1. RIGHT ICA: Less than 50% stenosis by velocity criteria.    2. LEFT ICA:  Less than 50% stenosis by velocity criteria.        [facility] Accreditation Commission Updated Recommendations for  Carotid Stenosis Interpretation Criteria - October 2021.  https://intersocietal.org/wp-content/uploads/2021/10/IAC-Vascular-Test  xr-Ldbgmziwgjbrw_Nbbnugb-Rmrniulblkoyvfk-for-Carotid-Stenosis-Interpre  ation-Criteria.pdf    Greater than 70% diameter stenosis criteria per - Journal of Vascular  Surgery Vol. 75Issue 1Supplement [id number] Published online: June 18, 2021          Normal            ICA PSV: < 180 cm/s            Plaque Estimate: None            ICA/CCA PSV Ratio: < 2.0            ICA EDV: < 40 cm/s         < 50%            ICA PSV: < 180 cm/s            Plaque Estimate: < 50%            ICA/CCA PSV Ratio: < 2.0            ICA EDV: < 40 cm/s         50 - 69%            ICA PSV: 180 - 230 cm/s            Plaque Estimate: > 50%            ICA/CCA PSV Ratio: 2.0 - 4.0            ICA EDV: 40 - 100 cm/s         > 70% but less than near occlusion            ICA PSV: > 230 cm/s            Plaque Estimate: > 50%            AND either            ICA EDV:  > 100 cm/s            or            ICA/CCA PSV Ratio: > 4.0         Near occlusion            ICA PSV: > 230 cm/s            Plaque Estimate: Visible            ICA/CCA PSV Ratio: Variable            ICA EDV: Variable         Total occlusion            ICA PSV: Undetectable            Plaque Estimate: Visible, no detectable lumen            ICA/CCA PSV Ratio: N/A            ICA EDV: N/A                                          Additional criteria from vascular surgery     > 80%       EDV > 120 cm/s    I have personally reviewed the examination and initial interpretation  and I agree with the findings.    RONNIE GARZA MD         SYSTEM ID:  [id number]   XR Chest Port 1 View    Narrative    EXAM: XR CHEST PORT 1 VIEW 6/3/2024 1:00 AM    INDICATION: Check endotracheal tube placement and ECLS cannula  placement. DO NOT log-roll patient.  Place film under patient using  patient safety handling process.    COMPARISON: Chest radiograph 6/1/2024, CT chest abdomen pelvis  6/1/2024    TECHNIQUE: Single AP view of the chest.    FINDINGS:   Endotracheal tube at the mid thoracic trachea. Lower approach venous  ECMO cannula stable in position at the superior IVC. Partially  visualized gastric tube. Trachea is grossly midline when accounting  for rotation. Lung fields are relatively clear. Mediastinal structures  are unchanged from prior. No pleural effusion or pneumothorax.      Impression    IMPRESSION:   Stable chest with unchanged support devices.    I have personally reviewed the examination and initial interpretation  and I agree with the findings.    OLIVIA MOORE MD         SYSTEM ID:  [id number]

## 2024-06-03 NOTE — PROGRESS NOTES
[facility]    ECLS Shift Summary:     ECMO Equipment:  Console Serial Number: [id number]  Circuit Lot Number: [id number]  Oxygenator Lot Number: [id number]    Circuit Assessment: Free of fibrin, clot, and air    Arterial ECMO Cannula: 17 Fr in the Right Femoral Artery  Venous ECMO Cannula: 25 Fr in the Right Femoral Vein  Distal Perfusion Catheter: 11 Fr in the Right Superficial Femoral Artery            Patient remains on V-A ECMO, all equipment is functioning and alarms are appropriately set. RPM's: 3500 with Blood Flow (Circuit) LPM  Avg: 3.7 LPM  Min: 3.62 LPM  Max: 3.74 LPM L/min. Sweep is at 1 LPM and 70 %. Extremities are warm.     Significant Shift Events: NA    Vent settings:  Vent Mode: CMV/AC  (Continuous Mandatory Ventilation/ Assist Control)  FiO2 (%): 50 %  Resp Rate (Set): 10 breaths/min  Tidal Volume (Set, mL): 450 mL  PEEP (cm H2O): 7 cmH2O  Inspiratory Pressure (cm H2O) (Drager Myah): 25  Resp: 10      Anticoagulation:  Dose (units/hr) HEParin: 1900 Units/hr  Rate (mL/hr) HEParin: 19 mL/hr  Concentration HEParin: 100 Units/mL        Most recent: ACT  (seconds): 182 seconds    Urine output is per RN. Pink, Sediment.  Blood loss was minimal. Product given included none.     Intake/Output Summary (Last 24 hours) at 6/2/2024 1914  Last data filed at 6/2/2024 1900  Gross per 24 hour   Intake 2459.62 ml   Output 2729 ml   Net -269.38 ml       Labs:  Recent Labs   Lab 06/02/24  1752 06/02/24  1612 06/02/24  1610 06/02/24  1538 06/02/24  1350 06/02/24  1155   PH 7.44  --   --  7.34* 7.35 7.35   PCO2 41  --   --  52* 50* 49*   PO2 116*  --   --  110* 118* 110*   HCO3 28  --   --  28 28 27   O2PER 50 50 70 50  50 50 50       Lab Results   Component Value Date    HGB 13.6 06/02/2024    PHGB <30 06/02/2024     06/02/2024    FIBR 353 06/02/2024    INR 1.16 (H) 06/02/2024     (HH) 06/02/2024    DD 2.63 (H) 06/02/2024    ANTCH 72 (L) 06/02/2024        Plan is cont VA ECMO .    Megan E. Dressler, RN  ECMO Specialist  6/2/2024 7:14 PM

## 2024-06-03 NOTE — PLAN OF CARE
Goal Outcome Evaluation:      Plan of Care Reviewed With: spouse, family, parent    Overall Patient Progress: no changeOverall Patient Progress: no change    Outcome Evaluation: Plan: SARAH Alexandre RN, PHN, BSN   Float Nurse Care Coordinator  Covering for Unit 4A/4E  Phone [phone]

## 2024-06-03 NOTE — CONSULTS
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 The hope is that eventually, he will get more good quality time with his family and can play hockey again.   Wife Arlyn voiced understanding that this process is a \"marathon not a sprint\" and set expectations that it could likely take weeks if not several months for Cullen to recover and that he may need future cardiac interventions as well.     ADVANCE CARE PLANNING:  No health care directive on file. Per  informed consent policy, next of kin should be involved if patient becomes unable.  Primary surrogate - Wife Arlyn, alternate - mother Jazzmine  There is no POLST form on file, defer to patient and/or next of kin for decisions   Code status: Full Code    MEDICAL MANAGEMENT:   We are not actively managing symptoms at this time.    PSYCHOSOCIAL/SPIRITUAL SUPPORT:  Family wife Arlyn, two daughters (12 going on 14yo and 15 yo)  Appreciate [facility] support regarding coping and processing with family  [place] community: [employer]    Palliative Care will continue to follow and plan to check in with Arlyn again tomorrow around ~1000.    Total time spent was 60 minutes regarding goals of care and support on the date of the encounter. These recommendations were given to the primary team via this note.    Wenceslao Gillette DO / Internal and Palliative Medicine   Securely message with the Vocera Web Console (learn more here)   Text page via [employer] Paging/Directory       Assessment      Cullen Reardon is a 48 year old male with " "no known past medical history. He presented to [facility] on 6/1/2024 due to chest pain. Per chart review, \"he collapsed in front of the triage desk and was found to be pulseless after saying \"I feel like I'm going to die\". Bystander CPR was started he was shocked x3 due to Vfib. Estimated downtime was 15 min. He was brought to the cath lab and had a 100% ostial LAD occlusion which was stented and cannulated on ECMO. Noted to have severe D1 and OM2 disease which will likely need further intervention. He was transferred to [facility]. Palliative was consulted routinely due to ECMO cannulation.     Today, the patient was seen for:  Cardiac arrest  AHRF  Severe CAD  Goals of care  Support  Encounter for palliative care    History of Present Illness   Examined Cullen at bedside. He was intubated and sedated and on ECMO. Turndown went well per RN. Neuro exam seems intact.    Called and spoke with wife Arlyn.    Introduced the role of palliative care as an interdisciplinary team that cares for patients with serious illness to help support symptom management, communication, coping for patients and their families as well as support with medical decision making.    Arlyn appreciates the great communication and care medical staff have provided to her and Cullen and notes there is always a family member present during visiting hours to help given updates. She has had to attend to her daughters as this is the last week of school and is busy with graduation and other activities.    Appreciate PCSW speaking to Arlyn about helping daughters process and cope with this sudden medical event.     Cullen does not have a healthcare directive. No limits to care noted. 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 Would not want to be a \"vegetable\". Arlyn understands that the road to recovery can be long.    Our conversation was cut short as she " had to  her daughters from school. Plan to visit with Arlyn in-person tomorrow 6/4.     Prognosis, Goals, & Planning:   Functional Status just prior to this current hospitalization:  Outpatient Palliative Performance Score (PPS) 100% No evidence of disease. Full/normal ambulation, activity/work, self-care, oral intake, & LOC.      Prognosis, Goals, and/or Advance Care Planning:  As above    Code Status was addressed today:   No      Patient's decision making preferences: unable to assess        Patient has decision-making capacity today for complex decisions: Unreliable          Coping, Meaning, & Spirituality:   Mood, coping, and/or meaning in the context of serious illness were addressed today: Yes    Social:   Living situation:lives with family  Important relationships/caregivers:wife, daughters x2, other loved ones  Areas of fulfillment/denzel: family and hockey (playing and coaching)    Medications:  Reviewed this patient's medication profile and medications from this hospitalization.     ROS:  Could not obtain ROS due to acuity of condition    Physical Exam   Vital Signs with Ranges  Temp:  [97.5  F (36.4  C)-98.8  F (37.1  C)] 98.4  F (36.9  C)  Pulse:  [83-97] 97  Resp:  [10-12] 10  MAP:  [69 mmHg-85 mmHg] 73 mmHg  Arterial Line BP: ()/(57-76) 110/59  FiO2 (%):  [50 %] 50 %  SpO2:  [89 %-100 %] 100 %  Wt Readings from Last 10 Encounters:   06/03/24 126.1 kg (278 lb 1.6 oz)     278 lbs 1.6 oz  General: Not in acute distress.   Head: Atraumatic. Normocephalic.   Eyes: Eyes closed  Ear, Nose, and Throat: Mouth pink and moist without lesions. Neck without overt masses.  Pulmonary: Unlabored. ETT in place.   Cardiovascular: Perfusing.   Abdomen: Non-distended. Feeding tube in place.  Skin: Warm.   Musculoskeletal: Joints of hand normal. Muscle bulk and tone normal in UE and LE.  Neuro: Intubated and sedated, did not assess  Psych: Intubated and sedated, could not assess      Data reviewed:  Results  for orders placed or performed during the hospital encounter of 06/01/24 (from the past 24 hour(s))   Blood gas arterial   Result Value Ref Range    pH Arterial 7.34 (L) 7.35 - 7.45    pCO2 Arterial 52 (H) 35 - 45 mm Hg    pO2 Arterial 110 (H) 80 - 105 mm Hg    FIO2 50     Bicarbonate Arterial 28 21 - 28 mmol/L    Base Excess/Deficit Arterial 1.1 -3.0 - 3.0 mmol/L    Geovanni's Test Artline     Oxyhemoglobin Arterial 97 92 - 100 %    O2 Sat, Arterial 98.9 (H) 96.0 - 97.0 %    Peep 7 cm H2O    Narrative    In healthy individuals, oxyhemoglobin (O2Hb) and oxygen saturation (SO2) are approximately equal. In the presence of dyshemoglobins, oxyhemoglobin can be considerably lower than oxygen saturation.   CBC with platelets   Result Value Ref Range    WBC Count 13.2 (H) 4.0 - 11.0 10e3/uL    RBC Count 4.42 4.40 - 5.90 10e6/uL    Hemoglobin 13.6 13.3 - 17.7 g/dL    Hematocrit 40.1 40.0 - 53.0 %    MCV 91 78 - 100 fL    MCH 30.8 26.5 - 33.0 pg    MCHC 33.9 31.5 - 36.5 g/dL    RDW 13.4 10.0 - 15.0 %    Platelet Count 165 150 - 450 10e3/uL   Comprehensive metabolic panel   Result Value Ref Range    Sodium 142 135 - 145 mmol/L    Potassium 4.4 3.4 - 5.3 mmol/L    Carbon Dioxide (CO2) 25 22 - 29 mmol/L    Anion Gap 9 7 - 15 mmol/L    Urea Nitrogen 25.9 (H) 6.0 - 20.0 mg/dL    Creatinine 1.53 (H) 0.67 - 1.17 mg/dL    GFR Estimate 55 (L) >60 mL/min/1.73m2    Calcium 8.6 8.6 - 10.0 mg/dL    Chloride 108 (H) 98 - 107 mmol/L    Glucose 122 (H) 70 - 99 mg/dL    Alkaline Phosphatase 43 40 - 150 U/L     (HH) 0 - 45 U/L     (H) 0 - 70 U/L    Protein Total 5.7 (L) 6.4 - 8.3 g/dL    Albumin 3.4 (L) 3.5 - 5.2 g/dL    Bilirubin Total 0.7 <=1.2 mg/dL   Calcium ionized whole blood   Result Value Ref Range    Calcium Ionized Whole Blood 4.6 4.4 - 5.2 mg/dL   Glucose whole blood   Result Value Ref Range    Glucose 116 (H) 70 - 99 mg/dL   Heparin Unfractionated Anti Xa Level   Result Value Ref Range    Anti Xa Unfractionated Heparin  >1.10 (HH) For Reference Range, See Comment IU/mL    Narrative    Therapeutic Range: UFH: 0.25-0.50 IU/mL for low intensity dosing,  0.30-0.70 IU/mL for high intensity dosing DVT and PE.  This test is not validated for other direct factor X inhibitors (e.g. rivaroxaban, apixaban, edoxaban, betrixaban, fondaparinux) and should not be used for monitoring of other medications.   INR   Result Value Ref Range    INR 1.16 (H) 0.85 - 1.15   Partial thromboplastin time   Result Value Ref Range    aPTT 228 (HH) 22 - 38 Seconds   Lactic acid whole blood   Result Value Ref Range    Lactic Acid 1.2 0.7 - 2.0 mmol/L   Magnesium   Result Value Ref Range    Magnesium 2.1 1.7 - 2.3 mg/dL   Troponin T, High Sensitivity   Result Value Ref Range    Troponin T, High Sensitivity >10,000 (HH) <=22 ng/L   D dimer quantitative   Result Value Ref Range    D-Dimer Quantitative 2.63 (H) 0.00 - 0.50 ug/mL FEU    Narrative    This D-dimer assay is intended for use in conjunction with a clinical pretest probability assessment model to exclude pulmonary embolism (PE) and deep venous thrombosis (DVT) in outpatients suspected of PE or DVT. The cut-off value is 0.50 ug/mL FEU.   Blood gas ELS venous   Result Value Ref Range    pH ELS Venous 7.20 (L) 7.32 - 7.43    pCO2 ELS Venous 85 (HH) 40 - 50 mm Hg    pO2 ELS Venous 42 25 - 47 mm Hg    Bicarbonate ELS Venous 33 (H) 21 - 28 mmol/L    Base Excess/Deficit Venous 1.9 -3.0 - 3.0 mmol/L    FIO2 50     Oxyhemoglobin ELS Venous 69 %    O2 Saturation ELS Venous 69.9 (L) 70.0 - 75.0 %    Narrative    In healthy individuals, oxyhemoglobin (O2Hb) and oxygen saturation (SO2) are approximately equal. In the presence of dyshemoglobins, oxyhemoglobin can be considerably lower than oxygen saturation.   Fibrinogen activity   Result Value Ref Range    Fibrinogen Activity 353 170 - 490 mg/dL   Glucose by meter   Result Value Ref Range    GLUCOSE BY METER POCT 118 (H) 70 - 99 mg/dL   Activated clotting time amado,  POCT   Result Value Ref Range    Activated Clotting Time (Celite) POCT 194 (H) 74 - 150 seconds   Blood gas ELS arterial   Result Value Ref Range    pH ELS Arterial 7.36 7.35 - 7.45    pCO2 ELS Arterial 48 (H) 35 - 45 mm Hg    pO2 ELS Arterial 348 (H) 80 - 105 mm Hg    Bicarbonate ELS Arterial 27 21 - 28 mmol/L    Base Excess/Deficit Arterial 0.9 -3.0 - 3.0 mmol/L    FIO2 70     Oxyhemoglobin ELS Arterial 98 75 - 100 %    O2 Saturation ELS Arterial >100.0 (H) 96.0 - 97.0 %    Narrative    In healthy individuals, oxyhemoglobin (O2Hb) and oxygen saturation (SO2) are approximately equal. In the presence of dyshemoglobins, oxyhemoglobin can be considerably lower than oxygen saturation.   Blood gas ELS venous   Result Value Ref Range    pH ELS Venous 7.27 (L) 7.32 - 7.43    pCO2 ELS Venous 65 (H) 40 - 50 mm Hg    pO2 ELS Venous 46 25 - 47 mm Hg    Bicarbonate ELS Venous 30 (H) 21 - 28 mmol/L    Base Excess/Deficit Venous 1.6 -3.0 - 3.0 mmol/L    FIO2 50     Oxyhemoglobin ELS Venous 78 %    O2 Saturation ELS Venous 79.8 (H) 70.0 - 75.0 %    Narrative    In healthy individuals, oxyhemoglobin (O2Hb) and oxygen saturation (SO2) are approximately equal. In the presence of dyshemoglobins, oxyhemoglobin can be considerably lower than oxygen saturation.   Blood gas arterial   Result Value Ref Range    pH Arterial 7.44 7.35 - 7.45    pCO2 Arterial 41 35 - 45 mm Hg    pO2 Arterial 116 (H) 80 - 105 mm Hg    FIO2 50     Bicarbonate Arterial 28 21 - 28 mmol/L    Base Excess/Deficit Arterial 2.9 -3.0 - 3.0 mmol/L    Geovanni's Test Artline     Oxyhemoglobin Arterial 98 92 - 100 %    O2 Sat, Arterial 99.9 (H) 96.0 - 97.0 %    Peep 7 cm H2O    Narrative    In healthy individuals, oxyhemoglobin (O2Hb) and oxygen saturation (SO2) are approximately equal. In the presence of dyshemoglobins, oxyhemoglobin can be considerably lower than oxygen saturation.   Glucose by meter   Result Value Ref Range    GLUCOSE BY METER POCT 104 (H) 70 - 99 mg/dL    Blood gas arterial   Result Value Ref Range    pH Arterial 7.43 7.35 - 7.45    pCO2 Arterial 42 35 - 45 mm Hg    pO2 Arterial 109 (H) 80 - 105 mm Hg    FIO2 50     Bicarbonate Arterial 28 21 - 28 mmol/L    Base Excess/Deficit Arterial 2.7 -3.0 - 3.0 mmol/L    Geovanni's Test Artline     Oxyhemoglobin Arterial 97 92 - 100 %    O2 Sat, Arterial 99.5 (H) 96.0 - 97.0 %    Narrative    In healthy individuals, oxyhemoglobin (O2Hb) and oxygen saturation (SO2) are approximately equal. In the presence of dyshemoglobins, oxyhemoglobin can be considerably lower than oxygen saturation.   Glucose by meter   Result Value Ref Range    GLUCOSE BY METER POCT 101 (H) 70 - 99 mg/dL   Activated clotting time celite, POCT   Result Value Ref Range    Activated Clotting Time (Celite) POCT 203 (H) 74 - 150 seconds   CBC with platelets   Result Value Ref Range    WBC Count 11.2 (H) 4.0 - 11.0 10e3/uL    RBC Count 4.29 (L) 4.40 - 5.90 10e6/uL    Hemoglobin 13.4 13.3 - 17.7 g/dL    Hematocrit 38.3 (L) 40.0 - 53.0 %    MCV 89 78 - 100 fL    MCH 31.2 26.5 - 33.0 pg    MCHC 35.0 31.5 - 36.5 g/dL    RDW 13.3 10.0 - 15.0 %    Platelet Count 142 (L) 150 - 450 10e3/uL   Comprehensive metabolic panel   Result Value Ref Range    Sodium 142 135 - 145 mmol/L    Potassium 3.7 3.4 - 5.3 mmol/L    Carbon Dioxide (CO2) 25 22 - 29 mmol/L    Anion Gap 9 7 - 15 mmol/L    Urea Nitrogen 25.7 (H) 6.0 - 20.0 mg/dL    Creatinine 1.42 (H) 0.67 - 1.17 mg/dL    GFR Estimate 61 >60 mL/min/1.73m2    Calcium 8.6 8.6 - 10.0 mg/dL    Chloride 108 (H) 98 - 107 mmol/L    Glucose 107 (H) 70 - 99 mg/dL    Alkaline Phosphatase 42 40 - 150 U/L     (HH) 0 - 45 U/L     (H) 0 - 70 U/L    Protein Total 5.6 (L) 6.4 - 8.3 g/dL    Albumin 3.3 (L) 3.5 - 5.2 g/dL    Bilirubin Total 0.7 <=1.2 mg/dL   Heparin Unfractionated Anti Xa Level   Result Value Ref Range    Anti Xa Unfractionated Heparin >1.10 (HH) For Reference Range, See Comment IU/mL    Narrative    Therapeutic  Range: UFH: 0.25-0.50 IU/mL for low intensity dosing,  0.30-0.70 IU/mL for high intensity dosing DVT and PE.  This test is not validated for other direct factor X inhibitors (e.g. rivaroxaban, apixaban, edoxaban, betrixaban, fondaparinux) and should not be used for monitoring of other medications.   INR   Result Value Ref Range    INR 1.20 (H) 0.85 - 1.15   Partial thromboplastin time   Result Value Ref Range    aPTT >240 (HH) 22 - 38 Seconds   Magnesium   Result Value Ref Range    Magnesium 2.0 1.7 - 2.3 mg/dL   Troponin T, High Sensitivity   Result Value Ref Range    Troponin T, High Sensitivity >10,000 (HH) <=22 ng/L   Procalcitonin   Result Value Ref Range    Procalcitonin 1.78 (H) <0.50 ng/mL   Blood gas arterial   Result Value Ref Range    pH Arterial 7.43 7.35 - 7.45    pCO2 Arterial 42 35 - 45 mm Hg    pO2 Arterial 99 80 - 105 mm Hg    FIO2 50     Bicarbonate Arterial 28 21 - 28 mmol/L    Base Excess/Deficit Arterial 2.9 -3.0 - 3.0 mmol/L    Geovanni's Test Artline     Oxyhemoglobin Arterial 97 92 - 100 %    O2 Sat, Arterial 99.0 (H) 96.0 - 97.0 %    Narrative    In healthy individuals, oxyhemoglobin (O2Hb) and oxygen saturation (SO2) are approximately equal. In the presence of dyshemoglobins, oxyhemoglobin can be considerably lower than oxygen saturation.   Calcium ionized whole blood   Result Value Ref Range    Calcium Ionized Whole Blood 4.7 4.4 - 5.2 mg/dL   Glucose whole blood   Result Value Ref Range    Glucose 102 (H) 70 - 99 mg/dL   Lactic acid whole blood   Result Value Ref Range    Lactic Acid 1.4 0.7 - 2.0 mmol/L   Glucose by meter   Result Value Ref Range    GLUCOSE BY METER POCT 100 (H) 70 - 99 mg/dL   Activated clotting time celite, POCT   Result Value Ref Range    Activated Clotting Time (Celite) POCT 199 (H) 74 - 150 seconds   Blood gas arterial   Result Value Ref Range    pH Arterial 7.39 7.35 - 7.45    pCO2 Arterial 45 35 - 45 mm Hg    pO2 Arterial 91 80 - 105 mm Hg    FIO2 50     Bicarbonate  Arterial 28 21 - 28 mmol/L    Base Excess/Deficit Arterial 2.2 -3.0 - 3.0 mmol/L    Geovanni's Test Artline     Oxyhemoglobin Arterial 96 92 - 100 %    O2 Sat, Arterial 98.1 (H) 96.0 - 97.0 %    Narrative    In healthy individuals, oxyhemoglobin (O2Hb) and oxygen saturation (SO2) are approximately equal. In the presence of dyshemoglobins, oxyhemoglobin can be considerably lower than oxygen saturation.   Glucose by meter   Result Value Ref Range    GLUCOSE BY METER POCT 116 (H) 70 - 99 mg/dL   EEG Video 12-26 hr Unmonitored    Narrative    EEG Video 12-26 hr Unmonitored Result    VIDEO EEG DATE: 2024  VIDEO EEG [id number]  VIDEO EEG DAY#: 2  VIDEO EEG SOURCE FILE DURATION: 23 hours and 58 minutes    CLINICAL SUMMARY: This diagnostic video-EEG monitoring procedure was   performed in evaluation of encephalopathy and seizures following cardiac   arrest in Cullen Reardon, who is a 48-year-old man.  Video EEG is being   done to evaluate for seizures.     MEDICATIONS: The patient was reported to be receiving continuous infusion   of midazolam and fentanyl.       TECHNICAL SUMMARY:  This continuous EEG monitoring procedure was performed   with 25 scalp electrodes in 10-20 system placements, and additional scalp,   precordial and other surface electrodes used for electrical referencing   and artifact detection.  Qualified technicians attached EEG electrodes,   set up the study, monitored and reviewed EEG recordings and disconnected   EEG electrodes as appropriate.  Video monitoring was utilized and   periodically reviewed by EEG technologists and the physician for   electroclinical correlation.      EEG ACTIVITIES: There is a poorly formed, unsustained 7 to 8 Hz posterior   dominant rhythm.  There is a generalized continuous delta greater than   theta slowing with a superimposed intermixture of a faster alpha-beta   activities.  There are no sleep architecture such as sleep spindles or K   complexes seen in this  recording.  At around approximately 1700, blinking   artifact is seen and some EEG reactivity is noted.    EPILEPTIFORM DISCHARGES: none     ICTAL: none      SUMMARY OF DAY 2 OF VIDEO-EEG MONITORING:    The EEG recording was abnormal due to continuous generalized delta-theta   slowing consistent with moderate-severe electrographic encephalopathy. No   interictal epileptiform abnormalities and no electrographic seizures were   recorded during the period of monitoring.  This recording excludes   non-convulsive status epilepticus as a possible cause of this   encephalopathy.  Clinical correlation is recommended.     Report prepared by Kin Cristina MD. Clinical Neurophysiology Fellow.    I agree with the findings as reported. I personally reviewed this EEG   recording and made edits to this report as needed.     Sandra Garner MD   Epilepsy Attending        Activated clotting time celite, POCT   Result Value Ref Range    Activated Clotting Time (Celite) POCT 207 (H) 74 - 150 seconds   XR Chest Port 1 View    Narrative    EXAM: XR CHEST PORT 1 VIEW 6/3/2024 1:00 AM    INDICATION: Check endotracheal tube placement and ECLS cannula  placement. DO NOT log-roll patient.  Place film under patient using  patient safety handling process.    COMPARISON: Chest radiograph 6/1/2024, CT chest abdomen pelvis  6/1/2024    TECHNIQUE: Single AP view of the chest.    FINDINGS:   Endotracheal tube at the mid thoracic trachea. Lower approach venous  ECMO cannula stable in position at the superior IVC. Partially  visualized gastric tube. Trachea is grossly midline when accounting  for rotation. Lung fields are relatively clear. Mediastinal structures  are unchanged from prior. No pleural effusion or pneumothorax.      Impression    IMPRESSION:   Stable chest with unchanged support devices.    I have personally reviewed the examination and initial interpretation  and I agree with the findings.    OLIVIA MOORE MD         SYSTEM ID:   [id number]   Blood gas arterial   Result Value Ref Range    pH Arterial 7.41 7.35 - 7.45    pCO2 Arterial 44 35 - 45 mm Hg    pO2 Arterial 99 80 - 105 mm Hg    FIO2 50     Bicarbonate Arterial 28 21 - 28 mmol/L    Base Excess/Deficit Arterial 2.7 -3.0 - 3.0 mmol/L    Geovanni's Test Artline     Oxyhemoglobin Arterial 97 92 - 100 %    O2 Sat, Arterial 98.7 (H) 96.0 - 97.0 %    Narrative    In healthy individuals, oxyhemoglobin (O2Hb) and oxygen saturation (SO2) are approximately equal. In the presence of dyshemoglobins, oxyhemoglobin can be considerably lower than oxygen saturation.   Glucose by meter   Result Value Ref Range    GLUCOSE BY METER POCT 117 (H) 70 - 99 mg/dL   EKG 12-lead, tracing only   Result Value Ref Range    Systolic Blood Pressure  mmHg    Diastolic Blood Pressure  mmHg    Ventricular Rate 90 BPM    Atrial Rate 90 BPM    AK Interval 148 ms    QRS Duration 82 ms     ms    QTc 371 ms    P Axis 30 degrees    R AXIS 59 degrees    T Axis 89 degrees    Interpretation ECG       Sinus rhythm  Low voltage QRS  Possible Lateral infarct (cited on or before 01-JUN-2024)  Abnormal ECG  When compared with ECG of 02-JUN-2024 03:59, (unconfirmed)  QT has shortened  Confirmed by MD BECKA, CARROL ([id number]) on 6/3/2024 11:29:40 AM     CBC with platelets   Result Value Ref Range    WBC Count 11.2 (H) 4.0 - 11.0 10e3/uL    RBC Count 4.15 (L) 4.40 - 5.90 10e6/uL    Hemoglobin 12.9 (L) 13.3 - 17.7 g/dL    Hematocrit 37.8 (L) 40.0 - 53.0 %    MCV 91 78 - 100 fL    MCH 31.1 26.5 - 33.0 pg    MCHC 34.1 31.5 - 36.5 g/dL    RDW 13.4 10.0 - 15.0 %    Platelet Count 134 (L) 150 - 450 10e3/uL   Comprehensive metabolic panel   Result Value Ref Range    Sodium 141 135 - 145 mmol/L    Potassium 3.7 3.4 - 5.3 mmol/L    Carbon Dioxide (CO2) 25 22 - 29 mmol/L    Anion Gap 10 7 - 15 mmol/L    Urea Nitrogen 25.9 (H) 6.0 - 20.0 mg/dL    Creatinine 1.32 (H) 0.67 - 1.17 mg/dL    GFR Estimate 66 >60 mL/min/1.73m2    Calcium 8.6 8.6 - 10.0  mg/dL    Chloride 106 98 - 107 mmol/L    Glucose 130 (H) 70 - 99 mg/dL    Alkaline Phosphatase 43 40 - 150 U/L     (HH) 0 - 45 U/L     (H) 0 - 70 U/L    Protein Total 5.6 (L) 6.4 - 8.3 g/dL    Albumin 3.3 (L) 3.5 - 5.2 g/dL    Bilirubin Total 0.7 <=1.2 mg/dL   Heparin Unfractionated Anti Xa Level   Result Value Ref Range    Anti Xa Unfractionated Heparin 1.08 For Reference Range, See Comment IU/mL    Narrative    Therapeutic Range: UFH: 0.25-0.50 IU/mL for low intensity dosing,  0.30-0.70 IU/mL for high intensity dosing DVT and PE.  This test is not validated for other direct factor X inhibitors (e.g. rivaroxaban, apixaban, edoxaban, betrixaban, fondaparinux) and should not be used for monitoring of other medications.   INR   Result Value Ref Range    INR 1.18 (H) 0.85 - 1.15   Partial thromboplastin time   Result Value Ref Range    aPTT 196 (HH) 22 - 38 Seconds   Magnesium   Result Value Ref Range    Magnesium 1.9 1.7 - 2.3 mg/dL   Troponin T, High Sensitivity   Result Value Ref Range    Troponin T, High Sensitivity >10,000 (HH) <=22 ng/L   D dimer quantitative   Result Value Ref Range    D-Dimer Quantitative 1.28 (H) 0.00 - 0.50 ug/mL FEU    Narrative    This D-dimer assay is intended for use in conjunction with a clinical pretest probability assessment model to exclude pulmonary embolism (PE) and deep venous thrombosis (DVT) in outpatients suspected of PE or DVT. The cut-off value is 0.50 ug/mL FEU.   Blood gas ELS venous   Result Value Ref Range    pH ELS Venous 7.36 7.32 - 7.43    pCO2 ELS Venous 55 (H) 40 - 50 mm Hg    pO2 ELS Venous 42 25 - 47 mm Hg    Bicarbonate ELS Venous 31 (H) 21 - 28 mmol/L    Base Excess/Deficit Venous 4.5 (H) -3.0 - 3.0 mmol/L    FIO2 50     Oxyhemoglobin ELS Venous 77 %    O2 Saturation ELS Venous 78.1 (H) 70.0 - 75.0 %    Narrative    In healthy individuals, oxyhemoglobin (O2Hb) and oxygen saturation (SO2) are approximately equal. In the presence of dyshemoglobins,  oxyhemoglobin can be considerably lower than oxygen saturation.   Blood gas ELS arterial   Result Value Ref Range    pH ELS Arterial 7.39 7.35 - 7.45    pCO2 ELS Arterial 50 (H) 35 - 45 mm Hg    pO2 ELS Arterial 301 (H) 80 - 105 mm Hg    Bicarbonate ELS Arterial 30 (H) 21 - 28 mmol/L    Base Excess/Deficit Arterial 3.7 (H) -3.0 - 3.0 mmol/L    FIO2 70     Oxyhemoglobin ELS Arterial 99 75 - 100 %    O2 Saturation ELS Arterial 99.5 (H) 96.0 - 97.0 %    Narrative    In healthy individuals, oxyhemoglobin (O2Hb) and oxygen saturation (SO2) are approximately equal. In the presence of dyshemoglobins, oxyhemoglobin can be considerably lower than oxygen saturation.   Fibrinogen activity   Result Value Ref Range    Fibrinogen Activity 436 170 - 490 mg/dL   Antithrombin III   Result Value Ref Range    Antithrombin III 63 (L) 85 - 135 %   CRP inflammation   Result Value Ref Range    CRP Inflammation 139.00 (H) <5.00 mg/L   Erythrocyte sedimentation rate auto   Result Value Ref Range    Erythrocyte Sedimentation Rate 16 (H) 0 - 15 mm/hr   Hemoglobin plasma   Result Value Ref Range    Hemoglobin Plasma 30 (H) <30 mg/dL   Lactate Dehydrogenase   Result Value Ref Range    Lactate Dehydrogenase 1,660 (H) 0 - 250 U/L   Phosphorus   Result Value Ref Range    Phosphorus 2.0 (L) 2.5 - 4.5 mg/dL   TSH   Result Value Ref Range    TSH 0.60 0.30 - 4.20 uIU/mL   T3 Free   Result Value Ref Range    T3 Free 2.3 2.0 - 4.4 pg/mL   T3 total   Result Value Ref Range    T3 Total 80 (L) 85 - 202 ng/dL   T4 free   Result Value Ref Range    Free T4 1.18 0.90 - 1.70 ng/dL   Lipid panel reflex to direct LDL   Result Value Ref Range    Cholesterol 149 <200 mg/dL    Triglycerides 99 <150 mg/dL    Direct Measure HDL 47 >=40 mg/dL    LDL Cholesterol Calculated 82 <=100 mg/dL    Non HDL Cholesterol 102 <130 mg/dL    Narrative    Cholesterol  Desirable:  <200 mg/dL    Triglycerides  Normal:  Less than 150 mg/dL  Borderline High:  150-199 mg/dL  High:  200-499  mg/dL  Very High:  Greater than or equal to 500 mg/dL    Direct Measure HDL  Female:  Greater than or equal to 50 mg/dL   Male:  Greater than or equal to 40 mg/dL    LDL Cholesterol  Desirable:  <100mg/dL  Above Desirable:  100-129 mg/dL   Borderline High:  130-159 mg/dL   High:  160-189 mg/dL   Very High:  >= 190 mg/dL    Non HDL Cholesterol  Desirable:  130 mg/dL  Above Desirable:  130-159 mg/dL  Borderline High:  160-189 mg/dL  High:  190-219 mg/dL  Very High:  Greater than or equal to 220 mg/dL   Blood gas arterial   Result Value Ref Range    pH Arterial 7.43 7.35 - 7.45    pCO2 Arterial 43 35 - 45 mm Hg    pO2 Arterial 102 80 - 105 mm Hg    FIO2 50     Bicarbonate Arterial 29 (H) 21 - 28 mmol/L    Base Excess/Deficit Arterial 3.7 (H) -3.0 - 3.0 mmol/L    Geovanni's Test Artline     Oxyhemoglobin Arterial 97 92 - 100 %    O2 Sat, Arterial 99.1 (H) 96.0 - 97.0 %    Narrative    In healthy individuals, oxyhemoglobin (O2Hb) and oxygen saturation (SO2) are approximately equal. In the presence of dyshemoglobins, oxyhemoglobin can be considerably lower than oxygen saturation.   Calcium ionized whole blood   Result Value Ref Range    Calcium Ionized Whole Blood 4.7 4.4 - 5.2 mg/dL   Glucose whole blood   Result Value Ref Range    Glucose 125 (H) 70 - 99 mg/dL   Lactic acid whole blood   Result Value Ref Range    Lactic Acid 1.3 0.7 - 2.0 mmol/L   Respiratory Aerobic Bacterial Culture    Specimen: Endotracheal; Sputum   Result Value Ref Range    Gram Stain Result >25 PMNs/low power field     Gram Stain Result 1+ Mixed jann    Glucose by meter   Result Value Ref Range    GLUCOSE BY METER POCT 121 (H) 70 - 99 mg/dL   Activated clotting time celite, POCT   Result Value Ref Range    Activated Clotting Time (Celite) POCT 199 (H) 74 - 150 seconds   Blood gas arterial   Result Value Ref Range    pH Arterial 7.43 7.35 - 7.45    pCO2 Arterial 44 35 - 45 mm Hg    pO2 Arterial 98 80 - 105 mm Hg    FIO2 50     Bicarbonate Arterial 29  (H) 21 - 28 mmol/L    Base Excess/Deficit Arterial 4.4 (H) -3.0 - 3.0 mmol/L    Geovanni's Test Artline     Oxyhemoglobin Arterial 96 92 - 100 %    O2 Sat, Arterial 98.8 (H) 96.0 - 97.0 %    Narrative    In healthy individuals, oxyhemoglobin (O2Hb) and oxygen saturation (SO2) are approximately equal. In the presence of dyshemoglobins, oxyhemoglobin can be considerably lower than oxygen saturation.   Glucose by meter   Result Value Ref Range    GLUCOSE BY METER POCT 130 (H) 70 - 99 mg/dL   Activated clotting time celite, POCT   Result Value Ref Range    Activated Clotting Time (Celite) POCT 190 (H) 74 - 150 seconds   TEG with Platelet Inhibition   Result Value Ref Range    Platelet Inhibition with ADP 66 %    Platelet Inhibition with AA 71 %    TEG Interpretation       The maximum amplitude (MA) of the reactions in the platelet mapping study are activator 20.9 mm, ADP 34.7 mm, and Arachidonic Acid 32.8 mm.    Esther Clark MD, PhD  [facility]      Narrative    Platelet Mapping is intended to assess platelet function   in patients who have received antiplatelet therapy, such as aspirin,   clopidogrel, abciximab, etc. Results are reported in a range of 0 to 100% inhibition. A high value indicates a response to the antiplatelet therapy.   Blood gas arterial   Result Value Ref Range    pH Arterial 7.43 7.35 - 7.45    pCO2 Arterial 45 35 - 45 mm Hg    pO2 Arterial 122 (H) 80 - 105 mm Hg    FIO2 50     Bicarbonate Arterial 30 (H) 21 - 28 mmol/L    Base Excess/Deficit Arterial 4.6 (H) -3.0 - 3.0 mmol/L    Geovanni's Test Artline     Oxyhemoglobin Arterial 97 92 - 100 %    O2 Sat, Arterial 99.5 (H) 96.0 - 97.0 %    Peep 7 cm H2O    Narrative    In healthy individuals, oxyhemoglobin (O2Hb) and oxygen saturation (SO2) are approximately equal. In the presence of dyshemoglobins, oxyhemoglobin can be considerably lower than oxygen saturation.   Teg with Heparinase (for use with Platelet Inhibition)   Result Value Ref Range     R (Time until clot forms) 8.2 5.0 - 10.0 Minute    K ( Time to Spec. clot strength) 1.8 1.0 - 3.0 Minute    Angle (Rate of Clot Growth) 63.5 53.0 - 72.0 Degrees    MA ( Maximum Clot Strength) 61.4 50.0 - 70.0 mm    CI (coagulation index) -1.5 -3.0 - 3.0    G (actual clot strength) 8.0 4.5 - 11.0 Kd/sc    LY30 (lysis at 30 minutes) 2.5 0.0 - 8.0 %    LY60 (lysis at 60 minutes) 6.3 0.0 - 15.0 %   Glucose by meter   Result Value Ref Range    GLUCOSE BY METER POCT 114 (H) 70 - 99 mg/dL   Activated clotting time celite, POCT   Result Value Ref Range    Activated Clotting Time (Celite) POCT 186 (H) 74 - 150 seconds   Blood gas arterial   Result Value Ref Range    pH Arterial 7.42 7.35 - 7.45    pCO2 Arterial 46 (H) 35 - 45 mm Hg    pO2 Arterial 93 80 - 105 mm Hg    FIO2 50     Bicarbonate Arterial 30 (H) 21 - 28 mmol/L    Base Excess/Deficit Arterial 4.7 (H) -3.0 - 3.0 mmol/L    Geovanni's Test Artline     Oxyhemoglobin Arterial 97 92 - 100 %    O2 Sat, Arterial 98.9 (H) 96.0 - 97.0 %    Peep 7 cm H2O    Narrative    In healthy individuals, oxyhemoglobin (O2Hb) and oxygen saturation (SO2) are approximately equal. In the presence of dyshemoglobins, oxyhemoglobin can be considerably lower than oxygen saturation.   CBC with platelets   Result Value Ref Range    WBC Count 10.9 4.0 - 11.0 10e3/uL    RBC Count 3.96 (L) 4.40 - 5.90 10e6/uL    Hemoglobin 12.4 (L) 13.3 - 17.7 g/dL    Hematocrit 35.9 (L) 40.0 - 53.0 %    MCV 91 78 - 100 fL    MCH 31.3 26.5 - 33.0 pg    MCHC 34.5 31.5 - 36.5 g/dL    RDW 13.3 10.0 - 15.0 %    Platelet Count 136 (L) 150 - 450 10e3/uL   Comprehensive metabolic panel   Result Value Ref Range    Sodium 141 135 - 145 mmol/L    Potassium 3.9 3.4 - 5.3 mmol/L    Carbon Dioxide (CO2) 27 22 - 29 mmol/L    Anion Gap 8 7 - 15 mmol/L    Urea Nitrogen 28.1 (H) 6.0 - 20.0 mg/dL    Creatinine 1.31 (H) 0.67 - 1.17 mg/dL    GFR Estimate 67 >60 mL/min/1.73m2    Calcium 8.5 (L) 8.6 - 10.0 mg/dL    Chloride 106 98 - 107  mmol/L    Glucose 133 (H) 70 - 99 mg/dL    Alkaline Phosphatase 41 40 - 150 U/L     (HH) 0 - 45 U/L     (H) 0 - 70 U/L    Protein Total 5.4 (L) 6.4 - 8.3 g/dL    Albumin 3.1 (L) 3.5 - 5.2 g/dL    Bilirubin Total 0.7 <=1.2 mg/dL   Calcium ionized whole blood   Result Value Ref Range    Calcium Ionized Whole Blood 4.7 4.4 - 5.2 mg/dL   Glucose whole blood   Result Value Ref Range    Glucose 123 (H) 70 - 99 mg/dL   Heparin Unfractionated Anti Xa Level   Result Value Ref Range    Anti Xa Unfractionated Heparin 0.76 For Reference Range, See Comment IU/mL    Narrative    Therapeutic Range: UFH: 0.25-0.50 IU/mL for low intensity dosing,  0.30-0.70 IU/mL for high intensity dosing DVT and PE.  This test is not validated for other direct factor X inhibitors (e.g. rivaroxaban, apixaban, edoxaban, betrixaban, fondaparinux) and should not be used for monitoring of other medications.   INR   Result Value Ref Range    INR 1.21 (H) 0.85 - 1.15   Partial thromboplastin time   Result Value Ref Range    aPTT 136 (HH) 22 - 38 Seconds   Lactic acid whole blood   Result Value Ref Range    Lactic Acid 1.3 0.7 - 2.0 mmol/L   Magnesium   Result Value Ref Range    Magnesium 1.9 1.7 - 2.3 mg/dL   Troponin T, High Sensitivity   Result Value Ref Range    Troponin T, High Sensitivity >10,000 (HH) <=22 ng/L   Glucose by meter   Result Value Ref Range    GLUCOSE BY METER POCT 133 (H) 70 - 99 mg/dL   Activated clotting time celite, POCT   Result Value Ref Range    Activated Clotting Time (Celite) POCT 195 (H) 74 - 150 seconds   Triple Lumen PICC Placement    Narrative    Cindy Gonzalez RN     6/3/2024 12:15 PM  [facility]    Triple Lumen PICC Placement    Date/Time: 6/3/2024 12:11 PM    Performed by: Cindy Gonzalez RN  Authorized by: Alpesh Cobb MD  Indications: vascular access      UNIVERSAL PROTOCOL   Site Marked: Yes  Prior Images Obtained  and Reviewed:  Yes  Required items: Required blood products, implants, devices and special   equipment available    Patient identity confirmed:  Verbally with patient and arm band  NA - No sedation, light sedation, or local anesthesia  Confirmation Checklist:  Patient's identity using two indicators and   relevant allergies  Time out: Immediately prior to the procedure a time out was called    Universal Protocol: the Joint Commission Universal Protocol was followed    Preparation: Patient was prepped and draped in usual sterile fashion       ANESTHESIA    Anesthesia:  Local infiltration  Local Anesthetic:  Lidocaine 1% without epinephrine  Anesthetic Total (mL):  5      SEDATION    Patient Sedated: No        Preparation: skin prepped with ChloraPrep  Skin prep agent: skin prep agent completely dried prior to procedure  Sterile barriers: maximum sterile barriers were used: cap, mask, sterile   gown, sterile gloves, and large sterile sheet  Hand hygiene: hand hygiene performed prior to central venous catheter   insertion  Type of line used: PICC  Catheter type: triple lumen  Lumen type: non-valved and power PICC  Lumen Identification: Gray, Red and White  Catheter size: 5 Fr  Brand: Bard  Lot number: [id number]  Placement method: venipuncture, MST, ultrasound and tip navigation system  Number of attempts: 1  Difficulty threading catheter: no  Successful placement: yes  Orientation: left    Location: basilic vein  Tip Location: SVC  Arm circumference: adults 10 cm  Extremity circumference: 40  Visible catheter length: 4  Total catheter length: 50  Dressing and securement: adhesive securement device, alcohol impregnated   caps, chlorhexidine disc applied, gloves changed prior to final dressing,   glue, securement device, site cleansed, subcutaneous anchor securement   system and transparent securement dressing  Post procedure assessment: blood return through all ports, placement   verified by 3CG technology and free fluid  flow  PROCEDURE   Patient Tolerance:  Patient tolerated the procedure well with no immediate   complicationsDescribe Procedure: PICC ok to use  Disposal: sharps and needle count correct at the end of procedure, needles   and guidewire disposed in sharps container   Blood gas arterial   Result Value Ref Range    pH Arterial 7.43 7.35 - 7.45    pCO2 Arterial 45 35 - 45 mm Hg    pO2 Arterial 98 80 - 105 mm Hg    FIO2 50     Bicarbonate Arterial 30 (H) 21 - 28 mmol/L    Base Excess/Deficit Arterial 4.7 (H) -3.0 - 3.0 mmol/L    Geovanni's Test Artline     Oxyhemoglobin Arterial 97 92 - 100 %    O2 Sat, Arterial 99.0 (H) 96.0 - 97.0 %    Peep 7 cm H2O    Narrative    In healthy individuals, oxyhemoglobin (O2Hb) and oxygen saturation (SO2) are approximately equal. In the presence of dyshemoglobins, oxyhemoglobin can be considerably lower than oxygen saturation.   Glucose by meter   Result Value Ref Range    GLUCOSE BY METER POCT 126 (H) 70 - 99 mg/dL   Blood gas arterial   Result Value Ref Range    pH Arterial 7.40 7.35 - 7.45    pCO2 Arterial 49 (H) 35 - 45 mm Hg    pO2 Arterial 108 (H) 80 - 105 mm Hg    FIO2 50     Bicarbonate Arterial 31 (H) 21 - 28 mmol/L    Base Excess/Deficit Arterial 4.9 (H) -3.0 - 3.0 mmol/L    Geovanni's Test Artline     Oxyhemoglobin Arterial 97 92 - 100 %    O2 Sat, Arterial 99.0 (H) 96.0 - 97.0 %    Peep 7 cm H2O    Narrative    In healthy individuals, oxyhemoglobin (O2Hb) and oxygen saturation (SO2) are approximately equal. In the presence of dyshemoglobins, oxyhemoglobin can be considerably lower than oxygen saturation.   EEG Video 2-12 HRS Ummonitored    Narrative    EEG Video 2-12 HRS Ummonitored Result    VIDEO EEG DATE: 6/3/2024  VIDEO EEG [id number]  VIDEO EEG DAY#: 3  VIDEO EEG SOURCE FILE DURATION: 8 hours and 22 minutes    CLINICAL SUMMARY: This diagnostic video-EEG monitoring procedure was   performed in evaluation of encephalopathy and seizures following cardiac   arrest in [place], who is a 48-year-old man.  Video EEG is being   done to evaluate for seizures.      MEDICATIONS: The patient was reported to be receiving continuous infusion   of midazolam and fentanyl.       TECHNICAL SUMMARY:  This continuous EEG monitoring procedure was performed   with 25 scalp electrodes in 10-20 system placements, and additional scalp,   precordial and other surface electrodes used for electrical referencing   and artifact detection.  Qualified technicians attached EEG electrodes,   set up the study, monitored and reviewed EEG recordings and disconnected   EEG electrodes as appropriate.  Video monitoring was utilized and   periodically reviewed by EEG technologists and the physician for   electroclinical correlation.      EEG ACTIVITIES: No clear posterior dominant rhythm.  There is a   generalized continuous delta greater than theta slowing with an occasional   intermixture of faster alpha-beta activities.  There are no sleep   architecture such as sleep spindles or K complexes seen in this recording.         EPILEPTIFORM DISCHARGES: none     ICTAL: none; Video was reviewed intermittently by EEG technologist and   physician for clinical seizures.      SUMMARY OF DAY 3 OF VIDEO-EEG MONITORING:    The EEG recording was abnormal due to continuous generalized delta-theta   slowing consistent with severe electrographic encephalopathy. No   interictal epileptiform abnormalities and no electrographic seizures were   recorded during the period of monitoring.  This recording excludes   non-convulsive status epilepticus as a possible cause of this   encephalopathy.  Clinical correlation is recommended.     SUMMARY OF THE 3 DAYS OF VEEG:  The 3 days of VEEG was abnormal due to   continuous generalized delta-theta slowing consistent with moderate-severe   electrographic encephalopathy. There was an intermixture of frequent   alpha-beta faster activities especially during day 1 and 2 of the   recording. No  interictal epileptiform abnormalities and no electrographic   seizures were recorded during the period of monitoring.  This recording   excludes non-convulsive status epilepticus as a possible cause of this   encephalopathy.  Clinical correlation is recommended.      Report prepared by Kin Cristina MD. Clinical Neurophysiology Fellow.   Echo Complete    Narrative    [id number]  [id number]  [id number]  [id number]^HELDER^ANIYA     [facility],[place]  Echocardiography Laboratory  [address] [place] [address]     Name: ALEX CARREON  MRN: [id number]  : 1975  Study Date: 2024 01:08 PM  Age: 48 yrs  Gender: Male  Patient Location: [facility]  Reason For Study: ECMO turndown  Ordering Physician: ANIYA PENDLETON  Performed By: Gregory Frazier RDCS     BSA: 2.5 m2  Height: 74 in  Weight: 278 lb  BP: 110/59 mmHg  ______________________________________________________________________________  Procedure  Complete Portable Echo Adult.  ______________________________________________________________________________  Interpretation Summary  VA ECMO turndown study.     Baseline (2.1L/min): Normal LV size and severely reduced LV funciton, LVEF=10-  15%. Mildly reduced RV function. Septum midline.  With turndown to 1.0L/min, there is no appreciable improvement in LV function.     This study was compared with the study from 24 .  No significant changes noted.  ______________________________________________________________________________  Left Ventricle  Left ventricular diastolic function is abnormal. Severe diffuse hypokinesis is  present.     Right Ventricle  The right ventricle is normal size. Global right ventricular function is  mildly reduced.     Mitral Valve  The mitral valve is normal.     Aortic Valve  Aortic valve is normal in structure and function. The aortic valve is  tricuspid.     Pulmonic Valve  The pulmonic valve cannot be assessed.     Vessels  The aorta root  cannot be assessed.     Pericardium  No pericardial effusion is present.     Compared to Previous Study  This study was compared with the study from 24 . No significant changes  noted.     ______________________________________________________________________________  Doppler Measurements & Calculations  PA acc time: 0.07 sec     ______________________________________________________________________________  Report approved by: Krystle Carbone 2024 02:40 PM           Recent Results (from the past 24 hour(s))   XR Chest Port 1 View    Narrative    EXAM: XR CHEST PORT 1 VIEW 6/3/2024 1:00 AM    INDICATION: Check endotracheal tube placement and ECLS cannula  placement. DO NOT log-roll patient.  Place film under patient using  patient safety handling process.    COMPARISON: Chest radiograph 2024, CT chest abdomen pelvis  2024    TECHNIQUE: Single AP view of the chest.    FINDINGS:   Endotracheal tube at the mid thoracic trachea. Lower approach venous  ECMO cannula stable in position at the superior IVC. Partially  visualized gastric tube. Trachea is grossly midline when accounting  for rotation. Lung fields are relatively clear. Mediastinal structures  are unchanged from prior. No pleural effusion or pneumothorax.      Impression    IMPRESSION:   Stable chest with unchanged support devices.    I have personally reviewed the examination and initial interpretation  and I agree with the findings.    OLIVIA MOORE MD         SYSTEM ID:  [id number]   Echo Complete    Narrative    [id number]  [id number]  [id number]  [id number]^VELANGI^ANIYA     [facility],[place]  Echocardiography Laboratory  [address] [place] [id number]     Name: ALEX CARREON  MRN: [id number]  : 1975  Study Date: 2024 01:08 PM  Age: 48 yrs  Gender: Male  Patient Location: [facility]  Reason For Study: ECMO turndown  Ordering Physician: ANIYA PENDLETON  Performed By: Gregory Frazier,  RDCS     BSA: 2.5 m2  Height: 74 in  Weight: 278 lb  BP: 110/59 mmHg  ______________________________________________________________________________  Procedure  Complete Portable Echo Adult.  ______________________________________________________________________________  Interpretation Summary  VA ECMO turndown study.     Baseline (2.1L/min): Normal LV size and severely reduced LV funciton, LVEF=10-  15%. Mildly reduced RV function. Septum midline.  With turndown to 1.0L/min, there is no appreciable improvement in LV function.     This study was compared with the study from 6/2/24 .  No significant changes noted.  ______________________________________________________________________________  Left Ventricle  Left ventricular diastolic function is abnormal. Severe diffuse hypokinesis is  present.     Right Ventricle  The right ventricle is normal size. Global right ventricular function is  mildly reduced.     Mitral Valve  The mitral valve is normal.     Aortic Valve  Aortic valve is normal in structure and function. The aortic valve is  tricuspid.     Pulmonic Valve  The pulmonic valve cannot be assessed.     Vessels  The aorta root cannot be assessed.     Pericardium  No pericardial effusion is present.     Compared to Previous Study  This study was compared with the study from 6/2/24 . No significant changes  noted.     ______________________________________________________________________________  Doppler Measurements & Calculations  PA acc time: 0.07 sec     ______________________________________________________________________________  Report approved by: Krystle Carbone 06/03/2024 02:40 PM             Medical Decision Making

## 2024-06-03 NOTE — PROCEDURES
ECMO [place] STUDY    Inotropes/Pressors:  none    Vent Mode: CMV/AC  (Continuous Mandatory Ventilation/ Assist Control)  FiO2 (%): 50 %  Resp Rate (Set): 10 breaths/min  Tidal Volume (Set, mL): 450 mL  PEEP (cm H2O): 7 cmH2O  Resp: 10       Flow  BP  HR O2 Sat   MVO2  EF (%)    2.1 111/60 73 93 99  81  5-10%  1.0 107/54 68 96 100  74  5-10%                ABG at lowest Flow: 7.40/49/108  P/F Ratio: 270         Summary:  -- EF with decreasing flows  -- EF compared to the last turndown  --Hemodynamically stable with turndown requiring no inotropes or pressors  --Oxygenation and ventilation stable with turndown    Plan:  --The patient is  ready for decannulation.  --CVTS consult placed    VJ Brewer CNP  [facility]  ICU Cardiology-CICU Service  Send message or 10 digit call back number Securely via [employer] with the [employer] Web Console (learn more here)'    Brittany 3, 2024

## 2024-06-03 NOTE — PROGRESS NOTES
"CLINICAL NUTRITION SERVICES - ASSESSMENT NOTE     Nutrition Prescription    RECOMMENDATIONS FOR MDs/PROVIDERS TO ORDER:  Total daily fluid adjustments per team  Monitor Phos with EN initiation / advancement    Malnutrition Status:    Patient does not meet two of the established criteria necessary for diagnosing malnutrition    Recommendations already ordered by Registered Dietitian (RD):  EN ordered via OGT:  - Pivot 1.5 Carlos (or equivalent) @ goal of  60ml/hr + 4 pkts prosource TF20 (1440ml/day) provides: 2480 kcals (26kcal/kg), 215 g PRO (2.2g/kg), 1080 ml free H20, 248 g CHO, and 10 g fiber daily.  - Initiate @ 20 ml/hr, adv by 10ml q 8 hours to goal, slower advancement d/t low phos  - Do not advance unless K+ >= 3, Mg++>= 1.5 and phos >=1.9  - FWF 30ml q 4 hours  - Certavite daily  - HOB 30 degrees with gastric feeds    Future/Additional Recommendations:  - Monitor EN initiation / advancement / tolerance via OGT  - Lytes (particularly phos)  - Weight trends - establish dry weight  - Can decrease protein if off ECMO to 1.5g/kg - 2 g/kg: Pivot 1.5 Carlos  (or equivalent) @ goal of  70ml/hr  (1680ml/day) provides: 2520 kcals (26kcal/kg), 157 g PRO (1.6g/kg), 1260 ml free H20, 289 g CHO, and 12 g fiber daily.     REASON FOR ASSESSMENT  Cullen Reardon is a/an 48 year old male assessed by the dietitian for Provider order: TF assess and order    Patient with no known past medical history who was admitted on 6/1/2024 following a witnessed VF cardiac arrest.     NUTRITION HISTORY  6/1 - Cannulated for [facility] ECMO.   Shock liver, JEAN CLAUDE, probable aspiration pneumonia    CURRENT NUTRITION ORDERS  Diet: NPO.  OGT (AXR)    LABS  BUN 25.9 (H)  Cr 1.32 (H)  Phos 2.0 (L)   (H), A1c pending    MEDICATIONS  Medications reviewed and notable for IV Kphos, miralax and senna.    Off pressors  Standard electrolyte replacement protocol    ANTHROPOMETRICS  Height: 188.6 cm (6' 2.25\")  Most Recent Weight: 126.1 kg (278 lb 1.6 oz)    IBW: " "86.4 kg  BMI: Obesity Grade II BMI 35-39.9  Weight History:   6/1 100 kg - outlier vs dry weight?  6/2 125.2 kg    Dosing Weight: 96 kg (adjusted using 6/2 weight of 125.2 and IBW of 86 kg)    ASSESSED NUTRITION NEEDS  Estimated Energy Needs: 2400 - 2880 kcals/day (25 - 30 kcals/kg)  Justification: Maintenance, aim for lower end with intubation, obesity  Estimated Protein Needs: 192 - 240 grams protein/day (2 - 2.5 grams of pro/kg vs 2.5/kg IBW)  Justification: ECMO - Increased needs  Estimated Fluid Needs: 1 mL/kcal   Justification: Per provider pending fluid status    MALNUTRITION  % Intake: Unable to assess  % Weight Loss: None noted  Subcutaneous Fat Loss: None observed  Muscle Loss: None observed  Fluid Accumulation/Edema: Does not meet criteria  Malnutrition Diagnosis: Patient does not meet two of the established criteria necessary for diagnosing malnutrition    NUTRITION DIAGNOSIS  Inadequate oral intake related to NPO with mechanical ventilation, nutrition support not yet initiated       INTERVENTIONS  Implementation   Enteral Nutrition - recommendations     Goals  Total avg nutritional intake to meet a minimum of 25 kcal/kg and 2 g PRO/kg daily (per dosing wt 96 kg).     Monitoring/Evaluation  Progress toward goals will be monitored and evaluated per protocol.  Juliet Westbrook, RD, LD, CNSC  Available on [employer] - can search by name or unit  Weekend/Holiday RD - \"Weekend Clinical Dietitian\" on [employer]    "

## 2024-06-03 NOTE — CONSULTS
"[facility] Nurse Inpatient Assessment     Consulted for: Left posterior head, ECMO    Patient History (according to provider note(s):      Cullen Reardon is a 48 year old male with no known past medical history who was admitted on 6/1/2024 following a witnessed VF cardiac arrest.     He presented to [facility] on 6/1/2024 with chest pain.  He collapsed in front of the triage desk and was found to be pulseless after saying \"I feel like I'm going to die\".  He received immediate bystander CPR in the waiting room of the emergency department.  He was found be in VF, was shocked x 3.  Estimated downtime was approximately 15 minutes per paramedic report.  Was cannulated in [facility] emergency department and was on circuit at 16:08.  He was brought to the Cath Lab for coronary angiogram where he was found to have a 100% ostial LAD thrombotic occlusion status post stenting.  He was residual severe disease in D1 and OM2 that will need staged intervention. He was subsequently brought to [facility] for further care    Assessment:      Areas visualized during today's visit: Complete head to toe  and Occiput    ECMO cannula location: Right groin ECMO cannula  Areas assessed: Head-to-Toe  Positioning tolerance: Good  Date of ECMO cannulation: 6/2/24  Presence of ischemia: No  Location of ischemia: n/a  Pressure Injury Present::No  Pressure Injury Prevention Interventions In Place:  Optifoam Dressing under ECMO Cannula, Z flow Positioner under head, TAPs Wedge Positioners in use, Heel off-loading boots, Mepilex Sacral Dressing, and Dressing to sacrum for prevention        Pressure Injury Prevention Interventions Added:  None     Wound location: Left side of scalp    Last photo: 6/3/24  Wound due to: Trauma  Wound history/plan of care: Wound from when Pt collapsed. Cards did laceration repair on 6/1/24. Defer questions about staples to cards team.  Wound base: 100 %  blood clot ,    "  Palpation of the wound bed: normal      Drainage: scant     Description of drainage: bloody     Measurements (length x width x depth, in cm): 4  x 0.3  x  0 cm      Tunneling: N/A     Undermining: N/A  Periwound skin: Intact      Color: normal and consistent with surrounding tissue      Temperature: normal   Odor: none  Pain: unable to assess due to  sedation ,  unable to assess  Pain interventions prior to dressing change: patient tolerated well  Treatment goal: Infection control/prevention  STATUS: initial assessment  Supplies ordered: supplies stored on unit     Treatment Plan:     Left scalp wound(s): Daily and PRN  Cleanse with normal saline.  Apply iodine to incision and allow to dry.  Shape z-flow to leave space between staples and z-flow.  May cover with optifoam between incision and z-flow    ECMO pressure injury prevention plan:      Reposition patient every 1-2 hours using positioning wedges  Reposition head with each turn or every 2 hours using fluidized positioner, remold fluidized positioner with each reposition so that pressure is redistributed along the entire head/neck. Ensure head and neck are aligned in a neutral position without any cords or tubes resting under head or ears.   Pad ECMO IJ cannula with non adhesive foam dressing (#[id number]) along face and scalp between skin and cannula and under Coban head wraps    Pad ECMO groin and chest cannula under rigid connectors with non adhesive foam dressing or Soft cloth  Heel off-loading Boots at all times  Sacral silicon adhesive dressing for Prevention, change every 5 days and prn  Low Air loss mattress     Orders: Written    RECOMMEND PRIMARY TEAM ORDER: None, at this time  Education provided: Not appropriate today   Discussed plan of care with: Nurse  WOC nurse follow-up plan: weekly  Notify WOC if wound(s) deteriorate.  Nursing to notify the Provider(s) and re-consult the WOC Nurse if new skin concern.    DATA:     Current support surface: Standard   Low air loss (ANNALISA pump, Isolibrium, Pulsate)  Containment of urine/stool: Incontinence Protocol  BMI: Body mass index is 35.47 kg/m .   Active diet order: Orders Placed This Encounter      NPO for Medical/Clinical Reasons Except for: No Exceptions     Output: I/O last 3 completed shifts:  In: 2015.9 [I.V.:1575.9; NG/GT:440]  Out: 1724 [Urine:1199; Emesis/NG output:525]     Labs:   Recent Labs   Lab 06/03/24  0946 06/02/24  0331 06/01/24  2040   ALBUMIN 3.1*   < > 3.7  3.7   HGB 12.4*   < > 16.0   INR 1.21*   < > 1.24*   WBC 10.9   < > 17.0*   A1C  --   --  6.1*    < > = values in this interval not displayed.     Pressure injury risk assessment:   Sensory Perception: 2-->very limited  Moisture: 4-->rarely moist  Activity: 1-->bedfast  Mobility: 3-->slightly limited  Nutrition: 1-->very poor  Friction and Shear: 1-->problem  Ernie Score: 12    Maggi Bui RN CWOCN  Please contact through [employer]ue-it   Abigail group: [facility] Nurse [facility]  Dept. Office Number: [phone]

## 2024-06-03 NOTE — PROCEDURES
[facility]    Triple Lumen PICC Placement    Date/Time: 6/3/2024 12:11 PM    Performed by: Cindy Gonzalez RN  Authorized by: Alpesh Cobb MD  Indications: vascular access      UNIVERSAL PROTOCOL   Site Marked: Yes  Prior Images Obtained and Reviewed:  Yes  Required items: Required blood products, implants, devices and special equipment available    Patient identity confirmed:  Verbally with patient and arm band  NA - No sedation, light sedation, or local anesthesia  Confirmation Checklist:  Patient's identity using two indicators and relevant allergies  Time out: Immediately prior to the procedure a time out was called    Universal Protocol: the Joint Commission Universal Protocol was followed    Preparation: Patient was prepped and draped in usual sterile fashion       ANESTHESIA    Anesthesia:  Local infiltration  Local Anesthetic:  Lidocaine 1% without epinephrine  Anesthetic Total (mL):  5      SEDATION    Patient Sedated: No        Preparation: skin prepped with ChloraPrep  Skin prep agent: skin prep agent completely dried prior to procedure  Sterile barriers: maximum sterile barriers were used: cap, mask, sterile gown, sterile gloves, and large sterile sheet  Hand hygiene: hand hygiene performed prior to central venous catheter insertion  Type of line used: PICC  Catheter type: triple lumen  Lumen type: non-valved and power PICC  Lumen Identification: Gray, Red and White  Catheter size: 5 Fr  Brand: Bard  Lot number: [id number]  Placement method: venipuncture, MST, ultrasound and tip navigation system  Number of attempts: 1  Difficulty threading catheter: no  Successful placement: yes  Orientation: left    Location: basilic vein  Tip Location: SVC  Arm circumference: adults 10 cm  Extremity circumference: 40  Visible catheter length: 4  Total catheter length: 50  Dressing and securement: adhesive securement device, alcohol impregnated  caps, chlorhexidine disc applied, gloves changed prior to final dressing, glue, securement device, site cleansed, subcutaneous anchor securement system and transparent securement dressing  Post procedure assessment: blood return through all ports, placement verified by 3CG technology and free fluid flow  PROCEDURE   Patient Tolerance:  Patient tolerated the procedure well with no immediate complicationsDescribe Procedure: PICC ok to use  Disposal: sharps and needle count correct at the end of procedure, needles and guidewire disposed in sharps container

## 2024-06-03 NOTE — PROGRESS NOTES
INTERIM REPORT of Video-EEG Monitoring              DATE OF RECORDIN2024         I reviewed the ongoing video-EEG monitoring of Cullen Reardon.         The EEG during sedated coma remains abnormal due to generalized delta-theta slowing, with intermixture of faster alpha-beta activities of 12 Hz predominance, and with reactivity to sensory stimulation.  No electrographic seizures and no interictal epileptiform abnormalities were observed.         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  This recording excludes a diagnosis of non-convulsive status epilepticus.    Michael Birmingham M.D.

## 2024-06-03 NOTE — PROGRESS NOTES
Neuro:  Pt was able to follow commands and RUDD appropriately. PERRL positive and afebrile.     CV:   Rate/rhythm:  SR. Amio maintenance.    Mechanical:    ECMO  F - 3.5  S- 2  O2 - 70%  ACT goal 180-200    Pulm:    /10/7/50%  ETT 28 at the lip    GI:   OGT confirmed at 74 at the lip to LIS  Bowel sounds + and bowel meds started.    :   Difficult schafer placement. Trauma caused to urethra. UROLOGY consulted and scoped with placement of schafer, scant drainage.  ml/hr    Skin:   Head laceration from trauma L occipital side with staples  Cannulation sites  Old  IO on R shin  Bruised tongue

## 2024-06-03 NOTE — PROGRESS NOTES
ECMO Attending Progress Note  6/3/2024    Cullen Reardon is a 48 year old male who was cannulated for ECMO 24 due to VT/VF arrest    Cannulation Site:  17 Fr in the R femoral artery  25 Fr in the R femoral vein    Interval events: following commands, lightly sedated, off pressors    Physical Exam:  Temp:  [90.1  F (32.3  C)-98.8  F (37.1  C)] 98.6  F (37  C)  Pulse:  [] 109  Resp:  [10-12] 10  MAP:  [69 mmHg-85 mmHg] 75 mmHg  Arterial Line BP: ()/(54-76) 118/57  FiO2 (%):  [50 %] 50 %  SpO2:  [89 %-100 %] 100 %    Intake/Output Summary (Last 24 hours) at 2024 0839  Last data filed at 2024 0700  Gross per 24 hour   Intake 1431.16 ml   Output 1750 ml   Net -318.84 ml    Vent Mode: CMV/AC  (Continuous Mandatory Ventilation/ Assist Control)  FiO2 (%): 50 %  Resp Rate (Set): 10 breaths/min  Tidal Volume (Set, mL): 450 mL  PEEP (cm H2O): 7 cmH2O  Resp: 10       Labs:  Recent Labs   Lab 24  1540 24  1538 24  1317 24  1214 24  0946   PH  --  7.38 7.40 7.43 7.42   PCO2  --  52* 49* 45 46*   PO2  --  99 108* 98 93   HCO3  --  31* 31* 30* 30*   O2PER 50  60 50 50 50 50      Recent Labs   Lab 24  1540 24  0946 24  0322 24  2141   WBC 11.8* 10.9 11.2* 11.2*   HGB 12.4* 12.4* 12.9* 13.4     Creatinine   Date Value Ref Range Status   2024 1.11 0.67 - 1.17 mg/dL Final   2024 1.31 (H) 0.67 - 1.17 mg/dL Final   2024 1.32 (H) 0.67 - 1.17 mg/dL Final   2024 1.42 (H) 0.67 - 1.17 mg/dL Final       Blood Flow (Circuit) LPM: 3.62 LPM  Sweep LPM: 1 LPM  Sweep FiO2   %: 70 %  ACT  (seconds): 140 seconds  Pulse Oximetry  (SpO2%): 99 %  Arterial Pressure  mmH mmHg    ECMO Issues including assessments and plan on DOS 6/3/2024:  Neuro: Sedated for mechanical ventilation and ECMO.  No acute distress.  NIRS stable  b/l  RASS goal: -1  CV: Cardiogenic shock.  Hemodynamically stable off pressors, pulse pressure 30 mmHg  Pulm: Keep vent  settings at rest settings as above.  FEN/Renal: Electrolytes stable w/ replacement protocols in place, Cr stable, UOP stable  Heme: ACT goal: 180-200, Hemoglobin stable .  Minimal oozing around the ECMO cannulas.  ID: Receiving empiric antibiotics- ctx   Cannulae: Position is acceptable on exam and the available imaging.  Distal perfusion cannula is in place and patent.  Extremities are well-perfused.     I have personally reviewed the ECMO flows, oxygenation and CO2 clearance, anticoagulation, and cannula position.  I have also personally assessed the patient's systemic response with hemodynamics, oxygenation, ventilation, and bleeding.       The patient requires continued ECMO support and management in the ICU.      Alpesh Cobb MD  Cardiology critical care      Brittany 3, 2024

## 2024-06-03 NOTE — PHARMACY-ADMISSION MEDICATION HISTORY
Pharmacy Intern Admission Medication History    Admission medication history is complete. The information provided in this note is only as accurate as the sources available at the time of the update.    Information Source(s[facility]. Also spoke with spouse in person.    Pertinent Information:   Last doses were not determined as the patient was transferred from another hospital.     Testosterone last picked up on 4/13/24 per pharmacy, 5/23/24 fill not picked up. Last dose was >4 weeks ago.    Changes made to PTA medication list:  Added:   Vitamin D3 25 mcg  Losartan-hydrochlorothiazide 50-12.5 mg  Tadalafil 2.5 mg   Testosterone cyp 200mg/mLinjection  Venlafaxine XR 75 mg  Deleted: None  Changed: None    Allergies reviewed with patient and updates made in EHR: no    Medication History Completed By: Nader Patterson 6/3/2024 2:16 PM    PTA Med List   Medication Sig Last Dose    cholecalciferol (VITAMIN D3) 25 mcg (1000 units) capsule Take 2 capsules by mouth daily     losartan-hydrochlorothiazide (HYZAAR) 50-12.5 MG tablet Take 2 tablets by mouth daily     tadalafil (CIALIS) 2.5 MG tablet Take 2.5 mg by mouth daily     testosterone cypionate (DEPOTESTOSTERONE) 200 MG/ML injection Inject 140 mg into the muscle every 14 days     venlafaxine (EFFEXOR XR) 75 MG 24 hr capsule Take 75 mg by mouth daily

## 2024-06-03 NOTE — CONSULTS
Care Management Initial Consult    General Information  Assessment completed with: Family, Spouse or significant other, Mother Jazzmine and wife Arlyn  Type of CM/SW Visit: Initial Assessment    Primary Care Provider verified and updated as needed: Yes   Readmission within the last 30 days: no previous admission in last 30 days      Reason for Consult: discharge planning  Advance Care Planning:            Communication Assessment  Patient's communication style: spoken language (English or Bilingual)    Hearing Difficulty or Deaf: no   Wear Glasses or Blind: no    Cognitive  Cognitive/Neuro/Behavioral: .WDL except  Level of Consciousness: sedated  Arousal Level: opens eyes spontaneously  Orientation:  (zari)  Mood/Behavior: hyperactive (agitated, impulsive), calm, cooperative, behavior appropriate to situation  Best Language:  (ZARI)  Speech: endotracheal tube    Living Environment:   People in home: child(adair), dependent, spouse  wife Arlyn and 2 daughters of 11 and 12 years old  Current living Arrangements: house      Able to return to prior arrangements: yes (if medically appropriate)       Family/Social Support:  Care provided by: self  Provides care for: child(adair)  Marital Status:   Wife, Parent(s)  Arlyn       Description of Support System: Supportive, Involved    Support Assessment: Adequate family and caregiver support    Current Resources:   Patient receiving home care services: No     Community Resources: None  Equipment currently used at home: none  Supplies currently used at home: None    Employment/Financial:  Employment Status: employed full-time        Financial Concerns: none   Referral to Financial Worker: No       Does the patient's insurance plan have a 3 day qualifying hospital stay waiver?  No    Lifestyle & Psychosocial Needs:  Social Determinants of Health     Food Insecurity: Not At Risk (7/31/2023)    Received from [facility]    Food Insecurity     Does your food run out before  you have the money to buy more?: No   Depression: Not at risk (8/1/2023)    Received from [facility]    PHQ-2     PHQ-2 Score: 0   Housing Stability: Not on file   Tobacco Use: Low Risk  (8/1/2023)    Received from [facility]    Patient History     Smoking Tobacco Use: Never     Smokeless Tobacco Use: Never     Passive Exposure: Not on file   Financial Resource Strain: Not At Risk (7/31/2023)    Received from [facility]    Financial Resource Strain     Is it hard for you to pay for the very basics like food, housing, medical care or heating?: No   Alcohol Use: Not on file   Transportation Needs: Not At Risk (7/31/2023)    Received from [facility]    Transportation Needs     Does a lack of transportation keep you from your medical appointments or from getting your medications?: No   Physical Activity: Not on file   Interpersonal Safety: Not on file   Stress: Not on file   Social Connections: Not on file   Health Literacy: Not on file       Functional Status:  Prior to admission patient needed assistance:   Dependent ADLs:: Independent, Ambulation-no assistive device  Dependent IADLs:: Independent       Mental Health Status:  Mental Health Status: Current Concern (anxiety)  Mental Health Management: Other (see comment) (followed by primary PCP)    Chemical Dependency Status:  Chemical Dependency Status: No Current Concerns             Values/Beliefs:  Spiritual, Cultural Beliefs, Temple Practices, Values that affect care: other (see comments) (unknown)               Additional Information:       Went to patient's room for initial consult due to incomplete demographic, no PCP and no emergency contacts from his profile. Patient was sedated. Family was present in the room. His mother Jazzmine, a friend Chris and his cousin Heidi were present int he room.   Introduced self and addressed assessment questions to other Jazzmine Naranjo. She does not know all the detail and mentioned patient is . Jazzmine  provided wife number to call for detail information and she agreed to list herself and patient's wife as emergency contacts.    Writer called wife arlyn, introduced self and the role of the care team. Arlyn was open to answer the assessment questions. Idalia confirmed that she is patient's wife and they lives at the new address [address] [place] [address] with their 2 daughters of 11 and 12 years old. Arlyn mentioned that patient was independent with his care without assistive device. Julián was driving, working full time in construction and  [employer].   Arlyn mentioned family can provide ride at discharge. Arlyn mentioned  patient has anxiety and being followed by PCP. She provided information on PCP information is Didier from health [employer] and confirmed insurance on file.     Updated information to patient chart via registration with the correct address, added patient's phone number, added Jazzmine and arlyn as emergency contacts, added PCP information, and updated patient's marital status form single to .     CC will continue to follow up.   Bindu Alexandre RN, PHN, BSN   [facility] Nurse Care Coordinator  Covering for Unit 4A/4E  Phone [phone]

## 2024-06-03 NOTE — CONSULTS
"Palliative Care Initial Social Work Note  Location: [facility]    Patient Info:  Cullen Reardon is a 48 year old male with no known medical history. He is now cannulated on ECMO.     Brief summary of visit: Joint phone call with Arlyn (wife) and Palliative Care MD to introduce ourselves and roles. Arlyn reports that it's been an overwhelming couple of days. She's been trying to balance being here with Cullen and keeping the schedule as normal as possible for their girls (both in middle school) who have all of their end of the year events this week. Encouraged her to follow their lead on when they need to be here at the hospital vs doing their activities.     Arlyn reports that Cullen is a great dad and he'd want the girls to be doing their sports and end of the year activities as much as they can. He is their hockey  and biggest champion. Arlyn reports that they have a big support network and are feeling well cared for. She appreciates the communication from the teams. She knows that Cullen would want all the care that he's receiving. He does not have a HCD, but the only thing he wouldn't want would to be a \"vegetable\".       Date of Admission: 6/1/2024    Reason for consult: Patient and family support    Sources of information: Family member -Wife (Arlyn)   Staff -Bedside RN  Other -chart review    Recommendations & Plan:  -PCSW will continue to follow for emotional support as needed while Palliative Care Team is following.       Symptoms & Concerns Addressed Today:  Caregiver -Cullen was independent prior to this heart attack. He was the hockey  for his girls' hockey teams  Emotional -Arlyn reports that she's doing ok, but overwhelmed   Family  Physical -Cullen was very physically active and loves playing hockey  Social -Arlyn reports that they have a good support network  Spiritual -Arlyn reports that they are Atheist     Strengths Identified:    -Large support " "network    Relationships & Support:  Aspects of relationships and support assessed today:  Identified family members: wife (Arlyn), 2 daughters, extended family  Professional supports: Medical teams  Family coping: Arlyn reports that they're doing ok, trying to keep the girls' schedules as normal as possible with the end of the school year.   Bereavement Risk concerns: needs further assessment    Coping, Mental Health & Adjustment to Illness:   Adjustment to Illness/Hospitalization    Goals, Decision Making & Advance Care Planning:   Prognosis, Goals, and/or Advance Care Planning were assessed today: Yes  If yes, brief summary of discussion: Arlyn reports that she trusts the doctors and the science and would want everything done; except for him to be a \"vegetable forever\"  Preferred language: English  Patient's decision making preferences: unable to assess  I have concerns about the patient/family's health literacy today: No  Patient has a completed Health Care Directive: No.   Code status per chart review: Full Code      Clinical Social Work Interventions:   Assessment of palliative specific issues    Introduction of Palliative clinical social work interventions   Adjustment to illness counseling  Facilitation of processing of thoughts/feelings  Family communication facilitated  Psychoeducation  Re-framing      ALEKSANDR Madrid  [facility], Palliative Care  Securely message with the [employer] Web Console (learn more here) or  Text page via [employer] (?????) Paging/Directory       "

## 2024-06-03 NOTE — PROGRESS NOTES
[facility]    ECLS Shift Summary:     ECMO Equipment:  Console Serial Number: [id number]  Circuit Lot Number: [id number]  Oxygenator Lot Number: [id number]    Circuit Assessment: Free of fibrin, clot, and air  Fibrin Location: connectors    Arterial ECMO Cannula: 17 Fr in the Right Femoral Artery  Venous ECMO Cannula: 25 Fr in the Right Femoral Vein  Distal Perfusion Catheter: 11 Fr in the Right Superficial Femoral Artery            Patient remains on V-A ECMO, all equipment is functioning and alarms are appropriately set. RPM's: 2550 with Blood Flow (Circuit) LPM  Av.5 LPM  Min: 2 LPM  Max: 3.75 LPM L/min. Sweep is at (S) 2 LPM and (S) 50 %. Extremities are warm.     Significant Shift Events: Ecmo flow decreased to 2 LPM. Turndown with echo completed with flow turned down to 1LPM. Pt tolerated well.     Vent settings:  Vent Mode: CMV/AC  (Continuous Mandatory Ventilation/ Assist Control)  FiO2 (%): 50 %  Resp Rate (Set): 10 breaths/min  Tidal Volume (Set, mL): 450 mL  PEEP (cm H2O): 7 cmH2O  Resp: 10      Anticoagulation:  Dose (units/hr) HEParin: 1600 Units/hr  Rate (mL/hr) HEParin: 16 mL/hr  Concentration HEParin: 100 Units/mL        Most recent: ACT  (seconds): 165 seconds    Urine output is 35-65ml/hr cherry colored. Cloudy, Odorous, Sediment, Red.  Blood loss was minimal. No product given.     Intake/Output Summary (Last 24 hours) at 6/3/2024 1824  Last data filed at 6/3/2024 1800  Gross per 24 hour   Intake 2166.22 ml   Output 1555 ml   Net 611.22 ml       Labs:  Recent Labs   Lab 24  1810 24  1540 24  1538 24  1317 24  1214   PH 7.40  --  7.38 7.40 7.43   PCO2 50*  --  52* 49* 45   PO2 122*  --  99 108* 98   HCO3 31*  --  31* 31* 30*   O2PER 50 50  60 50 50 50       Lab Results   Component Value Date    HGB 12.4 (L) 2024    PHGB 30 (H) 2024     2024    FIBR 518 (H) 2024    INR 1.19 (H) 2024     PTT 91 (H) 06/03/2024    DD 1.03 (H) 06/03/2024    ANTCH 63 (L) 06/03/2024       Plan is decannulation tomorrow.    Kyle Trammell, RT  ECMO Specialist  6/3/2024 6:24 PM

## 2024-06-03 NOTE — PHARMACY-CONSULT NOTE
Pharmacy Tube Feeding Consult    Medication reviewed for administration by feeding tube and for potential food/drug interactions.    Recommendation: No changes are needed at this time.     Pharmacy will continue to follow as new medications are ordered.    Angela Jeffers, StephanieD

## 2024-06-03 NOTE — PROGRESS NOTES
[facility]    ECLS Shift Summary:     ECMO Equipment:  Console Serial Number: [id number]  Circuit Lot Number: [id number]  Oxygenator Lot Number: [id number]    Circuit Assessment: Fibrin  Fibrin Location: Connectors    Arterial ECMO Cannula: 17 Fr in the Right Femoral Artery  Venous ECMO Cannula: 25 Fr in the Right Femoral Vein  Distal Perfusion Catheter: 11 Fr in the Right Superficial Femoral Artery    Distal Perfusion Catheter-Site Assessment: WDL  ECMO Cannula Arterial Right femoral artery-Site Assessment: WDL  ECMO Cannula Right femoral vein-Site Assessment: WDL  Distal Perfusion Catheter-Site Intervention: No intervention needed  ECMO Cannula Arterial Right femoral artery-Site Intervention: No intervention needed  ECMO Cannula Right femoral vein-Site Intervention: No intervention needed    Patient remains on V-A ECMO, all equipment is functioning and alarms are appropriately set. RPM's: 3500 with Blood Flow (Circuit) LPM  Avg: 3.8 LPM  Min: 3.75 LPM  Max: 3.77 LPM L/min. Sweep is at 2 LPM and 70 %. Extremities are warm.     Significant Shift Events:   None    Vent settings:  Vent Mode: CMV/AC  (Continuous Mandatory Ventilation/ Assist Control)  FiO2 (%): 50 %  Resp Rate (Set): 10 breaths/min  Tidal Volume (Set, mL): 450 mL  PEEP (cm H2O): 7 cmH2O  Resp: 10      Anticoagulation:  Dose (units/hr) HEParin: 1600 Units/hr  Rate (mL/hr) HEParin: 16 mL/hr  Concentration HEParin: 100 Units/mL        Most recent: ACT  (seconds): 199 seconds    Urine output is 245 mL. Pink, Sediment.  Blood loss was none. Product given included none.     Intake/Output Summary (Last 24 hours) at 6/3/2024 0532  Last data filed at 6/3/2024 0500  Gross per 24 hour   Intake 1964.55 ml   Output 1759 ml   Net 205.55 ml       Labs:  Recent Labs   Lab 06/03/24  0323 06/03/24  0322 06/03/24  0128 06/02/24  2333 06/02/24  2142   PH 7.43  --  7.41 7.39 7.43   PCO2 43  --  44 45 42   PO2 102  --  99 91 99    HCO3 29*  --  28 28 28   O2PER 50 70  50 50 50 50       Lab Results   Component Value Date    HGB 12.9 (L) 06/03/2024    PHGB 30 (H) 06/03/2024     (L) 06/03/2024    FIBR 436 06/03/2024    INR 1.18 (H) 06/03/2024     (HH) 06/03/2024    DD 1.28 (H) 06/03/2024    ANTCH 72 (L) 06/02/2024       Plan is continue VA ECMO support, turndown today.    RT Hesham  ECMO Specialist  6/3/2024 5:32 AM

## 2024-06-04 ENCOUNTER — ANESTHESIA EVENT (OUTPATIENT)
Dept: SURGERY | Facility: CLINIC | Age: 49
DRG: 003 | End: 2024-06-04
Payer: COMMERCIAL

## 2024-06-04 ENCOUNTER — ANESTHESIA (OUTPATIENT)
Dept: SURGERY | Facility: CLINIC | Age: 49
DRG: 003 | End: 2024-06-04
Payer: COMMERCIAL

## 2024-06-04 ENCOUNTER — APPOINTMENT (OUTPATIENT)
Dept: GENERAL RADIOLOGY | Facility: CLINIC | Age: 49
DRG: 003 | End: 2024-06-04
Attending: INTERNAL MEDICINE
Payer: COMMERCIAL

## 2024-06-04 LAB
ABO/RH(D): NORMAL
ACT BLD: 178 SECONDS (ref 74–150)
ACT BLD: 178 SECONDS (ref 74–150)
ACT BLD: 186 SECONDS (ref 74–150)
ACT BLD: 190 SECONDS (ref 74–150)
ACT BLD: 190 SECONDS (ref 74–150)
ALBUMIN SERPL BCG-MCNC: 2.8 G/DL (ref 3.5–5.2)
ALBUMIN SERPL BCG-MCNC: 3 G/DL (ref 3.5–5.2)
ALBUMIN SERPL BCG-MCNC: 3 G/DL (ref 3.5–5.2)
ALLEN'S TEST: ABNORMAL
ALP SERPL-CCNC: 38 U/L (ref 40–150)
ALP SERPL-CCNC: 39 U/L (ref 40–150)
ALP SERPL-CCNC: 41 U/L (ref 40–150)
ALT SERPL W P-5'-P-CCNC: 130 U/L (ref 0–70)
ALT SERPL W P-5'-P-CCNC: 133 U/L (ref 0–70)
ALT SERPL W P-5'-P-CCNC: 153 U/L (ref 0–70)
ANION GAP SERPL CALCULATED.3IONS-SCNC: 6 MMOL/L (ref 7–15)
ANION GAP SERPL CALCULATED.3IONS-SCNC: 8 MMOL/L (ref 7–15)
ANION GAP SERPL CALCULATED.3IONS-SCNC: 8 MMOL/L (ref 7–15)
ANTIBODY SCREEN: NEGATIVE
APTT PPP: 100 SECONDS (ref 22–38)
APTT PPP: 109 SECONDS (ref 22–38)
APTT PPP: 93 SECONDS (ref 22–38)
AST SERPL W P-5'-P-CCNC: 260 U/L (ref 0–45)
AST SERPL W P-5'-P-CCNC: 300 U/L (ref 0–45)
AST SERPL W P-5'-P-CCNC: 364 U/L (ref 0–45)
AT III ACT/NOR PPP CHRO: 52 % (ref 85–135)
BACTERIA SPT CULT: ABNORMAL
BACTERIA SPT CULT: ABNORMAL
BASE EXCESS BLDA CALC-SCNC: 3.8 MMOL/L (ref -3–3)
BASE EXCESS BLDA CALC-SCNC: 4 MMOL/L (ref -3–3)
BASE EXCESS BLDA CALC-SCNC: 4.3 MMOL/L (ref -3–3)
BASE EXCESS BLDA CALC-SCNC: 5.1 MMOL/L (ref -3–3)
BASE EXCESS BLDA CALC-SCNC: 5.3 MMOL/L (ref -3–3)
BASE EXCESS BLDA CALC-SCNC: 5.5 MMOL/L (ref -3–3)
BASE EXCESS BLDA CALC-SCNC: 5.6 MMOL/L (ref -3–3)
BASE EXCESS BLDA CALC-SCNC: 5.7 MMOL/L (ref -3–3)
BASE EXCESS BLDA CALC-SCNC: 5.7 MMOL/L (ref -3–3)
BASE EXCESS BLDA CALC-SCNC: 5.8 MMOL/L (ref -3–3)
BASE EXCESS BLDA CALC-SCNC: 6 MMOL/L (ref -3–3)
BASE EXCESS BLDA CALC-SCNC: 6.7 MMOL/L (ref -3–3)
BASE EXCESS BLDA CALC-SCNC: 6.7 MMOL/L (ref -3–3)
BASE EXCESS BLDA CALC-SCNC: 6.9 MMOL/L (ref -3–3)
BASE EXCESS BLDA CALC-SCNC: 7 MMOL/L (ref -3–3)
BASE EXCESS BLDA CALC-SCNC: 7.2 MMOL/L (ref -3–3)
BASE EXCESS BLDA CALC-SCNC: 7.4 MMOL/L (ref -3–3)
BASE EXCESS BLDA CALC-SCNC: 7.7 MMOL/L (ref -3–3)
BASE EXCESS BLDA CALC-SCNC: 8.2 MMOL/L (ref -3–3)
BASE EXCESS BLDV CALC-SCNC: 6.4 MMOL/L (ref -3–3)
BASE EXCESS BLDV CALC-SCNC: 7.7 MMOL/L (ref -3–3)
BILIRUB SERPL-MCNC: 0.4 MG/DL
BILIRUB SERPL-MCNC: 0.5 MG/DL
BILIRUB SERPL-MCNC: 0.5 MG/DL
BUN SERPL-MCNC: 33.2 MG/DL (ref 6–20)
BUN SERPL-MCNC: 34.6 MG/DL (ref 6–20)
BUN SERPL-MCNC: 35.4 MG/DL (ref 6–20)
CA-I BLD-MCNC: 4.5 MG/DL (ref 4.4–5.2)
CA-I BLD-MCNC: 4.6 MG/DL (ref 4.4–5.2)
CA-I BLD-MCNC: 4.6 MG/DL (ref 4.4–5.2)
CA-I BLD-MCNC: 4.7 MG/DL (ref 4.4–5.2)
CA-I BLD-MCNC: 4.7 MG/DL (ref 4.4–5.2)
CA-I BLD-MCNC: 4.9 MG/DL (ref 4.4–5.2)
CA-I BLD-MCNC: 5.1 MG/DL (ref 4.4–5.2)
CA-I BLD-MCNC: 5.2 MG/DL (ref 4.4–5.2)
CA-I BLD-MCNC: 5.9 MG/DL (ref 4.4–5.2)
CALCIUM SERPL-MCNC: 8.1 MG/DL (ref 8.6–10)
CALCIUM SERPL-MCNC: 8.2 MG/DL (ref 8.6–10)
CALCIUM SERPL-MCNC: 8.3 MG/DL (ref 8.6–10)
CHLORIDE SERPL-SCNC: 106 MMOL/L (ref 98–107)
CHLORIDE SERPL-SCNC: 108 MMOL/L (ref 98–107)
CHLORIDE SERPL-SCNC: 109 MMOL/L (ref 98–107)
COHGB MFR BLD: 98.7 % (ref 96–97)
COHGB MFR BLD: 98.8 % (ref 96–97)
COHGB MFR BLD: 99.2 % (ref 96–97)
COHGB MFR BLD: 99.3 % (ref 96–97)
COHGB MFR BLD: 99.5 % (ref 96–97)
COHGB MFR BLD: 99.7 % (ref 96–97)
COHGB MFR BLD: 99.7 % (ref 96–97)
COHGB MFR BLD: 99.8 % (ref 96–97)
COHGB MFR BLD: 99.8 % (ref 96–97)
COHGB MFR BLD: 99.9 % (ref 96–97)
CREAT SERPL-MCNC: 0.98 MG/DL (ref 0.67–1.17)
CREAT SERPL-MCNC: 1 MG/DL (ref 0.67–1.17)
CREAT SERPL-MCNC: 1.03 MG/DL (ref 0.67–1.17)
CRP SERPL-MCNC: 186 MG/L
D DIMER PPP FEU-MCNC: 0.81 UG/ML FEU (ref 0–0.5)
D DIMER PPP FEU-MCNC: 0.87 UG/ML FEU (ref 0–0.5)
DEPRECATED HCO3 PLAS-SCNC: 28 MMOL/L (ref 22–29)
EGFRCR SERPLBLD CKD-EPI 2021: 89 ML/MIN/1.73M2
EGFRCR SERPLBLD CKD-EPI 2021: >90 ML/MIN/1.73M2
EGFRCR SERPLBLD CKD-EPI 2021: >90 ML/MIN/1.73M2
ERYTHROCYTE [DISTWIDTH] IN BLOOD BY AUTOMATED COUNT: 13.2 % (ref 10–15)
ERYTHROCYTE [DISTWIDTH] IN BLOOD BY AUTOMATED COUNT: 13.2 % (ref 10–15)
ERYTHROCYTE [DISTWIDTH] IN BLOOD BY AUTOMATED COUNT: 13.3 % (ref 10–15)
ERYTHROCYTE [DISTWIDTH] IN BLOOD BY AUTOMATED COUNT: 13.4 % (ref 10–15)
ERYTHROCYTE [SEDIMENTATION RATE] IN BLOOD BY WESTERGREN METHOD: 41 MM/HR (ref 0–15)
FIBRINOGEN PPP-MCNC: 558 MG/DL (ref 170–490)
FIBRINOGEN PPP-MCNC: 600 MG/DL (ref 170–490)
GLUCOSE BLD-MCNC: 116 MG/DL (ref 70–99)
GLUCOSE BLD-MCNC: 117 MG/DL (ref 70–99)
GLUCOSE BLD-MCNC: 119 MG/DL (ref 70–99)
GLUCOSE BLD-MCNC: 121 MG/DL (ref 70–99)
GLUCOSE BLD-MCNC: 123 MG/DL (ref 70–99)
GLUCOSE BLD-MCNC: 124 MG/DL (ref 70–99)
GLUCOSE BLD-MCNC: 128 MG/DL (ref 70–99)
GLUCOSE BLD-MCNC: 144 MG/DL (ref 70–99)
GLUCOSE BLDC GLUCOMTR-MCNC: 108 MG/DL (ref 70–99)
GLUCOSE BLDC GLUCOMTR-MCNC: 113 MG/DL (ref 70–99)
GLUCOSE BLDC GLUCOMTR-MCNC: 117 MG/DL (ref 70–99)
GLUCOSE BLDC GLUCOMTR-MCNC: 118 MG/DL (ref 70–99)
GLUCOSE BLDC GLUCOMTR-MCNC: 118 MG/DL (ref 70–99)
GLUCOSE BLDC GLUCOMTR-MCNC: 119 MG/DL (ref 70–99)
GLUCOSE BLDC GLUCOMTR-MCNC: 122 MG/DL (ref 70–99)
GLUCOSE BLDC GLUCOMTR-MCNC: 124 MG/DL (ref 70–99)
GLUCOSE BLDC GLUCOMTR-MCNC: 139 MG/DL (ref 70–99)
GLUCOSE BLDC GLUCOMTR-MCNC: 143 MG/DL (ref 70–99)
GLUCOSE BLDC GLUCOMTR-MCNC: 152 MG/DL (ref 70–99)
GLUCOSE BLDC GLUCOMTR-MCNC: 166 MG/DL (ref 70–99)
GLUCOSE SERPL-MCNC: 127 MG/DL (ref 70–99)
GLUCOSE SERPL-MCNC: 129 MG/DL (ref 70–99)
GLUCOSE SERPL-MCNC: 146 MG/DL (ref 70–99)
GRAM STAIN RESULT: ABNORMAL
GRAM STAIN RESULT: ABNORMAL
HCO3 BLD-SCNC: 29 MMOL/L (ref 21–28)
HCO3 BLD-SCNC: 30 MMOL/L (ref 21–28)
HCO3 BLD-SCNC: 30 MMOL/L (ref 21–28)
HCO3 BLD-SCNC: 31 MMOL/L (ref 21–28)
HCO3 BLD-SCNC: 31 MMOL/L (ref 21–28)
HCO3 BLD-SCNC: 32 MMOL/L (ref 21–28)
HCO3 BLD-SCNC: 33 MMOL/L (ref 21–28)
HCO3 BLDA-SCNC: 28 MMOL/L (ref 21–28)
HCO3 BLDA-SCNC: 29 MMOL/L (ref 21–28)
HCO3 BLDA-SCNC: 30 MMOL/L (ref 21–28)
HCO3 BLDA-SCNC: 31 MMOL/L (ref 21–28)
HCO3 BLDA-SCNC: 31 MMOL/L (ref 21–28)
HCO3 BLDV-SCNC: 32 MMOL/L (ref 21–28)
HCO3 BLDV-SCNC: 33 MMOL/L (ref 21–28)
HCT VFR BLD AUTO: 28.3 % (ref 40–53)
HCT VFR BLD AUTO: 30.1 % (ref 40–53)
HCT VFR BLD AUTO: 30.6 % (ref 40–53)
HCT VFR BLD AUTO: 32 % (ref 40–53)
HGB BLD-MCNC: 10.1 G/DL (ref 13.3–17.7)
HGB BLD-MCNC: 10.3 G/DL (ref 13.3–17.7)
HGB BLD-MCNC: 10.4 G/DL (ref 13.3–17.7)
HGB BLD-MCNC: 10.6 G/DL (ref 13.3–17.7)
HGB BLD-MCNC: 10.7 G/DL (ref 13.3–17.7)
HGB BLD-MCNC: 9.6 G/DL (ref 13.3–17.7)
HGB FREE PLAS-MCNC: 30 MG/DL
INR PPP: 1.18 (ref 0.85–1.15)
INR PPP: 1.19 (ref 0.85–1.15)
INR PPP: 1.23 (ref 0.85–1.15)
LACTATE BLD-SCNC: 0.8 MMOL/L (ref 0.7–2)
LACTATE BLD-SCNC: 0.9 MMOL/L (ref 0.7–2)
LACTATE BLD-SCNC: 1 MMOL/L (ref 0.7–2)
LACTATE BLD-SCNC: 1.1 MMOL/L (ref 0.7–2)
LACTATE SERPL-SCNC: 0.7 MMOL/L (ref 0.7–2)
LACTATE SERPL-SCNC: 0.9 MMOL/L (ref 0.7–2)
LACTATE SERPL-SCNC: 1 MMOL/L (ref 0.7–2)
LACTATE SERPL-SCNC: 1.1 MMOL/L (ref 0.7–2)
LACTATE SERPL-SCNC: 1.2 MMOL/L (ref 0.7–2)
LDH SERPL L TO P-CCNC: 1313 U/L (ref 0–250)
MAGNESIUM SERPL-MCNC: 2.1 MG/DL (ref 1.7–2.3)
MAGNESIUM SERPL-MCNC: 2.1 MG/DL (ref 1.7–2.3)
MAGNESIUM SERPL-MCNC: 2.2 MG/DL (ref 1.7–2.3)
MCH RBC QN AUTO: 30.6 PG (ref 26.5–33)
MCH RBC QN AUTO: 30.7 PG (ref 26.5–33)
MCH RBC QN AUTO: 31.2 PG (ref 26.5–33)
MCH RBC QN AUTO: 31.5 PG (ref 26.5–33)
MCHC RBC AUTO-ENTMCNC: 33 G/DL (ref 31.5–36.5)
MCHC RBC AUTO-ENTMCNC: 33.4 G/DL (ref 31.5–36.5)
MCHC RBC AUTO-ENTMCNC: 33.9 G/DL (ref 31.5–36.5)
MCHC RBC AUTO-ENTMCNC: 34.2 G/DL (ref 31.5–36.5)
MCV RBC AUTO: 91 FL (ref 78–100)
MCV RBC AUTO: 91 FL (ref 78–100)
MCV RBC AUTO: 93 FL (ref 78–100)
MCV RBC AUTO: 93 FL (ref 78–100)
NSE SERPL IA-MCNC: 39 NG/ML
O2/TOTAL GAS SETTING VFR VENT: 40 %
O2/TOTAL GAS SETTING VFR VENT: 50 %
O2/TOTAL GAS SETTING VFR VENT: 60 %
OXYHGB MFR BLDA: 96 % (ref 75–100)
OXYHGB MFR BLDA: 97 % (ref 92–100)
OXYHGB MFR BLDA: 97 % (ref 92–100)
OXYHGB MFR BLDA: 98 % (ref 92–100)
OXYHGB MFR BLDA: 98 % (ref 92–100)
OXYHGB MFR BLDA: 99 % (ref 75–100)
OXYHGB MFR BLDA: 99 % (ref 75–100)
OXYHGB MFR BLDV: 79 %
OXYHGB MFR BLDV: 84 %
PCO2 BLD: 40 MM HG (ref 35–45)
PCO2 BLD: 41 MM HG (ref 35–45)
PCO2 BLD: 44 MM HG (ref 35–45)
PCO2 BLD: 44 MM HG (ref 35–45)
PCO2 BLD: 45 MM HG (ref 35–45)
PCO2 BLD: 45 MM HG (ref 35–45)
PCO2 BLD: 46 MM HG (ref 35–45)
PCO2 BLD: 47 MM HG (ref 35–45)
PCO2 BLD: 48 MM HG (ref 35–45)
PCO2 BLD: 48 MM HG (ref 35–45)
PCO2 BLD: 49 MM HG (ref 35–45)
PCO2 BLD: 50 MM HG (ref 35–45)
PCO2 BLDA: 39 MM HG (ref 35–45)
PCO2 BLDA: 39 MM HG (ref 35–45)
PCO2 BLDA: 40 MM HG (ref 35–45)
PCO2 BLDA: 41 MM HG (ref 35–45)
PCO2 BLDA: 49 MM HG (ref 35–45)
PCO2 BLDV: 44 MM HG (ref 40–50)
PCO2 BLDV: 53 MM HG (ref 40–50)
PEEP: 7 CM H2O
PH BLD: 7.41 [PH] (ref 7.35–7.45)
PH BLD: 7.42 [PH] (ref 7.35–7.45)
PH BLD: 7.43 [PH] (ref 7.35–7.45)
PH BLD: 7.44 [PH] (ref 7.35–7.45)
PH BLD: 7.46 [PH] (ref 7.35–7.45)
PH BLD: 7.47 [PH] (ref 7.35–7.45)
PH BLD: 7.47 [PH] (ref 7.35–7.45)
PH BLD: 7.48 [PH] (ref 7.35–7.45)
PH BLDA: 7.41 [PH] (ref 7.35–7.45)
PH BLDA: 7.45 [PH] (ref 7.35–7.45)
PH BLDA: 7.45 [PH] (ref 7.35–7.45)
PH BLDA: 7.47 [PH] (ref 7.35–7.45)
PH BLDA: 7.47 [PH] (ref 7.35–7.45)
PH BLDA: 7.5 [PH] (ref 7.35–7.45)
PH BLDA: 7.51 [PH] (ref 7.35–7.45)
PH BLDV: 7.4 [PH] (ref 7.32–7.43)
PH BLDV: 7.47 [PH] (ref 7.32–7.43)
PHOSPHATE SERPL-MCNC: 1 MG/DL (ref 2.5–4.5)
PHOSPHATE SERPL-MCNC: 1.9 MG/DL (ref 2.5–4.5)
PLATELET # BLD AUTO: 108 10E3/UL (ref 150–450)
PLATELET # BLD AUTO: 116 10E3/UL (ref 150–450)
PLATELET # BLD AUTO: 122 10E3/UL (ref 150–450)
PLATELET # BLD AUTO: 124 10E3/UL (ref 150–450)
PO2 BLD: 106 MM HG (ref 80–105)
PO2 BLD: 107 MM HG (ref 80–105)
PO2 BLD: 109 MM HG (ref 80–105)
PO2 BLD: 113 MM HG (ref 80–105)
PO2 BLD: 113 MM HG (ref 80–105)
PO2 BLD: 117 MM HG (ref 80–105)
PO2 BLD: 122 MM HG (ref 80–105)
PO2 BLD: 126 MM HG (ref 80–105)
PO2 BLD: 126 MM HG (ref 80–105)
PO2 BLD: 131 MM HG (ref 80–105)
PO2 BLD: 92 MM HG (ref 80–105)
PO2 BLD: 98 MM HG (ref 80–105)
PO2 BLDA: 106 MM HG (ref 80–105)
PO2 BLDA: 109 MM HG (ref 80–105)
PO2 BLDA: 196 MM HG (ref 80–105)
PO2 BLDA: 245 MM HG (ref 80–105)
PO2 BLDA: 83 MM HG (ref 80–105)
PO2 BLDA: 86 MM HG (ref 80–105)
PO2 BLDA: 91 MM HG (ref 80–105)
PO2 BLDV: 43 MM HG (ref 25–47)
PO2 BLDV: 45 MM HG (ref 25–47)
POTASSIUM BLD-SCNC: 3.4 MMOL/L (ref 3.4–5.3)
POTASSIUM BLD-SCNC: 3.5 MMOL/L (ref 3.4–5.3)
POTASSIUM BLD-SCNC: 3.6 MMOL/L (ref 3.4–5.3)
POTASSIUM BLD-SCNC: 3.7 MMOL/L (ref 3.4–5.3)
POTASSIUM SERPL-SCNC: 3.4 MMOL/L (ref 3.4–5.3)
POTASSIUM SERPL-SCNC: 3.8 MMOL/L (ref 3.4–5.3)
POTASSIUM SERPL-SCNC: 3.9 MMOL/L (ref 3.4–5.3)
PROT SERPL-MCNC: 5 G/DL (ref 6.4–8.3)
PROT SERPL-MCNC: 5.3 G/DL (ref 6.4–8.3)
PROT SERPL-MCNC: 5.3 G/DL (ref 6.4–8.3)
RBC # BLD AUTO: 3.05 10E6/UL (ref 4.4–5.9)
RBC # BLD AUTO: 3.29 10E6/UL (ref 4.4–5.9)
RBC # BLD AUTO: 3.3 10E6/UL (ref 4.4–5.9)
RBC # BLD AUTO: 3.5 10E6/UL (ref 4.4–5.9)
SAO2 % BLDA: 97 % (ref 92–100)
SAO2 % BLDA: 97.5 % (ref 96–97)
SAO2 % BLDA: 98 % (ref 92–100)
SAO2 % BLDA: 98 % (ref 96–97)
SAO2 % BLDA: 98 % (ref 96–97)
SAO2 % BLDA: 99 % (ref 96–97)
SAO2 % BLDA: 99 % (ref 96–97)
SAO2 % BLDA: 99.8 % (ref 96–97)
SAO2 % BLDA: >100 % (ref 96–97)
SAO2 % BLDV: 80.6 % (ref 70–75)
SAO2 % BLDV: 86.2 % (ref 70–75)
SODIUM BLD-SCNC: 145 MMOL/L (ref 135–145)
SODIUM BLD-SCNC: 146 MMOL/L (ref 135–145)
SODIUM SERPL-SCNC: 142 MMOL/L (ref 135–145)
SODIUM SERPL-SCNC: 143 MMOL/L (ref 135–145)
SODIUM SERPL-SCNC: 144 MMOL/L (ref 135–145)
SPECIMEN EXPIRATION DATE: NORMAL
T3 SERPL-MCNC: 53 NG/DL (ref 85–202)
T3FREE SERPL-MCNC: 1.5 PG/ML (ref 2–4.4)
T4 FREE SERPL-MCNC: 0.9 NG/DL (ref 0.9–1.7)
TROPONIN T SERPL HS-MCNC: 6834 NG/L
TROPONIN T SERPL HS-MCNC: 7914 NG/L
TROPONIN T SERPL HS-MCNC: 8849 NG/L
TSH SERPL DL<=0.005 MIU/L-ACNC: 0.32 UIU/ML (ref 0.3–4.2)
UFH PPP CHRO-ACNC: 0.12 IU/ML
UFH PPP CHRO-ACNC: 0.48 IU/ML
UFH PPP CHRO-ACNC: 0.52 IU/ML
UFH PPP CHRO-ACNC: 0.55 IU/ML
WBC # BLD AUTO: 7.4 10E3/UL (ref 4–11)
WBC # BLD AUTO: 8.6 10E3/UL (ref 4–11)
WBC # BLD AUTO: 8.6 10E3/UL (ref 4–11)
WBC # BLD AUTO: 9.4 10E3/UL (ref 4–11)

## 2024-06-04 PROCEDURE — 33949 ECMO/ECLS DAILY MGMT ARTERY: CPT | Performed by: INTERNAL MEDICINE

## 2024-06-04 PROCEDURE — 250N000009 HC RX 250: Performed by: INTERNAL MEDICINE

## 2024-06-04 PROCEDURE — 84100 ASSAY OF PHOSPHORUS: CPT | Performed by: SURGERY

## 2024-06-04 PROCEDURE — 250N000009 HC RX 250: Performed by: NURSE ANESTHETIST, CERTIFIED REGISTERED

## 2024-06-04 PROCEDURE — 83735 ASSAY OF MAGNESIUM: CPT | Performed by: INTERNAL MEDICINE

## 2024-06-04 PROCEDURE — 85730 THROMBOPLASTIN TIME PARTIAL: CPT | Performed by: INTERNAL MEDICINE

## 2024-06-04 PROCEDURE — 99233 SBSQ HOSP IP/OBS HIGH 50: CPT | Performed by: STUDENT IN AN ORGANIZED HEALTH CARE EDUCATION/TRAINING PROGRAM

## 2024-06-04 PROCEDURE — 85300 ANTITHROMBIN III ACTIVITY: CPT | Performed by: INTERNAL MEDICINE

## 2024-06-04 PROCEDURE — 84100 ASSAY OF PHOSPHORUS: CPT | Performed by: INTERNAL MEDICINE

## 2024-06-04 PROCEDURE — 04QK0ZZ REPAIR RIGHT FEMORAL ARTERY, OPEN APPROACH: ICD-10-PCS | Performed by: SURGERY

## 2024-06-04 PROCEDURE — 85652 RBC SED RATE AUTOMATED: CPT | Performed by: INTERNAL MEDICINE

## 2024-06-04 PROCEDURE — 84484 ASSAY OF TROPONIN QUANT: CPT | Performed by: INTERNAL MEDICINE

## 2024-06-04 PROCEDURE — 82374 ASSAY BLOOD CARBON DIOXIDE: CPT | Performed by: INTERNAL MEDICINE

## 2024-06-04 PROCEDURE — 82805 BLOOD GASES W/O2 SATURATION: CPT | Performed by: INTERNAL MEDICINE

## 2024-06-04 PROCEDURE — 71045 X-RAY EXAM CHEST 1 VIEW: CPT

## 2024-06-04 PROCEDURE — 84450 TRANSFERASE (AST) (SGOT): CPT | Performed by: SURGERY

## 2024-06-04 PROCEDURE — 85384 FIBRINOGEN ACTIVITY: CPT | Performed by: INTERNAL MEDICINE

## 2024-06-04 PROCEDURE — 250N000009 HC RX 250: Performed by: SURGERY

## 2024-06-04 PROCEDURE — 33949 ECMO/ECLS DAILY MGMT ARTERY: CPT | Performed by: ANESTHESIOLOGY

## 2024-06-04 PROCEDURE — 82805 BLOOD GASES W/O2 SATURATION: CPT

## 2024-06-04 PROCEDURE — 258N000003 HC RX IP 258 OP 636: Performed by: INTERNAL MEDICINE

## 2024-06-04 PROCEDURE — 83051 HEMOGLOBIN PLASMA: CPT | Performed by: INTERNAL MEDICINE

## 2024-06-04 PROCEDURE — 85610 PROTHROMBIN TIME: CPT | Performed by: INTERNAL MEDICINE

## 2024-06-04 PROCEDURE — 83605 ASSAY OF LACTIC ACID: CPT | Performed by: INTERNAL MEDICINE

## 2024-06-04 PROCEDURE — 86140 C-REACTIVE PROTEIN: CPT | Performed by: INTERNAL MEDICINE

## 2024-06-04 PROCEDURE — 82805 BLOOD GASES W/O2 SATURATION: CPT | Performed by: SURGERY

## 2024-06-04 PROCEDURE — 94003 VENT MGMT INPAT SUBQ DAY: CPT

## 2024-06-04 PROCEDURE — 82330 ASSAY OF CALCIUM: CPT | Performed by: SURGERY

## 2024-06-04 PROCEDURE — 85730 THROMBOPLASTIN TIME PARTIAL: CPT | Performed by: SURGERY

## 2024-06-04 PROCEDURE — 84443 ASSAY THYROID STIM HORMONE: CPT | Performed by: INTERNAL MEDICINE

## 2024-06-04 PROCEDURE — 82330 ASSAY OF CALCIUM: CPT | Performed by: INTERNAL MEDICINE

## 2024-06-04 PROCEDURE — 85347 COAGULATION TIME ACTIVATED: CPT

## 2024-06-04 PROCEDURE — 84481 FREE ASSAY (FT-3): CPT | Performed by: INTERNAL MEDICINE

## 2024-06-04 PROCEDURE — 85610 PROTHROMBIN TIME: CPT | Performed by: SURGERY

## 2024-06-04 PROCEDURE — 33984 ECMO/ECLS RMVL PRPH CANNULA: CPT | Mod: GC | Performed by: SURGERY

## 2024-06-04 PROCEDURE — 250N000013 HC RX MED GY IP 250 OP 250 PS 637: Performed by: STUDENT IN AN ORGANIZED HEALTH CARE EDUCATION/TRAINING PROGRAM

## 2024-06-04 PROCEDURE — 80053 COMPREHEN METABOLIC PANEL: CPT | Performed by: INTERNAL MEDICINE

## 2024-06-04 PROCEDURE — 71045 X-RAY EXAM CHEST 1 VIEW: CPT | Mod: 26 | Performed by: RADIOLOGY

## 2024-06-04 PROCEDURE — 87040 BLOOD CULTURE FOR BACTERIA: CPT | Performed by: STUDENT IN AN ORGANIZED HEALTH CARE EDUCATION/TRAINING PROGRAM

## 2024-06-04 PROCEDURE — 82947 ASSAY GLUCOSE BLOOD QUANT: CPT | Performed by: INTERNAL MEDICINE

## 2024-06-04 PROCEDURE — 06QM0ZZ REPAIR RIGHT FEMORAL VEIN, OPEN APPROACH: ICD-10-PCS | Performed by: SURGERY

## 2024-06-04 PROCEDURE — 999N000157 HC STATISTIC RCP TIME EA 10 MIN

## 2024-06-04 PROCEDURE — 250N000011 HC RX IP 250 OP 636: Performed by: SURGERY

## 2024-06-04 PROCEDURE — 999N000185 HC STATISTIC TRANSPORT TIME EA 15 MIN

## 2024-06-04 PROCEDURE — 33949 ECMO/ECLS DAILY MGMT ARTERY: CPT

## 2024-06-04 PROCEDURE — 250N000013 HC RX MED GY IP 250 OP 250 PS 637: Performed by: INTERNAL MEDICINE

## 2024-06-04 PROCEDURE — 370N000017 HC ANESTHESIA TECHNICAL FEE, PER MIN: Performed by: SURGERY

## 2024-06-04 PROCEDURE — 84484 ASSAY OF TROPONIN QUANT: CPT | Performed by: SURGERY

## 2024-06-04 PROCEDURE — 258N000003 HC RX IP 258 OP 636: Performed by: NURSE ANESTHETIST, CERTIFIED REGISTERED

## 2024-06-04 PROCEDURE — 272N000001 HC OR GENERAL SUPPLY STERILE: Performed by: SURGERY

## 2024-06-04 PROCEDURE — 82947 ASSAY GLUCOSE BLOOD QUANT: CPT | Performed by: SURGERY

## 2024-06-04 PROCEDURE — 85379 FIBRIN DEGRADATION QUANT: CPT | Performed by: INTERNAL MEDICINE

## 2024-06-04 PROCEDURE — 250N000011 HC RX IP 250 OP 636: Performed by: NURSE ANESTHETIST, CERTIFIED REGISTERED

## 2024-06-04 PROCEDURE — 250N000011 HC RX IP 250 OP 636: Performed by: INTERNAL MEDICINE

## 2024-06-04 PROCEDURE — 250N000024 HC ISOFLURANE, PER MIN: Performed by: SURGERY

## 2024-06-04 PROCEDURE — 83615 LACTATE (LD) (LDH) ENZYME: CPT | Performed by: INTERNAL MEDICINE

## 2024-06-04 PROCEDURE — 33949 ECMO/ECLS DAILY MGMT ARTERY: CPT | Performed by: NURSE ANESTHETIST, CERTIFIED REGISTERED

## 2024-06-04 PROCEDURE — 85520 HEPARIN ASSAY: CPT | Performed by: INTERNAL MEDICINE

## 2024-06-04 PROCEDURE — 99291 CRITICAL CARE FIRST HOUR: CPT | Mod: 25 | Performed by: INTERNAL MEDICINE

## 2024-06-04 PROCEDURE — 250N000011 HC RX IP 250 OP 636: Performed by: STUDENT IN AN ORGANIZED HEALTH CARE EDUCATION/TRAINING PROGRAM

## 2024-06-04 PROCEDURE — 85520 HEPARIN ASSAY: CPT | Performed by: SURGERY

## 2024-06-04 PROCEDURE — 250N000013 HC RX MED GY IP 250 OP 250 PS 637: Performed by: SURGERY

## 2024-06-04 PROCEDURE — 85027 COMPLETE CBC AUTOMATED: CPT | Performed by: INTERNAL MEDICINE

## 2024-06-04 PROCEDURE — 87070 CULTURE OTHR SPECIMN AEROBIC: CPT | Performed by: INTERNAL MEDICINE

## 2024-06-04 PROCEDURE — 84075 ASSAY ALKALINE PHOSPHATASE: CPT | Performed by: INTERNAL MEDICINE

## 2024-06-04 PROCEDURE — 84439 ASSAY OF FREE THYROXINE: CPT | Performed by: INTERNAL MEDICINE

## 2024-06-04 PROCEDURE — 84460 ALANINE AMINO (ALT) (SGPT): CPT | Performed by: SURGERY

## 2024-06-04 PROCEDURE — 200N000002 HC R&B ICU UMMC

## 2024-06-04 PROCEDURE — 84480 ASSAY TRIIODOTHYRONINE (T3): CPT | Performed by: INTERNAL MEDICINE

## 2024-06-04 PROCEDURE — 83605 ASSAY OF LACTIC ACID: CPT | Performed by: SURGERY

## 2024-06-04 PROCEDURE — 360N000079 HC SURGERY LEVEL 6, PER MIN: Performed by: SURGERY

## 2024-06-04 PROCEDURE — 83735 ASSAY OF MAGNESIUM: CPT | Performed by: SURGERY

## 2024-06-04 PROCEDURE — 85027 COMPLETE CBC AUTOMATED: CPT | Performed by: SURGERY

## 2024-06-04 PROCEDURE — 36415 COLL VENOUS BLD VENIPUNCTURE: CPT | Performed by: STUDENT IN AN ORGANIZED HEALTH CARE EDUCATION/TRAINING PROGRAM

## 2024-06-04 PROCEDURE — 250N000013 HC RX MED GY IP 250 OP 250 PS 637: Performed by: NURSE PRACTITIONER

## 2024-06-04 RX ORDER — POTASSIUM PHOS IN 0.9 % NACL 15MMOL/250
15 PLASTIC BAG, INJECTION (ML) INTRAVENOUS
Status: COMPLETED | OUTPATIENT
Start: 2024-06-04 | End: 2024-06-04

## 2024-06-04 RX ORDER — POLYETHYLENE GLYCOL 3350 17 G/17G
17 POWDER, FOR SOLUTION ORAL 2 TIMES DAILY
Status: DISCONTINUED | OUTPATIENT
Start: 2024-06-04 | End: 2024-06-07

## 2024-06-04 RX ORDER — PROPOFOL 10 MG/ML
INJECTION, EMULSION INTRAVENOUS PRN
Status: DISCONTINUED | OUTPATIENT
Start: 2024-06-04 | End: 2024-06-04

## 2024-06-04 RX ORDER — PROTAMINE SULFATE 10 MG/ML
INJECTION, SOLUTION INTRAVENOUS PRN
Status: DISCONTINUED | OUTPATIENT
Start: 2024-06-04 | End: 2024-06-04

## 2024-06-04 RX ORDER — CEFAZOLIN SODIUM 1 G/3ML
INJECTION, POWDER, FOR SOLUTION INTRAMUSCULAR; INTRAVENOUS PRN
Status: DISCONTINUED | OUTPATIENT
Start: 2024-06-04 | End: 2024-06-04

## 2024-06-04 RX ORDER — POTASSIUM CHLORIDE 29.8 MG/ML
20 INJECTION INTRAVENOUS
Status: COMPLETED | OUTPATIENT
Start: 2024-06-04 | End: 2024-06-04

## 2024-06-04 RX ORDER — SODIUM CHLORIDE, SODIUM GLUCONATE, SODIUM ACETATE, POTASSIUM CHLORIDE AND MAGNESIUM CHLORIDE 526; 502; 368; 37; 30 MG/100ML; MG/100ML; MG/100ML; MG/100ML; MG/100ML
INJECTION, SOLUTION INTRAVENOUS CONTINUOUS PRN
Status: DISCONTINUED | OUTPATIENT
Start: 2024-06-04 | End: 2024-06-04

## 2024-06-04 RX ORDER — DOBUTAMINE HYDROCHLORIDE 200 MG/100ML
INJECTION INTRAVENOUS CONTINUOUS PRN
Status: DISCONTINUED | OUTPATIENT
Start: 2024-06-04 | End: 2024-06-04

## 2024-06-04 RX ORDER — AMOXICILLIN 250 MG
2 CAPSULE ORAL 2 TIMES DAILY
Status: DISCONTINUED | OUTPATIENT
Start: 2024-06-04 | End: 2024-06-11

## 2024-06-04 RX ORDER — HEPARIN SODIUM 1000 [USP'U]/ML
INJECTION, SOLUTION INTRAVENOUS; SUBCUTANEOUS PRN
Status: DISCONTINUED | OUTPATIENT
Start: 2024-06-04 | End: 2024-06-04

## 2024-06-04 RX ORDER — POTASSIUM CHLORIDE 29.8 MG/ML
20 INJECTION INTRAVENOUS ONCE
Status: COMPLETED | OUTPATIENT
Start: 2024-06-04 | End: 2024-06-04

## 2024-06-04 RX ORDER — CALCIUM CHLORIDE 100 MG/ML
INJECTION INTRAVENOUS; INTRAVENTRICULAR PRN
Status: DISCONTINUED | OUTPATIENT
Start: 2024-06-04 | End: 2024-06-04

## 2024-06-04 RX ORDER — FENTANYL CITRATE 50 UG/ML
INJECTION, SOLUTION INTRAMUSCULAR; INTRAVENOUS PRN
Status: DISCONTINUED | OUTPATIENT
Start: 2024-06-04 | End: 2024-06-04

## 2024-06-04 RX ADMIN — Medication 1 PACKET: at 23:56

## 2024-06-04 RX ADMIN — ASPIRIN 81 MG CHEWABLE TABLET 81 MG: 81 TABLET CHEWABLE at 08:15

## 2024-06-04 RX ADMIN — POTASSIUM CHLORIDE 20 MEQ: 29.8 INJECTION, SOLUTION INTRAVENOUS at 06:55

## 2024-06-04 RX ADMIN — POTASSIUM CHLORIDE 20 MEQ: 29.8 INJECTION, SOLUTION INTRAVENOUS at 06:00

## 2024-06-04 RX ADMIN — Medication 15 ML: at 08:15

## 2024-06-04 RX ADMIN — Medication 1 PACKET: at 08:15

## 2024-06-04 RX ADMIN — FENTANYL CITRATE 100 MCG: 50 INJECTION INTRAMUSCULAR; INTRAVENOUS at 21:35

## 2024-06-04 RX ADMIN — HEPARIN SODIUM 5000 UNITS: 1000 INJECTION INTRAVENOUS; SUBCUTANEOUS at 22:08

## 2024-06-04 RX ADMIN — FENTANYL CITRATE 100 MCG: 50 INJECTION INTRAMUSCULAR; INTRAVENOUS at 21:03

## 2024-06-04 RX ADMIN — PROPOFOL 30 MG: 10 INJECTION, EMULSION INTRAVENOUS at 20:10

## 2024-06-04 RX ADMIN — AMIODARONE HYDROCHLORIDE 400 MG: 200 TABLET ORAL at 19:26

## 2024-06-04 RX ADMIN — FENTANYL CITRATE 300 MCG: 50 INJECTION INTRAMUSCULAR; INTRAVENOUS at 22:00

## 2024-06-04 RX ADMIN — Medication 50 MG: at 21:04

## 2024-06-04 RX ADMIN — Medication 1 PACKET: at 12:08

## 2024-06-04 RX ADMIN — AMIODARONE HYDROCHLORIDE 400 MG: 200 TABLET ORAL at 08:15

## 2024-06-04 RX ADMIN — CALCIUM CHLORIDE INJECTION 1000 MG: 100 INJECTION, SOLUTION INTRAVENOUS at 21:25

## 2024-06-04 RX ADMIN — DOCUSATE SODIUM 50 MG AND SENNOSIDES 8.6 MG 1 TABLET: 8.6; 5 TABLET, FILM COATED ORAL at 08:15

## 2024-06-04 RX ADMIN — Medication 20 MG: at 22:29

## 2024-06-04 RX ADMIN — Medication 40 MG: at 07:50

## 2024-06-04 RX ADMIN — TICAGRELOR 90 MG: 90 TABLET ORAL at 08:15

## 2024-06-04 RX ADMIN — POTASSIUM CHLORIDE 20 MEQ: 29.8 INJECTION, SOLUTION INTRAVENOUS at 18:23

## 2024-06-04 RX ADMIN — CALCIUM CHLORIDE INJECTION 1000 MG: 100 INJECTION, SOLUTION INTRAVENOUS at 20:35

## 2024-06-04 RX ADMIN — Medication 50 MG: at 20:22

## 2024-06-04 RX ADMIN — CEFAZOLIN 3 G: 1 INJECTION, POWDER, FOR SOLUTION INTRAMUSCULAR; INTRAVENOUS at 20:52

## 2024-06-04 RX ADMIN — TICAGRELOR 90 MG: 90 TABLET ORAL at 19:26

## 2024-06-04 RX ADMIN — HEPARIN SODIUM 1800 UNITS/HR: 10000 INJECTION, SOLUTION INTRAVENOUS at 09:22

## 2024-06-04 RX ADMIN — Medication 150 MCG/HR: at 12:02

## 2024-06-04 RX ADMIN — CHLORHEXIDINE GLUCONATE 0.12% ORAL RINSE 15 ML: 1.2 LIQUID ORAL at 19:26

## 2024-06-04 RX ADMIN — Medication 100 MCG: at 12:59

## 2024-06-04 RX ADMIN — POLYETHYLENE GLYCOL 3350 17 G: 17 POWDER, FOR SOLUTION ORAL at 23:55

## 2024-06-04 RX ADMIN — DOBUTAMINE HYDROCHLORIDE 2.5 MCG/KG/MIN: 200 INJECTION INTRAVENOUS at 21:55

## 2024-06-04 RX ADMIN — POTASSIUM PHOSPHATE, MONOBASIC AND POTASSIUM PHOSPHATE, DIBASIC 15 MMOL: 224; 236 INJECTION, SOLUTION, CONCENTRATE INTRAVENOUS at 08:05

## 2024-06-04 RX ADMIN — SUGAMMADEX 200 MG: 100 INJECTION, SOLUTION INTRAVENOUS at 23:20

## 2024-06-04 RX ADMIN — CEFTRIAXONE SODIUM 2 G: 2 INJECTION, POWDER, FOR SOLUTION INTRAMUSCULAR; INTRAVENOUS at 19:46

## 2024-06-04 RX ADMIN — CHLORHEXIDINE GLUCONATE 0.12% ORAL RINSE 15 ML: 1.2 LIQUID ORAL at 08:03

## 2024-06-04 RX ADMIN — POLYETHYLENE GLYCOL 3350 17 G: 17 POWDER, FOR SOLUTION ORAL at 08:16

## 2024-06-04 RX ADMIN — SODIUM CHLORIDE, SODIUM GLUCONATE, SODIUM ACETATE, POTASSIUM CHLORIDE AND MAGNESIUM CHLORIDE: 526; 502; 368; 37; 30 INJECTION, SOLUTION INTRAVENOUS at 20:14

## 2024-06-04 RX ADMIN — NOREPINEPHRINE BITARTRATE 0.02 MCG/KG/MIN: 1 INJECTION, SOLUTION, CONCENTRATE INTRAVENOUS at 21:11

## 2024-06-04 RX ADMIN — PROTAMINE SULFATE 25 MG: 10 INJECTION, SOLUTION INTRAVENOUS at 22:32

## 2024-06-04 RX ADMIN — DOCUSATE SODIUM 50 MG AND SENNOSIDES 8.6 MG 2 TABLET: 8.6; 5 TABLET, FILM COATED ORAL at 23:55

## 2024-06-04 RX ADMIN — POTASSIUM PHOSPHATE, MONOBASIC AND POTASSIUM PHOSPHATE, DIBASIC 15 MMOL: 224; 236 INJECTION, SOLUTION, CONCENTRATE INTRAVENOUS at 06:07

## 2024-06-04 RX ADMIN — SODIUM PHOSPHATE, MONOBASIC, MONOHYDRATE AND SODIUM PHOSPHATE, DIBASIC, ANHYDROUS 15 MMOL: 142; 276 INJECTION, SOLUTION INTRAVENOUS at 18:15

## 2024-06-04 RX ADMIN — MIDAZOLAM IN SODIUM CHLORIDE 4 MG/HR: 1 INJECTION INTRAVENOUS at 12:38

## 2024-06-04 ASSESSMENT — ACTIVITIES OF DAILY LIVING (ADL)
ADLS_ACUITY_SCORE: 32
ADLS_ACUITY_SCORE: 36
ADLS_ACUITY_SCORE: 33
ADLS_ACUITY_SCORE: 32
ADLS_ACUITY_SCORE: 33
ADLS_ACUITY_SCORE: 36
ADLS_ACUITY_SCORE: 33
ADLS_ACUITY_SCORE: 36
ADLS_ACUITY_SCORE: 33
ADLS_ACUITY_SCORE: 36
ADLS_ACUITY_SCORE: 36
ADLS_ACUITY_SCORE: 35
ADLS_ACUITY_SCORE: 33
ADLS_ACUITY_SCORE: 33
ADLS_ACUITY_SCORE: 36
ADLS_ACUITY_SCORE: 36
ADLS_ACUITY_SCORE: 32
ADLS_ACUITY_SCORE: 33
ADLS_ACUITY_SCORE: 36

## 2024-06-04 NOTE — PROGRESS NOTES
[facility]    ECLS Shift Summary:     ECMO Equipment:  Console Serial Number: [id number]  Circuit Lot Number: [id number]  Oxygenator Lot Number: [id number]    Circuit Assessment: Fibrin  Fibrin Location: Venous Connector    Arterial ECMO Cannula: 17 Fr in the Right Femoral Artery  Venous ECMO Cannula: 25 Fr in the Right Femoral Vein  Distal Perfusion Catheter: 11 Fr in the Right Superficial Femoral Artery    Distal Perfusion Catheter-Site Assessment: WDL  ECMO Cannula Arterial Right femoral artery-Site Assessment: WDL  ECMO Cannula Right femoral vein-Site Assessment: WDL  Distal Perfusion Catheter-Site Intervention: No intervention needed  ECMO Cannula Arterial Right femoral artery-Site Intervention: No intervention needed  ECMO Cannula Right femoral vein-Site Intervention: No intervention needed    Patient remains on V-A ECMO, all equipment is functioning and alarms are appropriately set. RPM's: 2550 with Blood Flow (Circuit) LPM  Av.2 LPM  Min: 2.04 LPM  Max: 2.29 LPM L/min. Sweep is at 2 LPM and 50 %. Extremities are well perfused.     Significant Shift Events:   None    Vent settings:  Vent Mode: CMV/AC  (Continuous Mandatory Ventilation/ Assist Control)  FiO2 (%): 40 %  Resp Rate (Set): 10 breaths/min  Tidal Volume (Set, mL): 550 mL  PEEP (cm H2O): 7 cmH2O  Resp: 10      Anticoagulation:  Dose (units/hr) HEParin: 1800 Units/hr  Rate (mL/hr) HEParin: 18 mL/hr  Concentration HEParin: 100 Units/mL        Most recent: ACT  (seconds): 190 seconds    Urine output is 250 ML. Cloudy, Odorous, Sediment, Red.  Blood loss was none. Product given included none.     Intake/Output Summary (Last 24 hours) at 2024 0531  Last data filed at 2024 0500  Gross per 24 hour   Intake 2368.88 ml   Output 1477 ml   Net 891.88 ml       Labs:  Recent Labs   Lab 24  0347 24  0346 24  0217 24  0029 24  2215   PH 7.46*  --  7.46* 7.47* 7.43   PCO2 45  --   44 44 48*   PO2 92  --  126* 113* 130*   HCO3 32*  --  32* 32* 32*   O2PER 40 50  40 50 50 50       Lab Results   Component Value Date    HGB 10.7 (L) 06/04/2024    PHGB 30 (H) 06/03/2024     (L) 06/04/2024    FIBR 558 (H) 06/04/2024    INR 1.19 (H) 06/04/2024     (H) 06/04/2024    DD 0.81 (H) 06/04/2024    ANTCH 63 (L) 06/03/2024       Plan is continue VA ECMO support, possible decannulation today.    Angie Ramsey RT  ECMO Specialist  6/4/2024 5:31 AM

## 2024-06-04 NOTE — PROGRESS NOTES
Shift Summary:    Pt remained intubated and mechanically ventilated on the following vent settings:    Vent Mode: CMV/AC  (Continuous Mandatory Ventilation/ Assist Control)  FiO2 (%): 40 %  Resp Rate (Set): 10 breaths/min  Tidal Volume (Set, mL): 550 mL  PEEP (cm H2O): 7 cmH2O      PST:  Pt not placed on PS today.    Assessment: BS clear bilaterally. RT suctioned scant of thick creamy and white secretions. Pt has tongue injury that RN is aware of and bruising on tongue. Bite block still in place due to pt biting and occluding tube, rolled towel on pt check to help offload pressure from vent circuit.      Ambu bag, mask, and valve present at bedside.     RT will continue to follow and monitor pt as appropriate.     Farrah Brock, RT on 6/4/2024 at 6:17 PM

## 2024-06-04 NOTE — PROGRESS NOTES
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 The hope is that eventually, he will get more good quality time with his family and can play hockey again.   Met with wife Arlyn and friends x2 at bedside.  Appreciate great communication from CICU with family.  Walked loved ones through what recovery process may entail. Timeline to go home would be weeks if not a few months as he out of ICU, out of hospital, likely to TCU, then home.   Loved ones asked about some potential complications to look out for.  Stated that cardiologists addressed the most severe lesion (the LAD area) first but to prevent future issues Cullen will likely need staged interventions for other coronary lesions. Noted he will likely be started on medications to help his heart recover. Noted that there is always risk of infections and wounds from ECMO groin area and from intubation but are minimizing them as much as possible. Finally, noted that if his heart does not recover, he may need more advanced therapies (LVAD or transplant) but did not go into these details noting it is far too early to say.  Advised loved ones to take things one step at a time (e.g. focus on the ICU stay, then med-surg stay, then TCU stay, etc). Noted that we will gain more clarity in the net days to week as to Cullen's rate of clinical progression.     ADVANCE CARE PLANNING:  No health care directive on file. Per  informed consent policy, next of kin should be involved if patient becomes unable.  Primary surrogate - Wife Arlyn, alternate - mother Jazzmine  There is no POLST form on file, defer to patient and/or next of kin for decisions   Code status: Full Code     MEDICAL MANAGEMENT:   We are not " "actively managing symptoms at this time.     PSYCHOSOCIAL/SPIRITUAL SUPPORT:  Family wife Arlyn, two daughters (12 going on 14yo and 15 yo)  Arlyn herself states that she is not [employer] and that \"Science (pointed to machines) is her Yazidi. She has the utmost trust in the medical teams and is grateful for the cares Cullen is receiving. She hopes to be able to speak to him again sometime in the near future.   Daughters Pat and Aspen and coping okay. Friends have been very supportive and they have end of the year school tasks to keep them busy. A sense of normalcy has been helpful for them.   Appreciate [facility] support regarding coping and processing with family  [place] community: [facility]     Palliative Care will follow peripherally with plans to check in with family and/or CICU team later this week.      Total time spent was 50 minutes regarding goals of care and support on the date of the encounter. These recommendations were given to the primary team via this note.     Wenceslao Gillette DO / Internal and Palliative Medicine   Securely message with the [employer] Web Console (learn more here)   Text page via [employer] Paging/Directory          Assessments          Cullen Reardon is a 48 year old male with no known past medical history. He presented to [facility] on 6/1/2024 due to chest pain. Per chart review, \"he collapsed in front of the triage desk and was found to be pulseless after saying \"I feel like I'm going to die\". Bystander CPR was started he was shocked x3 due to Vfib. Estimated downtime was 15 min. He was brought to the cath lab and had a 100% ostial LAD occlusion which was stented and cannulated on ECMO. Noted to have severe D1 and OM2 disease which will likely need further intervention. He was transferred to [facility]. Palliative was consulted routinely due to ECMO cannulation.      Today, the patient was seen for:  Cardiac arrest  AHRF  Severe CAD  Goals of care  Support  Encounter for palliative care        "     Interval History:     Chart review/discussion with unit or clinical team members:   Still on Versed, Insulin, Fentanyl, and heparin. Off Amiodarone and Levophed. CVTS consulted for decannulation.    Per patient or family/caregivers today:  Seen and examined at bedside. Wife Arlyn and friends x2 present at bedside. Visit as above.            Review of Systems:     Unable to obtain ROS due to acuity of condition          Medications:     I have reviewed this patient's medication profile and medications during this hospitalization.           Physical Exam:   Vitals were reviewed  Temp: 98.2  F (36.8  C) Temp src: Bladder   Pulse: 93   Resp: 10 SpO2: 100 % O2 Device: Mechanical Ventilator    General: Not in acute distress.   Head: Atraumatic. Normocephalic.   Eyes: Eyes closed  Ear, Nose, and Throat: Mouth pink and moist without lesions. Neck without overt masses.  Pulmonary: Unlabored. ETT in place.   Cardiovascular: Perfusing.   Abdomen: Non-distended. Feeding tube in place.  Skin: Warm.   Musculoskeletal: Joints of hand normal. Muscle bulk and tone normal in UE and LE.  Neuro: Intubated and sedated, did not assess  Psych: Intubated and sedated, could not assess           Data Reviewed:     Reviewed recent pertinent imaging, comments:   Echo Complete [[id number]] Collected: 06/03/24 1308   terpretation Summary   VA ECMO turndown study.      Baseline (2.1L/min): Normal LV size and severely reduced LV funciton, LVEF=10-   15%. Mildly reduced RV function. Septum midline.   With turndown to 1.0L/min, there is no appreciable improvement in LV function.      This study was compared with the study from 6/2/24 .   No significant changes noted.       CXR 6/4 - read pending    Reviewed recent labs, comments:   CMP  Recent Labs   Lab 06/04/24  0828 06/04/24  0558 06/04/24  0352 06/04/24  0347 06/04/24  0346 06/03/24  2214 06/03/24  2209 06/03/24  1545 06/03/24  1540 06/03/24  0949 06/03/24  0946 06/03/24  0323  06/03/24 0322 06/02/24 0336 06/02/24 0331   NA  --   --   --   --  142  --  141  --  142  --  141  --  141   < > 140   POTASSIUM  --   --   --   --  3.4  --  3.8  --  4.0  --  3.9  --  3.7   < > 4.0   CHLORIDE  --   --   --   --  106  --  106  --  107  --  106  --  106   < > 106   CO2  --   --   --   --  28  --  28  --  27  --  27  --  25   < > 21*   ANIONGAP  --   --   --   --  8  --  7  --  8  --  8  --  10   < > 13   * 139* 143* 144* 146*   < > 151*   < > 137*   < > 123*  133*   < > 130*   < > 149*   BUN  --   --   --   --  33.2*  --  33.0*  --  30.2*  --  28.1*  --  25.9*   < > 22.6*   CR  --   --   --   --  1.00  --  1.05  --  1.11  --  1.31*  --  1.32*   < > 1.13   GFRESTIMATED  --   --   --   --  >90  --  87  --  81  --  67  --  66   < > 80   RANDA  --   --   --   --  8.3*  --  8.6  --  8.5*  --  8.5*  --  8.6   < > 8.1*   MAG  --   --   --   --  2.2  --  2.0  --  2.0  --  1.9  --  1.9   < > 2.0   PHOS  --   --   --   --  1.0*  --   --   --   --   --   --   --  2.0*  --  3.9   PROTTOTAL  --   --   --   --  5.3*  --  5.6*  --  5.7*  --  5.4*  --  5.6*   < > 5.6*   ALBUMIN  --   --   --   --  3.0*  --  3.1*  --  3.3*  --  3.1*  --  3.3*   < > 3.5   BILITOTAL  --   --   --   --  0.5  --  0.5  --  0.7  --  0.7  --  0.7   < > 0.6   ALKPHOS  --   --   --   --  39*  --  41  --  42  --  41  --  43   < > 44   AST  --   --   --   --  364*  --  460*  --  577*  --  646*  --  732*   < > 836*   ALT  --   --   --   --  153*  --  180*  --  207*  --  220*  --  245*   < > 272*    < > = values in this interval not displayed.     CBC  Recent Labs   Lab 06/04/24  0346 06/03/24 2209 06/03/24  1540 06/03/24  0946   WBC 9.4 10.7 11.8* 10.9   RBC 3.50* 3.73* 3.96* 3.96*   HGB 10.7* 11.5* 12.4* 12.4*   HCT 32.0* 34.1* 36.0* 35.9*   MCV 91 91 91 91   MCH 30.6 30.8 31.3 31.3   MCHC 33.4 33.7 34.4 34.5   RDW 13.2 13.3 13.3 13.3   * 128* 152 136*     INR  Recent Labs   Lab 06/04/24 0346 06/03/24 2209 06/03/24  1540  06/03/24  0946   INR 1.19* 1.18* 1.19* 1.21*     Arterial Blood Gas  Recent Labs   Lab 06/04/24  0825 06/04/24  0555 06/04/24  0347 06/04/24  0346 06/04/24  0217   PH 7.44 7.46* 7.46*  --  7.46*   PCO2 47* 46* 45  --  44   PO2 109* 122* 92  --  126*   HCO3 32* 33* 32*  --  32*   O2PER 40 40 40 50  40 50

## 2024-06-04 NOTE — PROGRESS NOTES
PALLIATIVE CARE SOCIAL WORK Progress Note   Location: [facility]      Met with Arlyn & 2 friends this morning with Palliative Care MD as she needed to end our conversation yesterday to  her daughter from school. Arlyn reports that she's doing ok. She continues to feel overwhelmed, but is taking care of herself. She has talked to the school and her girls are being surrounded by staff and friends.    Arlyn is thinking big picture and having a hard time staying in the moment. Attempted to answer her & friend's questions this morning, but many of the answers were that we'd have to wait and see. That he's doing well and we're all hopeful, but he still has a long way to go. Arlyn is hopeful that he'll be able to get back on the ice with with girls and have a good quality of life, even if this takes months.     Plan: PCSW will continue to follow for ongoing emotional support while Palliative Care Team is following.     Clinical Social Work Interventions:   Assessment of palliative specific issues    Introduction of Palliative clinical social work interventions   Adjustment to illness counseling  Facilitation of processing of thoughts/feelings  Family communication facilitated  Grief counseling  Psychoeducation  Re-framing    ALEKSANDR Madrid  [facility], Palliative Care  Securely message with the [employer] Web Console (learn more here) or  Text page via [email or web address] Paging/Directory

## 2024-06-04 NOTE — PROGRESS NOTES
"  [facility]  Cardiology ICU History & Physical  June 1, 2024            Assessment and Plan:   Cullen Reardon is a 48 year old male with no known past medical history who was admitted on 6/1/2024 following a witnessed VF cardiac arrest.    He presented to [facility] on 6/1/2024 with chest pain.  He collapsed in front of the triage desk and was found to be pulseless after saying \"I feel like I'm going to die\".  He received immediate bystander CPR in the waiting room of the emergency department.  He was found be in VF, was shocked x 3.  Estimated downtime was approximately 15 minutes per paramedic report.  Was cannulated in [facility] emergency department and was on circuit at 16:08.  He was brought to the Cath Lab for coronary angiogram where he was found to have a 100% ostial LAD thrombotic occlusion status post stenting.  He was residual severe disease in D1 and OM2 that will need staged intervention. He was subsequently brought to [facility] for further care.    Patient was following commands on arrival. Currently clinically improving and off pressors.    Interval changes   In the interim lactate normalized . LFTs currently stable to downtrending.  Making good urine.  Following commands.     Changes today   - decan at 5 today   - Hold TDF pending decan   - High electrolyte protocol   - Monitory Benjamin output and continue flushing for now. Sediments appear to be improving.       Neuro: #. At risk for anoxic brain injury  --Intubated and sedated. Wean sedation daily to assess neurological status.   --Maintain euthermia/warm by 0.5 C/hr to get to 37C  --continue versed, fentanyl  --RASS goal -3 to -4   --CT head : No acute issues.     CV: #. Cardiogenic Shock s/p VA ECMO  #. ACUTE STEMI s/p KAYLYN to ostial LAD 6/1/24   #. Refractory VF arrest  s/p VA ECMO  --Peripheral V-A ECMO inserted via RCFA (17 Fr) and RCFV (25 Fr).  --Echo ordered  --Continue ASA 81mg and ticagrelor 90mg " BID  --Hold temp at 37, rewarm by 0.5 C every hour   --Continue heparin drip. ACT goal 180-200.  --Hold lipitor for now given likely hepatic injury during arrest  --Hold ACE/ARB for now given likely reduced renal fxn after arrest  --Holding beta blocker given shock      Pulm: #. Acute hypoxic respiratory failure  #. Probable aspiration pneumonia  Vent Mode: CMV/AC  (Continuous Mandatory Ventilation/ Assist Control)  FiO2 (%): 40 %  Resp Rate (Set): 10 breaths/min  Tidal Volume (Set, mL): 550 mL  PEEP (cm H2O): 7 cmH2O  Resp: 10    --ETT in stable position    --CXR: Lines in stable position.   --Wean vent as able  --Daily CXR  --Continue ctx .  --MRSA nares - neg, vanco stopped 6/1-6/2.  --Q2h ABGs for now     GI: #. Shock Liver 2/2 cardiac arrest  --Monitor LFTs twice a day.  --Consider RUQ US if LFTs do not improve.     Renal: #. JEAN CLAUDE 2/2 cardiac arrest  Hematuria 2/2 traumatic schafer insertion   --Monitor urine output  --Maintain K>3 and Mg>2      ID: #. Probable aspiration pneumonia  - Ctx x5 days 6/1- *  --Daily blood cultures.     Hem/Onc: #. Acute blood loss anemia due to ECMO cannulas.  --Continue Aspirin and Tacagrelor for the stent.  --Continue Heparin gtt for ECMO with ACT goal 180-200  --Cryo PRN fibrinogen < 150; FFP for INR >2  --Transfuse for Hgb<7  --US LE w/ arterial duplex per ECMO protocol   --DVT PPX: Heparin as above     Endo: #. Hyperglycemia  --Insulin as needed.  --HgbA1c - pending.     Checklist: ICU Checklist:  #Feeding: npo  #Analgesia: versed   #Sedation: fentanyl  #Thromboembolism ppx: heparin gtt   #HOB: 30 degrees   #Ulcer ppx: ppi  #Glucose: insulin gtt   #SBT/SAT: in am  #Bowel reg: miralax, senna   #Lines/tubes/drains: 17 Fr RCFA, 25 Fr RCFV, 6 Fr LCFA sheath, 6 Fr LCFV sheath, schafer, ETT, OG  #Code status: full   #Family: will update as able this evening   #Dispo: ICU          Code Status: Full    The pt was discussed with the attending physician. Dr. Reza Hernandez,  "MD  Cardiology fellow ([id number])  [id number]           Medications:   I have reviewed this patient's current medications    No past medical history on file.    No family history on file.  Social History     Socioeconomic History    Marital status:      Spouse name: Not on file    Number of children: Not on file    Years of education: Not on file    Highest education level: Not on file   Occupational History    Not on file   Tobacco Use    Smoking status: Not on file    Smokeless tobacco: Not on file   Substance and Sexual Activity    Alcohol use: Not on file    Drug use: Not on file    Sexual activity: Not on file   Other Topics Concern    Not on file   Social History Narrative    Not on file     Social Determinants of Health     Financial Resource Strain: Not At Risk (7/31/2023)    Received from [facility]    Financial Resource Strain     Is it hard for you to pay for the very basics like food, housing, medical care or heating?: No   Food Insecurity: Not At Risk (7/31/2023)    Received from [facility]    Food Insecurity     Does your food run out before you have the money to buy more?: No   Transportation Needs: Not At Risk (7/31/2023)    Received from [facility]    Transportation Needs     Does a lack of transportation keep you from your medical appointments or from getting your medications?: No   Physical Activity: Not on file   Stress: Not on file   Social Connections: Not on file   Interpersonal Safety: Not on file   Housing Stability: Not on file            Review of Systems:   Unable to obtain due to patients medical condition.            Physical Exam:   Pulse 93   Temp 98.2  F (36.8  C) (Bladder)   Resp 10   Ht 1.886 m (6' 2.25\")   Wt 127 kg (279 lb 15.8 oz)   SpO2 100%   BMI 35.71 kg/m        GENERAL: Intubated, sedated. NAD.  HEENT: No icterus. ETT in place, OG tube in place.  CARDIOVASCULAR: RRR. Normal S1 and S2.  RESP: Coarse bilaterally. Mechanical ventilation.    GI Soft, bowel sounds " "hypoactive but present.  GENITOURINARY: Benjamin in place.  EXTREMITIES: Cool, 1+ edema, pulses dopplered, as above.   NEURO: Sedated and intubated.  INTEGUMENTARY: No rashes. Cannula/Line sites CDI.      Resp: 10 SpO2: 100 % O2 Device: Mechanical Ventilator      Arterial Blood Gas:   Recent Labs   Lab 06/04/24  0825 06/04/24  0555 06/04/24  0347 06/04/24  0346 06/04/24  0217   PH 7.44 7.46* 7.46*  --  7.46*   PCO2 47* 46* 45  --  44   PO2 109* 122* 92  --  126*   HCO3 32* 33* 32*  --  32*   O2PER 40 40 40 50  40 50     Vitals:    06/02/24 0400 06/03/24 0000 06/04/24 0000   Weight: 125.2 kg (276 lb 0.3 oz) 126.1 kg (278 lb 1.6 oz) 127 kg (279 lb 15.8 oz)   I/O last 3 completed shifts:  In: 2497.71 [I.V.:1517.71; NG/GT:560]  Out: 1487 [Urine:1312; Emesis/NG output:175]  Recent Labs   Lab 06/04/24  0828 06/04/24  0558 06/04/24  0347 06/04/24 0346 06/03/24 2214 06/03/24 2209   NA  --   --   --  142  --  141   POTASSIUM  --   --   --  3.4  --  3.8   CHLORIDE  --   --   --  106  --  106   CO2  --   --   --  28  --  28   ANIONGAP  --   --   --  8  --  7   * 139*   < > 146*   < > 151*   BUN  --   --   --  33.2*  --  33.0*   CR  --   --   --  1.00  --  1.05   RANDA  --   --   --  8.3*  --  8.6    < > = values in this interval not displayed.     No components found for: \"URINE\"   Recent Labs   Lab 06/04/24  0346 06/03/24 2209 06/03/24  1540   * 460* 577*   * 180* 207*   BILITOTAL 0.5 0.5 0.7   ALBUMIN 3.0* 3.1* 3.3*   PROTTOTAL 5.3* 5.6* 5.7*   ALKPHOS 39* 41 42     Temp: 98.2  F (36.8  C) Temp src: BladderTemp  Min: 90.1  F (32.3  C)  Max: 98.6  F (37  C)   Recent Labs   Lab 06/04/24 0346 06/03/24 2209 06/03/24  1540 06/03/24  0946 06/03/24  0322   WBC 9.4 10.7 11.8* 10.9 11.2*   HGB 10.7* 11.5* 12.4* 12.4* 12.9*   HCT 32.0* 34.1* 36.0* 35.9* 37.8*   MCV 91 91 91 91 91   RDW 13.2 13.3 13.3 13.3 13.4   * 128* 152 136* 134*     Recent Labs   Lab 06/04/24 0346 06/03/24 2209 06/03/24  1540 " 24  0946 24  0322   INR 1.19* 1.18* 1.19* 1.21* 1.18*   * 112* 91* 136* 196*     Recent Labs   Lab 24  0828 24  0558 24  0352 24  0347 24  0346   * 139* 143* 144* 146*       Lines:           Data:     Recent Results (from the past 24 hour(s))   Echo Complete    Narrative    [id number]  [id number]  [id number]  [id number]^HELDER^ANIYA     [facility],[place]  Echocardiography Laboratory  [address] [place] [id number]     Name: ALEX CARREON  MRN: [id number]  : 1975  Study Date: 2024 01:08 PM  Age: 48 yrs  Gender: Male  Patient Location: [facility]  Reason For Study: ECMO turndown  Ordering Physician: ANIYA PENDLETON  Performed By: Gregory Frazier RDCS     BSA: 2.5 m2  Height: 74 in  Weight: 278 lb  BP: 110/59 mmHg  ______________________________________________________________________________  Procedure  Complete Portable Echo Adult.  ______________________________________________________________________________  Interpretation Summary  VA ECMO turndown study.     Baseline (2.1L/min): Normal LV size and severely reduced LV funciton, LVEF=10-  15%. Mildly reduced RV function. Septum midline.  With turndown to 1.0L/min, there is no appreciable improvement in LV function.     This study was compared with the study from 24 .  No significant changes noted.  ______________________________________________________________________________  Left Ventricle  Left ventricular diastolic function is abnormal. Severe diffuse hypokinesis is  present.     Right Ventricle  The right ventricle is normal size. Global right ventricular function is  mildly reduced.     Mitral Valve  The mitral valve is normal.     Aortic Valve  Aortic valve is normal in structure and function. The aortic valve is  tricuspid.     Pulmonic Valve  The pulmonic valve cannot be assessed.     Vessels  The aorta root cannot be assessed.     Pericardium  No  pericardial effusion is present.     Compared to Previous Study  This study was compared with the study from 6/2/24 . No significant changes  noted.     ______________________________________________________________________________  Doppler Measurements & Calculations  PA acc time: 0.07 sec     ______________________________________________________________________________  Report approved by: Krystle Carbone 06/03/2024 02:40 PM

## 2024-06-04 NOTE — PROGRESS NOTES
Neuro:  Pt was able to follow commands and RUDD appropriately. PERRL positive and afebrile.     CV:   Rate/rhythm:  SR.   Mechanical:    ECMO  F - 2  S- 2  O2 - 50%  ACT goal 180-200    Pulm:    /10/7/50%  ETT 28 at the lip    GI:   OGT confirmed at 74 at the lip to LIS  Bowel sounds hypoactive and bowel meds started.  TF started at 30cc/hr, goal 65cc/hr SFWF. Have not advanced because phos is >1.9.    :   Difficult schafer placement. Trauma caused to urethra. UROLOGY consulted and scoped with placement of schafer, scant drainage.  ml/hr. Had to flush 50 ml water in schafer for it to drain urine. Urology should reassess.    Skin:   Head laceration from trauma L occipital side with staples  Cannulation sites  Old  IO on R shin  Bruised tongue    Drains:   Schafer   LIS OGT    Drips:   Versed - 8  Insulin - 2  LEVO - OFF  Fent - 200  Amio - off  Hep- 1800    Lines: x2 PIV L; x1 PIV R; R TL PICC line    Labs:   Phos and K replacing    Other: Venous/Arterial sheaths L femoral groin removed. Dressing CDI, no hematoma

## 2024-06-04 NOTE — PROGRESS NOTES
[facility]    ECLS Shift Summary:     ECMO Equipment:  Console Serial Number: [id number]  Circuit Lot Number: [id number]  Oxygenator Lot Number: [id number]    Circuit Assessment: Fibrin  Fibrin Location: connectors    Arterial ECMO Cannula: 17 Fr in the Right Femoral Artery  Venous ECMO Cannula: 25 Fr in the Right Femoral Vein  Distal Perfusion Catheter: 11 Fr in the Right Superficial Femoral Artery            Patient remains on V-A ECMO, all equipment is functioning and alarms are appropriately set. RPM's: 2550 with Blood Flow (Circuit) LPM  Av.3 LPM  Min: 2.23 LPM  Max: 2.3 LPM L/min. Sweep is at 1.5 LPM and 50 %. Extremities are warm  .     Significant Shift Events: none    Vent settings:  Vent Mode: CMV/AC  (Continuous Mandatory Ventilation/ Assist Control)  FiO2 (%): 40 %  Resp Rate (Set): 10 breaths/min  Tidal Volume (Set, mL): 550 mL  PEEP (cm H2O): 7 cmH2O  Resp: 10      Anticoagulation:  Dose (units/hr) HEParin: 1800 Units/hr  Rate (mL/hr) HEParin: 18 mL/hr  Concentration HEParin: 100 Units/mL        Most recent: ACT  (seconds): 186 seconds    Urine output is per RN.  .  Blood loss was minimal. Product given included none.     Intake/Output Summary (Last 24 hours) at 2024 1830  Last data filed at 2024 1800  Gross per 24 hour   Intake 2797.64 ml   Output 1290 ml   Net 1507.64 ml       Labs:  Recent Labs   Lab 24  1705 24  1620 24  1619 24  1414 24  1230 24  1014   PH  --   --  7.41 7.42 7.42 7.43   PCO2  --   --  50* 48* 49* 48*   PO2  --   --  106* 117* 131* 107*   HCO3  --   --  31* 31* 32* 32*   O2PER 50 40  50 40 40 40 40       Lab Results   Component Value Date    HGB 10.1 (L) 2024    PHGB 30 (H) 2024     (L) 2024    FIBR 600 (H) 2024    INR 1.18 (H) 2024    PTT 93 (H) 2024    DD 0.87 (H) 2024    ANTCH 52 (L) 2024       Plan is decannulation  zabrina.    Megan E. Dressler, RN  ECMO Specialist  6/4/2024 6:30 PM

## 2024-06-04 NOTE — PLAN OF CARE
Goal Outcome Evaluation:         Pt hemodynamically stable without pressors all shift. No bleeding. SR no ectopy noted. No product or fluid resuscitation given. Sedation weaned to half, Fentanyl decreased from 200mcg/hour to 150mcg/hour. Pt wakes to voice/gentle stimuli, follows commands, opens eyes and tries to track voices at the bedside.   Phos and K replaced.  Urine with less sediment this shift. Continuing to flush schafer q1-2 hours to maintain patency.   Tube feeds on hold since 12:00 for decannulation in OR this evening.  Blood sugars stable/WNL off insulin and tube feeds.

## 2024-06-04 NOTE — PROGRESS NOTES
ECMO Attending Progress Note  2024    Cullen Reardon is a 48 year old male who was cannulated for ECMO 24 due to VT/VF arrest    Cannulation Site:  17 Fr in the R femoral artery  25 Fr in the R femoral vein    Interval events: following commands, sedated, remains off pressors --> plan for decan this evening as able, flow at 2.5    Physical Exam:  Temp:  [90.1  F (32.3  C)-98.6  F (37  C)] 98.4  F (36.9  C)  Pulse:  [] 92  Resp:  [10-11] 10  MAP:  [64 mmHg-81 mmHg] 72 mmHg  Arterial Line BP: ()/(47-64) 111/57  FiO2 (%):  [40 %-50 %] 40 %  SpO2:  [94 %-100 %] 100 %    Intake/Output Summary (Last 24 hours) at 2024 0839  Last data filed at 2024 0700  Gross per 24 hour   Intake 1431.16 ml   Output 1750 ml   Net -318.84 ml    Vent Mode: CMV/AC  (Continuous Mandatory Ventilation/ Assist Control)  FiO2 (%): 40 %  Resp Rate (Set): 10 breaths/min  Tidal Volume (Set, mL): 550 mL  PEEP (cm H2O): 7 cmH2O  Resp: 10       Labs:  Recent Labs   Lab 24  1230 24  1014 24  0825 24  0555   PH 7.42 7.43 7.44 7.46*   PCO2 49* 48* 47* 46*   PO2 131* 107* 109* 122*   HCO3 32* 32* 32* 33*   O2PER 40 40 40 40      Recent Labs   Lab 24  1015 24  0346 24  2209 24  1540   WBC 8.6 9.4 10.7 11.8*   HGB 10.3* 10.7* 11.5* 12.4*     Creatinine   Date Value Ref Range Status   2024 1.03 0.67 - 1.17 mg/dL Final   2024 1.00 0.67 - 1.17 mg/dL Final   2024 1.05 0.67 - 1.17 mg/dL Final   2024 1.11 0.67 - 1.17 mg/dL Final       Blood Flow (Circuit) LPM: 3.62 LPM  Sweep LPM: 1 LPM  Sweep FiO2   %: 70 %  ACT  (seconds): 140 seconds  Pulse Oximetry  (SpO2%): 99 %  Arterial Pressure  mmH mmHg    ECMO Issues including assessments and plan on DOS 2024:  Neuro: Sedated for mechanical ventilation and ECMO.  No acute distress.  NIRS stable  b/l  RASS goal: -1  CV: Cardiogenic shock.  Hemodynamically stable off pressors, pulse pressure 54 mmHg  Pulm: Keep  vent settings at rest settings as above.  FEN/Renal: Electrolytes stable w/ replacement protocols in place, Cr stable, UOP stable  Heme: ACT goal: 180-200, Hemoglobin stable .  Minimal oozing around the ECMO cannulas.  ID: Receiving empiric antibiotics- ctx   Cannulae: Position is acceptable on exam and the available imaging.  Distal perfusion cannula is in place and patent.  Extremities are well-perfused.     I have personally reviewed the ECMO flows, oxygenation and CO2 clearance, anticoagulation, and cannula position.  I have also personally assessed the patient's systemic response with hemodynamics, oxygenation, ventilation, and bleeding.       The patient requires continued ECMO support and management in the ICU.      Alpesh Cobb MD  Cardiology critical care      June 4, 2024

## 2024-06-05 ENCOUNTER — APPOINTMENT (OUTPATIENT)
Dept: GENERAL RADIOLOGY | Facility: CLINIC | Age: 49
DRG: 003 | End: 2024-06-05
Attending: INTERNAL MEDICINE
Payer: COMMERCIAL

## 2024-06-05 ENCOUNTER — APPOINTMENT (OUTPATIENT)
Dept: CT IMAGING | Facility: CLINIC | Age: 49
DRG: 003 | End: 2024-06-05
Attending: INTERNAL MEDICINE
Payer: COMMERCIAL

## 2024-06-05 ENCOUNTER — APPOINTMENT (OUTPATIENT)
Dept: GENERAL RADIOLOGY | Facility: CLINIC | Age: 49
DRG: 003 | End: 2024-06-05
Attending: NURSE PRACTITIONER
Payer: COMMERCIAL

## 2024-06-05 ENCOUNTER — APPOINTMENT (OUTPATIENT)
Dept: GENERAL RADIOLOGY | Facility: CLINIC | Age: 49
DRG: 003 | End: 2024-06-05
Attending: SURGERY
Payer: COMMERCIAL

## 2024-06-05 LAB
1OH-MIDAZOLAM UR QL SCN: PRESENT
ALBUMIN SERPL BCG-MCNC: 2.7 G/DL (ref 3.5–5.2)
ALBUMIN SERPL BCG-MCNC: 3 G/DL (ref 3.5–5.2)
ALBUMIN SERPL BCG-MCNC: 3.1 G/DL (ref 3.5–5.2)
ALLEN'S TEST: ABNORMAL
ALP SERPL-CCNC: 40 U/L (ref 40–150)
ALP SERPL-CCNC: 44 U/L (ref 40–150)
ALP SERPL-CCNC: 52 U/L (ref 40–150)
ALT SERPL W P-5'-P-CCNC: 104 U/L (ref 0–70)
ALT SERPL W P-5'-P-CCNC: 110 U/L (ref 0–70)
ALT SERPL W P-5'-P-CCNC: 117 U/L (ref 0–70)
ANION GAP SERPL CALCULATED.3IONS-SCNC: 10 MMOL/L (ref 7–15)
ANION GAP SERPL CALCULATED.3IONS-SCNC: 8 MMOL/L (ref 7–15)
ANION GAP SERPL CALCULATED.3IONS-SCNC: 8 MMOL/L (ref 7–15)
ANION GAP SERPL CALCULATED.3IONS-SCNC: 9 MMOL/L (ref 7–15)
APTT PPP: 29 SECONDS (ref 22–38)
APTT PPP: 42 SECONDS (ref 22–38)
AST SERPL W P-5'-P-CCNC: 152 U/L (ref 0–45)
AST SERPL W P-5'-P-CCNC: 197 U/L (ref 0–45)
AST SERPL W P-5'-P-CCNC: 205 U/L (ref 0–45)
AT III ACT/NOR PPP CHRO: 58 % (ref 85–135)
ATRIAL RATE - MUSE: 100 BPM
ATRIAL RATE - MUSE: 114 BPM
ATRIAL RATE - MUSE: 97 BPM
BACTERIA SPT CULT: ABNORMAL
BACTERIA SPT CULT: ABNORMAL
BASE EXCESS BLDA CALC-SCNC: 2.7 MMOL/L (ref -3–3)
BASE EXCESS BLDA CALC-SCNC: 4.3 MMOL/L (ref -3–3)
BASE EXCESS BLDA CALC-SCNC: 4.6 MMOL/L (ref -3–3)
BASE EXCESS BLDA CALC-SCNC: 6.2 MMOL/L (ref -3–3)
BASE EXCESS BLDA CALC-SCNC: 7.1 MMOL/L (ref -3–3)
BASE EXCESS BLDA CALC-SCNC: 8.1 MMOL/L (ref -3–3)
BASE EXCESS BLDA CALC-SCNC: 8.9 MMOL/L (ref -3–3)
BILIRUB SERPL-MCNC: 0.5 MG/DL
BILIRUB SERPL-MCNC: 0.6 MG/DL
BILIRUB SERPL-MCNC: 0.7 MG/DL
BUN SERPL-MCNC: 32.5 MG/DL (ref 6–20)
BUN SERPL-MCNC: 33 MG/DL (ref 6–20)
BUN SERPL-MCNC: 34.7 MG/DL (ref 6–20)
BUN SERPL-MCNC: 34.8 MG/DL (ref 6–20)
CA-I BLD-MCNC: 4.7 MG/DL (ref 4.4–5.2)
CA-I BLD-MCNC: 4.9 MG/DL (ref 4.4–5.2)
CALCIUM SERPL-MCNC: 8.9 MG/DL (ref 8.6–10)
CALCIUM SERPL-MCNC: 8.9 MG/DL (ref 8.6–10)
CALCIUM SERPL-MCNC: 9 MG/DL (ref 8.6–10)
CALCIUM SERPL-MCNC: 9.1 MG/DL (ref 8.6–10)
CHLORIDE SERPL-SCNC: 106 MMOL/L (ref 98–107)
CHLORIDE SERPL-SCNC: 107 MMOL/L (ref 98–107)
CHLORIDE SERPL-SCNC: 108 MMOL/L (ref 98–107)
CHLORIDE SERPL-SCNC: 109 MMOL/L (ref 98–107)
COHGB MFR BLD: 90.1 % (ref 96–97)
COHGB MFR BLD: 97.6 % (ref 96–97)
COHGB MFR BLD: 97.9 % (ref 96–97)
COHGB MFR BLD: 98.8 % (ref 96–97)
COHGB MFR BLD: 99.3 % (ref 96–97)
COHGB MFR BLD: 99.5 % (ref 96–97)
COHGB MFR BLD: >100 % (ref 96–97)
CREAT SERPL-MCNC: 0.91 MG/DL (ref 0.67–1.17)
CREAT SERPL-MCNC: 0.95 MG/DL (ref 0.67–1.17)
CREAT SERPL-MCNC: 1.15 MG/DL (ref 0.67–1.17)
CREAT SERPL-MCNC: 1.16 MG/DL (ref 0.67–1.17)
CRP SERPL-MCNC: 195 MG/L
D DIMER PPP FEU-MCNC: 1.91 UG/ML FEU (ref 0–0.5)
DEPRECATED HCO3 PLAS-SCNC: 26 MMOL/L (ref 22–29)
DEPRECATED HCO3 PLAS-SCNC: 27 MMOL/L (ref 22–29)
DEPRECATED HCO3 PLAS-SCNC: 29 MMOL/L (ref 22–29)
DEPRECATED HCO3 PLAS-SCNC: 30 MMOL/L (ref 22–29)
DIASTOLIC BLOOD PRESSURE - MUSE: NORMAL MMHG
EGFRCR SERPLBLD CKD-EPI 2021: 77 ML/MIN/1.73M2
EGFRCR SERPLBLD CKD-EPI 2021: 78 ML/MIN/1.73M2
EGFRCR SERPLBLD CKD-EPI 2021: >90 ML/MIN/1.73M2
EGFRCR SERPLBLD CKD-EPI 2021: >90 ML/MIN/1.73M2
ERYTHROCYTE [DISTWIDTH] IN BLOOD BY AUTOMATED COUNT: 13.5 % (ref 10–15)
ERYTHROCYTE [DISTWIDTH] IN BLOOD BY AUTOMATED COUNT: 13.5 % (ref 10–15)
ERYTHROCYTE [DISTWIDTH] IN BLOOD BY AUTOMATED COUNT: 13.6 % (ref 10–15)
ERYTHROCYTE [SEDIMENTATION RATE] IN BLOOD BY WESTERGREN METHOD: 54 MM/HR (ref 0–15)
FENTANYL UR CFM-MCNC: 121 NG/ML
FENTANYL/CREAT UR: 68 NG/MG {CREAT}
FIBRINOGEN PPP-MCNC: 594 MG/DL (ref 170–490)
FIBRINOGEN PPP-MCNC: 681 MG/DL (ref 170–490)
GLUCOSE BLD-MCNC: 130 MG/DL (ref 70–99)
GLUCOSE BLDC GLUCOMTR-MCNC: 123 MG/DL (ref 70–99)
GLUCOSE BLDC GLUCOMTR-MCNC: 126 MG/DL (ref 70–99)
GLUCOSE BLDC GLUCOMTR-MCNC: 141 MG/DL (ref 70–99)
GLUCOSE SERPL-MCNC: 129 MG/DL (ref 70–99)
GLUCOSE SERPL-MCNC: 132 MG/DL (ref 70–99)
GLUCOSE SERPL-MCNC: 149 MG/DL (ref 70–99)
GLUCOSE SERPL-MCNC: 157 MG/DL (ref 70–99)
GRAM STAIN RESULT: ABNORMAL
GRAM STAIN RESULT: ABNORMAL
HCO3 BLD-SCNC: 29 MMOL/L (ref 21–28)
HCO3 BLD-SCNC: 30 MMOL/L (ref 21–28)
HCO3 BLD-SCNC: 30 MMOL/L (ref 21–28)
HCO3 BLD-SCNC: 31 MMOL/L (ref 21–28)
HCO3 BLD-SCNC: 33 MMOL/L (ref 21–28)
HCO3 BLD-SCNC: 33 MMOL/L (ref 21–28)
HCO3 BLD-SCNC: 34 MMOL/L (ref 21–28)
HCT VFR BLD AUTO: 30.9 % (ref 40–53)
HCT VFR BLD AUTO: 32.2 % (ref 40–53)
HCT VFR BLD AUTO: 32.9 % (ref 40–53)
HGB BLD-MCNC: 10.2 G/DL (ref 13.3–17.7)
HGB BLD-MCNC: 10.8 G/DL (ref 13.3–17.7)
HGB BLD-MCNC: 10.8 G/DL (ref 13.3–17.7)
HGB FREE PLAS-MCNC: 30 MG/DL
HYDROMORPHONE UR CFM-MCNC: 2360 NG/ML
HYDROMORPHONE/CREAT UR: 1333 NG/MG {CREAT}
INR PPP: 1.11 (ref 0.85–1.15)
INR PPP: 1.21 (ref 0.85–1.15)
INTERPRETATION ECG - MUSE: NORMAL
LACTATE SERPL-SCNC: 0.9 MMOL/L (ref 0.7–2)
LACTATE SERPL-SCNC: 0.9 MMOL/L (ref 0.7–2)
LACTATE SERPL-SCNC: 1.1 MMOL/L (ref 0.7–2)
LACTATE SERPL-SCNC: 1.2 MMOL/L (ref 0.7–2)
LDH SERPL L TO P-CCNC: 1095 U/L (ref 0–250)
MAGNESIUM SERPL-MCNC: 2.1 MG/DL (ref 1.7–2.3)
MAGNESIUM SERPL-MCNC: 2.1 MG/DL (ref 1.7–2.3)
MAGNESIUM SERPL-MCNC: 2.2 MG/DL (ref 1.7–2.3)
MAGNESIUM SERPL-MCNC: 2.3 MG/DL (ref 1.7–2.3)
MCH RBC QN AUTO: 30.8 PG (ref 26.5–33)
MCH RBC QN AUTO: 30.9 PG (ref 26.5–33)
MCH RBC QN AUTO: 31.2 PG (ref 26.5–33)
MCHC RBC AUTO-ENTMCNC: 32.8 G/DL (ref 31.5–36.5)
MCHC RBC AUTO-ENTMCNC: 33 G/DL (ref 31.5–36.5)
MCHC RBC AUTO-ENTMCNC: 33.5 G/DL (ref 31.5–36.5)
MCV RBC AUTO: 93 FL (ref 78–100)
MCV RBC AUTO: 94 FL (ref 78–100)
MCV RBC AUTO: 94 FL (ref 78–100)
NORFENTANYL UR CFM-MCNC: 174 NG/ML
NORFENTANYL/CREAT UR: 98 NG/MG {CREAT}
O2/TOTAL GAS SETTING VFR VENT: 100 %
O2/TOTAL GAS SETTING VFR VENT: 40 %
O2/TOTAL GAS SETTING VFR VENT: 40 %
O2/TOTAL GAS SETTING VFR VENT: 60 %
O2/TOTAL GAS SETTING VFR VENT: 80 %
P AXIS - MUSE: 38 DEGREES
P AXIS - MUSE: 39 DEGREES
P AXIS - MUSE: 59 DEGREES
PCO2 BLD: 42 MM HG (ref 35–45)
PCO2 BLD: 47 MM HG (ref 35–45)
PCO2 BLD: 48 MM HG (ref 35–45)
PCO2 BLD: 50 MM HG (ref 35–45)
PCO2 BLD: 51 MM HG (ref 35–45)
PCO2 BLD: 51 MM HG (ref 35–45)
PCO2 BLD: 52 MM HG (ref 35–45)
PEEP: 14 CM H2O
PEEP: 14 CM H2O
PEEP: 5 CM H2O
PEEP: 7 CM H2O
PH BLD: 7.36 [PH] (ref 7.35–7.45)
PH BLD: 7.38 [PH] (ref 7.35–7.45)
PH BLD: 7.41 [PH] (ref 7.35–7.45)
PH BLD: 7.42 [PH] (ref 7.35–7.45)
PH BLD: 7.43 [PH] (ref 7.35–7.45)
PH BLD: 7.43 [PH] (ref 7.35–7.45)
PH BLD: 7.5 [PH] (ref 7.35–7.45)
PHOSPHATE SERPL-MCNC: 3 MG/DL (ref 2.5–4.5)
PHOSPHATE SERPL-MCNC: 3.3 MG/DL (ref 2.5–4.5)
PHOSPHATE SERPL-MCNC: 3.4 MG/DL (ref 2.5–4.5)
PHOSPHATE SERPL-MCNC: 4.1 MG/DL (ref 2.5–4.5)
PLATELET # BLD AUTO: 145 10E3/UL (ref 150–450)
PLATELET # BLD AUTO: 159 10E3/UL (ref 150–450)
PLATELET # BLD AUTO: 165 10E3/UL (ref 150–450)
PO2 BLD: 106 MM HG (ref 80–105)
PO2 BLD: 116 MM HG (ref 80–105)
PO2 BLD: 151 MM HG (ref 80–105)
PO2 BLD: 59 MM HG (ref 80–105)
PO2 BLD: 85 MM HG (ref 80–105)
PO2 BLD: 87 MM HG (ref 80–105)
PO2 BLD: 96 MM HG (ref 80–105)
POTASSIUM SERPL-SCNC: 3.6 MMOL/L (ref 3.4–5.3)
POTASSIUM SERPL-SCNC: 3.7 MMOL/L (ref 3.4–5.3)
POTASSIUM SERPL-SCNC: 3.7 MMOL/L (ref 3.4–5.3)
POTASSIUM SERPL-SCNC: 3.9 MMOL/L (ref 3.4–5.3)
POTASSIUM SERPL-SCNC: 4.4 MMOL/L (ref 3.4–5.3)
PR INTERVAL - MUSE: 136 MS
PR INTERVAL - MUSE: 144 MS
PR INTERVAL - MUSE: 148 MS
PROT SERPL-MCNC: 5 G/DL (ref 6.4–8.3)
PROT SERPL-MCNC: 5.7 G/DL (ref 6.4–8.3)
PROT SERPL-MCNC: 6.1 G/DL (ref 6.4–8.3)
QRS DURATION - MUSE: 80 MS
QRS DURATION - MUSE: 92 MS
QRS DURATION - MUSE: 94 MS
QT - MUSE: 288 MS
QT - MUSE: 294 MS
QT - MUSE: 360 MS
QTC - MUSE: 371 MS
QTC - MUSE: 405 MS
QTC - MUSE: 457 MS
R AXIS - MUSE: 54 DEGREES
R AXIS - MUSE: 63 DEGREES
R AXIS - MUSE: 76 DEGREES
RBC # BLD AUTO: 3.3 10E6/UL (ref 4.4–5.9)
RBC # BLD AUTO: 3.46 10E6/UL (ref 4.4–5.9)
RBC # BLD AUTO: 3.51 10E6/UL (ref 4.4–5.9)
S100 CA BINDING PROTEIN B SER-MCNC: 110 NG/L
S100 CA BINDING PROTEIN B SER-MCNC: 328 NG/L
S100 CA BINDING PROTEIN B SER-MCNC: 57 NG/L
SAO2 % BLDA: 88 % (ref 92–100)
SAO2 % BLDA: 96 % (ref 92–100)
SAO2 % BLDA: 96 % (ref 92–100)
SAO2 % BLDA: 97 % (ref 92–100)
SAO2 % BLDA: 98 % (ref 92–100)
SAO2 % BLDA: 98 % (ref 92–100)
SAO2 % BLDA: 99 % (ref 92–100)
SODIUM SERPL-SCNC: 143 MMOL/L (ref 135–145)
SODIUM SERPL-SCNC: 143 MMOL/L (ref 135–145)
SODIUM SERPL-SCNC: 145 MMOL/L (ref 135–145)
SODIUM SERPL-SCNC: 146 MMOL/L (ref 135–145)
SYSTOLIC BLOOD PRESSURE - MUSE: NORMAL MMHG
T AXIS - MUSE: 47 DEGREES
T AXIS - MUSE: 53 DEGREES
T AXIS - MUSE: 62 DEGREES
T3 SERPL-MCNC: 49 NG/DL (ref 85–202)
T3FREE SERPL-MCNC: 1.2 PG/ML (ref 2–4.4)
T4 FREE SERPL-MCNC: 0.73 NG/DL (ref 0.9–1.7)
TROPONIN T SERPL HS-MCNC: 5836 NG/L
TROPONIN T SERPL HS-MCNC: 6791 NG/L
TSH SERPL DL<=0.005 MIU/L-ACNC: 1 UIU/ML (ref 0.3–4.2)
VENTRICULAR RATE- MUSE: 100 BPM
VENTRICULAR RATE- MUSE: 114 BPM
VENTRICULAR RATE- MUSE: 97 BPM
WBC # BLD AUTO: 10.9 10E3/UL (ref 4–11)
WBC # BLD AUTO: 8.8 10E3/UL (ref 4–11)
WBC # BLD AUTO: 8.9 10E3/UL (ref 4–11)

## 2024-06-05 PROCEDURE — 85652 RBC SED RATE AUTOMATED: CPT | Performed by: SURGERY

## 2024-06-05 PROCEDURE — 83615 LACTATE (LD) (LDH) ENZYME: CPT | Performed by: SURGERY

## 2024-06-05 PROCEDURE — 999N000157 HC STATISTIC RCP TIME EA 10 MIN

## 2024-06-05 PROCEDURE — 85379 FIBRIN DEGRADATION QUANT: CPT | Performed by: SURGERY

## 2024-06-05 PROCEDURE — 250N000011 HC RX IP 250 OP 636: Performed by: STUDENT IN AN ORGANIZED HEALTH CARE EDUCATION/TRAINING PROGRAM

## 2024-06-05 PROCEDURE — 85384 FIBRINOGEN ACTIVITY: CPT | Performed by: SURGERY

## 2024-06-05 PROCEDURE — 200N000002 HC R&B ICU UMMC

## 2024-06-05 PROCEDURE — 84100 ASSAY OF PHOSPHORUS: CPT | Performed by: INTERNAL MEDICINE

## 2024-06-05 PROCEDURE — 83605 ASSAY OF LACTIC ACID: CPT | Performed by: SURGERY

## 2024-06-05 PROCEDURE — 94003 VENT MGMT INPAT SUBQ DAY: CPT

## 2024-06-05 PROCEDURE — 71045 X-RAY EXAM CHEST 1 VIEW: CPT | Mod: 26 | Performed by: RADIOLOGY

## 2024-06-05 PROCEDURE — 80053 COMPREHEN METABOLIC PANEL: CPT | Performed by: SURGERY

## 2024-06-05 PROCEDURE — 87205 SMEAR GRAM STAIN: CPT | Performed by: SURGERY

## 2024-06-05 PROCEDURE — 82805 BLOOD GASES W/O2 SATURATION: CPT | Performed by: STUDENT IN AN ORGANIZED HEALTH CARE EDUCATION/TRAINING PROGRAM

## 2024-06-05 PROCEDURE — 83735 ASSAY OF MAGNESIUM: CPT | Performed by: SURGERY

## 2024-06-05 PROCEDURE — 82374 ASSAY BLOOD CARBON DIOXIDE: CPT | Performed by: STUDENT IN AN ORGANIZED HEALTH CARE EDUCATION/TRAINING PROGRAM

## 2024-06-05 PROCEDURE — 71045 X-RAY EXAM CHEST 1 VIEW: CPT | Mod: 77

## 2024-06-05 PROCEDURE — 93010 ELECTROCARDIOGRAM REPORT: CPT | Mod: 76 | Performed by: INTERNAL MEDICINE

## 2024-06-05 PROCEDURE — 82310 ASSAY OF CALCIUM: CPT | Performed by: INTERNAL MEDICINE

## 2024-06-05 PROCEDURE — 82040 ASSAY OF SERUM ALBUMIN: CPT | Performed by: STUDENT IN AN ORGANIZED HEALTH CARE EDUCATION/TRAINING PROGRAM

## 2024-06-05 PROCEDURE — 250N000009 HC RX 250: Performed by: SURGERY

## 2024-06-05 PROCEDURE — 250N000011 HC RX IP 250 OP 636: Performed by: SURGERY

## 2024-06-05 PROCEDURE — 84443 ASSAY THYROID STIM HORMONE: CPT | Performed by: SURGERY

## 2024-06-05 PROCEDURE — 71275 CT ANGIOGRAPHY CHEST: CPT

## 2024-06-05 PROCEDURE — 84480 ASSAY TRIIODOTHYRONINE (T3): CPT | Performed by: SURGERY

## 2024-06-05 PROCEDURE — 85027 COMPLETE CBC AUTOMATED: CPT | Performed by: STUDENT IN AN ORGANIZED HEALTH CARE EDUCATION/TRAINING PROGRAM

## 2024-06-05 PROCEDURE — 71275 CT ANGIOGRAPHY CHEST: CPT | Mod: 26 | Performed by: STUDENT IN AN ORGANIZED HEALTH CARE EDUCATION/TRAINING PROGRAM

## 2024-06-05 PROCEDURE — 250N000011 HC RX IP 250 OP 636: Performed by: INTERNAL MEDICINE

## 2024-06-05 PROCEDURE — 82805 BLOOD GASES W/O2 SATURATION: CPT | Performed by: SURGERY

## 2024-06-05 PROCEDURE — 250N000013 HC RX MED GY IP 250 OP 250 PS 637: Performed by: SURGERY

## 2024-06-05 PROCEDURE — 87070 CULTURE OTHR SPECIMN AEROBIC: CPT | Performed by: SURGERY

## 2024-06-05 PROCEDURE — 84100 ASSAY OF PHOSPHORUS: CPT

## 2024-06-05 PROCEDURE — 85027 COMPLETE CBC AUTOMATED: CPT | Performed by: INTERNAL MEDICINE

## 2024-06-05 PROCEDURE — 85730 THROMBOPLASTIN TIME PARTIAL: CPT | Performed by: SURGERY

## 2024-06-05 PROCEDURE — 85610 PROTHROMBIN TIME: CPT | Performed by: SURGERY

## 2024-06-05 PROCEDURE — 83735 ASSAY OF MAGNESIUM: CPT

## 2024-06-05 PROCEDURE — 85027 COMPLETE CBC AUTOMATED: CPT | Performed by: SURGERY

## 2024-06-05 PROCEDURE — 85300 ANTITHROMBIN III ACTIVITY: CPT | Performed by: SURGERY

## 2024-06-05 PROCEDURE — 99233 SBSQ HOSP IP/OBS HIGH 50: CPT | Performed by: NURSE PRACTITIONER

## 2024-06-05 PROCEDURE — 86140 C-REACTIVE PROTEIN: CPT | Performed by: SURGERY

## 2024-06-05 PROCEDURE — 71045 X-RAY EXAM CHEST 1 VIEW: CPT

## 2024-06-05 PROCEDURE — 82947 ASSAY GLUCOSE BLOOD QUANT: CPT | Performed by: SURGERY

## 2024-06-05 PROCEDURE — 99291 CRITICAL CARE FIRST HOUR: CPT | Mod: GC | Performed by: INTERNAL MEDICINE

## 2024-06-05 PROCEDURE — 84100 ASSAY OF PHOSPHORUS: CPT | Performed by: SURGERY

## 2024-06-05 PROCEDURE — 83735 ASSAY OF MAGNESIUM: CPT | Performed by: INTERNAL MEDICINE

## 2024-06-05 PROCEDURE — 84439 ASSAY OF FREE THYROXINE: CPT | Performed by: SURGERY

## 2024-06-05 PROCEDURE — 82805 BLOOD GASES W/O2 SATURATION: CPT | Performed by: INTERNAL MEDICINE

## 2024-06-05 PROCEDURE — 82330 ASSAY OF CALCIUM: CPT | Performed by: INTERNAL MEDICINE

## 2024-06-05 PROCEDURE — 84484 ASSAY OF TROPONIN QUANT: CPT | Performed by: SURGERY

## 2024-06-05 PROCEDURE — 84481 FREE ASSAY (FT-3): CPT | Performed by: SURGERY

## 2024-06-05 PROCEDURE — 99418 PROLNG IP/OBS E/M EA 15 MIN: CPT | Performed by: NURSE PRACTITIONER

## 2024-06-05 PROCEDURE — 82330 ASSAY OF CALCIUM: CPT | Performed by: SURGERY

## 2024-06-05 PROCEDURE — 83051 HEMOGLOBIN PLASMA: CPT | Performed by: SURGERY

## 2024-06-05 PROCEDURE — 93005 ELECTROCARDIOGRAM TRACING: CPT

## 2024-06-05 RX ORDER — FUROSEMIDE 10 MG/ML
40 INJECTION INTRAMUSCULAR; INTRAVENOUS ONCE
Status: COMPLETED | OUTPATIENT
Start: 2024-06-05 | End: 2024-06-05

## 2024-06-05 RX ORDER — HEPARIN SODIUM 5000 [USP'U]/.5ML
5000 INJECTION, SOLUTION INTRAVENOUS; SUBCUTANEOUS EVERY 8 HOURS
Status: DISCONTINUED | OUTPATIENT
Start: 2024-06-05 | End: 2024-06-15 | Stop reason: HOSPADM

## 2024-06-05 RX ORDER — PIPERACILLIN SODIUM, TAZOBACTAM SODIUM 4; .5 G/20ML; G/20ML
4.5 INJECTION, POWDER, LYOPHILIZED, FOR SOLUTION INTRAVENOUS EVERY 6 HOURS
Qty: 120 ML | Refills: 0 | Status: COMPLETED | OUTPATIENT
Start: 2024-06-05 | End: 2024-06-07

## 2024-06-05 RX ORDER — IOPAMIDOL 755 MG/ML
77 INJECTION, SOLUTION INTRAVASCULAR ONCE
Status: COMPLETED | OUTPATIENT
Start: 2024-06-05 | End: 2024-06-05

## 2024-06-05 RX ORDER — POTASSIUM CHLORIDE 29.8 MG/ML
20 INJECTION INTRAVENOUS ONCE
Status: COMPLETED | OUTPATIENT
Start: 2024-06-05 | End: 2024-06-05

## 2024-06-05 RX ORDER — PIPERACILLIN SODIUM, TAZOBACTAM SODIUM 3; .375 G/15ML; G/15ML
3.38 INJECTION, POWDER, LYOPHILIZED, FOR SOLUTION INTRAVENOUS EVERY 8 HOURS
Status: DISCONTINUED | OUTPATIENT
Start: 2024-06-05 | End: 2024-06-05

## 2024-06-05 RX ADMIN — DOCUSATE SODIUM 50 MG AND SENNOSIDES 8.6 MG 2 TABLET: 8.6; 5 TABLET, FILM COATED ORAL at 19:32

## 2024-06-05 RX ADMIN — Medication 1 PACKET: at 09:05

## 2024-06-05 RX ADMIN — FUROSEMIDE 40 MG: 10 INJECTION, SOLUTION INTRAVENOUS at 17:24

## 2024-06-05 RX ADMIN — TICAGRELOR 90 MG: 90 TABLET ORAL at 09:04

## 2024-06-05 RX ADMIN — AMIODARONE HYDROCHLORIDE 400 MG: 200 TABLET ORAL at 09:04

## 2024-06-05 RX ADMIN — HEPARIN SODIUM 5000 UNITS: 5000 INJECTION, SOLUTION INTRAVENOUS; SUBCUTANEOUS at 19:31

## 2024-06-05 RX ADMIN — POTASSIUM CHLORIDE 20 MEQ: 29.8 INJECTION, SOLUTION INTRAVENOUS at 00:49

## 2024-06-05 RX ADMIN — CHLORHEXIDINE GLUCONATE 0.12% ORAL RINSE 15 ML: 1.2 LIQUID ORAL at 09:05

## 2024-06-05 RX ADMIN — CHLORHEXIDINE GLUCONATE 0.12% ORAL RINSE 15 ML: 1.2 LIQUID ORAL at 19:31

## 2024-06-05 RX ADMIN — POLYETHYLENE GLYCOL 3350 17 G: 17 POWDER, FOR SOLUTION ORAL at 09:04

## 2024-06-05 RX ADMIN — ACETAMINOPHEN 650 MG: 325 TABLET, FILM COATED ORAL at 14:15

## 2024-06-05 RX ADMIN — TICAGRELOR 90 MG: 90 TABLET ORAL at 19:32

## 2024-06-05 RX ADMIN — NOREPINEPHRINE BITARTRATE 0.06 MCG/KG/MIN: 0.06 INJECTION, SOLUTION INTRAVENOUS at 09:00

## 2024-06-05 RX ADMIN — Medication 40 MG: at 09:04

## 2024-06-05 RX ADMIN — Medication 100 MCG: at 12:02

## 2024-06-05 RX ADMIN — IOPAMIDOL 77 ML: 755 INJECTION, SOLUTION INTRAVENOUS at 08:14

## 2024-06-05 RX ADMIN — ACETAMINOPHEN 650 MG: 325 TABLET, FILM COATED ORAL at 09:04

## 2024-06-05 RX ADMIN — Medication 15 ML: at 09:05

## 2024-06-05 RX ADMIN — Medication 150 MCG/HR: at 20:46

## 2024-06-05 RX ADMIN — POTASSIUM CHLORIDE 20 MEQ: 29.8 INJECTION, SOLUTION INTRAVENOUS at 21:03

## 2024-06-05 RX ADMIN — Medication 100 MCG/HR: at 05:22

## 2024-06-05 RX ADMIN — POLYETHYLENE GLYCOL 3350 17 G: 17 POWDER, FOR SOLUTION ORAL at 19:32

## 2024-06-05 RX ADMIN — AMIODARONE HYDROCHLORIDE 400 MG: 200 TABLET ORAL at 19:32

## 2024-06-05 RX ADMIN — HEPARIN SODIUM 5000 UNITS: 5000 INJECTION, SOLUTION INTRAVENOUS; SUBCUTANEOUS at 11:42

## 2024-06-05 RX ADMIN — DOCUSATE SODIUM 50 MG AND SENNOSIDES 8.6 MG 2 TABLET: 8.6; 5 TABLET, FILM COATED ORAL at 09:04

## 2024-06-05 RX ADMIN — PIPERACILLIN SODIUM AND TAZOBACTAM SODIUM 4.5 G: 4; .5 INJECTION, POWDER, LYOPHILIZED, FOR SOLUTION INTRAVENOUS at 19:23

## 2024-06-05 RX ADMIN — FUROSEMIDE 40 MG: 10 INJECTION, SOLUTION INTRAVENOUS at 05:31

## 2024-06-05 RX ADMIN — ASPIRIN 81 MG CHEWABLE TABLET 81 MG: 81 TABLET CHEWABLE at 09:04

## 2024-06-05 RX ADMIN — FUROSEMIDE 40 MG: 10 INJECTION, SOLUTION INTRAVENOUS at 11:42

## 2024-06-05 ASSESSMENT — ACTIVITIES OF DAILY LIVING (ADL)
ADLS_ACUITY_SCORE: 34
ADLS_ACUITY_SCORE: 35
ADLS_ACUITY_SCORE: 35
ADLS_ACUITY_SCORE: 36
ADLS_ACUITY_SCORE: 35
ADLS_ACUITY_SCORE: 34
ADLS_ACUITY_SCORE: 35
ADLS_ACUITY_SCORE: 35
ADLS_ACUITY_SCORE: 34
ADLS_ACUITY_SCORE: 39
ADLS_ACUITY_SCORE: 35
ADLS_ACUITY_SCORE: 39
ADLS_ACUITY_SCORE: 34
ADLS_ACUITY_SCORE: 35
ADLS_ACUITY_SCORE: 36
ADLS_ACUITY_SCORE: 34
ADLS_ACUITY_SCORE: 34

## 2024-06-05 NOTE — PROGRESS NOTES
Pt remains intubated and sedated. At noon after suctioning, pt became extremely agitated requiring multiple people holding him down and boluses of sedation. SpO2 decreased to 80%, RT and CICU at bedside, CXR done stat. Pt slowly recovered after ~10mins. Currently on SIMV/PS mode, pt tolerating well. CT PE done this morning. Lasix given x2 with good response. Per CICU tube feeds restarted. Family at bedside throughout day, updated by myself and CICU team.

## 2024-06-05 NOTE — ANESTHESIA POSTPROCEDURE EVALUATION
Patient: Cullen Reardon    Procedure: Procedure(s):  REMOVAL, CANNULA, ADULT, FOR ECMO right femoral artery and right femoral vein, primary repair of right femoral artery.       Anesthesia Type:  General    Note:  Disposition: ICU            ICU Sign Out: Anesthesiologist/ICU physician sign out WAS performed   Postop Pain Control: Uneventful            Sign Out: Well controlled pain   PONV: No   Neuro/Psych: Uneventful            Sign Out: Acceptable/Baseline neuro status   Airway/Respiratory: Uneventful            Sign Out: AIRWAY IN SITU/Resp. Support               Airway in situ/Resp. Support: ETT   CV/Hemodynamics: Uneventful            Sign Out: Acceptable CV status   Other NRE: NONE   DID A NON-ROUTINE EVENT OCCUR? No           Last vitals:  Vitals:    06/04/24 1900 06/04/24 1930 06/04/24 2000   Pulse: 91 91 93   Resp:   10   Temp: 36.8  C (98.2  F) 36.9  C (98.4  F) 36.9  C (98.4  F)   SpO2: 100% 100% 100%       Electronically Signed By: Vince Hilliard MD  June 4, 2024  11:47 PM

## 2024-06-05 NOTE — PROGRESS NOTES
"  [facility]  Cardiology ICU History & Physical  June 1, 2024            Assessment and Plan:   uCllen Reardon is a 48 year old male with no known past medical history who was admitted on 6/1/2024 following a witnessed VF cardiac arrest.    He presented to [facility] on 6/1/2024 with chest pain.  He collapsed in front of the triage desk and was found to be pulseless after saying \"I feel like I'm going to die\".  He received immediate bystander CPR in the waiting room of the emergency department.  He was found be in VF, was shocked x 3.  Estimated downtime was approximately 15 minutes per paramedic report.  Was cannulated in [facility] emergency department and was on circuit at 16:08.  He was brought to the Cath Lab for coronary angiogram where he was found to have a 100% ostial LAD thrombotic occlusion status post stenting.  He was residual severe disease in D1 and OM2 that will need staged intervention. He was subsequently brought to [facility] for further care.    Patient was following commands on arrival. Currently clinically improving and off pressors.    Interval changes   Decannulated overnight.  Weaned off pressors overnight.  This a.m. was agitated.  With brief episodes of hypoxia.  Lasix was administered PEEP was titrated up.  Chest x-ray unremarkable.  Plan for CTPE and potential bronch.  PaO2  52 with pCO2 52. Currently improving with diuresis     Changes today   - Restart TF   - lasix 40      Neuro: #. At risk for anoxic brain injury  --Intubated and sedated. Wean sedation daily to assess neurological status.   --Maintain euthermia/warm by 0.5 C/hr to get to 37C  --continue versed, fentanyl  --RASS goal -3 to -4   --CT head : No acute issues.     CV: #. Cardiogenic Shock s/p VA ECMO  #. ACUTE STEMI s/p KAYLYN to ostial LAD 6/1/24   #. Refractory VF arrest  s/p VA ECMO  --Peripheral V-A ECMO inserted via RCFA (17 Fr) and RCFV (25 Fr).  --Echo ordered  --Continue ASA 81mg " and ticagrelor 90mg BID  --Hold temp at 37, rewarm by 0.5 C every hour   --Continue heparin drip. ACT goal 180-200.  --Hold lipitor for now given likely hepatic injury during arrest  --Hold ACE/ARB for now given likely reduced renal fxn after arrest  --Holding beta blocker given shock      Pulm: #. Acute hypoxic respiratory failure  #. Probable aspiration pneumonia  Vent Mode: CMV/AC  (Continuous Mandatory Ventilation/ Assist Control)  FiO2 (%): 40 %  Resp Rate (Set): 10 breaths/min  Tidal Volume (Set, mL): 550 mL  PEEP (cm H2O): 7 cmH2O  Inspiratory Pressure (cm H2O) (Drager Myah): 25  Resp: 12    --ETT in stable position    --CXR: Lines in stable position.   --Wean vent as able  --Daily CXR  --Continue ctx .  --MRSA nares - neg, vanco stopped 6/1-6/2.  --Q2h ABGs for now     GI: #. Shock Liver 2/2 cardiac arrest  --Monitor LFTs twice a day.  --Consider RUQ US if LFTs do not improve.     Renal: #. JEAN CLAUDE 2/2 cardiac arrest  Hematuria 2/2 traumatic schafer insertion   --Monitor urine output  --Maintain K>3 and Mg>2      ID: #. Probable aspiration pneumonia  - Ctx x5 days 6/1- *  --Daily blood cultures.     Hem/Onc: #. Acute blood loss anemia due to ECMO cannulas.  --Continue Aspirin and Tacagrelor for the stent.  --Continue Heparin gtt for ECMO with ACT goal 180-200  --Cryo PRN fibrinogen < 150; FFP for INR >2  --Transfuse for Hgb<7  --US LE w/ arterial duplex per ECMO protocol   --DVT PPX: Heparin as above     Endo: #. Hyperglycemia  --Insulin as needed.  --HgbA1c - pending.     Checklist: ICU Checklist:  #Feeding: npo  #Analgesia: versed   #Sedation: fentanyl  #Thromboembolism ppx: heparin gtt   #HOB: 30 degrees   #Ulcer ppx: ppi  #Glucose: insulin gtt   #SBT/SAT: in am  #Bowel reg: miralax, senna   #Lines/tubes/drains: 17 Fr RCFA, 25 Fr RCFV, 6 Fr LCFA sheath, 6 Fr LCFV sheath, schafer, ETT, OG  #Code status: full   #Family: will update as able this evening   #Dispo: ICU          Code Status: Full    The pt was  "discussed with the attending physician. Dr. Reza Hernandez MD  Cardiology fellow ([id number])  [id number]           Medications:   I have reviewed this patient's current medications    No past medical history on file.    No family history on file.  Social History     Socioeconomic History    Marital status:      Spouse name: Not on file    Number of children: Not on file    Years of education: Not on file    Highest education level: Not on file   Occupational History    Not on file   Tobacco Use    Smoking status: Not on file    Smokeless tobacco: Not on file   Substance and Sexual Activity    Alcohol use: Not on file    Drug use: Not on file    Sexual activity: Not on file   Other Topics Concern    Not on file   Social History Narrative    Not on file     Social Determinants of Health     Financial Resource Strain: Not At Risk (7/31/2023)    Received from [employer]    Financial Resource Strain     Is it hard for you to pay for the very basics like food, housing, medical care or heating?: No   Food Insecurity: Not At Risk (7/31/2023)    Received from [employer]    Food Insecurity     Does your food run out before you have the money to buy more?: No   Transportation Needs: Not At Risk (7/31/2023)    Received from [employer]    Transportation Needs     Does a lack of transportation keep you from your medical appointments or from getting your medications?: No   Physical Activity: Not on file   Stress: Not on file   Social Connections: Not on file   Interpersonal Safety: Not on file   Housing Stability: Not on file            Review of Systems:   Unable to obtain due to patients medical condition.            Physical Exam:   Pulse (!) 121   Temp 100.2  F (37.9  C)   Resp 12   Ht 1.886 m (6' 2.25\")   Wt 128 kg (282 lb 3 oz)   SpO2 (!) 89%   BMI 35.99 kg/m        GENERAL: Intubated, sedated. NAD.  HEENT: No icterus. ETT in place, OG tube in place.  CARDIOVASCULAR: RRR. Normal S1 and S2.  RESP: " "Coarse bilaterally. Mechanical ventilation.    GI Soft, bowel sounds hypoactive but present.  GENITOURINARY: Benjamin in place.  EXTREMITIES: Cool, 1+ edema, pulses dopplered, as above.   NEURO: Sedated and intubated.  INTEGUMENTARY: No rashes. Cannula/Line sites CDI.      Resp: 12 SpO2: (!) 89 % O2 Device: Mechanical Ventilator      Arterial Blood Gas:   Recent Labs   Lab 06/05/24  0627 06/05/24  0456 06/05/24  0344 06/04/24 2326   PH 7.36 7.41 7.38 7.42   PCO2 52* 48* 51* 45   PO2 59* 106* 116* 98   HCO3 29* 30* 30* 29*   O2PER 100 40 40 40     Vitals:    06/03/24 0000 06/04/24 0000 06/05/24 0000   Weight: 126.1 kg (278 lb 1.6 oz) 127 kg (279 lb 15.8 oz) 128 kg (282 lb 3 oz)   I/O last 3 completed shifts:  In: 3217.59 [I.V.:2412.59; NG/GT:525]  Out: 1710 [Urine:1610; Blood:100]  Recent Labs   Lab 06/05/24  0344 06/05/24  0343 06/04/24  2329 06/04/24 2326   NA  --  143  --  143   POTASSIUM  --  4.4  --  3.7  3.7   CHLORIDE  --  108*  --  109*   CO2  --  27  --  26   ANIONGAP  --  8  --  8   * 129*   < > 128*  132*   BUN  --  32.5*  --  33.0*   CR  --  0.95  --  0.91   RANDA  --  9.0  --  9.1    < > = values in this interval not displayed.     No components found for: \"URINE\"   Recent Labs   Lab 06/05/24  0343 06/04/24  2326 06/04/24  1620   * 205* 260*   * 110* 130*   BILITOTAL 0.5 0.6 0.5   ALBUMIN 3.0* 2.7* 3.0*   PROTTOTAL 5.7* 5.0* 5.3*   ALKPHOS 44 40 41     Temp: 100.2  F (37.9  C) Temp src: BladderTemp  Min: 98.2  F (36.8  C)  Max: 100.2  F (37.9  C)   Recent Labs   Lab 06/05/24  0343 06/04/24  2326 06/04/24  2235 06/04/24  2134 06/04/24  2123 06/04/24  2030 06/04/24  1620 06/04/24  1015 06/04/24  0346   WBC 8.9 7.4  --   --   --   --  8.6 8.6 9.4   HGB 10.2* 9.6* 10.3* 10.4* 10.3*   < > 10.1* 10.3* 10.7*   HCT 30.9* 28.3*  --   --   --   --  30.6* 30.1* 32.0*   MCV 94 93  --   --   --   --  93 91 91   RDW 13.5 13.3  --   --   --   --  13.4 13.2 13.2   * 108*  --   --   --   --  " 122* 116* 124*    < > = values in this interval not displayed.     Recent Labs   Lab 06/05/24  0343 06/04/24  2326 06/04/24  1620 06/04/24  1015 06/04/24  0346   INR 1.11 1.21* 1.18* 1.23* 1.19*   PTT 29 42* 93* 100* 109*     Recent Labs   Lab 06/05/24  0344 06/05/24  0343 06/05/24  0342 06/04/24  2329 06/04/24  2326   * 129* 123* 122* 128*  132*       Lines:           Data:     Recent Results (from the past 24 hour(s))   ALEXANDER with Report    Narrative    Vince Hilliard MD     6/4/2024 10:48 PM  Perioperative ALEXANDER Procedure Note    Staff -        Anesthesiologist:  Vince Hilliard MD       Performed By: anesthesiologist      ALEXANDER Probe Insertion    Probe Status PRE Insertion: NO obvious damage  Probe type:  Adult 3D  Bite block used:   Soft  Insertion Technique: Easy, no oropharyngeal manipulation  Insertion complications: None obvious  Billing Report:ALEXANDER report by Anesthesiologist (See Separate Report note)  Probe Status POST Removal: NO obvious damage    ALEXANDER Report  General Procedure Information  Images for this study have been archived.    Post Intervention Findings  Procedure(s) performed:  ECMO Decannulation.  Regional wall motion:.   Surgeon(s) notified of all postintervention findings: Yes.             Post Intervention comments: Pre: On 2 LPM, normal BiV Fx. Trace TR/MR/AI.   No aortic dissection.  Post decannulation. Originally, borderline decreased LV function. Now,   normal BiV Fx. Trace/mild TR. Trace MR/AI. No aortic dissection. No   inotropes or pressors..    Echocardiogram Comments   XR Chest Port 1 View    Narrative    EXAM: XR CHEST PORT 1 VIEW 6/5/2024 12:40 AM    INDICATION: Post-op pneumonia    COMPARISON: Chest radiograph 6/4/2024    TECHNIQUE: Single AP view of the chest.    FINDINGS:   Endotracheal tube at the mid thoracic trachea. Inferior approach ECMO  is not seen. Partially visualized gastric tube terminates below the  field-of-view. Left upper extremity PICC tip projects over the  low  SVC. Stable appearance of the cardiomediastinal silhouette. No  consolidation. No pleural effusion or pneumothorax. Osseous structures  are unchanged.      Impression    IMPRESSION:   1. PICC tip terminates at the low SVC. Stable ET tube. ECMO cannula is  not visualized on this exam, retracted versus removed.  2. Otherwise, no significant change since prior exam 06/04/2024.   XR Chest Port 1 View    Impression    RESIDENT PRELIMINARY INTERPRETATION  IMPRESSION:   1. Grossly stable exam without findings to account for patient's   reported change in clinical status.  2. Stable support devices.

## 2024-06-05 NOTE — PROGRESS NOTES
Shift events:  Patient decannulated in OR without complication.   WoundVac placed to right groin ECMO site (bedrest until 0600).   Periwound area soft without evidence of hematoma formation.    After decan, patient able to move all extremities, able to follow commands. Pulses dopplerable.Off pressors.    At 0600, pt suddenly became extremely agitated/combative. Followed by new onset of  tachycardia, hypertension, hypoxia. CXR, ABG, EKG ordered.     Vent setting change: PC-%, PC 32, p5, r12.     Plan:  CT-PE, possible bronchoscopy.

## 2024-06-05 NOTE — PROGRESS NOTES
"CLINICAL NUTRITION SERVICES - BRIEF NOTE      Reason for RD note: Adjusting EN    New Findings/Chart Review:  -Pt decannulated from ECMO yesterday  -Updated protein needs: 145-190 grams protein/day (1.5-2 grams of pro/kg)     Interventions:  -Once TFs restarted, NEW GOAL TF: Pivot 1.5 Carlos (or equivalent) @ goal of 70ml/hr (1680ml/day) provides: 2520 kcals, 157 g PRO, 1260 ml free H20, 289 g CHO, and 12 g fiber daily     --Initiate at 20 ml/hr and advance by 20 ml/hr q8h to goal rate    Nutrition will follow per protocol or sooner if consulted.    Nisreen Funez, MS, RD, LD  Available on [facility] - \"4A Clinical Dietitian\"  Weekend/Holiday RD - \"Weekend Clinical Dietitian\"  "

## 2024-06-05 NOTE — PROGRESS NOTES
Neurocritical Care Consultation    Reason for critical care admission: Cardiac Arrest  Admitting Team: CICU  Date of Service:  06/05/2024  Date of Admission:  6/1/2024  Hospital Day: 5    Assessment/Plan  Cullen Reardon is a 48 year old male with PMH pertinent for prediabetes, htn, VITALY admitted on 6/1/2024 for cardiac arrest. Presented to [facility] for chest pain on 6/1, collapsed at the triage desk. Immediate bystander CPR, cannulated at [facility] with estimated 15 minute downtime. S/p LAD stenting.    24 hour events:  -Following commands  -Continue to titrate down sedation as medically indicated  -Neurocritical care to sign off, thank you for the opportunity to partake in this patient's care    Neuro  #Encephalopathy 2.2 sedation vs hypoxia s/p cardiac arrest   Post arrest day: 4  Length of downtime: 15 min  -Witnessed arrest: Yes  -ROSC: No, rhythm: VF  -Goal normothermia   -Current temp: 99.9     Analgesics & sedation  Prior sedation:   Current sedation: Versed @ 2 mg/hr, fentanyl @ 100 mcg/hr    Exam findings   -Cough: Yes  -Gag: Yes  -Pupils: Briskly reactive bilaterally    Neuroimaging  -Day 1 CTH shows: no acute intracranial pathology    Neurophysiology  -start/continue vEEG with VA ECMO  -vEEG shows: moderate-severe diffuse encephalopathy, excludes non-convulsive status epilepticus    Biomarkers  -Neuron-specific Enolase (NSE) results: 46.3, 47.8, 39.0  -S 100B protein: 328, 110, 57    Recommendations  -Avoid hypotonic solutions as they may worsen cerebral edema   -Avoid nitroprusside or nitroglycerin as they may also worsen cerbral edema  -Advise slow correction of hypernatremia if needed  -When safe to do so, please limit use of CNS acting medications such as benzodiazepines, opiates, anticholinergics, which will permit a more accurate neurological assessment  -[facility] will continue to follow, please call *[phone] with any questions    Clinically Significant Risk Factors         # Hypernatremia: Highest Na = 146  "mmol/L in last 2 days, will monitor as appropriate   # Hypercalcemia: Highest iCa = 5.9 mg/dL in last 2 days, will monitor as appropriate    # Hypoalbuminemia: Lowest albumin = 2.7 g/dL at 6/4/2024 11:26 PM, will monitor as appropriate   # Thrombocytopenia: Lowest platelets = 108 in last 2 days, will monitor for bleeding    # Acute heart failure with reduced ejection fraction: last echo with EF <40% and receiving IV diuretics          #Precipitous drop in Hgb/Hct: Lowest Hgb this hospitalization: 9.6 g/dL. Will continue to monitor and treat/transfuse as appropriate.     # Obesity: Estimated body mass index is 35.99 kg/m  as calculated from the following:    Height as of this encounter: 1.886 m (6' 2.25\").    Weight as of this encounter: 128 kg (282 lb 3 oz)., PRESENT ON ADMISSION     # Financial/Environmental Concerns: none            TIME SPENT ON THIS ENCOUNTER  I spent 46 minutes of critical care time on the unit/floor managing the care of Cullen Reardon. Upon evaluation, this patient had a high probability of imminent or life-threatening deterioration due to cardiac arrest, which required my direct attention, intervention, and personal management . Greater than 50% of my time was spent at the bedside counseling the patient and/or coordinating care including chart review, history, exam, documentation, and further activities per this note. I have personally reviewed the following data/imaging over the past 24 hours.     The patient was seen and discussed with the NCC attending, Dr. Anton.      No Known Allergies    Current Medications:  Current Facility-Administered Medications   Medication Dose Route Frequency Provider Last Rate Last Admin    amiodarone (PACERONE) tablet 400 mg  400 mg Oral BID Stephan Kessler MD   400 mg at 06/05/24 0904    aspirin (ASA) chewable tablet 81 mg  81 mg Per Feeding Tube Daily Stephna Kessler MD   81 mg at 06/05/24 0904    cefTRIAXone (ROCEPHIN) 2 g vial to attach to  ml bag for " ADULTS or NS 50 ml bag for PEDS  2 g Intravenous Q24H Stephan Kessler MD   2 g at 06/04/24 1946    chlorhexidine (PERIDEX) 0.12 % solution 15 mL  15 mL Mouth/Throat Q12H Stephan Kessler MD   15 mL at 06/05/24 0905    heparin ANTICOAGULANT injection 5,000 Units  5,000 Units Subcutaneous Q8H Jonathan Hernandez MD   5,000 Units at 06/05/24 1142    multivitamins w/minerals liquid 15 mL  15 mL Per Feeding Tube Daily Stephan Kessler MD   15 mL at 06/05/24 0905    pantoprazole (PROTONIX) 2 mg/mL suspension 40 mg  40 mg Oral or Feeding Tube QAM AC Stephan Kessler MD   40 mg at 06/05/24 0904    polyethylene glycol (MIRALAX) Packet 17 g  17 g Oral or Feeding Tube BID Stephan Kessler MD   17 g at 06/05/24 0904    senna-docusate (SENOKOT-S/PERICOLACE) 8.6-50 MG per tablet 2 tablet  2 tablet Oral or Feeding Tube BID Stephan Kessler MD   2 tablet at 06/05/24 0904    ticagrelor (BRILINTA) tablet 90 mg  90 mg Oral BID Stephan Kessler MD   90 mg at 06/05/24 0904       PRN Medications:  Current Facility-Administered Medications   Medication Dose Route Frequency Provider Last Rate Last Admin    acetaminophen (TYLENOL) tablet 650 mg  650 mg Oral Q4H PRN Stephan Kessler MD   650 mg at 06/05/24 0904    Or    acetaminophen (TYLENOL) Suppository 650 mg  650 mg Rectal Q4H PRN Stephan Kessler MD        artificial tears ophthalmic ointment   Both Eyes Q8H PRN Stephan Kessler MD        calcium chloride 1 g in sodium chloride 0.9 % 100 mL intermittent infusion  1 g Intravenous Q6H PRN Stephan Kessler MD   1 g at 06/02/24 0555    dextrose 10% infusion   Intravenous Continuous PRN Stephan Kessler MD        dextrose 10% infusion   Intravenous Continuous PRN Stephan Kessler MD        glucose gel 15-30 g  15-30 g Oral Q15 Min PRN Stephan Kessler MD        Or    dextrose 50 % injection 25-50 mL  25-50 mL Intravenous Q15 Min PRN Setphan Kessler MD        Or    glucagon injection 1 mg  1 mg Subcutaneous Q15 Min PRN Stephan Kessler MD        fentaNYL (SUBLIMAZE) 50 mcg/mL bolus from  pump   mcg Intravenous Q1H PRN Stephan Kessler MD   100 mcg at 06/05/24 1202    lidocaine (LMX4) cream   Topical Q1H PRN Stephan Kessler MD        lidocaine 1 % 0.1-1 mL  0.1-1 mL Other Q1H PRN Stephan Kessler MD        melatonin liquid 10 mg  10 mg Oral At Bedtime PRN Stephan Kessler MD        midazolam (VERSED) 1 mg/mL bolus from syringe/bag pump ADULT  1-4 mg Intravenous Q1H PRN Stephan Kessler MD   2 mg at 06/05/24 1201    midazolam (VERSED) injection 1-3 mg  1-3 mg Intravenous Q1H PRN Stephan Kessler MD   3 mg at 06/03/24 1348    naloxone (NARCAN) injection 0.2 mg  0.2 mg Intravenous Q2 Min PRN Stephan Kessler MD        Or    naloxone (NARCAN) injection 0.4 mg  0.4 mg Intravenous Q2 Min PRN Stephan Kessler MD        Or    naloxone (NARCAN) injection 0.2 mg  0.2 mg Intramuscular Q2 Min PRN Stephan Kessler MD        Or    naloxone (NARCAN) injection 0.4 mg  0.4 mg Intramuscular Q2 Min PRN Stephan Kessler MD        polyethylene glycol (MIRALAX) Packet 17 g  17 g Oral Daily PRN Stephan Kessler MD        senna-docusate (SENOKOT-S/PERICOLACE) 8.6-50 MG per tablet 1 tablet  1 tablet Oral BID PRN Stephan Kessler MD        sodium chloride (PF) 0.9% PF flush 10-40 mL  10-40 mL Intracatheter Once PRN Stephan Kessler MD        sodium chloride (PF) 0.9% PF flush 3 mL  3 mL Intracatheter q1 min prn Stephan Kessler MD           Infusions:  Current Facility-Administered Medications   Medication Dose Route Frequency Provider Last Rate Last Admin    dextrose 10% infusion   Intravenous Continuous PRN Stephan Kessler MD        dextrose 10% infusion   Intravenous Continuous PRN Stephan Kessler MD        fentaNYL (SUBLIMAZE) infusion   mcg/hr Intravenous Continuous Stephan Kessler MD 3 mL/hr at 06/05/24 1300 150 mcg/hr at 06/05/24 1300    insulin regular (MYXREDLIN) 1 unit/mL infusion  0-24 Units/hr Intravenous Continuous Stephan Kessler MD   Stopped at 06/04/24 1228    midazolam (VERSED) 100 mg/100 mL NS infusion - ADULT  1-8 mg/hr Intravenous Continuous  "Stephan Kessler MD 2 mL/hr at 06/05/24 1300 2 mg/hr at 06/05/24 1300    norepinephrine (LEVOPHED) 16 mg in  mL infusion MAX CONC CENTRAL LINE  0.01-0.6 mcg/kg/min (Dosing Weight) Intravenous Continuous Stephan Kessler MD 4.7 mL/hr at 06/05/24 1300 0.05 mcg/kg/min at 06/05/24 1300    vasopressin 1 unit/mL MAX Conc (PITRESSIN) infusion  0.5-4 Units/hr Intravenous Continuous Stephan Kessler MD   Held at 06/01/24 2058       Physical Examination:  Vitals: Pulse 112   Temp 99.9  F (37.7  C)   Resp 12   Ht 1.886 m (6' 2.25\")   Wt 128 kg (282 lb 3 oz)   SpO2 92%   BMI 35.99 kg/m    General: Adult male patient, lying in bed, critically-ill  HEENT: Normocephalic, atraumatic, no icterus, oral cavity/oropharynx pink and moist  Cardiac: RRR  Pulm: Unlabored, expansion symmetric, no retractions or use of accessory muscles  Abdomen: Non-distended  Extremities: Warm, well perfused  Skin: No rash or lesion  Psych: Calm and cooperative  Neuro:  Mental status: Awake, follows commands; limited by sedation on during exam  Cranial nerves: PERRL  Motor: Moves extremities antigravity x4  Sensory: SHALONDA  Coordination: SHALONDA  Gait: SHALONDA    All relevant imaging and laboratory values personally reviewed.    "

## 2024-06-05 NOTE — ANESTHESIA CARE TRANSFER NOTE
Patient: Cullen Reardon    Procedure: Procedure(s):  REMOVAL, CANNULA, ADULT, FOR ECMO right femoral artery and right femoral vein, primary repair of right femoral artery.       Diagnosis: ST elevation myocardial infarction (STEMI), unspecified artery (H) [I21.3]  Diagnosis Additional Information: No value filed.    Anesthesia Type:   General     Note:    Oropharynx: endotracheal tube in place, oral gastic tube in place and ventilatory support  Level of Consciousness: iatrogenic sedation    Level of Supplemental Oxygen (L/min / FiO2): 60  Independent Airway: airway patency not satisfactory and stable  Dentition: dentition unchanged  Vital Signs Stable: post-procedure vital signs reviewed and stable  Report to RN Given: handoff report given  Patient transferred to: ICU    ICU Handoff: Call for PAUSE to initiate/utilize ICU HANDOFF, Identified Patient, Identified Responsible Provider, Reviewed the Pertinent Medical History, Discussed Surgical Course, Reviewed Intra-OP Anesthesia Management and Issues during Anesthesia, Set Expectations for Post Procedure Period and Allowed Opportunity for Questions and Acknowledgement of Understanding      Vitals:  Vitals Value Taken Time   /62    Temp 36.4    Pulse 96 06/04/24 2329   Resp 14    SpO2 100 % 06/04/24 2329   Vitals shown include unfiled device data.    Electronically Signed By: VJ Butt CRNA  June 4, 2024  11:30 PM

## 2024-06-05 NOTE — ANESTHESIA PREPROCEDURE EVALUATION
Anesthesia Pre-Procedure Evaluation    Patient: Cullen Reardon   MRN: [id number] : 1975        Procedure : Procedure(s):  REMOVAL, CANNULA, ADULT, FOR ECMO AND ANY ASSOCIATED PROCEDURES          No past medical history on file.   Past Surgical History:   Procedure Laterality Date    PICC INSERTION - TRIPLE LUMEN Left 2024    left basilic 5 fr tl power picc 50 cm      No Known Allergies   Social History     Tobacco Use    Smoking status: Not on file    Smokeless tobacco: Not on file   Substance Use Topics    Alcohol use: Not on file      Wt Readings from Last 1 Encounters:   24 127 kg (279 lb 15.8 oz)        Anesthesia Evaluation   Pt has had prior anesthetic. Type: General.        ROS/MED HX  ENT/Pulmonary:     (+) sleep apnea,                                       Neurologic: Comment: Pituitary gland       Cardiovascular: Comment: V-A fem-fem ECMO s/p arrest    (+)  hypertension- -  CAD - past MI - stent-                                      METS/Exercise Tolerance:     Hematologic:     (+)      anemia,          Musculoskeletal:       GI/Hepatic:     (+) GERD,                   Renal/Genitourinary:  - neg Renal ROS     Endo: Comment: Pituitary problem    (+)               Obesity,       Psychiatric/Substance Use:       Infectious Disease:  - neg infectious disease ROS     Malignancy:  - neg malignancy ROS     Other:            Physical Exam    Airway   unable to assess          Respiratory Devices and Support    ETT:      Dental    unable to assess        Cardiovascular       Comment: V-A ECMO   Rhythm and rate: regular and normal     Pulmonary   pulmonary exam normal                OUTSIDE LABS:  CBC:   Lab Results   Component Value Date    WBC 8.6 2024    WBC 8.6 2024    HGB 10.6 (L) 2024    HGB 10.1 (L) 2024    HCT 30.6 (L) 2024    HCT 30.1 (L) 2024     (L) 2024     (L) 2024     BMP:   Lab Results   Component Value Date      "(H) 06/04/2024     06/04/2024    POTASSIUM 3.7 06/04/2024    POTASSIUM 3.8 06/04/2024    CHLORIDE 108 (H) 06/04/2024    CHLORIDE 109 (H) 06/04/2024    CO2 28 06/04/2024    CO2 28 06/04/2024    BUN 35.4 (H) 06/04/2024    BUN 34.6 (H) 06/04/2024    CR 0.98 06/04/2024    CR 1.03 06/04/2024     (H) 06/04/2024     (H) 06/04/2024     COAGS:   Lab Results   Component Value Date    PTT 93 (H) 06/04/2024    INR 1.18 (H) 06/04/2024    FIBR 600 (H) 06/04/2024     POC: No results found for: \"BGM\", \"HCG\", \"HCGS\"  HEPATIC:   Lab Results   Component Value Date    ALBUMIN 3.0 (L) 06/04/2024    PROTTOTAL 5.3 (L) 06/04/2024     (H) 06/04/2024     (H) 06/04/2024    ALKPHOS 41 06/04/2024    BILITOTAL 0.5 06/04/2024     OTHER:   Lab Results   Component Value Date    PH 7.47 (H) 06/04/2024    LACT 0.8 06/04/2024    A1C 6.1 (H) 06/01/2024    RANDA 8.2 (L) 06/04/2024    PHOS 1.9 (L) 06/04/2024    MAG 2.1 06/04/2024    TSH 0.32 06/04/2024    T4 0.90 06/04/2024    T3 53 (L) 06/04/2024    SED 41 (H) 06/04/2024       Anesthesia Plan    ASA Status:  4    NPO Status:  NPO Appropriate    Anesthesia Type: General.         Techniques and Equipment:     - Lines/Monitors: CVL in situ, Arterial Line     Consents    Anesthesia Plan(s) and associated risks, benefits, and realistic alternatives discussed. Questions answered and patient/representative(s) expressed understanding.     - Discussed: Risks, Benefits and Alternatives for BOTH SEDATION and the PROCEDURE were discussed     - Discussed with:  Spouse      - Extended Intubation/Ventilatory Support Discussed: Yes.      Use of blood products discussed: Yes.     - Discussed with: Legal guardian.     - Consented: consented to blood products     Postoperative Care    Pain management: IV analgesics.   PONV prophylaxis: Ondansetron (or other 5HT-3)     Comments:    Other Comments: Discussed with wife risks and benefits of ECMO decannulation.           Vince Hilliard, " MD LUNA have reviewed the pertinent notes and labs in the chart from the past 30 days and (re)examined the patient.  Any updates or changes from those notes are reflected in this note.

## 2024-06-05 NOTE — PROGRESS NOTES
[facility]    ECLS Discontinuation Note:     ECLS was discontinued 6/4/2024 at 2157.    Liza Causey, RT  ECMO Specialist  6/4/2024 11:43 PM

## 2024-06-05 NOTE — ANESTHESIA PROCEDURE NOTES
Perioperative ALEXANDER Procedure Note    Staff -        Anesthesiologist:  Vince Hilliard MD       Performed By: anesthesiologist      ALEXANDER Probe Insertion    Probe Status PRE Insertion: NO obvious damage  Probe type:  Adult 3D  Bite block used:   Soft  Insertion Technique: Easy, no oropharyngeal manipulation  Insertion complications: None obvious  Billing Report:ALEXANDER report by Anesthesiologist (See Separate Report note)  Probe Status POST Removal: NO obvious damage    ALEXANDER Report  General Procedure Information  Images for this study have been archived.    Post Intervention Findings  Procedure(s) performed:  ECMO Decannulation.  Regional wall motion:. Surgeon(s) notified of all postintervention findings: Yes.             Post Intervention comments: Pre: On 2 LPM, normal BiV Fx. Trace TR/MR/AI. No aortic dissection.  Post decannulation. Originally, borderline decreased LV function. Now, normal BiV Fx. Trace/mild TR. Trace MR/AI. No aortic dissection. No inotropes or pressors..    Echocardiogram Comments

## 2024-06-05 NOTE — BRIEF OP NOTE
[facility]    Brief Operative Note    Pre-operative diagnosis: ST elevation myocardial infarction (STEMI), unspecified artery (H) [I21.3]  Post-operative diagnosis Same as pre-operative diagnosis    Procedure: REMOVAL, CANNULA, ADULT, FOR ECMO right femoral artery and right femoral vein, primary repair of right femoral artery., N/A - Chest    Surgeon: Surgeons and Role:     * Demond Ladd MD - Primary     * Stephan Kessler MD - Resident - Assisting     * Mario Dodson MD - Resident - Assisting  Anesthesia: General   Estimated Blood Loss: Less than 100 ml    Drains: Wound vac  Specimens: * No specimens in log *  Findings:   Primary repair of femoral artery, skin stapled with incisional wound vac placement .  Complications: None.  Implants:   Implant Name Type Inv. Item Serial No.  Lot No. LRB No. Used Action   GRAFT PATCH VASC XENOSURE BIOLOGIC 0.8X08CM E0.8P8 - SN/A  GRAFT PATCH VASC XENOSURE BIOLOGIC 0.8X08CM E0.8P8 N/A [facility] VASCULAR IN [id number] Right 1 Wasted       Signed:    Stephan Kessler MD 6/4/2024 at 11:05 PM  Cardiothoracic Surgery Fellow  Pager: [phone]

## 2024-06-06 ENCOUNTER — APPOINTMENT (OUTPATIENT)
Dept: GENERAL RADIOLOGY | Facility: CLINIC | Age: 49
DRG: 003 | End: 2024-06-06
Attending: SURGERY
Payer: COMMERCIAL

## 2024-06-06 LAB
ACT BLD: 153 SECONDS (ref 74–150)
ACT BLD: 199 SECONDS (ref 74–150)
ACT BLD: 224 SECONDS (ref 74–150)
ACT BLD: 236 SECONDS (ref 74–150)
ACT BLD: 241 SECONDS (ref 74–150)
ACT BLD: 253 SECONDS (ref 74–150)
ALBUMIN SERPL BCG-MCNC: 2.9 G/DL (ref 3.5–5.2)
ALBUMIN SERPL BCG-MCNC: 3.1 G/DL (ref 3.5–5.2)
ALLEN'S TEST: ABNORMAL
ALP SERPL-CCNC: 67 U/L (ref 40–150)
ALP SERPL-CCNC: 77 U/L (ref 40–150)
ALT SERPL W P-5'-P-CCNC: 72 U/L (ref 0–70)
ALT SERPL W P-5'-P-CCNC: 86 U/L (ref 0–70)
ANION GAP SERPL CALCULATED.3IONS-SCNC: 11 MMOL/L (ref 7–15)
ANION GAP SERPL CALCULATED.3IONS-SCNC: 8 MMOL/L (ref 7–15)
ANION GAP SERPL CALCULATED.3IONS-SCNC: 8 MMOL/L (ref 7–15)
AST SERPL W P-5'-P-CCNC: 104 U/L (ref 0–45)
AST SERPL W P-5'-P-CCNC: 83 U/L (ref 0–45)
BACTERIA SPT CULT: NORMAL
BASE EXCESS BLDA CALC-SCNC: 10.3 MMOL/L (ref -3–3)
BASE EXCESS BLDA CALC-SCNC: 7.4 MMOL/L (ref -3–3)
BASE EXCESS BLDA CALC-SCNC: 8.6 MMOL/L (ref -3–3)
BASE EXCESS BLDA CALC-SCNC: 8.7 MMOL/L (ref -3–3)
BASE EXCESS BLDA CALC-SCNC: 8.8 MMOL/L (ref -3–3)
BASE EXCESS BLDA CALC-SCNC: 9.6 MMOL/L (ref -3–3)
BILIRUB SERPL-MCNC: 0.7 MG/DL
BILIRUB SERPL-MCNC: 0.7 MG/DL
BUN SERPL-MCNC: 34.6 MG/DL (ref 6–20)
BUN SERPL-MCNC: 36.9 MG/DL (ref 6–20)
BUN SERPL-MCNC: 37.6 MG/DL (ref 6–20)
CA-I BLD-MCNC: 4.4 MG/DL (ref 4.4–5.2)
CA-I BLD-MCNC: 4.6 MG/DL (ref 4.4–5.2)
CALCIUM SERPL-MCNC: 8.1 MG/DL (ref 8.6–10)
CALCIUM SERPL-MCNC: 8.1 MG/DL (ref 8.6–10)
CALCIUM SERPL-MCNC: 8.5 MG/DL (ref 8.6–10)
CHLORIDE SERPL-SCNC: 108 MMOL/L (ref 98–107)
CHLORIDE SERPL-SCNC: 108 MMOL/L (ref 98–107)
CHLORIDE SERPL-SCNC: 111 MMOL/L (ref 98–107)
COHGB MFR BLD: 97.5 % (ref 96–97)
COHGB MFR BLD: 98.4 % (ref 96–97)
COHGB MFR BLD: 98.8 % (ref 96–97)
COHGB MFR BLD: 99.2 % (ref 96–97)
CREAT SERPL-MCNC: 1.07 MG/DL (ref 0.67–1.17)
CREAT SERPL-MCNC: 1.13 MG/DL (ref 0.67–1.17)
CREAT SERPL-MCNC: 1.22 MG/DL (ref 0.67–1.17)
DEPRECATED HCO3 PLAS-SCNC: 27 MMOL/L (ref 22–29)
DEPRECATED HCO3 PLAS-SCNC: 29 MMOL/L (ref 22–29)
DEPRECATED HCO3 PLAS-SCNC: 30 MMOL/L (ref 22–29)
EGFRCR SERPLBLD CKD-EPI 2021: 73 ML/MIN/1.73M2
EGFRCR SERPLBLD CKD-EPI 2021: 80 ML/MIN/1.73M2
EGFRCR SERPLBLD CKD-EPI 2021: 85 ML/MIN/1.73M2
ENTEROCOCCUS FAECALIS: NOT DETECTED
ENTEROCOCCUS FAECIUM: NOT DETECTED
ERYTHROCYTE [DISTWIDTH] IN BLOOD BY AUTOMATED COUNT: 13.6 % (ref 10–15)
ERYTHROCYTE [DISTWIDTH] IN BLOOD BY AUTOMATED COUNT: 13.7 % (ref 10–15)
ERYTHROCYTE [DISTWIDTH] IN BLOOD BY AUTOMATED COUNT: 13.9 % (ref 10–15)
GLUCOSE BLD-MCNC: 145 MG/DL (ref 70–99)
GLUCOSE BLD-MCNC: 153 MG/DL (ref 70–99)
GLUCOSE BLDC GLUCOMTR-MCNC: 137 MG/DL (ref 70–99)
GLUCOSE BLDC GLUCOMTR-MCNC: 138 MG/DL (ref 70–99)
GLUCOSE BLDC GLUCOMTR-MCNC: 147 MG/DL (ref 70–99)
GLUCOSE BLDC GLUCOMTR-MCNC: 147 MG/DL (ref 70–99)
GLUCOSE BLDC GLUCOMTR-MCNC: 148 MG/DL (ref 70–99)
GLUCOSE BLDC GLUCOMTR-MCNC: 155 MG/DL (ref 70–99)
GLUCOSE BLDC GLUCOMTR-MCNC: 156 MG/DL (ref 70–99)
GLUCOSE BLDC GLUCOMTR-MCNC: 176 MG/DL (ref 70–99)
GLUCOSE BLDC GLUCOMTR-MCNC: 186 MG/DL (ref 70–99)
GLUCOSE SERPL-MCNC: 148 MG/DL (ref 70–99)
GLUCOSE SERPL-MCNC: 169 MG/DL (ref 70–99)
GLUCOSE SERPL-MCNC: 201 MG/DL (ref 70–99)
GRAM STAIN RESULT: NORMAL
GRAM STAIN RESULT: NORMAL
HCO3 BLD-SCNC: 30 MMOL/L (ref 21–28)
HCO3 BLD-SCNC: 33 MMOL/L (ref 21–28)
HCO3 BLD-SCNC: 33 MMOL/L (ref 21–28)
HCO3 BLD-SCNC: 34 MMOL/L (ref 21–28)
HCO3 BLDA-SCNC: 33 MMOL/L (ref 21–28)
HCO3 BLDA-SCNC: 33 MMOL/L (ref 21–28)
HCT VFR BLD AUTO: 27.2 % (ref 40–53)
HCT VFR BLD AUTO: 27.8 % (ref 40–53)
HCT VFR BLD AUTO: 29.9 % (ref 40–53)
HGB BLD-MCNC: 10.1 G/DL (ref 13.3–17.7)
HGB BLD-MCNC: 9.2 G/DL (ref 13.3–17.7)
HGB BLD-MCNC: 9.3 G/DL (ref 13.3–17.7)
HGB BLD-MCNC: 9.5 G/DL (ref 13.3–17.7)
HGB BLD-MCNC: 9.6 G/DL (ref 13.3–17.7)
INR PPP: 1.16 (ref 0.85–1.15)
LACTATE BLD-SCNC: 1 MMOL/L (ref 0.7–2)
LACTATE BLD-SCNC: 1.1 MMOL/L (ref 0.7–2)
LACTATE SERPL-SCNC: 1 MMOL/L (ref 0.7–2)
LACTATE SERPL-SCNC: 1.2 MMOL/L (ref 0.7–2)
LACTATE SERPL-SCNC: 1.3 MMOL/L (ref 0.7–2)
LACTATE SERPL-SCNC: 2.1 MMOL/L (ref 0.7–2)
LISTERIA SPECIES (DETECTED/NOT DETECTED): NOT DETECTED
MAGNESIUM SERPL-MCNC: 2.2 MG/DL (ref 1.7–2.3)
MAGNESIUM SERPL-MCNC: 2.8 MG/DL (ref 1.7–2.3)
MCH RBC QN AUTO: 31.2 PG (ref 26.5–33)
MCH RBC QN AUTO: 31.3 PG (ref 26.5–33)
MCH RBC QN AUTO: 31.5 PG (ref 26.5–33)
MCHC RBC AUTO-ENTMCNC: 33.8 G/DL (ref 31.5–36.5)
MCHC RBC AUTO-ENTMCNC: 34.2 G/DL (ref 31.5–36.5)
MCHC RBC AUTO-ENTMCNC: 34.2 G/DL (ref 31.5–36.5)
MCV RBC AUTO: 91 FL (ref 78–100)
MCV RBC AUTO: 91 FL (ref 78–100)
MCV RBC AUTO: 93 FL (ref 78–100)
O2/TOTAL GAS SETTING VFR VENT: 100 %
O2/TOTAL GAS SETTING VFR VENT: 60 %
O2/TOTAL GAS SETTING VFR VENT: 70 %
O2/TOTAL GAS SETTING VFR VENT: 70 %
O2/TOTAL GAS SETTING VFR VENT: 75 %
O2/TOTAL GAS SETTING VFR VENT: 80 %
OXYHGB MFR BLDA: 91 % (ref 92–100)
OXYHGB MFR BLDA: 96 % (ref 92–100)
PCO2 BLD: 33 MM HG (ref 35–45)
PCO2 BLD: 38 MM HG (ref 35–45)
PCO2 BLD: 40 MM HG (ref 35–45)
PCO2 BLD: 47 MM HG (ref 35–45)
PCO2 BLDA: 42 MM HG (ref 35–45)
PCO2 BLDA: 44 MM HG (ref 35–45)
PEEP: 12 CM H2O
PEEP: 14 CM H2O
PEEP: 7 CM H2O
PEEP: 7 CM H2O
PH BLD: 7.46 [PH] (ref 7.35–7.45)
PH BLD: 7.53 [PH] (ref 7.35–7.45)
PH BLD: 7.56 [PH] (ref 7.35–7.45)
PH BLD: 7.56 [PH] (ref 7.35–7.45)
PH BLDA: 7.49 [PH] (ref 7.35–7.45)
PH BLDA: 7.5 [PH] (ref 7.35–7.45)
PHOSPHATE SERPL-MCNC: 1.2 MG/DL (ref 2.5–4.5)
PHOSPHATE SERPL-MCNC: 2 MG/DL (ref 2.5–4.5)
PLATELET # BLD AUTO: 150 10E3/UL (ref 150–450)
PLATELET # BLD AUTO: 160 10E3/UL (ref 150–450)
PLATELET # BLD AUTO: 169 10E3/UL (ref 150–450)
PO2 BLD: 101 MM HG (ref 80–105)
PO2 BLD: 72 MM HG (ref 80–105)
PO2 BLD: 79 MM HG (ref 80–105)
PO2 BLD: 86 MM HG (ref 80–105)
PO2 BLDA: 55 MM HG (ref 80–105)
PO2 BLDA: 79 MM HG (ref 80–105)
POTASSIUM BLD-SCNC: 2.8 MMOL/L (ref 3.4–5.3)
POTASSIUM BLD-SCNC: 2.8 MMOL/L (ref 3.4–5.3)
POTASSIUM SERPL-SCNC: 2.8 MMOL/L (ref 3.4–5.3)
POTASSIUM SERPL-SCNC: 3.3 MMOL/L (ref 3.4–5.3)
POTASSIUM SERPL-SCNC: 3.3 MMOL/L (ref 3.4–5.3)
POTASSIUM SERPL-SCNC: 3.5 MMOL/L (ref 3.4–5.3)
PROT SERPL-MCNC: 5.4 G/DL (ref 6.4–8.3)
PROT SERPL-MCNC: 5.8 G/DL (ref 6.4–8.3)
RBC # BLD AUTO: 2.98 10E6/UL (ref 4.4–5.9)
RBC # BLD AUTO: 3.04 10E6/UL (ref 4.4–5.9)
RBC # BLD AUTO: 3.21 10E6/UL (ref 4.4–5.9)
SAO2 % BLDA: 96 % (ref 92–100)
SAO2 % BLDA: 97 % (ref 92–100)
SODIUM BLD-SCNC: 149 MMOL/L (ref 135–145)
SODIUM BLD-SCNC: 149 MMOL/L (ref 135–145)
SODIUM SERPL-SCNC: 145 MMOL/L (ref 135–145)
SODIUM SERPL-SCNC: 146 MMOL/L (ref 135–145)
SODIUM SERPL-SCNC: 149 MMOL/L (ref 135–145)
STAPHYLOCOCCUS AUREUS: NOT DETECTED
STAPHYLOCOCCUS EPIDERMIDIS: NOT DETECTED
STAPHYLOCOCCUS LUGDUNENSIS: NOT DETECTED
STAPHYLOCOCCUS SPECIES: NOT DETECTED
STREPTOCOCCUS AGALACTIAE: NOT DETECTED
STREPTOCOCCUS ANGINOSUS GROUP: NOT DETECTED
STREPTOCOCCUS PNEUMONIAE: NOT DETECTED
STREPTOCOCCUS PYOGENES: NOT DETECTED
STREPTOCOCCUS SPECIES: NOT DETECTED
WBC # BLD AUTO: 10 10E3/UL (ref 4–11)
WBC # BLD AUTO: 7.8 10E3/UL (ref 4–11)
WBC # BLD AUTO: 8.8 10E3/UL (ref 4–11)

## 2024-06-06 PROCEDURE — 80053 COMPREHEN METABOLIC PANEL: CPT | Performed by: STUDENT IN AN ORGANIZED HEALTH CARE EDUCATION/TRAINING PROGRAM

## 2024-06-06 PROCEDURE — 250N000011 HC RX IP 250 OP 636: Performed by: INTERNAL MEDICINE

## 2024-06-06 PROCEDURE — 84295 ASSAY OF SERUM SODIUM: CPT | Performed by: INTERNAL MEDICINE

## 2024-06-06 PROCEDURE — 84100 ASSAY OF PHOSPHORUS: CPT | Performed by: INTERNAL MEDICINE

## 2024-06-06 PROCEDURE — 84155 ASSAY OF PROTEIN SERUM: CPT | Performed by: STUDENT IN AN ORGANIZED HEALTH CARE EDUCATION/TRAINING PROGRAM

## 2024-06-06 PROCEDURE — 82805 BLOOD GASES W/O2 SATURATION: CPT | Performed by: STUDENT IN AN ORGANIZED HEALTH CARE EDUCATION/TRAINING PROGRAM

## 2024-06-06 PROCEDURE — 99152 MOD SED SAME PHYS/QHP 5/>YRS: CPT | Performed by: INTERNAL MEDICINE

## 2024-06-06 PROCEDURE — 272N000272 HC CONTINUOUS NEBULIZER MICRO PUMP

## 2024-06-06 PROCEDURE — 258N000003 HC RX IP 258 OP 636: Mod: JZ | Performed by: STUDENT IN AN ORGANIZED HEALTH CARE EDUCATION/TRAINING PROGRAM

## 2024-06-06 PROCEDURE — C1894 INTRO/SHEATH, NON-LASER: HCPCS | Performed by: INTERNAL MEDICINE

## 2024-06-06 PROCEDURE — 83735 ASSAY OF MAGNESIUM: CPT | Performed by: SURGERY

## 2024-06-06 PROCEDURE — 93010 ELECTROCARDIOGRAM REPORT: CPT | Mod: 76 | Performed by: INTERNAL MEDICINE

## 2024-06-06 PROCEDURE — 83605 ASSAY OF LACTIC ACID: CPT | Performed by: SURGERY

## 2024-06-06 PROCEDURE — 93005 ELECTROCARDIOGRAM TRACING: CPT

## 2024-06-06 PROCEDURE — 200N000002 HC R&B ICU UMMC

## 2024-06-06 PROCEDURE — B2111ZZ FLUOROSCOPY OF MULTIPLE CORONARY ARTERIES USING LOW OSMOLAR CONTRAST: ICD-10-PCS | Performed by: INTERNAL MEDICINE

## 2024-06-06 PROCEDURE — 250N000011 HC RX IP 250 OP 636: Mod: JZ | Performed by: INTERNAL MEDICINE

## 2024-06-06 PROCEDURE — 94640 AIRWAY INHALATION TREATMENT: CPT | Mod: 76

## 2024-06-06 PROCEDURE — 71045 X-RAY EXAM CHEST 1 VIEW: CPT

## 2024-06-06 PROCEDURE — 82805 BLOOD GASES W/O2 SATURATION: CPT | Performed by: SURGERY

## 2024-06-06 PROCEDURE — 250N000009 HC RX 250: Performed by: INTERNAL MEDICINE

## 2024-06-06 PROCEDURE — 84295 ASSAY OF SERUM SODIUM: CPT

## 2024-06-06 PROCEDURE — 250N000011 HC RX IP 250 OP 636: Performed by: STUDENT IN AN ORGANIZED HEALTH CARE EDUCATION/TRAINING PROGRAM

## 2024-06-06 PROCEDURE — 82330 ASSAY OF CALCIUM: CPT | Performed by: SURGERY

## 2024-06-06 PROCEDURE — 85347 COAGULATION TIME ACTIVATED: CPT

## 2024-06-06 PROCEDURE — 83735 ASSAY OF MAGNESIUM: CPT | Performed by: INTERNAL MEDICINE

## 2024-06-06 PROCEDURE — 93799 UNLISTED CV SVC/PROCEDURE: CPT | Performed by: INTERNAL MEDICINE

## 2024-06-06 PROCEDURE — 250N000013 HC RX MED GY IP 250 OP 250 PS 637: Performed by: SURGERY

## 2024-06-06 PROCEDURE — 82330 ASSAY OF CALCIUM: CPT | Performed by: INTERNAL MEDICINE

## 2024-06-06 PROCEDURE — 999N000185 HC STATISTIC TRANSPORT TIME EA 15 MIN

## 2024-06-06 PROCEDURE — 85027 COMPLETE CBC AUTOMATED: CPT | Performed by: STUDENT IN AN ORGANIZED HEALTH CARE EDUCATION/TRAINING PROGRAM

## 2024-06-06 PROCEDURE — 250N000013 HC RX MED GY IP 250 OP 250 PS 637: Performed by: INTERNAL MEDICINE

## 2024-06-06 PROCEDURE — 82374 ASSAY BLOOD CARBON DIOXIDE: CPT | Performed by: STUDENT IN AN ORGANIZED HEALTH CARE EDUCATION/TRAINING PROGRAM

## 2024-06-06 PROCEDURE — 250N000009 HC RX 250: Performed by: NURSE PRACTITIONER

## 2024-06-06 PROCEDURE — 258N000003 HC RX IP 258 OP 636: Performed by: INTERNAL MEDICINE

## 2024-06-06 PROCEDURE — 999N000157 HC STATISTIC RCP TIME EA 10 MIN

## 2024-06-06 PROCEDURE — C1769 GUIDE WIRE: HCPCS | Performed by: INTERNAL MEDICINE

## 2024-06-06 PROCEDURE — 93571 IV DOP VEL&/PRESS C FLO 1ST: CPT | Mod: 26 | Performed by: INTERNAL MEDICINE

## 2024-06-06 PROCEDURE — 999N000287 HC ICU ADULT ROUNDING, EACH 10 MINS

## 2024-06-06 PROCEDURE — 85027 COMPLETE CBC AUTOMATED: CPT | Performed by: INTERNAL MEDICINE

## 2024-06-06 PROCEDURE — 85610 PROTHROMBIN TIME: CPT | Performed by: SURGERY

## 2024-06-06 PROCEDURE — 250N000011 HC RX IP 250 OP 636: Performed by: SURGERY

## 2024-06-06 PROCEDURE — 99153 MOD SED SAME PHYS/QHP EA: CPT | Performed by: INTERNAL MEDICINE

## 2024-06-06 PROCEDURE — 71045 X-RAY EXAM CHEST 1 VIEW: CPT | Mod: 26 | Performed by: STUDENT IN AN ORGANIZED HEALTH CARE EDUCATION/TRAINING PROGRAM

## 2024-06-06 PROCEDURE — C9600 PERC DRUG-EL COR STENT SING: HCPCS | Performed by: INTERNAL MEDICINE

## 2024-06-06 PROCEDURE — 94003 VENT MGMT INPAT SUBQ DAY: CPT

## 2024-06-06 PROCEDURE — 250N000011 HC RX IP 250 OP 636: Performed by: NURSE PRACTITIONER

## 2024-06-06 PROCEDURE — 84100 ASSAY OF PHOSPHORUS: CPT | Performed by: SURGERY

## 2024-06-06 PROCEDURE — 94640 AIRWAY INHALATION TREATMENT: CPT

## 2024-06-06 PROCEDURE — 92928 PRQ TCAT PLMT NTRAC ST 1 LES: CPT | Mod: LD | Performed by: INTERNAL MEDICINE

## 2024-06-06 PROCEDURE — 250N000013 HC RX MED GY IP 250 OP 250 PS 637: Performed by: NURSE PRACTITIONER

## 2024-06-06 PROCEDURE — 027034Z DILATION OF CORONARY ARTERY, ONE ARTERY WITH DRUG-ELUTING INTRALUMINAL DEVICE, PERCUTANEOUS APPROACH: ICD-10-PCS | Performed by: INTERNAL MEDICINE

## 2024-06-06 PROCEDURE — 85018 HEMOGLOBIN: CPT | Performed by: STUDENT IN AN ORGANIZED HEALTH CARE EDUCATION/TRAINING PROGRAM

## 2024-06-06 PROCEDURE — C1874 STENT, COATED/COV W/DEL SYS: HCPCS | Performed by: INTERNAL MEDICINE

## 2024-06-06 PROCEDURE — 272N000001 HC OR GENERAL SUPPLY STERILE: Performed by: INTERNAL MEDICINE

## 2024-06-06 PROCEDURE — C1725 CATH, TRANSLUMIN NON-LASER: HCPCS | Performed by: INTERNAL MEDICINE

## 2024-06-06 PROCEDURE — 99291 CRITICAL CARE FIRST HOUR: CPT | Mod: GC | Performed by: INTERNAL MEDICINE

## 2024-06-06 PROCEDURE — C1887 CATHETER, GUIDING: HCPCS | Performed by: INTERNAL MEDICINE

## 2024-06-06 DEVICE — STENT CORONARY DES SYNERGY XD MR US 3.00X20MM H7493941820300: Type: IMPLANTABLE DEVICE | Status: FUNCTIONAL

## 2024-06-06 RX ORDER — FLUMAZENIL 0.1 MG/ML
0.2 INJECTION, SOLUTION INTRAVENOUS
Status: DISCONTINUED | OUTPATIENT
Start: 2024-06-06 | End: 2024-06-07

## 2024-06-06 RX ORDER — FUROSEMIDE 10 MG/ML
40 INJECTION INTRAMUSCULAR; INTRAVENOUS 2 TIMES DAILY
Status: DISCONTINUED | OUTPATIENT
Start: 2024-06-06 | End: 2024-06-06

## 2024-06-06 RX ORDER — ASPIRIN 81 MG/1
81 TABLET, CHEWABLE ORAL ONCE
Status: COMPLETED | OUTPATIENT
Start: 2024-06-06 | End: 2024-06-06

## 2024-06-06 RX ORDER — HYDRALAZINE HYDROCHLORIDE 20 MG/ML
10 INJECTION INTRAMUSCULAR; INTRAVENOUS EVERY 4 HOURS PRN
Status: DISCONTINUED | OUTPATIENT
Start: 2024-06-06 | End: 2024-06-10

## 2024-06-06 RX ORDER — ASPIRIN 81 MG/1
81 TABLET, CHEWABLE ORAL DAILY
Status: DISCONTINUED | OUTPATIENT
Start: 2024-06-07 | End: 2024-06-15 | Stop reason: HOSPADM

## 2024-06-06 RX ORDER — HEPARIN SODIUM 1000 [USP'U]/ML
INJECTION, SOLUTION INTRAVENOUS; SUBCUTANEOUS
Status: DISCONTINUED | OUTPATIENT
Start: 2024-06-06 | End: 2024-06-06 | Stop reason: HOSPADM

## 2024-06-06 RX ORDER — METOPROLOL TARTRATE 1 MG/ML
5 INJECTION, SOLUTION INTRAVENOUS
Status: DISCONTINUED | OUTPATIENT
Start: 2024-06-06 | End: 2024-06-11

## 2024-06-06 RX ORDER — PROPOFOL 10 MG/ML
5-75 INJECTION, EMULSION INTRAVENOUS CONTINUOUS
Status: DISCONTINUED | OUTPATIENT
Start: 2024-06-06 | End: 2024-06-10

## 2024-06-06 RX ORDER — CALCIUM GLUCONATE 20 MG/ML
1 INJECTION, SOLUTION INTRAVENOUS EVERY 6 HOURS PRN
Status: DISCONTINUED | OUTPATIENT
Start: 2024-06-06 | End: 2024-06-15 | Stop reason: HOSPADM

## 2024-06-06 RX ORDER — NALOXONE HYDROCHLORIDE 0.4 MG/ML
0.2 INJECTION, SOLUTION INTRAMUSCULAR; INTRAVENOUS; SUBCUTANEOUS
Status: DISCONTINUED | OUTPATIENT
Start: 2024-06-06 | End: 2024-06-06

## 2024-06-06 RX ORDER — POTASSIUM PHOS IN 0.9 % NACL 15MMOL/250
15 PLASTIC BAG, INJECTION (ML) INTRAVENOUS ONCE
Status: COMPLETED | OUTPATIENT
Start: 2024-06-06 | End: 2024-06-06

## 2024-06-06 RX ORDER — ATROPINE SULFATE 0.1 MG/ML
0.5 INJECTION INTRAVENOUS
Status: DISCONTINUED | OUTPATIENT
Start: 2024-06-06 | End: 2024-06-06

## 2024-06-06 RX ORDER — NALOXONE HYDROCHLORIDE 0.4 MG/ML
0.4 INJECTION, SOLUTION INTRAMUSCULAR; INTRAVENOUS; SUBCUTANEOUS
Status: DISCONTINUED | OUTPATIENT
Start: 2024-06-06 | End: 2024-06-06

## 2024-06-06 RX ORDER — NITROGLYCERIN 0.4 MG/1
0.4 TABLET SUBLINGUAL EVERY 5 MIN PRN
Status: DISCONTINUED | OUTPATIENT
Start: 2024-06-06 | End: 2024-06-11

## 2024-06-06 RX ORDER — POTASSIUM CHLORIDE 29.8 MG/ML
20 INJECTION INTRAVENOUS ONCE
Status: COMPLETED | OUTPATIENT
Start: 2024-06-06 | End: 2024-06-06

## 2024-06-06 RX ORDER — ALBUTEROL SULFATE 0.83 MG/ML
2.5 SOLUTION RESPIRATORY (INHALATION)
Status: DISCONTINUED | OUTPATIENT
Start: 2024-06-06 | End: 2024-06-08

## 2024-06-06 RX ORDER — FLUMAZENIL 0.1 MG/ML
0.2 INJECTION, SOLUTION INTRAVENOUS
Status: DISCONTINUED | OUTPATIENT
Start: 2024-06-06 | End: 2024-06-06

## 2024-06-06 RX ORDER — PROPOFOL 10 MG/ML
5-75 INJECTION, EMULSION INTRAVENOUS CONTINUOUS
Status: DISCONTINUED | OUTPATIENT
Start: 2024-06-06 | End: 2024-06-06

## 2024-06-06 RX ORDER — SODIUM CHLORIDE 9 MG/ML
INJECTION, SOLUTION INTRAVENOUS CONTINUOUS
Status: ACTIVE | OUTPATIENT
Start: 2024-06-06 | End: 2024-06-06

## 2024-06-06 RX ORDER — QUETIAPINE FUMARATE 50 MG/1
50 TABLET, FILM COATED ORAL DAILY
Status: DISCONTINUED | OUTPATIENT
Start: 2024-06-06 | End: 2024-06-08

## 2024-06-06 RX ORDER — ONDANSETRON 4 MG/1
4 TABLET, ORALLY DISINTEGRATING ORAL EVERY 6 HOURS PRN
Status: DISCONTINUED | OUTPATIENT
Start: 2024-06-06 | End: 2024-06-15 | Stop reason: HOSPADM

## 2024-06-06 RX ORDER — MIDAZOLAM HCL IN 0.9 % NACL/PF 1 MG/ML
1-4 PLASTIC BAG, INJECTION (ML) INTRAVENOUS CONTINUOUS
Status: DISCONTINUED | OUTPATIENT
Start: 2024-06-06 | End: 2024-06-08

## 2024-06-06 RX ORDER — OXYCODONE HYDROCHLORIDE 10 MG/1
10 TABLET ORAL EVERY 4 HOURS PRN
Status: DISCONTINUED | OUTPATIENT
Start: 2024-06-06 | End: 2024-06-15 | Stop reason: HOSPADM

## 2024-06-06 RX ORDER — OXYCODONE HYDROCHLORIDE 5 MG/1
5 TABLET ORAL EVERY 4 HOURS PRN
Status: DISCONTINUED | OUTPATIENT
Start: 2024-06-06 | End: 2024-06-15 | Stop reason: HOSPADM

## 2024-06-06 RX ORDER — POTASSIUM CHLORIDE 29.8 MG/ML
20 INJECTION INTRAVENOUS
Status: COMPLETED | OUTPATIENT
Start: 2024-06-06 | End: 2024-06-06

## 2024-06-06 RX ORDER — ACETYLCYSTEINE 200 MG/ML
2 SOLUTION ORAL; RESPIRATORY (INHALATION) EVERY 4 HOURS
Status: DISCONTINUED | OUTPATIENT
Start: 2024-06-06 | End: 2024-06-08

## 2024-06-06 RX ORDER — POTASSIUM CHLORIDE 29.8 MG/ML
INJECTION INTRAVENOUS CONTINUOUS PRN
Status: COMPLETED | OUTPATIENT
Start: 2024-06-06 | End: 2024-06-06

## 2024-06-06 RX ORDER — FENTANYL CITRATE 50 UG/ML
25 INJECTION, SOLUTION INTRAMUSCULAR; INTRAVENOUS
Status: DISCONTINUED | OUTPATIENT
Start: 2024-06-06 | End: 2024-06-06

## 2024-06-06 RX ORDER — IOPAMIDOL 755 MG/ML
INJECTION, SOLUTION INTRAVASCULAR
Status: DISCONTINUED | OUTPATIENT
Start: 2024-06-06 | End: 2024-06-06 | Stop reason: HOSPADM

## 2024-06-06 RX ORDER — ONDANSETRON 2 MG/ML
4 INJECTION INTRAMUSCULAR; INTRAVENOUS EVERY 6 HOURS PRN
Status: DISCONTINUED | OUTPATIENT
Start: 2024-06-06 | End: 2024-06-15 | Stop reason: HOSPADM

## 2024-06-06 RX ORDER — NITROGLYCERIN 5 MG/ML
VIAL (ML) INTRAVENOUS
Status: DISCONTINUED | OUTPATIENT
Start: 2024-06-06 | End: 2024-06-06 | Stop reason: HOSPADM

## 2024-06-06 RX ORDER — QUETIAPINE FUMARATE 50 MG/1
100 TABLET, FILM COATED ORAL EVERY EVENING
Status: DISCONTINUED | OUTPATIENT
Start: 2024-06-06 | End: 2024-06-12

## 2024-06-06 RX ORDER — ATROPINE SULFATE 0.1 MG/ML
0.5 INJECTION INTRAVENOUS
Status: DISCONTINUED | OUTPATIENT
Start: 2024-06-06 | End: 2024-06-07

## 2024-06-06 RX ORDER — FENTANYL CITRATE 50 UG/ML
25 INJECTION, SOLUTION INTRAMUSCULAR; INTRAVENOUS
Status: DISCONTINUED | OUTPATIENT
Start: 2024-06-06 | End: 2024-06-08

## 2024-06-06 RX ADMIN — CALCIUM GLUCONATE 1 G: 20 INJECTION, SOLUTION INTRAVENOUS at 21:12

## 2024-06-06 RX ADMIN — POTASSIUM CHLORIDE 20 MEQ: 29.8 INJECTION, SOLUTION INTRAVENOUS at 18:50

## 2024-06-06 RX ADMIN — TICAGRELOR 90 MG: 90 TABLET ORAL at 08:18

## 2024-06-06 RX ADMIN — POTASSIUM CHLORIDE 20 MEQ: 29.8 INJECTION, SOLUTION INTRAVENOUS at 21:08

## 2024-06-06 RX ADMIN — CHLORHEXIDINE GLUCONATE 0.12% ORAL RINSE 15 ML: 1.2 LIQUID ORAL at 08:18

## 2024-06-06 RX ADMIN — ACETYLCYSTEINE 2 ML: 200 SOLUTION ORAL; RESPIRATORY (INHALATION) at 08:56

## 2024-06-06 RX ADMIN — POTASSIUM CHLORIDE 20 MEQ: 29.8 INJECTION, SOLUTION INTRAVENOUS at 17:44

## 2024-06-06 RX ADMIN — MIDAZOLAM IN SODIUM CHLORIDE 2 MG/HR: 1 INJECTION INTRAVENOUS at 00:29

## 2024-06-06 RX ADMIN — AMIODARONE HYDROCHLORIDE 400 MG: 200 TABLET ORAL at 19:40

## 2024-06-06 RX ADMIN — POTASSIUM PHOSPHATE, MONOBASIC AND POTASSIUM PHOSPHATE, DIBASIC 15 MMOL: 224; 236 INJECTION, SOLUTION, CONCENTRATE INTRAVENOUS at 05:38

## 2024-06-06 RX ADMIN — DOCUSATE SODIUM 50 MG AND SENNOSIDES 8.6 MG 2 TABLET: 8.6; 5 TABLET, FILM COATED ORAL at 19:40

## 2024-06-06 RX ADMIN — ACETAMINOPHEN 650 MG: 325 TABLET, FILM COATED ORAL at 19:40

## 2024-06-06 RX ADMIN — ALBUTEROL SULFATE 2.5 MG: 2.5 SOLUTION RESPIRATORY (INHALATION) at 12:34

## 2024-06-06 RX ADMIN — ALBUTEROL SULFATE 2.5 MG: 2.5 SOLUTION RESPIRATORY (INHALATION) at 08:56

## 2024-06-06 RX ADMIN — CHLORHEXIDINE GLUCONATE 0.12% ORAL RINSE 15 ML: 1.2 LIQUID ORAL at 19:40

## 2024-06-06 RX ADMIN — FUROSEMIDE 40 MG: 10 INJECTION, SOLUTION INTRAVENOUS at 08:18

## 2024-06-06 RX ADMIN — POLYETHYLENE GLYCOL 3350 17 G: 17 POWDER, FOR SOLUTION ORAL at 08:18

## 2024-06-06 RX ADMIN — POLYETHYLENE GLYCOL 3350 17 G: 17 POWDER, FOR SOLUTION ORAL at 19:40

## 2024-06-06 RX ADMIN — HEPARIN SODIUM 5000 UNITS: 5000 INJECTION, SOLUTION INTRAVENOUS; SUBCUTANEOUS at 03:58

## 2024-06-06 RX ADMIN — QUETIAPINE FUMARATE 50 MG: 50 TABLET ORAL at 10:34

## 2024-06-06 RX ADMIN — PIPERACILLIN SODIUM AND TAZOBACTAM SODIUM 4.5 G: 4; .5 INJECTION, POWDER, LYOPHILIZED, FOR SOLUTION INTRAVENOUS at 00:15

## 2024-06-06 RX ADMIN — ACETYLCYSTEINE 2 ML: 200 SOLUTION ORAL; RESPIRATORY (INHALATION) at 12:33

## 2024-06-06 RX ADMIN — TICAGRELOR 90 MG: 90 TABLET ORAL at 19:40

## 2024-06-06 RX ADMIN — SODIUM CHLORIDE, POTASSIUM CHLORIDE, SODIUM LACTATE AND CALCIUM CHLORIDE 500 ML: 600; 310; 30; 20 INJECTION, SOLUTION INTRAVENOUS at 13:14

## 2024-06-06 RX ADMIN — Medication 40 MG: at 08:19

## 2024-06-06 RX ADMIN — Medication 15 ML: at 08:18

## 2024-06-06 RX ADMIN — PROPOFOL 20 MCG/KG/MIN: 10 INJECTION, EMULSION INTRAVENOUS at 10:24

## 2024-06-06 RX ADMIN — PIPERACILLIN SODIUM AND TAZOBACTAM SODIUM 4.5 G: 4; .5 INJECTION, POWDER, LYOPHILIZED, FOR SOLUTION INTRAVENOUS at 18:06

## 2024-06-06 RX ADMIN — PIPERACILLIN SODIUM AND TAZOBACTAM SODIUM 4.5 G: 4; .5 INJECTION, POWDER, LYOPHILIZED, FOR SOLUTION INTRAVENOUS at 06:05

## 2024-06-06 RX ADMIN — HEPARIN SODIUM 5000 UNITS: 5000 INJECTION, SOLUTION INTRAVENOUS; SUBCUTANEOUS at 12:09

## 2024-06-06 RX ADMIN — AMIODARONE HYDROCHLORIDE 400 MG: 200 TABLET ORAL at 08:18

## 2024-06-06 RX ADMIN — ALBUTEROL SULFATE 2.5 MG: 2.5 SOLUTION RESPIRATORY (INHALATION) at 20:17

## 2024-06-06 RX ADMIN — POTASSIUM CHLORIDE 20 MEQ: 29.8 INJECTION, SOLUTION INTRAVENOUS at 04:55

## 2024-06-06 RX ADMIN — ALBUTEROL SULFATE 2.5 MG: 2.5 SOLUTION RESPIRATORY (INHALATION) at 16:46

## 2024-06-06 RX ADMIN — ASPIRIN 81 MG CHEWABLE TABLET 81 MG: 81 TABLET CHEWABLE at 08:18

## 2024-06-06 RX ADMIN — ACETYLCYSTEINE 2 ML: 200 SOLUTION ORAL; RESPIRATORY (INHALATION) at 20:17

## 2024-06-06 RX ADMIN — POTASSIUM CHLORIDE 20 MEQ: 29.8 INJECTION, SOLUTION INTRAVENOUS at 22:19

## 2024-06-06 RX ADMIN — ACETYLCYSTEINE 2 ML: 200 SOLUTION ORAL; RESPIRATORY (INHALATION) at 16:46

## 2024-06-06 RX ADMIN — POTASSIUM & SODIUM PHOSPHATES POWDER PACK 280-160-250 MG 2 PACKET: 280-160-250 PACK at 21:31

## 2024-06-06 RX ADMIN — DOCUSATE SODIUM 50 MG AND SENNOSIDES 8.6 MG 2 TABLET: 8.6; 5 TABLET, FILM COATED ORAL at 08:18

## 2024-06-06 RX ADMIN — POTASSIUM CHLORIDE 20 MEQ: 29.8 INJECTION, SOLUTION INTRAVENOUS at 16:45

## 2024-06-06 RX ADMIN — Medication 150 MCG/HR: at 08:17

## 2024-06-06 RX ADMIN — QUETIAPINE FUMARATE 100 MG: 100 TABLET ORAL at 19:40

## 2024-06-06 RX ADMIN — PIPERACILLIN SODIUM AND TAZOBACTAM SODIUM 4.5 G: 4; .5 INJECTION, POWDER, LYOPHILIZED, FOR SOLUTION INTRAVENOUS at 12:05

## 2024-06-06 ASSESSMENT — ACTIVITIES OF DAILY LIVING (ADL)
ADLS_ACUITY_SCORE: 36
ADLS_ACUITY_SCORE: 38
ADLS_ACUITY_SCORE: 36
ADLS_ACUITY_SCORE: 34
ADLS_ACUITY_SCORE: 38
ADLS_ACUITY_SCORE: 36
ADLS_ACUITY_SCORE: 38
ADLS_ACUITY_SCORE: 36
ADLS_ACUITY_SCORE: 36
ADLS_ACUITY_SCORE: 38
ADLS_ACUITY_SCORE: 36
ADLS_ACUITY_SCORE: 34
ADLS_ACUITY_SCORE: 36
ADLS_ACUITY_SCORE: 38
ADLS_ACUITY_SCORE: 36
ADLS_ACUITY_SCORE: 38
ADLS_ACUITY_SCORE: 34
ADLS_ACUITY_SCORE: 36
ADLS_ACUITY_SCORE: 38
ADLS_ACUITY_SCORE: 38
ADLS_ACUITY_SCORE: 36
ADLS_ACUITY_SCORE: 38
ADLS_ACUITY_SCORE: 36

## 2024-06-06 NOTE — PROGRESS NOTES
"  [facility]  Cardiology ICU History & Physical  June 1, 2024            Assessment and Plan:   Cullen Reardon is a 48 year old male with no known past medical history who was admitted on 6/1/2024 following a witnessed VF cardiac arrest.    He presented to [facility] on 6/1/2024 with chest pain.  He collapsed in front of the triage desk and was found to be pulseless after saying \"I feel like I'm going to die\".  He received immediate bystander CPR in the waiting room of the emergency department.  He was found be in VF, was shocked x 3.  Estimated downtime was approximately 15 minutes per paramedic report.  Was cannulated in [facility] emergency department and was on circuit at 16:08.  He was brought to the Cath Lab for coronary angiogram where he was found to have a 100% ostial LAD thrombotic occlusion status post stenting.  He was residual severe disease in D1 and OM2 that will need staged intervention. He was subsequently brought to [facility] for further care.    Patient was following commands on arrival. Currently clinically improving and off pressors. Will plan for stagesd PCI, wean vent then GDMT    Interval changes   Improved oxygen needs overnight.  Decreased from 14-7.  Blood gas with pO2 86.  Unclear why it helped.  Yesterday we broadened him to Zosyn and diuresed him.  Suspect he may not be intravascularly wet.  Pressor needs increasing with diuresis.  Will try to give back fluid.  Will plan for angiogram with staged PCI today and try alternate sedation for agitation.    Changes today   - , escalating pressor needs   - Cath today for staged PCI  - trial of propofol and Seroquel -did not tolerate propofol got hypotensive.  - pulmonary toilet : mucomyst, nebs  - BC from 6/1 with GPB : resembling diphtheroids. Followup cultures NGTD on 6/2 an 6/3. This is likely a contaminant .    Neuro: #. At risk for anoxic brain injury  --Intubated and sedated. Wean sedation daily " to assess neurological status.   --Maintain euthermia/warm by 0.5 C/hr to get to 37C  --continue versed, fentanyl  --RASS goal -3 to -4   --CT head : No acute issues.     CV: #. Cardiogenic Shock s/p VA ECMO  #. ACUTE STEMI s/p KAYLYN to ostial LAD 6/1/24   #. Refractory VF arrest  s/p VA ECMO  --Peripheral V-A ECMO inserted via RCFA (17 Fr) and RCFV (25 Fr).  -- EF 15%  --Continue ASA 81mg and ticagrelor 90mg BID  -- GDMT and Lipitor pending clinical course      Pulm: #. Acute hypoxic respiratory failure  #. Probable aspiration pneumonia  Vent Mode: -- (SIMV/PC)  FiO2 (%): (S) 60 %  Resp Rate (Set): 12 breaths/min  Tidal Volume (Set, mL): 550 mL  PEEP (cm H2O): 7 cmH2O  Pressure Support (cm H2O): 5 cmH2O  Inspiratory Pressure (cm H2O) (Drager Myah): 30  Resp: 12    --ETT in stable position    --CXR: Lines in stable position.   --Wean vent as able  --Daily CXR  --Continue ctx .  --MRSA nares - neg, vanco stopped 6/1-6/2.  --Q2h ABGs for now     GI: #. Shock Liver 2/2 cardiac arrest  --Monitor LFTs twice a day.  --Consider RUQ US if LFTs do not improve.     Renal: #. JEAN CLAUDE 2/2 cardiac arrest  Hematuria 2/2 traumatic schafer insertion   --Monitor urine output  --Maintain K>3 and Mg>2      ID: #. Probable aspiration pneumonia  - Ctx x5 days 6/1- 6/5  - Zosyn 6/5- *  --Daily blood cultures.     Hem/Onc: #. Acute blood loss anemia due to ECMO cannulas.  --Continue Aspirin and Tacagrelor for the stent.  --Continue Heparin gtt for ECMO with ACT goal 180-200  --Cryo PRN fibrinogen < 150; FFP for INR >2  --Transfuse for Hgb<7  --US LE w/ arterial duplex per ECMO protocol   --DVT PPX: Heparin as above     Endo: #. Hyperglycemia  --Insulin as needed.  --HgbA1c - pending.     Checklist: ICU Checklist:  #Feeding: npo  #Analgesia: versed   #Sedation: fentanyl  #Thromboembolism ppx: heparin gtt   #HOB: 30 degrees   #Ulcer ppx: ppi  #Glucose: insulin gtt   #SBT/SAT: in am  #Bowel reg: miralax, senna   #Lines/tubes/drains: 17 Fr RCFA, 25  "Fr RCFV, 6 Fr LCFA sheath, 6 Fr LCFV sheath, schafer, ETT, OG  #Code status: full   #Family: will update as able this evening   #Dispo: ICU          Code Status: Full    The pt was discussed with the attending physician. Dr. Reza Hernandez MD  Cardiology fellow ([id number])  [id number]           Medications:   I have reviewed this patient's current medications    No past medical history on file.    No family history on file.  Social History     Socioeconomic History    Marital status:      Spouse name: Not on file    Number of children: Not on file    Years of education: Not on file    Highest education level: Not on file   Occupational History    Not on file   Tobacco Use    Smoking status: Not on file    Smokeless tobacco: Not on file   Substance and Sexual Activity    Alcohol use: Not on file    Drug use: Not on file    Sexual activity: Not on file   Other Topics Concern    Not on file   Social History Narrative    Not on file     Social Determinants of Health     Financial Resource Strain: Not At Risk (7/31/2023)    Received from [facility]    Financial Resource Strain     Is it hard for you to pay for the very basics like food, housing, medical care or heating?: No   Food Insecurity: Not At Risk (7/31/2023)    Received from [facility]    Food Insecurity     Does your food run out before you have the money to buy more?: No   Transportation Needs: Not At Risk (7/31/2023)    Received from [facility]    Transportation Needs     Does a lack of transportation keep you from your medical appointments or from getting your medications?: No   Physical Activity: Not on file   Stress: Not on file   Social Connections: Not on file   Interpersonal Safety: Not on file   Housing Stability: Not on file            Review of Systems:   Unable to obtain due to patients medical condition.            Physical Exam:   Pulse 93   Temp 99  F (37.2  C) (Bladder)   Resp 12   Ht 1.886 m (6' 2.25\")   Wt 126.7 kg (279 lb " "5.2 oz)   SpO2 98%   BMI 35.62 kg/m        GENERAL: Intubated, sedated. NAD.  HEENT: No icterus. ETT in place, OG tube in place.  CARDIOVASCULAR: RRR. Normal S1 and S2.  RESP: Coarse bilaterally. Mechanical ventilation.    GI Soft, bowel sounds hypoactive but present.  GENITOURINARY: Benjamin in place.  EXTREMITIES: Cool, 1+ edema, pulses dopplered, as above.   NEURO: Sedated and intubated.  INTEGUMENTARY: No rashes. Cannula/Line sites CDI.      Resp: 12 SpO2: 98 % O2 Device: (P) Mechanical Ventilator      Arterial Blood Gas:   Recent Labs   Lab 06/06/24  1027 06/06/24  0353 06/06/24  0205 06/05/24  2153   PH 7.56* 7.53* 7.46* 7.50*   PCO2 38 40 47* 42   PO2 79* 86 101 151*   HCO3 33* 33* 34* 33*   O2PER 75 60 70 60     Vitals:    06/04/24 0000 06/05/24 0000 06/06/24 0000   Weight: 127 kg (279 lb 15.8 oz) 128 kg (282 lb 3 oz) 126.7 kg (279 lb 5.2 oz)   I/O last 3 completed shifts:  In: 1928.1 [I.V.:888.1; NG/GT:460]  Out: 3660 [Urine:3360; Emesis/NG output:300]  Recent Labs   Lab 06/06/24  1208 06/06/24  1031 06/06/24  0844 06/06/24  0354 06/06/24  0353 06/05/24 2007   NA  --   --   --  146*  --  146*   POTASSIUM  --   --   --  3.5  --  3.6   CHLORIDE  --   --   --  108*  --  106   CO2  --   --   --  30*  --  30*   ANIONGAP  --   --   --  8  --  10   * 147*   < > 148*   < > 149*   BUN  --   --   --  36.9*  --  34.8*   CR  --   --   --  1.07  --  1.15   RANDA  --   --   --  8.5*  --  8.9    < > = values in this interval not displayed.     No components found for: \"URINE\"   Recent Labs   Lab 06/06/24  0354 06/05/24 1349 06/05/24  0343   * 152* 197*   ALT 86* 104* 117*   BILITOTAL 0.7 0.7 0.5   ALBUMIN 3.1* 3.1* 3.0*   PROTTOTAL 5.8* 6.1* 5.7*   ALKPHOS 67 52 44     Temp: 99  F (37.2  C) Temp src: BladderTemp  Min: 98.8  F (37.1  C)  Max: 100.4  F (38  C)   Recent Labs   Lab 06/06/24  0354 06/05/24 2007 06/05/24  1349 06/05/24  0343 06/04/24  2326   WBC 7.8 8.8 10.9 8.9 7.4   HGB 10.1* 10.8* 10.8* 10.2* " 9.6*   HCT 29.9* 32.9* 32.2* 30.9* 28.3*   MCV 93 94 93 94 93   RDW 13.7 13.6 13.5 13.5 13.3    159 165 145* 108*     Recent Labs   Lab 06/06/24  0354 06/05/24  0343 06/04/24  2326 06/04/24  1620 06/04/24  1015 06/04/24  0346   INR 1.16* 1.11 1.21* 1.18* 1.23* 1.19*   PTT  --  29 42* 93* 100* 109*     Recent Labs   Lab 06/06/24  1208 06/06/24  1031 06/06/24  0844 06/06/24  0354 06/06/24  0353   * 147* 148* 148* 138*       Lines:           Data:     Recent Results (from the past 24 hour(s))   XR Chest Port 1 View    Narrative    XR CHEST PORT 1 VIEW  6/6/2024 1:15 AM     HISTORY:  Post-op pneumonia       COMPARISON:  Chest radiograph 6/5/2024    TECHNIQUE: Portable, supine, frontal projection radiograph of the  chest.    FINDINGS:   Stable position of ET tube, left-sided PICC, and gastric tube. Trachea  is midline. Stable cardiomediastinal silhouette. Silhouetting of the  right lateral hemidiaphragm is somewhat improved. No pneumothorax.  Bibasilar and bilateral perihilar patchy opacities are relatively  unchanged. Upper abdomen and osseous structures are within normal  limits.        Impression    IMPRESSION:  Overall stable bilateral patchy opacities, likely atelectasis with  possible superimposed pneumonia. Stable support devices.    I have personally reviewed the examination and initial interpretation  and I agree with the findings.    LILIAM GUZMAN DO         SYSTEM ID:  [id number]

## 2024-06-06 NOTE — PROGRESS NOTES
Patient arousable, occasionally agitated, but redirectable. Intubated, follows commands.   Pupils equal, reactive. Moves all extremities.    Vent: PS-SIMV f90%, p30/14, r12.         SHIFT EVENTS      2235, episode of extreme agitation after ETT suction, requiring multiple nurses. Associated hypoxia, tachypnea, vent dyssynchrony, tachycardia, and hypertension.     Patient stability achieved. Continued to ween vent support.    0500, episode of extreme agitation, necessitating wrist restraints.     Vent settings: PS-SIMV f60%, p30/7, r12.     PLAN:     Continue to ween vent support.

## 2024-06-06 NOTE — PLAN OF CARE
Major Shift Events:      Neuro: Pt continues to be intermittently agitated, delirious. Soft wrist restraints maintained to protect patient and staff. Attempting to wean sedation from midazolam ->propofol with increased pressor requirements.     CV: Pt to cath lab for KAYLYN to Diag1 Levo 0.12 for MAP>65.     Resp: Vent PC-SIMV  RR 12/FiO2 100%/ PIP 30/ PEEP 7    GI/:  Lasix 40 mgx1 w/mod o/p 500cc LR bolus for hypotension. F @ goal 70 ml/hr Insulin @ 1u/hr  No BM    Plan: Continue to wean pressors, vent as tolerated     For vital signs and complete assessments, please see documentation flowsheets.

## 2024-06-06 NOTE — PRE-PROCEDURE
GENERAL PRE-PROCEDURE:   Procedure:  Coronary angiogram    Written consent obtained?: Yes    Risks and benefits: Risks, benefits and alternatives were discussed    Consent given by:  Patient  Patient states understanding of procedure being performed: Yes    Patient's understanding of procedure matches consent: Yes    Procedure consent matches procedure scheduled: Yes    Expected level of sedation:  Moderate  Appropriately NPO:  Yes  ASA Class:  3  Mallampati  :  Grade 1- soft palate, uvula, tonsillar pillars, and posterior pharyngeal wall visible  Lungs:  Lungs clear with good breath sounds bilaterally  Heart:  Normal heart sounds and rate  History & Physical reviewed:  History and physical reviewed and no updates needed  Statement of review:  I have reviewed the lab findings, diagnostic data, medications, and the plan for sedation

## 2024-06-07 ENCOUNTER — APPOINTMENT (OUTPATIENT)
Dept: GENERAL RADIOLOGY | Facility: CLINIC | Age: 49
DRG: 003 | End: 2024-06-07
Attending: SURGERY
Payer: COMMERCIAL

## 2024-06-07 LAB
ALBUMIN SERPL BCG-MCNC: 3.1 G/DL (ref 3.5–5.2)
ALBUMIN SERPL BCG-MCNC: 3.2 G/DL (ref 3.5–5.2)
ALLEN'S TEST: ABNORMAL
ALP SERPL-CCNC: 82 U/L (ref 40–150)
ALP SERPL-CCNC: 87 U/L (ref 40–150)
ALT SERPL W P-5'-P-CCNC: 70 U/L (ref 0–70)
ALT SERPL W P-5'-P-CCNC: 71 U/L (ref 0–70)
ANION GAP SERPL CALCULATED.3IONS-SCNC: 10 MMOL/L (ref 7–15)
ANION GAP SERPL CALCULATED.3IONS-SCNC: 12 MMOL/L (ref 7–15)
APPEARANCE FLD: ABNORMAL
AST SERPL W P-5'-P-CCNC: 67 U/L (ref 0–45)
AST SERPL W P-5'-P-CCNC: 71 U/L (ref 0–45)
ATRIAL RATE - MUSE: 106 BPM
ATRIAL RATE - MUSE: 88 BPM
ATRIAL RATE - MUSE: 99 BPM
BACTERIA BLD CULT: NO GROWTH
BACTERIA SPEC CULT: NORMAL
BACTERIA SPT CULT: NORMAL
BASE EXCESS BLDA CALC-SCNC: 6.5 MMOL/L (ref -3–3)
BASE EXCESS BLDA CALC-SCNC: 8.1 MMOL/L (ref -3–3)
BASE EXCESS BLDA CALC-SCNC: 9.1 MMOL/L (ref -3–3)
BILIRUB SERPL-MCNC: 0.7 MG/DL
BILIRUB SERPL-MCNC: 0.8 MG/DL
BUN SERPL-MCNC: 34.2 MG/DL (ref 6–20)
BUN SERPL-MCNC: 34.9 MG/DL (ref 6–20)
C PNEUM DNA SPEC QL NAA+PROBE: NOT DETECTED
CA-I BLD-MCNC: 4.4 MG/DL (ref 4.4–5.2)
CALCIUM SERPL-MCNC: 8.2 MG/DL (ref 8.6–10)
CALCIUM SERPL-MCNC: 8.7 MG/DL (ref 8.6–10)
CELL COUNT BODY FLUID SOURCE: ABNORMAL
CHLORIDE SERPL-SCNC: 109 MMOL/L (ref 98–107)
CHLORIDE SERPL-SCNC: 110 MMOL/L (ref 98–107)
COHGB MFR BLD: 97.1 % (ref 96–97)
COHGB MFR BLD: 97.4 % (ref 96–97)
COHGB MFR BLD: 97.8 % (ref 96–97)
COLOR FLD: ABNORMAL
CREAT SERPL-MCNC: 1.21 MG/DL (ref 0.67–1.17)
CREAT SERPL-MCNC: 1.23 MG/DL (ref 0.67–1.17)
DEPRECATED HCO3 PLAS-SCNC: 27 MMOL/L (ref 22–29)
DEPRECATED HCO3 PLAS-SCNC: 29 MMOL/L (ref 22–29)
DIASTOLIC BLOOD PRESSURE - MUSE: NORMAL MMHG
EGFRCR SERPLBLD CKD-EPI 2021: 72 ML/MIN/1.73M2
EGFRCR SERPLBLD CKD-EPI 2021: 73 ML/MIN/1.73M2
ERYTHROCYTE [DISTWIDTH] IN BLOOD BY AUTOMATED COUNT: 14.4 % (ref 10–15)
ERYTHROCYTE [DISTWIDTH] IN BLOOD BY AUTOMATED COUNT: 14.7 % (ref 10–15)
FLUAV H1 2009 PAND RNA SPEC QL NAA+PROBE: NOT DETECTED
FLUAV H1 RNA SPEC QL NAA+PROBE: NOT DETECTED
FLUAV H3 RNA SPEC QL NAA+PROBE: NOT DETECTED
FLUAV RNA SPEC QL NAA+PROBE: NOT DETECTED
FLUBV RNA SPEC QL NAA+PROBE: NOT DETECTED
GLUCOSE BLDC GLUCOMTR-MCNC: 117 MG/DL (ref 70–99)
GLUCOSE BLDC GLUCOMTR-MCNC: 117 MG/DL (ref 70–99)
GLUCOSE BLDC GLUCOMTR-MCNC: 119 MG/DL (ref 70–99)
GLUCOSE BLDC GLUCOMTR-MCNC: 127 MG/DL (ref 70–99)
GLUCOSE BLDC GLUCOMTR-MCNC: 132 MG/DL (ref 70–99)
GLUCOSE BLDC GLUCOMTR-MCNC: 136 MG/DL (ref 70–99)
GLUCOSE BLDC GLUCOMTR-MCNC: 139 MG/DL (ref 70–99)
GLUCOSE BLDC GLUCOMTR-MCNC: 142 MG/DL (ref 70–99)
GLUCOSE BLDC GLUCOMTR-MCNC: 143 MG/DL (ref 70–99)
GLUCOSE BLDC GLUCOMTR-MCNC: 143 MG/DL (ref 70–99)
GLUCOSE BLDC GLUCOMTR-MCNC: 151 MG/DL (ref 70–99)
GLUCOSE BLDC GLUCOMTR-MCNC: 152 MG/DL (ref 70–99)
GLUCOSE SERPL-MCNC: 112 MG/DL (ref 70–99)
GLUCOSE SERPL-MCNC: 152 MG/DL (ref 70–99)
GRAM STAIN RESULT: NORMAL
HADV DNA SPEC QL NAA+PROBE: NOT DETECTED
HCO3 BLD-SCNC: 30 MMOL/L (ref 21–28)
HCO3 BLD-SCNC: 33 MMOL/L (ref 21–28)
HCO3 BLD-SCNC: 34 MMOL/L (ref 21–28)
HCOV PNL SPEC NAA+PROBE: NOT DETECTED
HCT VFR BLD AUTO: 29.2 % (ref 40–53)
HCT VFR BLD AUTO: 30.2 % (ref 40–53)
HGB BLD-MCNC: 10.2 G/DL (ref 13.3–17.7)
HGB BLD-MCNC: 9.7 G/DL (ref 13.3–17.7)
HMPV RNA SPEC QL NAA+PROBE: NOT DETECTED
HPIV1 RNA SPEC QL NAA+PROBE: NOT DETECTED
HPIV2 RNA SPEC QL NAA+PROBE: NOT DETECTED
HPIV3 RNA SPEC QL NAA+PROBE: NOT DETECTED
HPIV4 RNA SPEC QL NAA+PROBE: NOT DETECTED
INR PPP: 1.15 (ref 0.85–1.15)
INTERPRETATION ECG - MUSE: NORMAL
KOH PREPARATION: NORMAL
KOH PREPARATION: NORMAL
LACTATE SERPL-SCNC: 1.2 MMOL/L (ref 0.7–2)
LACTATE SERPL-SCNC: 1.4 MMOL/L (ref 0.7–2)
LACTATE SERPL-SCNC: 1.4 MMOL/L (ref 0.7–2)
LACTATE SERPL-SCNC: 1.8 MMOL/L (ref 0.7–2)
LYMPHOCYTES NFR FLD MANUAL: 10 %
M PNEUMO DNA SPEC QL NAA+PROBE: NOT DETECTED
MAGNESIUM SERPL-MCNC: 2.4 MG/DL (ref 1.7–2.3)
MAGNESIUM SERPL-MCNC: 2.5 MG/DL (ref 1.7–2.3)
MCH RBC QN AUTO: 30.9 PG (ref 26.5–33)
MCH RBC QN AUTO: 31.3 PG (ref 26.5–33)
MCHC RBC AUTO-ENTMCNC: 33.2 G/DL (ref 31.5–36.5)
MCHC RBC AUTO-ENTMCNC: 33.8 G/DL (ref 31.5–36.5)
MCV RBC AUTO: 93 FL (ref 78–100)
MCV RBC AUTO: 93 FL (ref 78–100)
MONOS+MACROS NFR FLD MANUAL: 0 %
NEUTS BAND NFR FLD MANUAL: 18 %
O2/TOTAL GAS SETTING VFR VENT: 80 %
O2/TOTAL GAS SETTING VFR VENT: 80 %
O2/TOTAL GAS SETTING VFR VENT: 90 %
OTHER CELLS FLD MANUAL: 73 %
P AXIS - MUSE: 41 DEGREES
P AXIS - MUSE: 43 DEGREES
P AXIS - MUSE: 44 DEGREES
PCO2 BLD: 39 MM HG (ref 35–45)
PCO2 BLD: 46 MM HG (ref 35–45)
PCO2 BLD: 47 MM HG (ref 35–45)
PEEP: 12 CM H2O
PH BLD: 7.45 [PH] (ref 7.35–7.45)
PH BLD: 7.48 [PH] (ref 7.35–7.45)
PH BLD: 7.49 [PH] (ref 7.35–7.45)
PHOSPHATE SERPL-MCNC: 3.1 MG/DL (ref 2.5–4.5)
PHOSPHATE SERPL-MCNC: 3.5 MG/DL (ref 2.5–4.5)
PHOSPHATE SERPL-MCNC: 4 MG/DL (ref 2.5–4.5)
PLATELET # BLD AUTO: 176 10E3/UL (ref 150–450)
PLATELET # BLD AUTO: 189 10E3/UL (ref 150–450)
PO2 BLD: 76 MM HG (ref 80–105)
PO2 BLD: 80 MM HG (ref 80–105)
PO2 BLD: 85 MM HG (ref 80–105)
POTASSIUM BLD-SCNC: 3.5 MMOL/L (ref 3.4–5.3)
POTASSIUM SERPL-SCNC: 3.2 MMOL/L (ref 3.4–5.3)
POTASSIUM SERPL-SCNC: 3.4 MMOL/L (ref 3.4–5.3)
POTASSIUM SERPL-SCNC: 3.4 MMOL/L (ref 3.4–5.3)
POTASSIUM SERPL-SCNC: 3.9 MMOL/L (ref 3.4–5.3)
POTASSIUM SERPL-SCNC: 3.9 MMOL/L (ref 3.4–5.3)
PR INTERVAL - MUSE: 136 MS
PR INTERVAL - MUSE: 138 MS
PR INTERVAL - MUSE: 146 MS
PROT SERPL-MCNC: 5.8 G/DL (ref 6.4–8.3)
PROT SERPL-MCNC: 6.2 G/DL (ref 6.4–8.3)
QRS DURATION - MUSE: 90 MS
QRS DURATION - MUSE: 92 MS
QRS DURATION - MUSE: 94 MS
QT - MUSE: 310 MS
QT - MUSE: 328 MS
QT - MUSE: 386 MS
QTC - MUSE: 396 MS
QTC - MUSE: 411 MS
QTC - MUSE: 495 MS
R AXIS - MUSE: 52 DEGREES
R AXIS - MUSE: 54 DEGREES
R AXIS - MUSE: 76 DEGREES
RBC # BLD AUTO: 3.14 10E6/UL (ref 4.4–5.9)
RBC # BLD AUTO: 3.26 10E6/UL (ref 4.4–5.9)
RSV RNA SPEC QL NAA+PROBE: NOT DETECTED
RSV RNA SPEC QL NAA+PROBE: NOT DETECTED
RV+EV RNA SPEC QL NAA+PROBE: NOT DETECTED
SAO2 % BLDA: 95 % (ref 92–100)
SAO2 % BLDA: 96 % (ref 92–100)
SAO2 % BLDA: 96 % (ref 92–100)
SODIUM SERPL-SCNC: 147 MMOL/L (ref 135–145)
SODIUM SERPL-SCNC: 150 MMOL/L (ref 135–145)
SYSTOLIC BLOOD PRESSURE - MUSE: NORMAL MMHG
T AXIS - MUSE: 30 DEGREES
T AXIS - MUSE: 33 DEGREES
T AXIS - MUSE: 54 DEGREES
VENTRICULAR RATE- MUSE: 106 BPM
VENTRICULAR RATE- MUSE: 88 BPM
VENTRICULAR RATE- MUSE: 99 BPM
WBC # BLD AUTO: 9.4 10E3/UL (ref 4–11)
WBC # BLD AUTO: 9.7 10E3/UL (ref 4–11)
WBC # FLD AUTO: 55 /UL

## 2024-06-07 PROCEDURE — 83605 ASSAY OF LACTIC ACID: CPT | Performed by: SURGERY

## 2024-06-07 PROCEDURE — 250N000009 HC RX 250: Performed by: SURGERY

## 2024-06-07 PROCEDURE — 87581 M.PNEUMON DNA AMP PROBE: CPT | Performed by: INTERNAL MEDICINE

## 2024-06-07 PROCEDURE — 31645 BRNCHSC W/THER ASPIR 1ST: CPT | Performed by: INTERNAL MEDICINE

## 2024-06-07 PROCEDURE — 87070 CULTURE OTHR SPECIMN AEROBIC: CPT | Performed by: INTERNAL MEDICINE

## 2024-06-07 PROCEDURE — 250N000013 HC RX MED GY IP 250 OP 250 PS 637: Performed by: NURSE PRACTITIONER

## 2024-06-07 PROCEDURE — 82330 ASSAY OF CALCIUM: CPT | Performed by: SURGERY

## 2024-06-07 PROCEDURE — 0B9F8ZX DRAINAGE OF RIGHT LOWER LUNG LOBE, VIA NATURAL OR ARTIFICIAL OPENING ENDOSCOPIC, DIAGNOSTIC: ICD-10-PCS | Performed by: INTERNAL MEDICINE

## 2024-06-07 PROCEDURE — 84132 ASSAY OF SERUM POTASSIUM: CPT | Performed by: INTERNAL MEDICINE

## 2024-06-07 PROCEDURE — 94640 AIRWAY INHALATION TREATMENT: CPT

## 2024-06-07 PROCEDURE — 250N000011 HC RX IP 250 OP 636: Mod: JZ | Performed by: INTERNAL MEDICINE

## 2024-06-07 PROCEDURE — 99291 CRITICAL CARE FIRST HOUR: CPT | Mod: 25 | Performed by: INTERNAL MEDICINE

## 2024-06-07 PROCEDURE — 82805 BLOOD GASES W/O2 SATURATION: CPT | Performed by: STUDENT IN AN ORGANIZED HEALTH CARE EDUCATION/TRAINING PROGRAM

## 2024-06-07 PROCEDURE — 83735 ASSAY OF MAGNESIUM: CPT | Performed by: SURGERY

## 2024-06-07 PROCEDURE — 250N000011 HC RX IP 250 OP 636: Performed by: SURGERY

## 2024-06-07 PROCEDURE — 250N000013 HC RX MED GY IP 250 OP 250 PS 637: Performed by: SURGERY

## 2024-06-07 PROCEDURE — 87040 BLOOD CULTURE FOR BACTERIA: CPT | Performed by: STUDENT IN AN ORGANIZED HEALTH CARE EDUCATION/TRAINING PROGRAM

## 2024-06-07 PROCEDURE — 71045 X-RAY EXAM CHEST 1 VIEW: CPT | Mod: 26 | Performed by: RADIOLOGY

## 2024-06-07 PROCEDURE — 250N000009 HC RX 250: Performed by: NURSE PRACTITIONER

## 2024-06-07 PROCEDURE — 80053 COMPREHEN METABOLIC PANEL: CPT | Performed by: STUDENT IN AN ORGANIZED HEALTH CARE EDUCATION/TRAINING PROGRAM

## 2024-06-07 PROCEDURE — 82805 BLOOD GASES W/O2 SATURATION: CPT | Performed by: SURGERY

## 2024-06-07 PROCEDURE — 99207 PR NO CHARGE TRIAGED PS: CPT | Performed by: STUDENT IN AN ORGANIZED HEALTH CARE EDUCATION/TRAINING PROGRAM

## 2024-06-07 PROCEDURE — 200N000002 HC R&B ICU UMMC

## 2024-06-07 PROCEDURE — 87206 SMEAR FLUORESCENT/ACID STAI: CPT | Performed by: INTERNAL MEDICINE

## 2024-06-07 PROCEDURE — 999N000157 HC STATISTIC RCP TIME EA 10 MIN

## 2024-06-07 PROCEDURE — 94003 VENT MGMT INPAT SUBQ DAY: CPT

## 2024-06-07 PROCEDURE — 999N000185 HC STATISTIC TRANSPORT TIME EA 15 MIN

## 2024-06-07 PROCEDURE — 84100 ASSAY OF PHOSPHORUS: CPT | Performed by: SURGERY

## 2024-06-07 PROCEDURE — 250N000013 HC RX MED GY IP 250 OP 250 PS 637: Performed by: INTERNAL MEDICINE

## 2024-06-07 PROCEDURE — 85610 PROTHROMBIN TIME: CPT | Performed by: SURGERY

## 2024-06-07 PROCEDURE — 250N000011 HC RX IP 250 OP 636: Mod: JZ | Performed by: STUDENT IN AN ORGANIZED HEALTH CARE EDUCATION/TRAINING PROGRAM

## 2024-06-07 PROCEDURE — 87529 HSV DNA AMP PROBE: CPT | Performed by: INTERNAL MEDICINE

## 2024-06-07 PROCEDURE — 94640 AIRWAY INHALATION TREATMENT: CPT | Mod: 76

## 2024-06-07 PROCEDURE — 250N000011 HC RX IP 250 OP 636: Performed by: STUDENT IN AN ORGANIZED HEALTH CARE EDUCATION/TRAINING PROGRAM

## 2024-06-07 PROCEDURE — 250N000013 HC RX MED GY IP 250 OP 250 PS 637: Performed by: STUDENT IN AN ORGANIZED HEALTH CARE EDUCATION/TRAINING PROGRAM

## 2024-06-07 PROCEDURE — 71045 X-RAY EXAM CHEST 1 VIEW: CPT

## 2024-06-07 PROCEDURE — 82805 BLOOD GASES W/O2 SATURATION: CPT | Performed by: INTERNAL MEDICINE

## 2024-06-07 PROCEDURE — 82247 BILIRUBIN TOTAL: CPT | Performed by: STUDENT IN AN ORGANIZED HEALTH CARE EDUCATION/TRAINING PROGRAM

## 2024-06-07 PROCEDURE — 87210 SMEAR WET MOUNT SALINE/INK: CPT | Performed by: INTERNAL MEDICINE

## 2024-06-07 PROCEDURE — 85027 COMPLETE CBC AUTOMATED: CPT | Performed by: STUDENT IN AN ORGANIZED HEALTH CARE EDUCATION/TRAINING PROGRAM

## 2024-06-07 PROCEDURE — 93005 ELECTROCARDIOGRAM TRACING: CPT

## 2024-06-07 PROCEDURE — 83735 ASSAY OF MAGNESIUM: CPT | Performed by: INTERNAL MEDICINE

## 2024-06-07 PROCEDURE — 84100 ASSAY OF PHOSPHORUS: CPT | Performed by: INTERNAL MEDICINE

## 2024-06-07 PROCEDURE — 87205 SMEAR GRAM STAIN: CPT | Performed by: STUDENT IN AN ORGANIZED HEALTH CARE EDUCATION/TRAINING PROGRAM

## 2024-06-07 PROCEDURE — 89050 BODY FLUID CELL COUNT: CPT | Performed by: INTERNAL MEDICINE

## 2024-06-07 PROCEDURE — 87205 SMEAR GRAM STAIN: CPT | Performed by: INTERNAL MEDICINE

## 2024-06-07 PROCEDURE — 93010 ELECTROCARDIOGRAM REPORT: CPT | Performed by: INTERNAL MEDICINE

## 2024-06-07 PROCEDURE — 31624 DX BRONCHOSCOPE/LAVAGE: CPT

## 2024-06-07 PROCEDURE — 84155 ASSAY OF PROTEIN SERUM: CPT | Performed by: STUDENT IN AN ORGANIZED HEALTH CARE EDUCATION/TRAINING PROGRAM

## 2024-06-07 PROCEDURE — 87102 FUNGUS ISOLATION CULTURE: CPT | Performed by: INTERNAL MEDICINE

## 2024-06-07 PROCEDURE — 36415 COLL VENOUS BLD VENIPUNCTURE: CPT | Performed by: STUDENT IN AN ORGANIZED HEALTH CARE EDUCATION/TRAINING PROGRAM

## 2024-06-07 RX ORDER — POLYETHYLENE GLYCOL 3350 17 G/17G
17 POWDER, FOR SOLUTION ORAL 3 TIMES DAILY
Status: DISCONTINUED | OUTPATIENT
Start: 2024-06-07 | End: 2024-06-11

## 2024-06-07 RX ORDER — VENLAFAXINE 37.5 MG/1
37.5 TABLET ORAL 2 TIMES DAILY WITH MEALS
Status: DISCONTINUED | OUTPATIENT
Start: 2024-06-07 | End: 2024-06-13

## 2024-06-07 RX ORDER — POTASSIUM CHLORIDE 29.8 MG/ML
20 INJECTION INTRAVENOUS ONCE
Status: COMPLETED | OUTPATIENT
Start: 2024-06-07 | End: 2024-06-07

## 2024-06-07 RX ORDER — POTASSIUM CHLORIDE 20MEQ/15ML
40 LIQUID (ML) ORAL ONCE
Status: COMPLETED | OUTPATIENT
Start: 2024-06-07 | End: 2024-06-07

## 2024-06-07 RX ORDER — FUROSEMIDE 10 MG/ML
40 INJECTION INTRAMUSCULAR; INTRAVENOUS ONCE
Status: COMPLETED | OUTPATIENT
Start: 2024-06-07 | End: 2024-06-07

## 2024-06-07 RX ORDER — BISACODYL 10 MG
10 SUPPOSITORY, RECTAL RECTAL ONCE
Status: COMPLETED | OUTPATIENT
Start: 2024-06-07 | End: 2024-06-07

## 2024-06-07 RX ORDER — ATROPINE SULFATE 0.1 MG/ML
INJECTION INTRAVENOUS
Status: DISCONTINUED
Start: 2024-06-07 | End: 2024-06-07 | Stop reason: HOSPADM

## 2024-06-07 RX ORDER — FENTANYL CITRATE 50 UG/ML
INJECTION, SOLUTION INTRAMUSCULAR; INTRAVENOUS PRN
Status: DISCONTINUED | OUTPATIENT
Start: 2024-06-07 | End: 2024-06-08

## 2024-06-07 RX ORDER — PROPOFOL 10 MG/ML
INJECTION, EMULSION INTRAVENOUS PRN
Status: DISCONTINUED | OUTPATIENT
Start: 2024-06-07 | End: 2024-06-11

## 2024-06-07 RX ORDER — METOLAZONE 5 MG/1
5 TABLET ORAL ONCE
Status: COMPLETED | OUTPATIENT
Start: 2024-06-07 | End: 2024-06-07

## 2024-06-07 RX ORDER — FUROSEMIDE 10 MG/ML
40 INJECTION INTRAMUSCULAR; INTRAVENOUS EVERY 6 HOURS
Status: COMPLETED | OUTPATIENT
Start: 2024-06-07 | End: 2024-06-07

## 2024-06-07 RX ORDER — POTASSIUM CHLORIDE 29.8 MG/ML
20 INJECTION INTRAVENOUS
Status: COMPLETED | OUTPATIENT
Start: 2024-06-07 | End: 2024-06-07

## 2024-06-07 RX ADMIN — HEPARIN SODIUM 5000 UNITS: 5000 INJECTION, SOLUTION INTRAVENOUS; SUBCUTANEOUS at 19:40

## 2024-06-07 RX ADMIN — QUETIAPINE FUMARATE 50 MG: 50 TABLET ORAL at 07:51

## 2024-06-07 RX ADMIN — FENTANYL CITRATE 100 MCG: 50 INJECTION INTRAMUSCULAR; INTRAVENOUS at 18:10

## 2024-06-07 RX ADMIN — ACETAMINOPHEN 650 MG: 325 TABLET, FILM COATED ORAL at 04:32

## 2024-06-07 RX ADMIN — PROPOFOL 20 MG: 10 INJECTION, EMULSION INTRAVENOUS at 18:11

## 2024-06-07 RX ADMIN — PROPOFOL 20 MG: 10 INJECTION, EMULSION INTRAVENOUS at 18:20

## 2024-06-07 RX ADMIN — CHLORHEXIDINE GLUCONATE 0.12% ORAL RINSE 15 ML: 1.2 LIQUID ORAL at 08:01

## 2024-06-07 RX ADMIN — AMIODARONE HYDROCHLORIDE 400 MG: 200 TABLET ORAL at 19:41

## 2024-06-07 RX ADMIN — PROPOFOL 50 MCG/KG/MIN: 10 INJECTION, EMULSION INTRAVENOUS at 14:13

## 2024-06-07 RX ADMIN — PROPOFOL 20 MG: 10 INJECTION, EMULSION INTRAVENOUS at 18:12

## 2024-06-07 RX ADMIN — METOLAZONE 5 MG: 5 TABLET ORAL at 09:38

## 2024-06-07 RX ADMIN — POTASSIUM & SODIUM PHOSPHATES POWDER PACK 280-160-250 MG 2 PACKET: 280-160-250 PACK at 04:35

## 2024-06-07 RX ADMIN — Medication 10 MG: at 21:38

## 2024-06-07 RX ADMIN — HEPARIN SODIUM 5000 UNITS: 5000 INJECTION, SOLUTION INTRAVENOUS; SUBCUTANEOUS at 04:32

## 2024-06-07 RX ADMIN — VENLAFAXINE 37.5 MG: 37.5 TABLET ORAL at 19:44

## 2024-06-07 RX ADMIN — Medication 100 MCG: at 16:08

## 2024-06-07 RX ADMIN — FUROSEMIDE 40 MG: 10 INJECTION, SOLUTION INTRAVENOUS at 09:58

## 2024-06-07 RX ADMIN — Medication 150 MCG/HR: at 19:26

## 2024-06-07 RX ADMIN — ASPIRIN 81 MG CHEWABLE TABLET 81 MG: 81 TABLET CHEWABLE at 07:51

## 2024-06-07 RX ADMIN — ACETYLCYSTEINE 2 ML: 200 SOLUTION ORAL; RESPIRATORY (INHALATION) at 15:52

## 2024-06-07 RX ADMIN — ALBUTEROL SULFATE 2.5 MG: 2.5 SOLUTION RESPIRATORY (INHALATION) at 15:52

## 2024-06-07 RX ADMIN — POTASSIUM CHLORIDE 20 MEQ: 29.8 INJECTION, SOLUTION INTRAVENOUS at 05:48

## 2024-06-07 RX ADMIN — Medication 100 MCG/HR: at 01:13

## 2024-06-07 RX ADMIN — AMIODARONE HYDROCHLORIDE 400 MG: 200 TABLET ORAL at 07:51

## 2024-06-07 RX ADMIN — Medication 100 MCG: at 13:48

## 2024-06-07 RX ADMIN — ACETYLCYSTEINE 2 ML: 200 SOLUTION ORAL; RESPIRATORY (INHALATION) at 07:53

## 2024-06-07 RX ADMIN — ACETYLCYSTEINE 2 ML: 200 SOLUTION ORAL; RESPIRATORY (INHALATION) at 12:08

## 2024-06-07 RX ADMIN — ALBUTEROL SULFATE 2.5 MG: 2.5 SOLUTION RESPIRATORY (INHALATION) at 12:08

## 2024-06-07 RX ADMIN — PROPOFOL 50 MCG/KG/MIN: 10 INJECTION, EMULSION INTRAVENOUS at 07:48

## 2024-06-07 RX ADMIN — POLYETHYLENE GLYCOL 3350 17 G: 17 POWDER, FOR SOLUTION ORAL at 07:51

## 2024-06-07 RX ADMIN — ACETAMINOPHEN 650 MG: 325 TABLET, FILM COATED ORAL at 00:49

## 2024-06-07 RX ADMIN — Medication 15 ML: at 07:51

## 2024-06-07 RX ADMIN — POTASSIUM & SODIUM PHOSPHATES POWDER PACK 280-160-250 MG 2 PACKET: 280-160-250 PACK at 00:34

## 2024-06-07 RX ADMIN — FUROSEMIDE 40 MG: 10 INJECTION, SOLUTION INTRAVENOUS at 02:24

## 2024-06-07 RX ADMIN — POTASSIUM CHLORIDE 20 MEQ: 29.8 INJECTION, SOLUTION INTRAVENOUS at 19:07

## 2024-06-07 RX ADMIN — Medication 40 MG: at 07:52

## 2024-06-07 RX ADMIN — CHLORHEXIDINE GLUCONATE 0.12% ORAL RINSE 15 ML: 1.2 LIQUID ORAL at 19:40

## 2024-06-07 RX ADMIN — CALCIUM GLUCONATE 1 G: 20 INJECTION, SOLUTION INTRAVENOUS at 04:07

## 2024-06-07 RX ADMIN — FENTANYL CITRATE 100 MCG: 50 INJECTION INTRAMUSCULAR; INTRAVENOUS at 21:37

## 2024-06-07 RX ADMIN — TICAGRELOR 90 MG: 90 TABLET ORAL at 19:41

## 2024-06-07 RX ADMIN — PROPOFOL 40 MCG/KG/MIN: 10 INJECTION, EMULSION INTRAVENOUS at 21:35

## 2024-06-07 RX ADMIN — ALBUTEROL SULFATE 2.5 MG: 2.5 SOLUTION RESPIRATORY (INHALATION) at 20:35

## 2024-06-07 RX ADMIN — OXYCODONE HYDROCHLORIDE 10 MG: 10 TABLET ORAL at 19:41

## 2024-06-07 RX ADMIN — OXYCODONE HYDROCHLORIDE 5 MG: 5 TABLET ORAL at 16:19

## 2024-06-07 RX ADMIN — FUROSEMIDE 40 MG: 10 INJECTION, SOLUTION INTRAVENOUS at 15:54

## 2024-06-07 RX ADMIN — INSULIN HUMAN 3 UNITS/HR: 1 INJECTION, SOLUTION INTRAVENOUS at 17:26

## 2024-06-07 RX ADMIN — FENTANYL CITRATE 50 MCG: 50 INJECTION INTRAMUSCULAR; INTRAVENOUS at 18:18

## 2024-06-07 RX ADMIN — ACETAMINOPHEN 650 MG: 325 TABLET, FILM COATED ORAL at 13:14

## 2024-06-07 RX ADMIN — PROPOFOL 40 MCG/KG/MIN: 10 INJECTION, EMULSION INTRAVENOUS at 11:13

## 2024-06-07 RX ADMIN — PROPOFOL 20 MCG/KG/MIN: 10 INJECTION, EMULSION INTRAVENOUS at 01:15

## 2024-06-07 RX ADMIN — POTASSIUM CHLORIDE 40 MEQ: 20 SOLUTION ORAL at 15:51

## 2024-06-07 RX ADMIN — POTASSIUM CHLORIDE 20 MEQ: 29.8 INJECTION, SOLUTION INTRAVENOUS at 04:41

## 2024-06-07 RX ADMIN — TICAGRELOR 90 MG: 90 TABLET ORAL at 07:51

## 2024-06-07 RX ADMIN — PIPERACILLIN SODIUM AND TAZOBACTAM SODIUM 4.5 G: 4; .5 INJECTION, POWDER, LYOPHILIZED, FOR SOLUTION INTRAVENOUS at 00:02

## 2024-06-07 RX ADMIN — QUETIAPINE FUMARATE 100 MG: 100 TABLET ORAL at 19:40

## 2024-06-07 RX ADMIN — DOCUSATE SODIUM 50 MG AND SENNOSIDES 8.6 MG 2 TABLET: 8.6; 5 TABLET, FILM COATED ORAL at 07:51

## 2024-06-07 RX ADMIN — VENLAFAXINE 37.5 MG: 37.5 TABLET ORAL at 13:14

## 2024-06-07 RX ADMIN — ALBUTEROL SULFATE 2.5 MG: 2.5 SOLUTION RESPIRATORY (INHALATION) at 07:53

## 2024-06-07 RX ADMIN — PROPOFOL 40 MCG/KG/MIN: 10 INJECTION, EMULSION INTRAVENOUS at 17:28

## 2024-06-07 RX ADMIN — ACETYLCYSTEINE 2 ML: 200 SOLUTION ORAL; RESPIRATORY (INHALATION) at 20:35

## 2024-06-07 RX ADMIN — NOREPINEPHRINE BITARTRATE 0.04 MCG/KG/MIN: 0.06 INJECTION, SOLUTION INTRAVENOUS at 06:41

## 2024-06-07 ASSESSMENT — ACTIVITIES OF DAILY LIVING (ADL)
ADLS_ACUITY_SCORE: 32
ADLS_ACUITY_SCORE: 35
ADLS_ACUITY_SCORE: 32
ADLS_ACUITY_SCORE: 34
ADLS_ACUITY_SCORE: 32
ADLS_ACUITY_SCORE: 34
ADLS_ACUITY_SCORE: 32
ADLS_ACUITY_SCORE: 31
ADLS_ACUITY_SCORE: 32
ADLS_ACUITY_SCORE: 35
ADLS_ACUITY_SCORE: 32
ADLS_ACUITY_SCORE: 35
ADLS_ACUITY_SCORE: 35

## 2024-06-07 NOTE — PROCEDURES
[facility]  CAPS PROCEDURE NOTE  Date of Admission:  6/1/2024    Cardiac ICU Procedure Note  Procedure:  Bronchoscopy  Indication:  Acute hypoxia  Performed by: Mary  Attending Provider:  Dr. Cobb  Medications: Propofol, fentanyl  Time Out:  Performed     The patient's medical record has been reviewed.  The necessary history and physical examination was performed.  Consent was obtained and in chart The proposed procedure and the patient's identification were verified prior to the procedure by the physician and the nurse.      The patient was assessed for the adequacy for the procedure and to receive medications.   Mental Status:  Intubated and sedated  Airway examination: ETT  Pulmonary:  Diminished breath sounds b/l  CV:  RRR  ASA rdGrdrrdarddrderd:rd rd3rd The patient was too unstable to move to a negative pressure room for the procedure.  The bronchoscopy was performed at the bedside.      Immediately before administration of medications the patient was re-assessed for adequacy to receive sedatives including the heart rate, respiratory rate, mental status, oxygen saturation, blood pressure and adequacy of pulmonary ventilation. These same parameters were continuously monitored throughout the procedure.      Maneuvers / Procedure:   The bronchoscope was inserted through the mouth via ETT.  The cords were anesthetized with lidocaine. A complete airway examination was performed from the distal trachea to the subsegmental level in each lobe of both lungs. There were no endobronchial lesion. The mucosa appears friable diffusely. No obvious masses or debris noted. BAL sample acquired from RLL. The fluid sample appeared serous.     Jonathan Hernandez MD  Cardiology fellow ([id number])  [phone]

## 2024-06-07 NOTE — PROGRESS NOTES
"Cullen Reardon is a 48 year old male with no known past medical history. He presented to [facility] on 6/1/2024 due to chest pain. Per chart review, \"he collapsed in front of the triage desk and was found to be pulseless after saying \"I feel like I'm going to die\". Bystander CPR was started he was shocked x3 due to Vfib. Estimated downtime was 15 min. He was brought to the cath lab and had a 100% ostial LAD occlusion which was stented and cannulated on ECMO. Noted to have severe D1 and OM2 disease which will likely need further intervention. He was transferred to [facility] 6/1. Palliative was consulted routinely due to ECMO cannulation.     Decannulated overnight on 6/4. Normal BiV function noted on ALEXANDER during decannulation with trace/mild TR and trace MR/AI.     Remains on Fentanyl and insulin drips. During sedation vacation, he became agitated needing restraints for safety. Sedation was adjusted and changed from Versed to Propofol. Variable Levophed doses needed and currently at 0.08.     Cullen is clinically progressing. S/p staged PCI of other coronary lesions 6/6. Monitoring for any infections and gradually weaning off vent/sedation. No overt palliative medical needs at this time.     Appreciate continued PCSW intermittent check-ins for family support.    Patient was not seen. This is a non-billable note.    Wenceslao Gillette DO / Internal and Palliative Medicine   Securely message with the Vocera Web Console (learn more here)   Text page via [employer] Paging/Directory   "

## 2024-06-07 NOTE — PROGRESS NOTES
"Bronchoscopy Risk Assessment Guidelines      A. Patient symptoms to consider when assessing pulmonary TB risk are:    I. Cough greater than 3 weeks; and fever, hemoptysis, pleuritic chest    pain, weight loss greater than 10 lbs, night sweats, fatigue, infiltrates on    upper lobes or superior segments of lower lobes, cavitation on chest    x-ray.   B. Patient risk factors to consider when assessing pulmonary TB risk are:    I. Exposure to known TB case, foreign-born persons (within 5 years of    arrival to [place]), residence in a crowded setting (correctional facility,     long-term care center, etc.), persons with HIV or immunosuppression.    Patients with symptoms and risk factors should generally be considered \"suspect risk\" and bronchoscopies should be performed in airborne precautions.    This patient has NO KNOWN RISK of Tuberculosis (proceed with bronchoscopy)    Specimens sent: yes  Complications: None  Scope used: #[id number] Adult  Attending Physician: Dr. Reza Liz, RT on 6/7/2024 at 6:38 PM  "

## 2024-06-07 NOTE — PROGRESS NOTES
CLINICAL NUTRITION SERVICES    Reason for Assessment:  Heart-healthy nutrition education, received consult.    Diet History:  No history of receiving heart-healthy nutrition education in the past per chart review.  However, pt remains intubated, sedated on EN support to meet nutrition needs. Not appropriate for education at this time.     Clinical Nutrition will attempt heart healthy diet education when more appropriate.    Jade Tomlin RD, LD  Available on Vocera - can search by name or unit Dietitian  **Clinical Nutrition is no longer available via pager

## 2024-06-07 NOTE — PROGRESS NOTES
Pt follows commands, RUDD, is calm and cooperative.       SHIFT EVENTS:    AnOx2.     Fever resolved with Tylenol administration.  Remained on HFNC at 40%, 30L.  Passed bedside swallow eval.    Patient carried out of bed to chair.       PLAN:     Titrate oxygen  Get up and get moving. Progressive exercise.  Diet orders?  Determine if Haldol, Seroquel, Oxycodone, etc still necessary for this patient?

## 2024-06-07 NOTE — PROGRESS NOTES
"  [facility]  Cardiology ICU History & Physical  June 1, 2024            Assessment and Plan:   Cullen Reardon is a 48 year old male with no known past medical history who was admitted on 6/1/2024 following a witnessed VF cardiac arrest.    He presented to [facility] on 6/1/2024 with chest pain.  He collapsed in front of the triage desk and was found to be pulseless after saying \"I feel like I'm going to die\".  He received immediate bystander CPR in the waiting room of the emergency department.  He was found be in VF, was shocked x 3.  Estimated downtime was approximately 15 minutes per paramedic report.  Was cannulated in [facility] emergency department and was on circuit at 16:08.  He was brought to the Cath Lab for coronary angiogram where he was found to have a 100% ostial LAD thrombotic occlusion status post stenting.  He was residual severe disease in D1 and OM2 that will need staged intervention. He was subsequently brought to [facility] for further care.    Patient was following commands on arrival. Currently clinically improving and off pressors. Will plan for stagesd PCI, wean vent then GDMT    Interval changes   No acute events overnight  Completed staged PCI to diag  Switched sedation from midazolam to propofol  Increasing O2 requiriements overnight  Febrile overnight to 38.3    Changes today   - Diuresis with lasix and metolazone, FBG (-) 2 L  - Continue high PEEP, wean FiO2 as tolerated  - Consider bronchoscopy if unable to wean vent  - Repeat cultures give fever    Neuro: #. At risk for anoxic brain injury  --Intubated and sedated. Wean sedation daily to assess neurological status.   --Maintain euthermia/warm by 0.5 C/hr to get to 37C  --continue propofol, fentanyl  --Seroquel for delirium  --RASS goal -3 to -4   --CT head : No acute issues.     CV: #. Cardiogenic Shock s/p VA ECMO  #. ACUTE STEMI s/p KAYLYN to ostial LAD 6/1/24   #. Refractory VF arrest  s/p VA " ECMO  --Peripheral V-A ECMO inserted via RCFA (17 Fr) and RCFV (25 Fr).  -- EF 15%  --FBG (-) 2-3 L, 49 mg lasix BID, 5 mg metolazone  --Continue ASA 81mg and ticagrelor 90mg BID  -- GDMT and Lipitor pending clinical course      Pulm: #. Acute hypoxic respiratory failure  #. Probable aspiration pneumonia  Vent Mode: PCV Plus assist  (Pressure Control Ventilation/ Assist Control) (PC/SIMV)  FiO2 (%): 90 %  Resp Rate (Set): 12 breaths/min  Tidal Volume (Set, mL): 550 mL  PEEP (cm H2O): 12 cmH2O  Pressure Support (cm H2O): 5 cmH2O  Inspiratory Pressure (cm H2O) (Drager Myah): 30  Resp: 16    --ETT in stable position    --CXR: Lines in stable position.   --Wean vent as able  --Daily CXR  --MRSA nares - neg, vanco stopped 6/1-6/2.  --Q12h ABGs for now     GI: #. Shock Liver 2/2 cardiac arrest  --Monitor LFTs twice a day.  --Consider RUQ US if LFTs do not improve.     Renal: #. JEAN CLAUDE 2/2 cardiac arrest  Hematuria 2/2 traumatic schafer insertion   --Monitor urine output  --Maintain K>3 and Mg>2      ID: #. Probable aspiration pneumonia  - Ctx x5 days 6/1- 6/5  - Febrile to 38.3  - Zosyn 6/5- 6/7  --Repeat pan cx     Hem/Onc: #. Acute blood loss anemia due to ECMO cannulas.  --Continue Aspirin and Tacagrelor for the stent.  --Cryo PRN fibrinogen < 150; FFP for INR >2  --Transfuse for Hgb<7  --US LE w/ arterial duplex per ECMO protocol   --DVT PPX: STEVE     Endo: #. Hyperglycemia  --Insulin as needed.  --HgbA1c - 6.1.     Checklist: ICU Checklist:  #Feeding: tube feeds at goal  #Analgesia: propofol   #Sedation: fentanyl  #Thromboembolism ppx: heparin sq   #HOB: 30 degrees   #Ulcer ppx: ppi  #Glucose: insulin gtt   #SBT/SAT: as tolerated  #Bowel reg: miralax, senna   #Lines/tubes/drains: 6 Fr LCFA sheath, schafer, ETT, OG  #Code status: full   #Family: will update as able this evening   #Dispo: ICU          Code Status: Full    The pt was discussed with the attending physician. VJ Suh [facility]  "Heart Care  ICU Cardiology-CICU Service  Send message or 10 digit call back number Securely via [employer] with the [employer] Web Console (learn more here)         Medications:   I have reviewed this patient's current medications    No past medical history on file.    No family history on file.  Social History     Socioeconomic History    Marital status:      Spouse name: Not on file    Number of children: Not on file    Years of education: Not on file    Highest education level: Not on file   Occupational History    Not on file   Tobacco Use    Smoking status: Not on file    Smokeless tobacco: Not on file   Substance and Sexual Activity    Alcohol use: Not on file    Drug use: Not on file    Sexual activity: Not on file   Other Topics Concern    Not on file   Social History Narrative    Not on file     Social Determinants of Health     Financial Resource Strain: Not At Risk (7/31/2023)    Received from [facility]    Financial Resource Strain     Is it hard for you to pay for the very basics like food, housing, medical care or heating?: No   Food Insecurity: Not At Risk (7/31/2023)    Received from [facility]    Food Insecurity     Does your food run out before you have the money to buy more?: No   Transportation Needs: Not At Risk (7/31/2023)    Received from [facility]    Transportation Needs     Does a lack of transportation keep you from your medical appointments or from getting your medications?: No   Physical Activity: Not on file   Stress: Not on file   Social Connections: Not on file   Interpersonal Safety: Not on file   Housing Stability: Not on file            Review of Systems:   Unable to obtain due to patients medical condition.            Physical Exam:   BP 92/60   Pulse 89   Temp 99.1  F (37.3  C)   Resp 16   Ht 1.886 m (6' 2.25\")   Wt 127.8 kg (281 lb 12 oz)   SpO2 96%   BMI 35.93 kg/m        GENERAL: Intubated, sedated. NAD.  HEENT: No icterus. ETT in place, OG tube in " "place.  CARDIOVASCULAR: RRR. Normal S1 and S2.  RESP: Coarse bilaterally. Mechanical ventilation.    GI Soft, bowel sounds hypoactive but present.  GENITOURINARY: Benjamin in place.  EXTREMITIES: Cool, 1+ edema, pulses dopplered, as above.   NEURO: Sedated and intubated.  INTEGUMENTARY: No rashes. Cannula/Line sites CDI.      Resp: 16 SpO2: 96 % O2 Device: Mechanical Ventilator      Arterial Blood Gas:   Recent Labs   Lab 06/07/24  0343 06/06/24 2002 06/06/24  1537 06/06/24  1525   PH 7.49* 7.56* 7.49* 7.50*   PCO2 39 33* 44 42   PO2 76* 72* 79* 55*   HCO3 30* 30* 33* 33*   O2PER 90 80 100.0 70.0     Vitals:    06/05/24 0000 06/06/24 0000 06/07/24 0000   Weight: 128 kg (282 lb 3 oz) 126.7 kg (279 lb 5.2 oz) 127.8 kg (281 lb 12 oz)   I/O last 3 completed shifts:  In: 4036.56 [I.V.:1346.56; NG/GT:720; IV Piggyback:500]  Out: 3465 [Urine:3465]  Recent Labs   Lab 06/07/24  0947 06/07/24  0812 06/07/24  0811 06/07/24  0345 06/07/24 0344 06/06/24 2004 06/06/24 2003   NA  --   --   --   --  147*  --  149*   POTASSIUM  --  3.9  --   --  3.4  3.4  --  3.3*  3.3*   CHLORIDE  --   --   --   --  110*  --  111*   CO2  --   --   --   --  27  --  27   ANIONGAP  --   --   --   --  10  --  11   *  --  127*   < > 152*   < > 169*   BUN  --   --   --   --  34.2*  --  34.6*   CR  --   --   --   --  1.21*  --  1.22*   RANDA  --   --   --   --  8.2*  --  8.1*    < > = values in this interval not displayed.     No components found for: \"URINE\"   Recent Labs   Lab 06/07/24  0344 06/06/24  1409 06/06/24  0354   AST 71* 83* 104*   ALT 70 72* 86*   BILITOTAL 0.7 0.7 0.7   ALBUMIN 3.1* 2.9* 3.1*   PROTTOTAL 5.8* 5.4* 5.8*   ALKPHOS 82 77 67     Temp: 99.1  F (37.3  C) Temp src: BladderTemp  Min: 99  F (37.2  C)  Max: 100.9  F (38.3  C)   Recent Labs   Lab 06/07/24  0344 06/06/24 2003 06/06/24  1537 06/06/24  1525 06/06/24  1409 06/06/24  0354 06/05/24 2007   WBC 9.7 10.0  --   --  8.8 7.8 8.8   HGB 9.7* 9.5* 9.2* 9.6* 9.3* 10.1* 10.8* "   HCT 29.2* 27.8*  --   --  27.2* 29.9* 32.9*   MCV 93 91  --   --  91 93 94   RDW 14.4 13.9  --   --  13.6 13.7 13.6    169  --   --  160 150 159     Recent Labs   Lab 06/07/24  0344 06/06/24  0354 06/05/24  0343 06/04/24  2326 06/04/24  1620 06/04/24  1015 06/04/24  0346   INR 1.15 1.16* 1.11 1.21* 1.18* 1.23* 1.19*   PTT  --   --  29 42* 93* 100* 109*     Recent Labs   Lab 06/07/24  0947 06/07/24  0811 06/07/24  0547 06/07/24  0345 06/07/24  0344   * 127* 132* 139* 152*       Lines:           Data:     Recent Results (from the past 24 hour(s))   Cardiac Catheterization    Narrative      1st Diag lesion is 60% stenosed.    Single vessel CAD with patent proximal LAD with prior stent in place and   PCI with KAYLYN x 2 into a large D1 with hemodynamically significant (iFR   0.82) intermediate lesion proximal D1  Successful PCI with placement of two KAYLYN 3.0 x 20 mm and 2,5 x 12 mm into   proximal D1. There was an excellent angiographic result and SARATH 3 flow.   Uncomplicated left femoral access with angiography demonstrating an   appropriate insertion of the arterial sheath in the common femoral a above   the bifurcation. Given that access site was above the level of low take   off of inf epigastric artery, no closure device was used. Left 6 Fr   femoral sheath was left in place to be removed once ACT is at goal per   protocol.    XR Chest Port 1 View    Narrative    XR CHEST PORT 1 VIEW  6/7/2024 2:14 AM     HISTORY:  Post-op pneumonia       COMPARISON:  Chest radiograph 6/6/2024    TECHNIQUE: Portable, supine, frontal projection radiograph of the  chest.    FINDINGS:   Stable position of ET tube, left-sided PICC, and gastric tube.     Trachea is midline. Stable cardiomediastinal silhouette. Silhouetting  of the right lateral hemidiaphragm has increased. No pneumothorax.  Bibasilar and bilateral perihilar patchy opacities are relatively  increased. Upper abdomen and osseous structures are within  normal  limits.      Impression    IMPRESSION:  Increased bilateral patchy opacities, could represent any combination  of atelectasis, edema, or infection. Stable support devices.    I have personally reviewed the examination and initial interpretation  and I agree with the findings.    BLOSSOM CASTANEDA MD         SYSTEM ID:  [id number]

## 2024-06-07 NOTE — PLAN OF CARE
Goal Outcome Evaluation:    Major Shift Events:  Patient continuing to have challenges with sedation/agitation and oxygenation.  Attempted to wean sedation, however patient becomes very agitated (pulling on restraints, sitting up in bed.)  FiO2 60-90% this shift.  Patient desats with agitation episodes.  Levo weaned off this shift.  No ectopy noted on ECG.  Tolerating tube feeds,  Free water increased to 60ml q4h.  Stable insulin needs.  Large urine output with diuresis.  Left fem Art sheath removed without incident.  Bronch performed by MD at the end of shift.  Patient tolerated moderately well.      Plan: decrease sedation and FiO2 as able.  Increase mobility as tolerated.      For vital signs and complete assessments, please see documentation flowsheets.           Plan of Care Reviewed With: family    Overall Patient Progress: improvingOverall Patient Progress: improving

## 2024-06-07 NOTE — PROGRESS NOTES
Shift Summary:    Pt remained intubated and mechanically ventilated on the following vent settings:    Vent Mode: Other (see comments) (SIMV/PC)  FiO2 (%): 100 %  Resp Rate (Set): 12 breaths/min  PEEP (cm H2O): 7 cmH2O  Pressure Support (cm H2O): 5 cmH2O  Inspiratory Pressure (cm H2O) (Drager Myah): 30      PST:  Pt not placed on PS today.  Upon arrival back from Cath lab desaturated to 80s and needed a PEEP 14 for a short period of time.    Assessment: BS coarse bilaterally. RT suctioned moderate of thick,tenacious ede and tan.    Therapy: Pt tolerated nebs with no issues .    Ambu bag, mask, and valve present at bedside.   RT will continue to follow and monitor pt as appropriate.     Farrah Brock, RT on 6/6/2024 at 7:04 PM

## 2024-06-08 ENCOUNTER — APPOINTMENT (OUTPATIENT)
Dept: CARDIOLOGY | Facility: CLINIC | Age: 49
DRG: 003 | End: 2024-06-08
Attending: STUDENT IN AN ORGANIZED HEALTH CARE EDUCATION/TRAINING PROGRAM
Payer: COMMERCIAL

## 2024-06-08 ENCOUNTER — APPOINTMENT (OUTPATIENT)
Dept: GENERAL RADIOLOGY | Facility: CLINIC | Age: 49
DRG: 003 | End: 2024-06-08
Attending: SURGERY
Payer: COMMERCIAL

## 2024-06-08 LAB
ALBUMIN SERPL BCG-MCNC: 3.1 G/DL (ref 3.5–5.2)
ALBUMIN SERPL BCG-MCNC: 3.2 G/DL (ref 3.5–5.2)
ALLEN'S TEST: ABNORMAL
ALLEN'S TEST: ABNORMAL
ALP SERPL-CCNC: 106 U/L (ref 40–150)
ALP SERPL-CCNC: 96 U/L (ref 40–150)
ALT SERPL W P-5'-P-CCNC: 74 U/L (ref 0–70)
ALT SERPL W P-5'-P-CCNC: 79 U/L (ref 0–70)
ANION GAP SERPL CALCULATED.3IONS-SCNC: 10 MMOL/L (ref 7–15)
ANION GAP SERPL CALCULATED.3IONS-SCNC: 11 MMOL/L (ref 7–15)
AST SERPL W P-5'-P-CCNC: 67 U/L (ref 0–45)
AST SERPL W P-5'-P-CCNC: 68 U/L (ref 0–45)
BACTERIA BLD CULT: NO GROWTH
BASE EXCESS BLDA CALC-SCNC: 10.9 MMOL/L (ref -3–3)
BASE EXCESS BLDA CALC-SCNC: 8.4 MMOL/L (ref -3–3)
BILIRUB SERPL-MCNC: 0.8 MG/DL
BILIRUB SERPL-MCNC: 0.8 MG/DL
BUN SERPL-MCNC: 40.4 MG/DL (ref 6–20)
BUN SERPL-MCNC: 40.9 MG/DL (ref 6–20)
CA-I BLD-MCNC: 4.5 MG/DL (ref 4.4–5.2)
CALCIUM SERPL-MCNC: 8.5 MG/DL (ref 8.6–10)
CALCIUM SERPL-MCNC: 8.6 MG/DL (ref 8.6–10)
CHLORIDE SERPL-SCNC: 104 MMOL/L (ref 98–107)
CHLORIDE SERPL-SCNC: 106 MMOL/L (ref 98–107)
COHGB MFR BLD: 95.2 % (ref 96–97)
COHGB MFR BLD: 97.1 % (ref 96–97)
CREAT SERPL-MCNC: 1.17 MG/DL (ref 0.67–1.17)
CREAT SERPL-MCNC: 1.27 MG/DL (ref 0.67–1.17)
DEPRECATED HCO3 PLAS-SCNC: 29 MMOL/L (ref 22–29)
DEPRECATED HCO3 PLAS-SCNC: 32 MMOL/L (ref 22–29)
EGFRCR SERPLBLD CKD-EPI 2021: 69 ML/MIN/1.73M2
EGFRCR SERPLBLD CKD-EPI 2021: 76 ML/MIN/1.73M2
ERYTHROCYTE [DISTWIDTH] IN BLOOD BY AUTOMATED COUNT: 15.3 % (ref 10–15)
ERYTHROCYTE [DISTWIDTH] IN BLOOD BY AUTOMATED COUNT: 15.5 % (ref 10–15)
GLUCOSE BLDC GLUCOMTR-MCNC: 110 MG/DL (ref 70–99)
GLUCOSE BLDC GLUCOMTR-MCNC: 115 MG/DL (ref 70–99)
GLUCOSE BLDC GLUCOMTR-MCNC: 125 MG/DL (ref 70–99)
GLUCOSE BLDC GLUCOMTR-MCNC: 135 MG/DL (ref 70–99)
GLUCOSE BLDC GLUCOMTR-MCNC: 137 MG/DL (ref 70–99)
GLUCOSE BLDC GLUCOMTR-MCNC: 139 MG/DL (ref 70–99)
GLUCOSE BLDC GLUCOMTR-MCNC: 141 MG/DL (ref 70–99)
GLUCOSE BLDC GLUCOMTR-MCNC: 141 MG/DL (ref 70–99)
GLUCOSE BLDC GLUCOMTR-MCNC: 145 MG/DL (ref 70–99)
GLUCOSE SERPL-MCNC: 143 MG/DL (ref 70–99)
GLUCOSE SERPL-MCNC: 173 MG/DL (ref 70–99)
HCO3 BLD-SCNC: 34 MMOL/L (ref 21–28)
HCO3 BLD-SCNC: 36 MMOL/L (ref 21–28)
HCT VFR BLD AUTO: 29.2 % (ref 40–53)
HCT VFR BLD AUTO: 29.8 % (ref 40–53)
HGB BLD-MCNC: 9.7 G/DL (ref 13.3–17.7)
HGB BLD-MCNC: 9.9 G/DL (ref 13.3–17.7)
INR PPP: 1.09 (ref 0.85–1.15)
LACTATE SERPL-SCNC: 0.9 MMOL/L (ref 0.7–2)
LACTATE SERPL-SCNC: 1.1 MMOL/L (ref 0.7–2)
LACTATE SERPL-SCNC: 1.5 MMOL/L (ref 0.7–2)
LACTATE SERPL-SCNC: 1.7 MMOL/L (ref 0.7–2)
LVEF ECHO: NORMAL
MAGNESIUM SERPL-MCNC: 2.6 MG/DL (ref 1.7–2.3)
MCH RBC QN AUTO: 31.4 PG (ref 26.5–33)
MCH RBC QN AUTO: 31.6 PG (ref 26.5–33)
MCHC RBC AUTO-ENTMCNC: 33.2 G/DL (ref 31.5–36.5)
MCHC RBC AUTO-ENTMCNC: 33.2 G/DL (ref 31.5–36.5)
MCV RBC AUTO: 95 FL (ref 78–100)
MCV RBC AUTO: 95 FL (ref 78–100)
O2/TOTAL GAS SETTING VFR VENT: 70 %
O2/TOTAL GAS SETTING VFR VENT: 80 %
PCO2 BLD: 49 MM HG (ref 35–45)
PCO2 BLD: 49 MM HG (ref 35–45)
PEEP: 12 CM H2O
PEEP: 12 CM H2O
PH BLD: 7.45 [PH] (ref 7.35–7.45)
PH BLD: 7.47 [PH] (ref 7.35–7.45)
PHOSPHATE SERPL-MCNC: 4.6 MG/DL (ref 2.5–4.5)
PLATELET # BLD AUTO: 210 10E3/UL (ref 150–450)
PLATELET # BLD AUTO: 215 10E3/UL (ref 150–450)
PO2 BLD: 71 MM HG (ref 80–105)
PO2 BLD: 84 MM HG (ref 80–105)
POTASSIUM SERPL-SCNC: 3.5 MMOL/L (ref 3.4–5.3)
POTASSIUM SERPL-SCNC: 3.8 MMOL/L (ref 3.4–5.3)
PROT SERPL-MCNC: 6.1 G/DL (ref 6.4–8.3)
PROT SERPL-MCNC: 6.2 G/DL (ref 6.4–8.3)
RBC # BLD AUTO: 3.09 10E6/UL (ref 4.4–5.9)
RBC # BLD AUTO: 3.13 10E6/UL (ref 4.4–5.9)
SAO2 % BLDA: 93 % (ref 92–100)
SAO2 % BLDA: 95 % (ref 92–100)
SODIUM SERPL-SCNC: 146 MMOL/L (ref 135–145)
SODIUM SERPL-SCNC: 146 MMOL/L (ref 135–145)
WBC # BLD AUTO: 11 10E3/UL (ref 4–11)
WBC # BLD AUTO: 9.2 10E3/UL (ref 4–11)

## 2024-06-08 PROCEDURE — 999N000208 ECHOCARDIOGRAM LIMITED

## 2024-06-08 PROCEDURE — 250N000013 HC RX MED GY IP 250 OP 250 PS 637: Performed by: STUDENT IN AN ORGANIZED HEALTH CARE EDUCATION/TRAINING PROGRAM

## 2024-06-08 PROCEDURE — 84100 ASSAY OF PHOSPHORUS: CPT | Performed by: SURGERY

## 2024-06-08 PROCEDURE — 93321 DOPPLER ECHO F-UP/LMTD STD: CPT | Mod: 26 | Performed by: STUDENT IN AN ORGANIZED HEALTH CARE EDUCATION/TRAINING PROGRAM

## 2024-06-08 PROCEDURE — 87205 SMEAR GRAM STAIN: CPT | Performed by: STUDENT IN AN ORGANIZED HEALTH CARE EDUCATION/TRAINING PROGRAM

## 2024-06-08 PROCEDURE — 83735 ASSAY OF MAGNESIUM: CPT | Performed by: SURGERY

## 2024-06-08 PROCEDURE — 71045 X-RAY EXAM CHEST 1 VIEW: CPT | Mod: 26 | Performed by: RADIOLOGY

## 2024-06-08 PROCEDURE — 93308 TTE F-UP OR LMTD: CPT | Mod: 26 | Performed by: STUDENT IN AN ORGANIZED HEALTH CARE EDUCATION/TRAINING PROGRAM

## 2024-06-08 PROCEDURE — 250N000011 HC RX IP 250 OP 636: Performed by: STUDENT IN AN ORGANIZED HEALTH CARE EDUCATION/TRAINING PROGRAM

## 2024-06-08 PROCEDURE — 999N000157 HC STATISTIC RCP TIME EA 10 MIN

## 2024-06-08 PROCEDURE — 250N000009 HC RX 250: Performed by: STUDENT IN AN ORGANIZED HEALTH CARE EDUCATION/TRAINING PROGRAM

## 2024-06-08 PROCEDURE — 82330 ASSAY OF CALCIUM: CPT | Performed by: SURGERY

## 2024-06-08 PROCEDURE — 255N000002 HC RX 255 OP 636: Performed by: STUDENT IN AN ORGANIZED HEALTH CARE EDUCATION/TRAINING PROGRAM

## 2024-06-08 PROCEDURE — 94640 AIRWAY INHALATION TREATMENT: CPT

## 2024-06-08 PROCEDURE — 36415 COLL VENOUS BLD VENIPUNCTURE: CPT | Performed by: STUDENT IN AN ORGANIZED HEALTH CARE EDUCATION/TRAINING PROGRAM

## 2024-06-08 PROCEDURE — 250N000013 HC RX MED GY IP 250 OP 250 PS 637: Performed by: SURGERY

## 2024-06-08 PROCEDURE — 87040 BLOOD CULTURE FOR BACTERIA: CPT | Performed by: STUDENT IN AN ORGANIZED HEALTH CARE EDUCATION/TRAINING PROGRAM

## 2024-06-08 PROCEDURE — 250N000011 HC RX IP 250 OP 636: Performed by: INTERNAL MEDICINE

## 2024-06-08 PROCEDURE — 250N000009 HC RX 250: Performed by: NURSE PRACTITIONER

## 2024-06-08 PROCEDURE — 83605 ASSAY OF LACTIC ACID: CPT | Performed by: SURGERY

## 2024-06-08 PROCEDURE — 94640 AIRWAY INHALATION TREATMENT: CPT | Mod: 76

## 2024-06-08 PROCEDURE — 85610 PROTHROMBIN TIME: CPT | Performed by: SURGERY

## 2024-06-08 PROCEDURE — 85027 COMPLETE CBC AUTOMATED: CPT | Performed by: STUDENT IN AN ORGANIZED HEALTH CARE EDUCATION/TRAINING PROGRAM

## 2024-06-08 PROCEDURE — 82805 BLOOD GASES W/O2 SATURATION: CPT | Performed by: INTERNAL MEDICINE

## 2024-06-08 PROCEDURE — 99291 CRITICAL CARE FIRST HOUR: CPT | Mod: 25 | Performed by: INTERNAL MEDICINE

## 2024-06-08 PROCEDURE — 93325 DOPPLER ECHO COLOR FLOW MAPG: CPT | Mod: 26 | Performed by: STUDENT IN AN ORGANIZED HEALTH CARE EDUCATION/TRAINING PROGRAM

## 2024-06-08 PROCEDURE — 250N000013 HC RX MED GY IP 250 OP 250 PS 637: Performed by: NURSE PRACTITIONER

## 2024-06-08 PROCEDURE — 82805 BLOOD GASES W/O2 SATURATION: CPT | Performed by: SURGERY

## 2024-06-08 PROCEDURE — 250N000011 HC RX IP 250 OP 636: Performed by: SURGERY

## 2024-06-08 PROCEDURE — 84155 ASSAY OF PROTEIN SERUM: CPT | Performed by: STUDENT IN AN ORGANIZED HEALTH CARE EDUCATION/TRAINING PROGRAM

## 2024-06-08 PROCEDURE — 250N000013 HC RX MED GY IP 250 OP 250 PS 637: Performed by: INTERNAL MEDICINE

## 2024-06-08 PROCEDURE — 71045 X-RAY EXAM CHEST 1 VIEW: CPT

## 2024-06-08 PROCEDURE — 94003 VENT MGMT INPAT SUBQ DAY: CPT

## 2024-06-08 PROCEDURE — 84295 ASSAY OF SERUM SODIUM: CPT | Performed by: STUDENT IN AN ORGANIZED HEALTH CARE EDUCATION/TRAINING PROGRAM

## 2024-06-08 PROCEDURE — 82247 BILIRUBIN TOTAL: CPT | Performed by: STUDENT IN AN ORGANIZED HEALTH CARE EDUCATION/TRAINING PROGRAM

## 2024-06-08 PROCEDURE — 200N000002 HC R&B ICU UMMC

## 2024-06-08 RX ORDER — METOLAZONE 5 MG/1
5 TABLET ORAL ONCE
Status: COMPLETED | OUTPATIENT
Start: 2024-06-08 | End: 2024-06-08

## 2024-06-08 RX ORDER — FUROSEMIDE 10 MG/ML
40 INJECTION INTRAMUSCULAR; INTRAVENOUS EVERY 6 HOURS
Status: DISCONTINUED | OUTPATIENT
Start: 2024-06-08 | End: 2024-06-08

## 2024-06-08 RX ORDER — DEXMEDETOMIDINE HYDROCHLORIDE 4 UG/ML
.1-1.2 INJECTION, SOLUTION INTRAVENOUS CONTINUOUS
Status: DISCONTINUED | OUTPATIENT
Start: 2024-06-08 | End: 2024-06-10

## 2024-06-08 RX ORDER — POTASSIUM CHLORIDE 1.5 G/1.58G
20 POWDER, FOR SOLUTION ORAL ONCE
Status: COMPLETED | OUTPATIENT
Start: 2024-06-08 | End: 2024-06-08

## 2024-06-08 RX ORDER — POTASSIUM CHLORIDE 1.5 G/1.58G
40 POWDER, FOR SOLUTION ORAL 2 TIMES DAILY
Status: COMPLETED | OUTPATIENT
Start: 2024-06-08 | End: 2024-06-08

## 2024-06-08 RX ORDER — OXYCODONE HYDROCHLORIDE 5 MG/1
5 TABLET ORAL
Status: DISCONTINUED | OUTPATIENT
Start: 2024-06-08 | End: 2024-06-11

## 2024-06-08 RX ORDER — ACETYLCYSTEINE 200 MG/ML
2 SOLUTION ORAL; RESPIRATORY (INHALATION) 4 TIMES DAILY PRN
Status: DISCONTINUED | OUTPATIENT
Start: 2024-06-08 | End: 2024-06-11

## 2024-06-08 RX ORDER — ALBUTEROL SULFATE 0.83 MG/ML
2.5 SOLUTION RESPIRATORY (INHALATION) EVERY 4 HOURS PRN
Status: DISCONTINUED | OUTPATIENT
Start: 2024-06-08 | End: 2024-06-11

## 2024-06-08 RX ORDER — DEXMEDETOMIDINE HYDROCHLORIDE 4 UG/ML
.1-1.2 INJECTION, SOLUTION INTRAVENOUS CONTINUOUS
Status: DISCONTINUED | OUTPATIENT
Start: 2024-06-08 | End: 2024-06-08

## 2024-06-08 RX ORDER — HYDRALAZINE HYDROCHLORIDE 25 MG/1
25 TABLET, FILM COATED ORAL EVERY 8 HOURS SCHEDULED
Status: DISCONTINUED | OUTPATIENT
Start: 2024-06-08 | End: 2024-06-08

## 2024-06-08 RX ORDER — CEFEPIME HYDROCHLORIDE 2 G/1
2 INJECTION, POWDER, FOR SOLUTION INTRAVENOUS EVERY 8 HOURS
Status: COMPLETED | OUTPATIENT
Start: 2024-06-08 | End: 2024-06-13

## 2024-06-08 RX ORDER — NOREPINEPHRINE BITARTRATE 0.06 MG/ML
INJECTION, SOLUTION INTRAVENOUS
Status: COMPLETED
Start: 2024-06-08 | End: 2024-06-08

## 2024-06-08 RX ADMIN — HEPARIN SODIUM 5000 UNITS: 5000 INJECTION, SOLUTION INTRAVENOUS; SUBCUTANEOUS at 20:30

## 2024-06-08 RX ADMIN — ALBUTEROL SULFATE 2.5 MG: 2.5 SOLUTION RESPIRATORY (INHALATION) at 00:34

## 2024-06-08 RX ADMIN — POTASSIUM CHLORIDE 20 MEQ: 1.5 POWDER, FOR SOLUTION ORAL at 08:44

## 2024-06-08 RX ADMIN — CHLORHEXIDINE GLUCONATE 0.12% ORAL RINSE 15 ML: 1.2 LIQUID ORAL at 08:43

## 2024-06-08 RX ADMIN — ACETYLCYSTEINE 2 ML: 200 SOLUTION ORAL; RESPIRATORY (INHALATION) at 00:34

## 2024-06-08 RX ADMIN — AMIODARONE HYDROCHLORIDE 400 MG: 200 TABLET ORAL at 08:44

## 2024-06-08 RX ADMIN — ACETAMINOPHEN 650 MG: 325 TABLET, FILM COATED ORAL at 17:46

## 2024-06-08 RX ADMIN — Medication 100 MCG: at 09:01

## 2024-06-08 RX ADMIN — DEXMEDETOMIDINE HYDROCHLORIDE 1.2 MCG/KG/HR: 400 INJECTION INTRAVENOUS at 17:40

## 2024-06-08 RX ADMIN — PROPOFOL: 10 INJECTION, EMULSION INTRAVENOUS at 09:02

## 2024-06-08 RX ADMIN — ALBUTEROL SULFATE 2.5 MG: 2.5 SOLUTION RESPIRATORY (INHALATION) at 08:28

## 2024-06-08 RX ADMIN — Medication 100 MCG: at 11:57

## 2024-06-08 RX ADMIN — FUROSEMIDE 40 MG: 10 INJECTION, SOLUTION INTRAVENOUS at 14:27

## 2024-06-08 RX ADMIN — PROPOFOL: 10 INJECTION, EMULSION INTRAVENOUS at 11:57

## 2024-06-08 RX ADMIN — VENLAFAXINE 37.5 MG: 37.5 TABLET ORAL at 11:46

## 2024-06-08 RX ADMIN — HEPARIN SODIUM 5000 UNITS: 5000 INJECTION, SOLUTION INTRAVENOUS; SUBCUTANEOUS at 03:19

## 2024-06-08 RX ADMIN — OXYCODONE HYDROCHLORIDE 10 MG: 10 TABLET ORAL at 03:19

## 2024-06-08 RX ADMIN — POTASSIUM CHLORIDE 20 MEQ: 1.5 POWDER, FOR SOLUTION ORAL at 15:54

## 2024-06-08 RX ADMIN — POTASSIUM CHLORIDE 40 MEQ: 1.5 POWDER, FOR SOLUTION ORAL at 09:44

## 2024-06-08 RX ADMIN — ACETYLCYSTEINE 2 ML: 200 SOLUTION ORAL; RESPIRATORY (INHALATION) at 08:28

## 2024-06-08 RX ADMIN — HEPARIN SODIUM 5000 UNITS: 5000 INJECTION, SOLUTION INTRAVENOUS; SUBCUTANEOUS at 11:46

## 2024-06-08 RX ADMIN — QUETIAPINE FUMARATE 100 MG: 100 TABLET ORAL at 20:30

## 2024-06-08 RX ADMIN — HYDRALAZINE HYDROCHLORIDE 25 MG: 25 TABLET ORAL at 14:27

## 2024-06-08 RX ADMIN — QUETIAPINE FUMARATE 50 MG: 50 TABLET ORAL at 08:43

## 2024-06-08 RX ADMIN — OXYCODONE HYDROCHLORIDE 5 MG: 5 TABLET ORAL at 15:54

## 2024-06-08 RX ADMIN — ALBUTEROL SULFATE 2.5 MG: 2.5 SOLUTION RESPIRATORY (INHALATION) at 04:22

## 2024-06-08 RX ADMIN — Medication 150 MCG/HR: at 10:03

## 2024-06-08 RX ADMIN — AMIODARONE HYDROCHLORIDE 400 MG: 200 TABLET ORAL at 20:30

## 2024-06-08 RX ADMIN — PROPOFOL 40 MCG/KG/MIN: 10 INJECTION, EMULSION INTRAVENOUS at 07:36

## 2024-06-08 RX ADMIN — PROPOFOL: 10 INJECTION, EMULSION INTRAVENOUS at 17:38

## 2024-06-08 RX ADMIN — CEFEPIME HYDROCHLORIDE 2 G: 2 INJECTION, POWDER, FOR SOLUTION INTRAVENOUS at 23:30

## 2024-06-08 RX ADMIN — Medication 40 MG: at 08:53

## 2024-06-08 RX ADMIN — OXYCODONE HYDROCHLORIDE 5 MG: 5 TABLET ORAL at 23:25

## 2024-06-08 RX ADMIN — POTASSIUM CHLORIDE 40 MEQ: 1.5 POWDER, FOR SOLUTION ORAL at 20:30

## 2024-06-08 RX ADMIN — CHLORHEXIDINE GLUCONATE 0.12% ORAL RINSE 15 ML: 1.2 LIQUID ORAL at 20:30

## 2024-06-08 RX ADMIN — ACETYLCYSTEINE 2 ML: 200 SOLUTION ORAL; RESPIRATORY (INHALATION) at 04:23

## 2024-06-08 RX ADMIN — METOLAZONE 5 MG: 5 TABLET ORAL at 08:43

## 2024-06-08 RX ADMIN — FUROSEMIDE 40 MG: 10 INJECTION, SOLUTION INTRAVENOUS at 08:53

## 2024-06-08 RX ADMIN — Medication 0.5 MG/HR: at 14:39

## 2024-06-08 RX ADMIN — DEXMEDETOMIDINE HYDROCHLORIDE 1.2 MCG/KG/HR: 400 INJECTION INTRAVENOUS at 14:32

## 2024-06-08 RX ADMIN — PROPOFOL 40 MCG/KG/MIN: 10 INJECTION, EMULSION INTRAVENOUS at 23:07

## 2024-06-08 RX ADMIN — Medication 12.5 MG: at 08:46

## 2024-06-08 RX ADMIN — PROPOFOL 50 MCG/KG/MIN: 10 INJECTION, EMULSION INTRAVENOUS at 03:19

## 2024-06-08 RX ADMIN — PROPOFOL: 10 INJECTION, EMULSION INTRAVENOUS at 12:07

## 2024-06-08 RX ADMIN — Medication 15 ML: at 08:43

## 2024-06-08 RX ADMIN — HUMAN ALBUMIN MICROSPHERES AND PERFLUTREN 6 ML: 10; .22 INJECTION, SOLUTION INTRAVENOUS at 09:38

## 2024-06-08 RX ADMIN — PROPOFOL: 10 INJECTION, EMULSION INTRAVENOUS at 14:48

## 2024-06-08 RX ADMIN — CEFEPIME HYDROCHLORIDE 2 G: 2 INJECTION, POWDER, FOR SOLUTION INTRAVENOUS at 17:24

## 2024-06-08 RX ADMIN — PROPOFOL 50 MCG/KG/MIN: 10 INJECTION, EMULSION INTRAVENOUS at 00:47

## 2024-06-08 RX ADMIN — OXYCODONE HYDROCHLORIDE 5 MG: 5 TABLET ORAL at 09:44

## 2024-06-08 RX ADMIN — TICAGRELOR 90 MG: 90 TABLET ORAL at 20:30

## 2024-06-08 RX ADMIN — OXYCODONE HYDROCHLORIDE 5 MG: 5 TABLET ORAL at 20:30

## 2024-06-08 RX ADMIN — ASPIRIN 81 MG CHEWABLE TABLET 81 MG: 81 TABLET CHEWABLE at 08:46

## 2024-06-08 RX ADMIN — TICAGRELOR 90 MG: 90 TABLET ORAL at 08:46

## 2024-06-08 RX ADMIN — VENLAFAXINE 37.5 MG: 37.5 TABLET ORAL at 17:47

## 2024-06-08 ASSESSMENT — ACTIVITIES OF DAILY LIVING (ADL)
ADLS_ACUITY_SCORE: 38
ADLS_ACUITY_SCORE: 38
ADLS_ACUITY_SCORE: 34
ADLS_ACUITY_SCORE: 38
ADLS_ACUITY_SCORE: 34
ADLS_ACUITY_SCORE: 38
ADLS_ACUITY_SCORE: 38
ADLS_ACUITY_SCORE: 34
ADLS_ACUITY_SCORE: 34
ADLS_ACUITY_SCORE: 38
ADLS_ACUITY_SCORE: 34
ADLS_ACUITY_SCORE: 38
ADLS_ACUITY_SCORE: 38
ADLS_ACUITY_SCORE: 34
ADLS_ACUITY_SCORE: 38
ADLS_ACUITY_SCORE: 34

## 2024-06-08 NOTE — PLAN OF CARE
HX:  admitted 6/1 following a witnessed VF cardiac arrest in [facility]     6/1 VT/VF, VA ECMO, CCL: 100% LAD occlusion s/p stenting  6/4 Decann  6/5 Increased vent support: PEEP 14, 100% FiO2, PO2 80-90. Diuresing. CT PE (-)  6/6 - Very agitated    Nights  KEVIN    Neuro: ERICK RUDD.  Extremely agitated when awake and does not follow.    CV: SR-ST 90-100s. Pulses 2+    Pulm:   SIMV/PS: Rate12, PIP 30, PEEP 12, FiO2 60-90%  Very thick secretions - Sputum sample sent    GI:   OGT @ 71cm.   TF @ 70 mL/hr, goal met. 60q4 FWF.  Rectal tube out 100cc    : Difficult/traumatic schafer insertion. UROLOGY placement of schafer. Urine 75cc/hr    Skin:   Head laceration from trauma L occipital side with staples  Old cannulation sites to right groin, with Woundvac in place.  Old  IO on R shin  Bruised tongue    Drains:   Schafer  OGT -TF  Rectal tube    Drips:   Propofol 40 mcg/kg/min  Fent - 150  Insulin 3    Lines: x2 PIV L; x1 PIV R; R TL PICC line    Plan  Cont to assess and update MD with changes.

## 2024-06-08 NOTE — PROGRESS NOTES
"  [facility]  Cardiology ICU History & Physical  June 1, 2024            Assessment and Plan:   Cullen Reardon is a 48 year old male with no known past medical history who was admitted on 6/1/2024 following a witnessed VF cardiac arrest.    He presented to [facility] on 6/1/2024 with chest pain.  He collapsed in front of the triage desk and was found to be pulseless after saying \"I feel like I'm going to die\".  He received immediate bystander CPR in the waiting room of the emergency department.  He was found be in VF, was shocked x 3.  Estimated downtime was approximately 15 minutes per paramedic report.  Was cannulated in [facility] emergency department and was on circuit at 16:08.  He was brought to the Cath Lab for coronary angiogram where he was found to have a 100% ostial LAD thrombotic occlusion status post stenting.  He was residual severe disease in D1 and OM2 that will need staged intervention. He was subsequently brought to [facility] for further care.    Patient was following commands on arrival. Currently clinically improving and off pressors. ECMO decannulated 6/05, PCI to D1 with KAYLYN x 2 on 6/06.    Interval changes   No acute events overnight  Off pressors  Continued hypoxia  Bronch yesterday, cultures in process  Continued episodes of intermittent agitation with hypoxia    Changes today   - Diuresis with lasix and metolazone, FBG (-) 2 L  - Continue high PEEP, wean FiO2 as tolerated  - Repeat TTE to assess LVEF  - Afterload reduction with hydralazine    Neuro: #. At risk for anoxic brain injury  --Intubated and sedated. Wean sedation daily to assess neurological status.   --Maintain euthermia/warm by 0.5 C/hr to get to 37C  --continue propofol, fentanyl  --Seroquel for delirium  --RASS goal -3 to -4   --CT head : No acute issues.     CV: #. Cardiogenic Shock s/p VA ECMO  #. ACUTE STEMI s/p KAYLYN to ostial LAD 6/1/24   #. Refractory VF arrest  s/p VA " ECMO  --Peripheral V-A ECMO inserted via RCFA (17 Fr) and RCFV (25 Fr).  -- EF 15%  -- Repeat TTE  -- Afterload reduction with hydralazine  -- FBG (-) 2-3 L, 40 mg lasix TID, 5 mg metolazone  -- Continue ASA 81mg and ticagrelor 90mg BID  -- GDMT and Lipitor pending clinical course      Pulm: #. Acute hypoxic respiratory failure  #. Probable aspiration pneumonia  Vent Mode: PCV Plus assist  (Pressure Control Ventilation/ Assist Control) (PC-SIMV)  FiO2 (%): 80 %  Resp Rate (Set): 12 breaths/min  Tidal Volume (Set, mL): 550 mL  PEEP (cm H2O): 12 cmH2O  Pressure Support (cm H2O): 5 cmH2O  Inspiratory Pressure (cm H2O) (Drager Myah): 30  Resp: 13    --ETT in stable position    --CXR: Lines in stable position.   --Wean vent as able  --Daily CXR  --MRSA nares - neg, vanco stopped 6/1-6/2.  --Q12h ABGs for now     GI: #. Shock Liver 2/2 cardiac arrest  --Monitor LFTs twice a day.  --Consider RUQ US if LFTs do not improve.     Renal: #. JEAN CLAUDE 2/2 cardiac arrest  Hematuria 2/2 traumatic schafer insertion   --Monitor urine output  --Maintain K>3 and Mg>2      ID: #. Probable aspiration pneumonia  - Ctx x5 days 6/1- 6/5  - Febrile to 38.3  - Zosyn 6/5- 6/7  --Repeat pan cx     Hem/Onc: #. Acute blood loss anemia due to ECMO cannulas.  --Continue Aspirin and Tacagrelor for the stent.  --Cryo PRN fibrinogen < 150; FFP for INR >2  --Transfuse for Hgb<7  --US LE w/ arterial duplex per ECMO protocol   --DVT PPX: STEVE     Endo: #. Hyperglycemia  --Insulin as needed.  --HgbA1c - 6.1.     Checklist: ICU Checklist:  #Feeding: tube feeds at goal  #Analgesia: propofol   #Sedation: fentanyl  #Thromboembolism ppx: heparin sq   #HOB: 30 degrees   #Ulcer ppx: ppi  #Glucose: insulin gtt   #SBT/SAT: as tolerated  #Bowel reg: miralax, senna   #Lines/tubes/drains: 6 Fr LCFA sheath, schafer, ETT, OG  #Code status: full   #Family: will update as able this evening   #Dispo: ICU          Code Status: Full    The pt was discussed with the attending  "physician. VJ Lynch CNP  [facility] Heart Care  ICU Cardiology-CICU Service  Send message or 10 digit call back number Securely via [employer] with the [employer] Web Console (learn more here)         Medications:   I have reviewed this patient's current medications    No past medical history on file.    No family history on file.  Social History     Socioeconomic History    Marital status:      Spouse name: Not on file    Number of children: Not on file    Years of education: Not on file    Highest education level: Not on file   Occupational History    Not on file   Tobacco Use    Smoking status: Not on file    Smokeless tobacco: Not on file   Substance and Sexual Activity    Alcohol use: Not on file    Drug use: Not on file    Sexual activity: Not on file   Other Topics Concern    Not on file   Social History Narrative    Not on file     Social Determinants of Health     Financial Resource Strain: Not At Risk (7/31/2023)    Received from [facility]    Financial Resource Strain     Is it hard for you to pay for the very basics like food, housing, medical care or heating?: No   Food Insecurity: Not At Risk (7/31/2023)    Received from [facility]    Food Insecurity     Does your food run out before you have the money to buy more?: No   Transportation Needs: Not At Risk (7/31/2023)    Received from [facility]    Transportation Needs     Does a lack of transportation keep you from your medical appointments or from getting your medications?: No   Physical Activity: Not on file   Stress: Not on file   Social Connections: Not on file   Interpersonal Safety: Not on file   Housing Stability: Not on file            Review of Systems:   Unable to obtain due to patients medical condition.            Physical Exam:   BP 92/60   Pulse 92   Temp 98.8  F (37.1  C)   Resp 13   Ht 1.886 m (6' 2.25\")   Wt 127.8 kg (281 lb 12 oz)   SpO2 98%   BMI 35.93 kg/m        GENERAL: Intubated, sedated. " "NAD.  HEENT: No icterus. ETT in place, OG tube in place.  CARDIOVASCULAR: RRR. Normal S1 and S2.  RESP: Coarse bilaterally. Mechanical ventilation.    GI Soft, bowel sounds hypoactive but present.  GENITOURINARY: Benjamin in place.  EXTREMITIES: Cool, 1+ edema, pulses dopplered, as above.   NEURO: Sedated and intubated.  INTEGUMENTARY: No rashes. Cannula/Line sites CDI.      Resp: 13 SpO2: 98 % O2 Device: Mechanical Ventilator      Arterial Blood Gas:   Recent Labs   Lab 06/08/24  0541 06/07/24 2204 06/07/24 2003 06/07/24  0343   PH 7.45 7.45 7.48* 7.49*   PCO2 49* 47* 46* 39   PO2 84 85 80 76*   HCO3 34* 33* 34* 30*   O2PER 80 80 80 90     Vitals:    06/05/24 0000 06/06/24 0000 06/07/24 0000   Weight: 128 kg (282 lb 3 oz) 126.7 kg (279 lb 5.2 oz) 127.8 kg (281 lb 12 oz)   I/O last 3 completed shifts:  In: 3702.9 [I.V.:1037.9; NG/GT:985]  Out: 5007 [Urine:4707; Stool:300]  Recent Labs   Lab 06/08/24  0609 06/08/24  0540 06/07/24 2012 06/07/24 2003 06/07/24  1409 06/07/24  1402   NA  --  146*  --   --   --  150*   POTASSIUM  --  3.5  --  3.9   < > 3.2*   CHLORIDE  --  106  --   --   --  109*   CO2  --  29  --   --   --  29   ANIONGAP  --  11  --   --   --  12   * 173*   < >  --    < > 112*   BUN  --  40.4*  --   --   --  34.9*   CR  --  1.17  --   --   --  1.23*   RANDA  --  8.5*  --   --   --  8.7    < > = values in this interval not displayed.     No components found for: \"URINE\"   Recent Labs   Lab 06/08/24  0540 06/07/24  1402 06/07/24  0344   AST 67* 67* 71*   ALT 74* 71* 70   BILITOTAL 0.8 0.8 0.7   ALBUMIN 3.1* 3.2* 3.1*   PROTTOTAL 6.1* 6.2* 5.8*   ALKPHOS 96 87 82     Temp: 98.8  F (37.1  C) Temp src: BladderTemp  Min: 98.8  F (37.1  C)  Max: 99.7  F (37.6  C)   Recent Labs   Lab 06/08/24  0540 06/07/24  1402 06/07/24  0344 06/06/24 2003 06/06/24  1537 06/06/24  1525 06/06/24  1409   WBC 9.2 9.4 9.7 10.0  --   --  8.8   HGB 9.9* 10.2* 9.7* 9.5* 9.2*   < > 9.3*   HCT 29.8* 30.2* 29.2* 27.8*  --   --  " 27.2*   MCV 95 93 93 91  --   --  91   RDW 15.3* 14.7 14.4 13.9  --   --  13.6    189 176 169  --   --  160    < > = values in this interval not displayed.     Recent Labs   Lab 06/08/24  0540 06/07/24  0344 06/06/24  0354 06/05/24  0343 06/04/24  2326 06/04/24  1620 06/04/24  1015 06/04/24  0346   INR 1.09 1.15 1.16* 1.11 1.21* 1.18* 1.23* 1.19*   PTT  --   --   --  29 42* 93* 100* 109*     Recent Labs   Lab 06/08/24  0609 06/08/24  0540 06/08/24  0312 06/07/24  2207 06/07/24 2012   * 173* 125* 143* 143*       Lines:           Data:     Recent Results (from the past 24 hour(s))   XR Chest Port 1 View    Narrative    XR CHEST PORT 1 VIEW  6/8/2024 2:08 AM     HISTORY:  Post-op pneumonia       COMPARISON:  Chest radiograph 6/7/2024    TECHNIQUE: Frontal view of the chest.    FINDINGS:   Patient is significantly rotated and rightward leaning. Stable  position of ET tube, left-sided PICC, and gastric tube.     Trachea is midline. Stable cardiomediastinal silhouette when  accounting for rotation. Persistent silhouetting of the right lateral  hemidiaphragm. No pneumothorax. Bibasilar and bilateral perihilar  patchy opacities are stable. Upper abdomen and osseous structures are  within normal limits.      Impression    IMPRESSION:  Stable bilateral patchy mixed pulmonary opacities, could represent a  combination of atelectasis, edema, or infection. Stable support  devices.    I have personally reviewed the examination and initial interpretation  and I agree with the findings.    STEFAN PATTON MD         SYSTEM ID:  [id number]

## 2024-06-08 NOTE — PLAN OF CARE
Goal Outcome Evaluation:      Plan of Care Reviewed With: family    Overall Patient Progress: no changeOverall Patient Progress: no change    Outcome Evaluation: Not tolerating FiO2 wean. Intermittent hypertention and agitation.    Major Shift Events:  Febrile up to 39.7, pan cultured, tylenol given, ice packs applied. Intermittent agitation/restlessness, soft restraints in place for line/tube safety. BP labile, hypotensive while sleeping, hypertensive when awake.     Plan: Team to decide on plan for temperature control. Wean FiO2 as tolerated.     For vital signs and complete assessments, please see documentation flowsheets.

## 2024-06-09 ENCOUNTER — APPOINTMENT (OUTPATIENT)
Dept: GENERAL RADIOLOGY | Facility: CLINIC | Age: 49
DRG: 003 | End: 2024-06-09
Attending: STUDENT IN AN ORGANIZED HEALTH CARE EDUCATION/TRAINING PROGRAM
Payer: COMMERCIAL

## 2024-06-09 ENCOUNTER — APPOINTMENT (OUTPATIENT)
Dept: GENERAL RADIOLOGY | Facility: CLINIC | Age: 49
DRG: 003 | End: 2024-06-09
Attending: SURGERY
Payer: COMMERCIAL

## 2024-06-09 LAB
ABO/RH(D): NORMAL
ALBUMIN SERPL BCG-MCNC: 3.3 G/DL (ref 3.5–5.2)
ALBUMIN SERPL BCG-MCNC: 3.3 G/DL (ref 3.5–5.2)
ALLEN'S TEST: ABNORMAL
ALP SERPL-CCNC: 109 U/L (ref 40–150)
ALP SERPL-CCNC: 113 U/L (ref 40–150)
ALT SERPL W P-5'-P-CCNC: 75 U/L (ref 0–70)
ALT SERPL W P-5'-P-CCNC: 77 U/L (ref 0–70)
ANION GAP SERPL CALCULATED.3IONS-SCNC: 10 MMOL/L (ref 7–15)
ANION GAP SERPL CALCULATED.3IONS-SCNC: 11 MMOL/L (ref 7–15)
ANTIBODY SCREEN: NEGATIVE
AST SERPL W P-5'-P-CCNC: 59 U/L (ref 0–45)
AST SERPL W P-5'-P-CCNC: 61 U/L (ref 0–45)
BACTERIA ASPIRATE CULT: NORMAL
BACTERIA BLD CULT: NO GROWTH
BACTERIA BRONCH: NO GROWTH
BASE EXCESS BLDA CALC-SCNC: 10 MMOL/L (ref -3–3)
BASE EXCESS BLDA CALC-SCNC: 8.5 MMOL/L (ref -3–3)
BASE EXCESS BLDA CALC-SCNC: 9.1 MMOL/L (ref -3–3)
BILIRUB SERPL-MCNC: 0.8 MG/DL
BILIRUB SERPL-MCNC: 1 MG/DL
BUN SERPL-MCNC: 46.6 MG/DL (ref 6–20)
BUN SERPL-MCNC: 50 MG/DL (ref 6–20)
CA-I BLD-MCNC: 4.3 MG/DL (ref 4.4–5.2)
CALCIUM SERPL-MCNC: 8.7 MG/DL (ref 8.6–10)
CALCIUM SERPL-MCNC: 9 MG/DL (ref 8.6–10)
CHLORIDE SERPL-SCNC: 103 MMOL/L (ref 98–107)
CHLORIDE SERPL-SCNC: 105 MMOL/L (ref 98–107)
COHGB MFR BLD: 100 % (ref 96–97)
COHGB MFR BLD: 97.7 % (ref 96–97)
COHGB MFR BLD: 98.2 % (ref 96–97)
CREAT SERPL-MCNC: 1.22 MG/DL (ref 0.67–1.17)
CREAT SERPL-MCNC: 1.32 MG/DL (ref 0.67–1.17)
DEPRECATED HCO3 PLAS-SCNC: 31 MMOL/L (ref 22–29)
DEPRECATED HCO3 PLAS-SCNC: 33 MMOL/L (ref 22–29)
EGFRCR SERPLBLD CKD-EPI 2021: 66 ML/MIN/1.73M2
EGFRCR SERPLBLD CKD-EPI 2021: 73 ML/MIN/1.73M2
ERYTHROCYTE [DISTWIDTH] IN BLOOD BY AUTOMATED COUNT: 15.2 % (ref 10–15)
ERYTHROCYTE [DISTWIDTH] IN BLOOD BY AUTOMATED COUNT: 15.4 % (ref 10–15)
GLUCOSE BLDC GLUCOMTR-MCNC: 123 MG/DL (ref 70–99)
GLUCOSE BLDC GLUCOMTR-MCNC: 129 MG/DL (ref 70–99)
GLUCOSE BLDC GLUCOMTR-MCNC: 130 MG/DL (ref 70–99)
GLUCOSE BLDC GLUCOMTR-MCNC: 131 MG/DL (ref 70–99)
GLUCOSE BLDC GLUCOMTR-MCNC: 133 MG/DL (ref 70–99)
GLUCOSE BLDC GLUCOMTR-MCNC: 134 MG/DL (ref 70–99)
GLUCOSE BLDC GLUCOMTR-MCNC: 136 MG/DL (ref 70–99)
GLUCOSE SERPL-MCNC: 143 MG/DL (ref 70–99)
GLUCOSE SERPL-MCNC: 152 MG/DL (ref 70–99)
GRAM STAIN RESULT: NORMAL
GRAM STAIN RESULT: NORMAL
HCO3 BLD-SCNC: 35 MMOL/L (ref 21–28)
HCO3 BLD-SCNC: 35 MMOL/L (ref 21–28)
HCO3 BLD-SCNC: 36 MMOL/L (ref 21–28)
HCT VFR BLD AUTO: 30 % (ref 40–53)
HCT VFR BLD AUTO: 30.9 % (ref 40–53)
HGB BLD-MCNC: 10 G/DL (ref 13.3–17.7)
HGB BLD-MCNC: 9.6 G/DL (ref 13.3–17.7)
INR PPP: 1.08 (ref 0.85–1.15)
LACTATE SERPL-SCNC: 0.7 MMOL/L (ref 0.7–2)
LACTATE SERPL-SCNC: 0.8 MMOL/L (ref 0.7–2)
LACTATE SERPL-SCNC: 0.8 MMOL/L (ref 0.7–2)
LACTATE SERPL-SCNC: 0.9 MMOL/L (ref 0.7–2)
MAGNESIUM SERPL-MCNC: 2.8 MG/DL (ref 1.7–2.3)
MCH RBC QN AUTO: 31.1 PG (ref 26.5–33)
MCH RBC QN AUTO: 31.3 PG (ref 26.5–33)
MCHC RBC AUTO-ENTMCNC: 32 G/DL (ref 31.5–36.5)
MCHC RBC AUTO-ENTMCNC: 32.4 G/DL (ref 31.5–36.5)
MCV RBC AUTO: 97 FL (ref 78–100)
MCV RBC AUTO: 97 FL (ref 78–100)
O2/TOTAL GAS SETTING VFR VENT: 50 %
O2/TOTAL GAS SETTING VFR VENT: 60 %
O2/TOTAL GAS SETTING VFR VENT: 80 %
PCO2 BLD: 48 MM HG (ref 35–45)
PCO2 BLD: 55 MM HG (ref 35–45)
PCO2 BLD: 62 MM HG (ref 35–45)
PEEP: 10 CM H2O
PH BLD: 7.37 [PH] (ref 7.35–7.45)
PH BLD: 7.41 [PH] (ref 7.35–7.45)
PH BLD: 7.47 [PH] (ref 7.35–7.45)
PHOSPHATE SERPL-MCNC: 3.9 MG/DL (ref 2.5–4.5)
PLATELET # BLD AUTO: 236 10E3/UL (ref 150–450)
PLATELET # BLD AUTO: 262 10E3/UL (ref 150–450)
PO2 BLD: 161 MM HG (ref 80–105)
PO2 BLD: 83 MM HG (ref 80–105)
PO2 BLD: 98 MM HG (ref 80–105)
POTASSIUM SERPL-SCNC: 3.2 MMOL/L (ref 3.4–5.3)
POTASSIUM SERPL-SCNC: 3.4 MMOL/L (ref 3.4–5.3)
POTASSIUM SERPL-SCNC: 3.5 MMOL/L (ref 3.4–5.3)
PROT SERPL-MCNC: 6.4 G/DL (ref 6.4–8.3)
PROT SERPL-MCNC: 6.7 G/DL (ref 6.4–8.3)
RBC # BLD AUTO: 3.09 10E6/UL (ref 4.4–5.9)
RBC # BLD AUTO: 3.2 10E6/UL (ref 4.4–5.9)
SAO2 % BLDA: 96 % (ref 92–100)
SAO2 % BLDA: 96 % (ref 92–100)
SAO2 % BLDA: 98 % (ref 92–100)
SODIUM SERPL-SCNC: 146 MMOL/L (ref 135–145)
SODIUM SERPL-SCNC: 147 MMOL/L (ref 135–145)
SPECIMEN EXPIRATION DATE: NORMAL
WBC # BLD AUTO: 10.9 10E3/UL (ref 4–11)
WBC # BLD AUTO: 11 10E3/UL (ref 4–11)

## 2024-06-09 PROCEDURE — 200N000002 HC R&B ICU UMMC

## 2024-06-09 PROCEDURE — 250N000011 HC RX IP 250 OP 636: Mod: JZ | Performed by: INTERNAL MEDICINE

## 2024-06-09 PROCEDURE — 250N000013 HC RX MED GY IP 250 OP 250 PS 637: Performed by: STUDENT IN AN ORGANIZED HEALTH CARE EDUCATION/TRAINING PROGRAM

## 2024-06-09 PROCEDURE — 250N000011 HC RX IP 250 OP 636: Performed by: STUDENT IN AN ORGANIZED HEALTH CARE EDUCATION/TRAINING PROGRAM

## 2024-06-09 PROCEDURE — 250N000013 HC RX MED GY IP 250 OP 250 PS 637: Performed by: SURGERY

## 2024-06-09 PROCEDURE — 250N000013 HC RX MED GY IP 250 OP 250 PS 637: Performed by: NURSE PRACTITIONER

## 2024-06-09 PROCEDURE — 82805 BLOOD GASES W/O2 SATURATION: CPT | Performed by: INTERNAL MEDICINE

## 2024-06-09 PROCEDURE — 84132 ASSAY OF SERUM POTASSIUM: CPT | Performed by: INTERNAL MEDICINE

## 2024-06-09 PROCEDURE — 99291 CRITICAL CARE FIRST HOUR: CPT | Mod: GC | Performed by: INTERNAL MEDICINE

## 2024-06-09 PROCEDURE — 71045 X-RAY EXAM CHEST 1 VIEW: CPT

## 2024-06-09 PROCEDURE — 85610 PROTHROMBIN TIME: CPT | Performed by: SURGERY

## 2024-06-09 PROCEDURE — 999N000157 HC STATISTIC RCP TIME EA 10 MIN

## 2024-06-09 PROCEDURE — 250N000009 HC RX 250: Performed by: SURGERY

## 2024-06-09 PROCEDURE — 80053 COMPREHEN METABOLIC PANEL: CPT | Performed by: STUDENT IN AN ORGANIZED HEALTH CARE EDUCATION/TRAINING PROGRAM

## 2024-06-09 PROCEDURE — 84100 ASSAY OF PHOSPHORUS: CPT | Performed by: SURGERY

## 2024-06-09 PROCEDURE — 82330 ASSAY OF CALCIUM: CPT | Performed by: SURGERY

## 2024-06-09 PROCEDURE — 82805 BLOOD GASES W/O2 SATURATION: CPT | Performed by: SURGERY

## 2024-06-09 PROCEDURE — 85027 COMPLETE CBC AUTOMATED: CPT | Performed by: STUDENT IN AN ORGANIZED HEALTH CARE EDUCATION/TRAINING PROGRAM

## 2024-06-09 PROCEDURE — 94003 VENT MGMT INPAT SUBQ DAY: CPT

## 2024-06-09 PROCEDURE — 84155 ASSAY OF PROTEIN SERUM: CPT | Performed by: STUDENT IN AN ORGANIZED HEALTH CARE EDUCATION/TRAINING PROGRAM

## 2024-06-09 PROCEDURE — 83605 ASSAY OF LACTIC ACID: CPT | Performed by: SURGERY

## 2024-06-09 PROCEDURE — 71045 X-RAY EXAM CHEST 1 VIEW: CPT | Mod: 26 | Performed by: RADIOLOGY

## 2024-06-09 PROCEDURE — 999N000065 XR CHEST PORT 1 VIEW

## 2024-06-09 PROCEDURE — 83735 ASSAY OF MAGNESIUM: CPT | Performed by: SURGERY

## 2024-06-09 PROCEDURE — 250N000013 HC RX MED GY IP 250 OP 250 PS 637: Performed by: INTERNAL MEDICINE

## 2024-06-09 PROCEDURE — 250N000011 HC RX IP 250 OP 636: Mod: JZ | Performed by: STUDENT IN AN ORGANIZED HEALTH CARE EDUCATION/TRAINING PROGRAM

## 2024-06-09 PROCEDURE — 86900 BLOOD TYPING SEROLOGIC ABO: CPT | Performed by: STUDENT IN AN ORGANIZED HEALTH CARE EDUCATION/TRAINING PROGRAM

## 2024-06-09 RX ORDER — POTASSIUM CHLORIDE 20MEQ/15ML
40 LIQUID (ML) ORAL ONCE
Status: COMPLETED | OUTPATIENT
Start: 2024-06-09 | End: 2024-06-09

## 2024-06-09 RX ORDER — POTASSIUM CHLORIDE 1.5 G/1.58G
20 POWDER, FOR SOLUTION ORAL ONCE
Status: COMPLETED | OUTPATIENT
Start: 2024-06-09 | End: 2024-06-09

## 2024-06-09 RX ORDER — FUROSEMIDE 10 MG/ML
60 INJECTION INTRAMUSCULAR; INTRAVENOUS
Status: DISCONTINUED | OUTPATIENT
Start: 2024-06-09 | End: 2024-06-10

## 2024-06-09 RX ORDER — POTASSIUM CHLORIDE 29.8 MG/ML
20 INJECTION INTRAVENOUS ONCE
Status: COMPLETED | OUTPATIENT
Start: 2024-06-09 | End: 2024-06-09

## 2024-06-09 RX ORDER — FUROSEMIDE 10 MG/ML
60 INJECTION INTRAMUSCULAR; INTRAVENOUS
Status: DISCONTINUED | OUTPATIENT
Start: 2024-06-09 | End: 2024-06-09

## 2024-06-09 RX ORDER — ACETAZOLAMIDE 500 MG/5ML
500 INJECTION, POWDER, LYOPHILIZED, FOR SOLUTION INTRAVENOUS ONCE
Status: COMPLETED | OUTPATIENT
Start: 2024-06-09 | End: 2024-06-09

## 2024-06-09 RX ORDER — CHLOROTHIAZIDE SODIUM 500 MG/1
1000 INJECTION INTRAVENOUS ONCE
Status: COMPLETED | OUTPATIENT
Start: 2024-06-09 | End: 2024-06-09

## 2024-06-09 RX ORDER — POTASSIUM CHLORIDE 1.5 G/1.58G
40 POWDER, FOR SOLUTION ORAL ONCE
Status: COMPLETED | OUTPATIENT
Start: 2024-06-09 | End: 2024-06-09

## 2024-06-09 RX ORDER — FUROSEMIDE 10 MG/ML
40 INJECTION INTRAMUSCULAR; INTRAVENOUS
Status: DISCONTINUED | OUTPATIENT
Start: 2024-06-09 | End: 2024-06-09

## 2024-06-09 RX ORDER — ISOSORBIDE DINITRATE 5 MG/1
10 TABLET ORAL
Status: DISCONTINUED | OUTPATIENT
Start: 2024-06-09 | End: 2024-06-10

## 2024-06-09 RX ADMIN — PROPOFOL 60 MCG/KG/MIN: 10 INJECTION, EMULSION INTRAVENOUS at 08:20

## 2024-06-09 RX ADMIN — PROPOFOL 60 MCG/KG/MIN: 10 INJECTION, EMULSION INTRAVENOUS at 22:34

## 2024-06-09 RX ADMIN — CHLORHEXIDINE GLUCONATE 0.12% ORAL RINSE 15 ML: 1.2 LIQUID ORAL at 07:38

## 2024-06-09 RX ADMIN — Medication 12.5 MG: at 07:37

## 2024-06-09 RX ADMIN — Medication 1 MG/HR: at 08:57

## 2024-06-09 RX ADMIN — HEPARIN SODIUM 5000 UNITS: 5000 INJECTION, SOLUTION INTRAVENOUS; SUBCUTANEOUS at 12:06

## 2024-06-09 RX ADMIN — CALCIUM GLUCONATE 1 G: 20 INJECTION, SOLUTION INTRAVENOUS at 06:54

## 2024-06-09 RX ADMIN — OXYCODONE HYDROCHLORIDE 5 MG: 5 TABLET ORAL at 20:33

## 2024-06-09 RX ADMIN — Medication 15 ML: at 07:38

## 2024-06-09 RX ADMIN — OXYCODONE HYDROCHLORIDE 5 MG: 5 TABLET ORAL at 11:56

## 2024-06-09 RX ADMIN — HEPARIN SODIUM 5000 UNITS: 5000 INJECTION, SOLUTION INTRAVENOUS; SUBCUTANEOUS at 03:41

## 2024-06-09 RX ADMIN — AMIODARONE HYDROCHLORIDE 400 MG: 200 TABLET ORAL at 20:33

## 2024-06-09 RX ADMIN — PROPOFOL 50 MCG/KG/MIN: 10 INJECTION, EMULSION INTRAVENOUS at 02:15

## 2024-06-09 RX ADMIN — FUROSEMIDE 60 MG: 10 INJECTION, SOLUTION INTRAMUSCULAR; INTRAVENOUS at 15:53

## 2024-06-09 RX ADMIN — Medication 40 MG: at 07:37

## 2024-06-09 RX ADMIN — CHLOROTHIAZIDE SODIUM 1000 MG: 500 INJECTION, POWDER, LYOPHILIZED, FOR SOLUTION INTRAVENOUS at 17:20

## 2024-06-09 RX ADMIN — VENLAFAXINE 37.5 MG: 37.5 TABLET ORAL at 18:00

## 2024-06-09 RX ADMIN — ACETAZOLAMIDE 500 MG: 500 INJECTION, POWDER, LYOPHILIZED, FOR SOLUTION INTRAVENOUS at 10:24

## 2024-06-09 RX ADMIN — QUETIAPINE FUMARATE 100 MG: 100 TABLET ORAL at 20:33

## 2024-06-09 RX ADMIN — PROPOFOL 60 MCG/KG/MIN: 10 INJECTION, EMULSION INTRAVENOUS at 15:54

## 2024-06-09 RX ADMIN — ISOSORBIDE DINITRATE 10 MG: 5 TABLET ORAL at 11:56

## 2024-06-09 RX ADMIN — Medication 10 MG: at 18:27

## 2024-06-09 RX ADMIN — POTASSIUM CHLORIDE 40 MEQ: 20 SOLUTION ORAL at 22:34

## 2024-06-09 RX ADMIN — OXYCODONE HYDROCHLORIDE 5 MG: 5 TABLET ORAL at 16:10

## 2024-06-09 RX ADMIN — POTASSIUM CHLORIDE 40 MEQ: 20 SOLUTION ORAL at 16:14

## 2024-06-09 RX ADMIN — TICAGRELOR 90 MG: 90 TABLET ORAL at 20:34

## 2024-06-09 RX ADMIN — TICAGRELOR 90 MG: 90 TABLET ORAL at 07:38

## 2024-06-09 RX ADMIN — ISOSORBIDE DINITRATE 10 MG: 5 TABLET ORAL at 18:00

## 2024-06-09 RX ADMIN — INSULIN HUMAN 3 UNITS/HR: 1 INJECTION, SOLUTION INTRAVENOUS at 03:41

## 2024-06-09 RX ADMIN — OXYCODONE HYDROCHLORIDE 5 MG: 5 TABLET ORAL at 07:38

## 2024-06-09 RX ADMIN — POTASSIUM CHLORIDE 20 MEQ: 1.5 POWDER, FOR SOLUTION ORAL at 07:37

## 2024-06-09 RX ADMIN — AMIODARONE HYDROCHLORIDE 400 MG: 200 TABLET ORAL at 07:38

## 2024-06-09 RX ADMIN — CEFEPIME HYDROCHLORIDE 2 G: 2 INJECTION, POWDER, FOR SOLUTION INTRAVENOUS at 07:38

## 2024-06-09 RX ADMIN — CEFEPIME HYDROCHLORIDE 2 G: 2 INJECTION, POWDER, FOR SOLUTION INTRAVENOUS at 15:54

## 2024-06-09 RX ADMIN — PROPOFOL 60 MCG/KG/MIN: 10 INJECTION, EMULSION INTRAVENOUS at 13:46

## 2024-06-09 RX ADMIN — HEPARIN SODIUM 5000 UNITS: 5000 INJECTION, SOLUTION INTRAVENOUS; SUBCUTANEOUS at 20:34

## 2024-06-09 RX ADMIN — VENLAFAXINE 37.5 MG: 37.5 TABLET ORAL at 07:38

## 2024-06-09 RX ADMIN — CHLORHEXIDINE GLUCONATE 0.12% ORAL RINSE 15 ML: 1.2 LIQUID ORAL at 20:34

## 2024-06-09 RX ADMIN — FUROSEMIDE 60 MG: 10 INJECTION, SOLUTION INTRAMUSCULAR; INTRAVENOUS at 08:49

## 2024-06-09 RX ADMIN — PROPOFOL 60 MCG/KG/MIN: 10 INJECTION, EMULSION INTRAVENOUS at 10:23

## 2024-06-09 RX ADMIN — Medication 12.5 MG: at 14:26

## 2024-06-09 RX ADMIN — OXYCODONE HYDROCHLORIDE 5 MG: 5 TABLET ORAL at 03:41

## 2024-06-09 RX ADMIN — PROPOFOL 50 MCG/KG/MIN: 10 INJECTION, EMULSION INTRAVENOUS at 05:37

## 2024-06-09 RX ADMIN — PROPOFOL 60 MCG/KG/MIN: 10 INJECTION, EMULSION INTRAVENOUS at 23:51

## 2024-06-09 RX ADMIN — POTASSIUM CHLORIDE 20 MEQ: 29.8 INJECTION, SOLUTION INTRAVENOUS at 08:18

## 2024-06-09 RX ADMIN — Medication 12.5 MG: at 22:34

## 2024-06-09 RX ADMIN — ASPIRIN 81 MG CHEWABLE TABLET 81 MG: 81 TABLET CHEWABLE at 07:38

## 2024-06-09 RX ADMIN — POTASSIUM CHLORIDE 40 MEQ: 1.5 POWDER, FOR SOLUTION ORAL at 15:53

## 2024-06-09 ASSESSMENT — ACTIVITIES OF DAILY LIVING (ADL)
ADLS_ACUITY_SCORE: 34
ADLS_ACUITY_SCORE: 38
ADLS_ACUITY_SCORE: 38
ADLS_ACUITY_SCORE: 34
ADLS_ACUITY_SCORE: 38
ADLS_ACUITY_SCORE: 34
ADLS_ACUITY_SCORE: 38
ADLS_ACUITY_SCORE: 38
ADLS_ACUITY_SCORE: 34
ADLS_ACUITY_SCORE: 38
ADLS_ACUITY_SCORE: 34
ADLS_ACUITY_SCORE: 38
ADLS_ACUITY_SCORE: 34

## 2024-06-09 NOTE — PLAN OF CARE
Goal Outcome Evaluation:      Plan of Care Reviewed With: patient, spouse    Overall Patient Progress: no changeOverall Patient Progress: no change    Major Shift Events:  70% Fio2. Slept well overnight. Agitated/restless this morning. Now on 60 prop. Wrist restraints in place for safety/line pulling. Pressures labile with agitation/sedation. Very strong cough when stimulated, ETT migrated out a few CM, ETT advanced overnight. Tmax 103.5, down to 98.6 this morning, ice packs, cold towels, fans used. Dex held d/t fever. Rectal tube leaked overnight, Benjamin in place with good output. Calcium replaced. Insulin at 3.   Plan: Continue to wean fio2 as tolerated. Continue POC.   For vital signs and complete assessments, please see documentation flowsheets.

## 2024-06-09 NOTE — PLAN OF CARE
Goal Outcome Evaluation:      Plan of Care Reviewed With: family    Overall Patient Progress: improvingOverall Patient Progress: improving    Outcome Evaluation: Good response to diuretics. Maintaining RASS goal throughout shift.    Major Shift Events: Adequately sedated to RASS goal -2-3 throughout day. Following commands, RUDD. Tolerating vent weaning. Diuresed.     Plan: Test for Cdiff tomorrow. Continue to wean vent as tolerated.     For vital signs and complete assessments, please see documentation flowsheets.

## 2024-06-09 NOTE — PROGRESS NOTES
"  [facility]  Cardiology ICU History & Physical  June 1, 2024            Assessment and Plan:   Cullen Reardon is a 48 year old male with no known past medical history who was admitted on 6/1/2024 following a witnessed VF cardiac arrest.    He presented to [facility] on 6/1/2024 with chest pain.  He collapsed in front of the triage desk and was found to be pulseless after saying \"I feel like I'm going to die\".  He received immediate bystander CPR in the waiting room of the emergency department.  He was found be in VF, was shocked x 3.  Estimated downtime was approximately 15 minutes per paramedic report.  Was cannulated in [facility] emergency department and was on circuit at 16:08.  He was brought to the Cath Lab for coronary angiogram where he was found to have a 100% ostial LAD thrombotic occlusion status post stenting.  He was residual severe disease in D1. S/p staged PCI 6/7 to D1.     Currently working on agitation with sedation changes and improving respiratory status with diuresis.     Interval changes   Chest x-ray continues to have bilateral infiltrates concerning for edema/infection.  Labs concerning for possible contraction.  Continues to require PEEP of 10-12.  [facility] labs unremarkable.    Changes today   -With bilateral infiltrates, P to F ratio less than 300 and improved cardiac function with labs concerning for euvolemia/contraction we will consider treating as ARDS and try Decadron 20mg x 5 days if no clinical improvement. For now will attempt aggressive diuresis to rule out edema as a contributor to his respiratory status   - Lasix 60 BID, Diamox 500 mg goal net neg 2-3 L  - Isordil 10 TID  - Vent titration to blood gasses.  - Cdiff tomorrow, continued diarrhea/fevers/elevated WBC. Will start loperamide if negative.    Neuro: #. At risk for anoxic brain injury  Following commands.   --Intubated and sedated. Wean sedation daily to assess neurological status. "   --Maintain euthermia/warm by 0.5 C/hr to get to 37C  --continue Dilautid, propofol. Agitation despite high doses of fentanyl. Switched on 6/8 due to concern for tachyphylaxis.  - Oxy 5q4  --Seroquel for delirium  --RASS goal -3 to -4   --CT head : No acute issues.     CV: #. Cardiogenic Shock s/p VA ECMO  #. ACUTE STEMI s/p KAYLYN to ostial LAD 6/1/24   #. Refractory VF arrest  s/p VA ECMO  --Peripheral V-A ECMO inserted via RCFA (17 Fr) and RCFV (25 Fr).  -- EF 15% -- 45% (6/8)  -- Diuresis as above, goal net neg 1L   --Continue ASA 81mg and ticagrelor 90mg BID  - Hydral+isordil   -- GDMT and Lipitor pending clinical course      Pulm: #. Acute hypoxic respiratory failure  #. Probable aspiration pneumonia  Vent Mode: SIMV/PS  (Synchronized Intermittent Mandatory Ventilation with Pressure Support)  FiO2 (%): 60 %  Resp Rate (Set): 10 breaths/min  Tidal Volume (Set, mL): 500 mL  PEEP (cm H2O): 10 cmH2O  Pressure Support (cm H2O): 5 cmH2O  Inspiratory Pressure (cm H2O) (Drager Myah): 30  Resp: 10    --ETT in stable position    --CXR: Lines in stable position.   --Wean vent as able  --Daily CXR  --MRSA nares - neg, vanco stopped 6/1-6/2.  --Q12h ABGs for now     GI: #. Shock Liver 2/2 cardiac arrest  --Monitor LFTs twice a day.  --Consider RUQ US if LFTs do not improve.     Renal: #. JEAN CLAUDE 2/2 cardiac arrest  Hematuria 2/2 traumatic schafer insertion   --Monitor urine output  --Maintain K>3 and Mg>2      ID: #. Probable aspiration pneumonia  - Ctx x5 days 6/1- 6/5  - Febrile to 38.3  - Zosyn 6/5- 6/7  - Cefipime 6/9-*  --Repeat pan cx     Hem/Onc: #. Acute blood loss anemia   --Continue Aspirin and Tacagrelor for the stent.  --Cryo PRN fibrinogen < 150; FFP for INR >2  --Transfuse for Hgb<7  --US LE w/ arterial duplex per ECMO protocol   --DVT PPX: STEVE     Endo: #. Hyperglycemia  --Insulin as needed.  --HgbA1c - 6.1.     Checklist: ICU Checklist:  #Feeding: tube feeds at goal  #Analgesia: propofol   #Sedation:  fentanyl  #Thromboembolism ppx: heparin sq   #HOB: 30 degrees   #Ulcer ppx: ppi  #Glucose: insulin gtt   #SBT/SAT: as tolerated  #Bowel reg: miralax, senna   #Lines/tubes/drains: 6 Fr LCFA sheath, schafer, ETT, OG  #Code status: full   #Family: will update as able this evening   #Dispo: ICU          Code Status: Full    The pt was discussed with the attending physician. Dr. Wallace Hernandez MD  Cardiology fellow ([id number])  [id number]         Medications:   I have reviewed this patient's current medications    No past medical history on file.    No family history on file.  Social History     Socioeconomic History    Marital status:      Spouse name: Not on file    Number of children: Not on file    Years of education: Not on file    Highest education level: Not on file   Occupational History    Not on file   Tobacco Use    Smoking status: Not on file    Smokeless tobacco: Not on file   Substance and Sexual Activity    Alcohol use: Not on file    Drug use: Not on file    Sexual activity: Not on file   Other Topics Concern    Not on file   Social History Narrative    Not on file     Social Determinants of Health     Financial Resource Strain: Not At Risk (7/31/2023)    Received from [employer]    Financial Resource Strain     Is it hard for you to pay for the very basics like food, housing, medical care or heating?: No   Food Insecurity: Not At Risk (7/31/2023)    Received from [employer]    Food Insecurity     Does your food run out before you have the money to buy more?: No   Transportation Needs: Not At Risk (7/31/2023)    Received from [employer]    Transportation Needs     Does a lack of transportation keep you from your medical appointments or from getting your medications?: No   Physical Activity: Not on file   Stress: Not on file   Social Connections: Not on file   Interpersonal Safety: Not on file   Housing Stability: Not on file            Review of Systems:   Unable to obtain due to  "patients medical condition.            Physical Exam:   BP 92/60   Pulse 89   Temp 98.4  F (36.9  C)   Resp 10   Ht 1.886 m (6' 2.25\")   Wt 134.6 kg (296 lb 11.8 oz)   SpO2 98%   BMI 37.84 kg/m        GENERAL: Intubated, sedated. NAD.  HEENT: No icterus. ETT in place, OG tube in place.  CARDIOVASCULAR: RRR. Normal S1 and S2.  RESP: Coarse bilaterally. Mechanical ventilation.    GI Soft, bowel sounds hypoactive but present.  GENITOURINARY: Benjamin in place.  EXTREMITIES: Cool, 1+ edema, pulses dopplered, as above.   NEURO: Sedated and intubated.  INTEGUMENTARY: No rashes. Cannula/Line sites CDI.      Resp: 10 SpO2: 98 % O2 Device: Mechanical Ventilator      Arterial Blood Gas:   Recent Labs   Lab 06/09/24  0859 06/09/24  0404 06/08/24  1228 06/08/24  0541   PH 7.47* 7.41 7.47* 7.45   PCO2 48* 55* 49* 49*   PO2 83 161* 71* 84   HCO3 35* 35* 36* 34*   O2PER 60 80 70 80     Vitals:    06/06/24 0000 06/07/24 0000 06/09/24 0000   Weight: 126.7 kg (279 lb 5.2 oz) 127.8 kg (281 lb 12 oz) 134.6 kg (296 lb 11.8 oz)   I/O last 3 completed shifts:  In: 3786.95 [I.V.:1276.95; NG/GT:900]  Out: 4350 [Urine:4200; Stool:150]  Recent Labs   Lab 06/09/24  0750 06/09/24  0402 06/09/24  0354 06/08/24  1422 06/08/24  1420   NA  --   --  146*  --  146*   POTASSIUM  --   --  3.5  --  3.8   CHLORIDE  --   --  105  --  104   CO2  --   --  31*  --  32*   ANIONGAP  --   --  10  --  10   * 131* 143*   < > 143*   BUN  --   --  46.6*  --  40.9*   CR  --   --  1.22*  --  1.27*   RANDA  --   --  8.7  --  8.6    < > = values in this interval not displayed.     No components found for: \"URINE\"   Recent Labs   Lab 06/09/24  0354 06/08/24  1420 06/08/24  0540   AST 59* 68* 67*   ALT 75* 79* 74*   BILITOTAL 0.8 0.8 0.8   ALBUMIN 3.3* 3.2* 3.1*   PROTTOTAL 6.4 6.2* 6.1*   ALKPHOS 109 106 96     Temp: 98.4  F (36.9  C) Temp src: BladderTemp  Min: 98.4  F (36.9  C)  Max: 103.5  F (39.7  C)   Recent Labs   Lab 06/09/24  0354 06/08/24  1420 " 06/08/24  0540 06/07/24  1402 06/07/24  0344   WBC 10.9 11.0 9.2 9.4 9.7   HGB 9.6* 9.7* 9.9* 10.2* 9.7*   HCT 30.0* 29.2* 29.8* 30.2* 29.2*   MCV 97 95 95 93 93   RDW 15.4* 15.5* 15.3* 14.7 14.4    215 210 189 176     Recent Labs   Lab 06/09/24  0354 06/08/24  0540 06/07/24  0344 06/06/24  0354 06/05/24  0343 06/04/24  2326 06/04/24  1620 06/04/24  1015 06/04/24  0346   INR 1.08 1.09 1.15 1.16* 1.11 1.21* 1.18* 1.23* 1.19*   PTT  --   --   --   --  29 42* 93* 100* 109*     Recent Labs   Lab 06/09/24  0750 06/09/24  0402 06/09/24  0354 06/08/24  2341 06/08/24  2102   * 131* 143* 139* 141*       Lines:           Data:     Recent Results (from the past 24 hour(s))   XR Chest Port 1 View    Narrative    XR CHEST PORT 1 VIEW  6/9/2024 2:23 AM     HISTORY:  Post-op pneumonia       COMPARISON:  Chest radiograph 6/8/2024    TECHNIQUE: Frontal view of the chest.    FINDINGS:   Patient is significantly rotated. Stable position of ET tube,  left-sided PICC, and gastric tube.     Trachea is midline. Stable cardiomediastinal silhouette. No  pneumothorax. Bibasilar and bilateral perihilar patchy opacities are  stable. Upper abdomen and osseous structures are within normal limits.      Impression    IMPRESSION:  Stable bilateral patchy mixed pulmonary opacities, could represent a  combination of atelectasis, edema, or infection. Stable support  devices.    I have personally reviewed the examination and initial interpretation  and I agree with the findings.    KITTY CHEUNG DO         SYSTEM ID:  [id number]   XR Chest Port 1 View    Narrative    XR CHEST PORT 1 VIEW  6/9/2024 5:57 AM     HISTORY:  ETT position after advancing by 2 cm       COMPARISON:  4 hours prior same day    TECHNIQUE: Frontal view of the chest.    FINDINGS:   Endotracheal tube does not appear to have changed significantly in  position, still terminates at the mid thoracic trachea. Stable left  upper extremity PICC. Unchanged enteric tube. Lung  fields are stable.      Impression    IMPRESSION:  1. Endotracheal tube appears unchanged at the mid thoracic trachea.  2. Stable bilateral patchy mixed pulmonary opacities, could represent  a combination of atelectasis, edema, or infection.    I have personally reviewed the examination and initial interpretation  and I agree with the findings.    MELISSA BEACH MD         SYSTEM ID:  [id number]

## 2024-06-10 ENCOUNTER — APPOINTMENT (OUTPATIENT)
Dept: OCCUPATIONAL THERAPY | Facility: CLINIC | Age: 49
DRG: 003 | End: 2024-06-10
Attending: SURGERY
Payer: COMMERCIAL

## 2024-06-10 ENCOUNTER — APPOINTMENT (OUTPATIENT)
Dept: GENERAL RADIOLOGY | Facility: CLINIC | Age: 49
DRG: 003 | End: 2024-06-10
Attending: STUDENT IN AN ORGANIZED HEALTH CARE EDUCATION/TRAINING PROGRAM
Payer: COMMERCIAL

## 2024-06-10 ENCOUNTER — APPOINTMENT (OUTPATIENT)
Dept: PHYSICAL THERAPY | Facility: CLINIC | Age: 49
DRG: 003 | End: 2024-06-10
Attending: SURGERY
Payer: COMMERCIAL

## 2024-06-10 ENCOUNTER — APPOINTMENT (OUTPATIENT)
Dept: GENERAL RADIOLOGY | Facility: CLINIC | Age: 49
DRG: 003 | End: 2024-06-10
Attending: SURGERY
Payer: COMMERCIAL

## 2024-06-10 LAB
ALBUMIN SERPL BCG-MCNC: 3.2 G/DL (ref 3.5–5.2)
ALBUMIN SERPL BCG-MCNC: 3.3 G/DL (ref 3.5–5.2)
ALLEN'S TEST: ABNORMAL
ALP SERPL-CCNC: 119 U/L (ref 40–150)
ALP SERPL-CCNC: 162 U/L (ref 40–150)
ALT SERPL W P-5'-P-CCNC: 101 U/L (ref 0–70)
ALT SERPL W P-5'-P-CCNC: 81 U/L (ref 0–70)
ANION GAP SERPL CALCULATED.3IONS-SCNC: 10 MMOL/L (ref 7–15)
ANION GAP SERPL CALCULATED.3IONS-SCNC: 11 MMOL/L (ref 7–15)
ANION GAP SERPL CALCULATED.3IONS-SCNC: 12 MMOL/L (ref 7–15)
AST SERPL W P-5'-P-CCNC: 63 U/L (ref 0–45)
AST SERPL W P-5'-P-CCNC: 94 U/L (ref 0–45)
BACTERIA BLD CULT: ABNORMAL
BACTERIA BLD CULT: ABNORMAL
BACTERIA SPT CULT: NO GROWTH
BASE EXCESS BLDA CALC-SCNC: 3.5 MMOL/L (ref -3–3)
BASE EXCESS BLDA CALC-SCNC: 8.3 MMOL/L (ref -3–3)
BASE EXCESS BLDA CALC-SCNC: 9.1 MMOL/L (ref -3–3)
BASE EXCESS BLDA CALC-SCNC: 9.4 MMOL/L (ref -3–3)
BILIRUB SERPL-MCNC: 0.8 MG/DL
BILIRUB SERPL-MCNC: 1 MG/DL
BUN SERPL-MCNC: 51.9 MG/DL (ref 6–20)
BUN SERPL-MCNC: 53 MG/DL (ref 6–20)
BUN SERPL-MCNC: 55 MG/DL (ref 6–20)
C DIFF TOX B STL QL: NEGATIVE
CALCIUM SERPL-MCNC: 8.9 MG/DL (ref 8.6–10)
CALCIUM SERPL-MCNC: 8.9 MG/DL (ref 8.6–10)
CALCIUM SERPL-MCNC: 9 MG/DL (ref 8.6–10)
CHLORIDE SERPL-SCNC: 102 MMOL/L (ref 98–107)
CHLORIDE SERPL-SCNC: 106 MMOL/L (ref 98–107)
CHLORIDE SERPL-SCNC: 106 MMOL/L (ref 98–107)
COHGB MFR BLD: 96.8 % (ref 96–97)
COHGB MFR BLD: 97.2 % (ref 96–97)
COHGB MFR BLD: 98.4 % (ref 96–97)
COHGB MFR BLD: 98.7 % (ref 96–97)
CREAT SERPL-MCNC: 1.34 MG/DL (ref 0.67–1.17)
CREAT SERPL-MCNC: 1.37 MG/DL (ref 0.67–1.17)
CREAT SERPL-MCNC: 1.51 MG/DL (ref 0.67–1.17)
DEPRECATED HCO3 PLAS-SCNC: 25 MMOL/L (ref 22–29)
DEPRECATED HCO3 PLAS-SCNC: 29 MMOL/L (ref 22–29)
DEPRECATED HCO3 PLAS-SCNC: 32 MMOL/L (ref 22–29)
EGFRCR SERPLBLD CKD-EPI 2021: 56 ML/MIN/1.73M2
EGFRCR SERPLBLD CKD-EPI 2021: 63 ML/MIN/1.73M2
EGFRCR SERPLBLD CKD-EPI 2021: 65 ML/MIN/1.73M2
ERYTHROCYTE [DISTWIDTH] IN BLOOD BY AUTOMATED COUNT: 14.9 % (ref 10–15)
ERYTHROCYTE [DISTWIDTH] IN BLOOD BY AUTOMATED COUNT: 15.1 % (ref 10–15)
GLUCOSE BLDC GLUCOMTR-MCNC: 104 MG/DL (ref 70–99)
GLUCOSE BLDC GLUCOMTR-MCNC: 126 MG/DL (ref 70–99)
GLUCOSE BLDC GLUCOMTR-MCNC: 131 MG/DL (ref 70–99)
GLUCOSE BLDC GLUCOMTR-MCNC: 131 MG/DL (ref 70–99)
GLUCOSE BLDC GLUCOMTR-MCNC: 139 MG/DL (ref 70–99)
GLUCOSE BLDC GLUCOMTR-MCNC: 140 MG/DL (ref 70–99)
GLUCOSE BLDC GLUCOMTR-MCNC: 141 MG/DL (ref 70–99)
GLUCOSE BLDC GLUCOMTR-MCNC: 151 MG/DL (ref 70–99)
GLUCOSE BLDC GLUCOMTR-MCNC: 151 MG/DL (ref 70–99)
GLUCOSE BLDC GLUCOMTR-MCNC: 165 MG/DL (ref 70–99)
GLUCOSE BLDC GLUCOMTR-MCNC: 201 MG/DL (ref 70–99)
GLUCOSE BLDC GLUCOMTR-MCNC: 205 MG/DL (ref 70–99)
GLUCOSE SERPL-MCNC: 131 MG/DL (ref 70–99)
GLUCOSE SERPL-MCNC: 140 MG/DL (ref 70–99)
GLUCOSE SERPL-MCNC: 220 MG/DL (ref 70–99)
GRAM STAIN RESULT: NORMAL
GRAM STAIN RESULT: NORMAL
HCO3 BLD-SCNC: 27 MMOL/L (ref 21–28)
HCO3 BLD-SCNC: 31 MMOL/L (ref 21–28)
HCO3 BLD-SCNC: 34 MMOL/L (ref 21–28)
HCO3 BLD-SCNC: 35 MMOL/L (ref 21–28)
HCT VFR BLD AUTO: 29.5 % (ref 40–53)
HCT VFR BLD AUTO: 30.1 % (ref 40–53)
HGB BLD-MCNC: 10 G/DL (ref 13.3–17.7)
HGB BLD-MCNC: 9.4 G/DL (ref 13.3–17.7)
HSV1 DNA SPEC QL NAA+PROBE: NEGATIVE
HSV2 DNA SPEC QL NAA+PROBE: NEGATIVE
INR PPP: 1.13 (ref 0.85–1.15)
LACTATE SERPL-SCNC: 0.8 MMOL/L (ref 0.7–2)
LACTATE SERPL-SCNC: 0.8 MMOL/L (ref 0.7–2)
LACTATE SERPL-SCNC: 1.1 MMOL/L (ref 0.7–2)
LACTATE SERPL-SCNC: 1.3 MMOL/L (ref 0.7–2)
MAGNESIUM SERPL-MCNC: 2.5 MG/DL (ref 1.7–2.3)
MAGNESIUM SERPL-MCNC: 2.6 MG/DL (ref 1.7–2.3)
MCH RBC QN AUTO: 30.8 PG (ref 26.5–33)
MCH RBC QN AUTO: 31.4 PG (ref 26.5–33)
MCHC RBC AUTO-ENTMCNC: 31.9 G/DL (ref 31.5–36.5)
MCHC RBC AUTO-ENTMCNC: 33.2 G/DL (ref 31.5–36.5)
MCV RBC AUTO: 95 FL (ref 78–100)
MCV RBC AUTO: 97 FL (ref 78–100)
O2/TOTAL GAS SETTING VFR VENT: 30 %
O2/TOTAL GAS SETTING VFR VENT: 30 %
O2/TOTAL GAS SETTING VFR VENT: 40 %
O2/TOTAL GAS SETTING VFR VENT: 40 %
PCO2 BLD: 35 MM HG (ref 35–45)
PCO2 BLD: 37 MM HG (ref 35–45)
PCO2 BLD: 47 MM HG (ref 35–45)
PCO2 BLD: 51 MM HG (ref 35–45)
PEEP: 0 CM H2O
PEEP: 5 CM H2O
PEEP: 8 CM H2O
PH BLD: 7.44 [PH] (ref 7.35–7.45)
PH BLD: 7.47 [PH] (ref 7.35–7.45)
PH BLD: 7.5 [PH] (ref 7.35–7.45)
PH BLD: 7.54 [PH] (ref 7.35–7.45)
PHOSPHATE SERPL-MCNC: 1.7 MG/DL (ref 2.5–4.5)
PHOSPHATE SERPL-MCNC: 3.7 MG/DL (ref 2.5–4.5)
PLATELET # BLD AUTO: 261 10E3/UL (ref 150–450)
PLATELET # BLD AUTO: 316 10E3/UL (ref 150–450)
PO2 BLD: 77 MM HG (ref 80–105)
PO2 BLD: 86 MM HG (ref 80–105)
PO2 BLD: 87 MM HG (ref 80–105)
PO2 BLD: 88 MM HG (ref 80–105)
POTASSIUM SERPL-SCNC: 3.2 MMOL/L (ref 3.4–5.3)
POTASSIUM SERPL-SCNC: 3.3 MMOL/L (ref 3.4–5.3)
PROT SERPL-MCNC: 6.7 G/DL (ref 6.4–8.3)
PROT SERPL-MCNC: 6.8 G/DL (ref 6.4–8.3)
RBC # BLD AUTO: 3.05 10E6/UL (ref 4.4–5.9)
RBC # BLD AUTO: 3.18 10E6/UL (ref 4.4–5.9)
SAO2 % BLDA: 94 % (ref 92–100)
SAO2 % BLDA: 95 % (ref 92–100)
SAO2 % BLDA: 96 % (ref 92–100)
SAO2 % BLDA: 96 % (ref 92–100)
SODIUM SERPL-SCNC: 142 MMOL/L (ref 135–145)
SODIUM SERPL-SCNC: 144 MMOL/L (ref 135–145)
SODIUM SERPL-SCNC: 147 MMOL/L (ref 135–145)
TRIGL SERPL-MCNC: 297 MG/DL
WBC # BLD AUTO: 11.6 10E3/UL (ref 4–11)
WBC # BLD AUTO: 9.3 10E3/UL (ref 4–11)

## 2024-06-10 PROCEDURE — 74018 RADEX ABDOMEN 1 VIEW: CPT | Mod: 26 | Performed by: RADIOLOGY

## 2024-06-10 PROCEDURE — 80053 COMPREHEN METABOLIC PANEL: CPT | Performed by: STUDENT IN AN ORGANIZED HEALTH CARE EDUCATION/TRAINING PROGRAM

## 2024-06-10 PROCEDURE — 250N000011 HC RX IP 250 OP 636: Mod: JZ | Performed by: INTERNAL MEDICINE

## 2024-06-10 PROCEDURE — 250N000011 HC RX IP 250 OP 636: Mod: JZ | Performed by: STUDENT IN AN ORGANIZED HEALTH CARE EDUCATION/TRAINING PROGRAM

## 2024-06-10 PROCEDURE — 250N000013 HC RX MED GY IP 250 OP 250 PS 637: Performed by: SURGERY

## 2024-06-10 PROCEDURE — 82805 BLOOD GASES W/O2 SATURATION: CPT | Performed by: SURGERY

## 2024-06-10 PROCEDURE — 250N000013 HC RX MED GY IP 250 OP 250 PS 637: Performed by: NURSE PRACTITIONER

## 2024-06-10 PROCEDURE — 71045 X-RAY EXAM CHEST 1 VIEW: CPT | Mod: 26 | Performed by: STUDENT IN AN ORGANIZED HEALTH CARE EDUCATION/TRAINING PROGRAM

## 2024-06-10 PROCEDURE — 82247 BILIRUBIN TOTAL: CPT | Performed by: STUDENT IN AN ORGANIZED HEALTH CARE EDUCATION/TRAINING PROGRAM

## 2024-06-10 PROCEDURE — 87493 C DIFF AMPLIFIED PROBE: CPT | Performed by: STUDENT IN AN ORGANIZED HEALTH CARE EDUCATION/TRAINING PROGRAM

## 2024-06-10 PROCEDURE — 200N000002 HC R&B ICU UMMC

## 2024-06-10 PROCEDURE — 93005 ELECTROCARDIOGRAM TRACING: CPT

## 2024-06-10 PROCEDURE — 85027 COMPLETE CBC AUTOMATED: CPT | Performed by: STUDENT IN AN ORGANIZED HEALTH CARE EDUCATION/TRAINING PROGRAM

## 2024-06-10 PROCEDURE — 85018 HEMOGLOBIN: CPT | Performed by: STUDENT IN AN ORGANIZED HEALTH CARE EDUCATION/TRAINING PROGRAM

## 2024-06-10 PROCEDURE — 999N000253 HC STATISTIC WEANING TRIALS

## 2024-06-10 PROCEDURE — 74018 RADEX ABDOMEN 1 VIEW: CPT | Mod: 26 | Performed by: STUDENT IN AN ORGANIZED HEALTH CARE EDUCATION/TRAINING PROGRAM

## 2024-06-10 PROCEDURE — 999N000157 HC STATISTIC RCP TIME EA 10 MIN

## 2024-06-10 PROCEDURE — 250N000011 HC RX IP 250 OP 636: Performed by: NURSE PRACTITIONER

## 2024-06-10 PROCEDURE — 250N000013 HC RX MED GY IP 250 OP 250 PS 637: Performed by: STUDENT IN AN ORGANIZED HEALTH CARE EDUCATION/TRAINING PROGRAM

## 2024-06-10 PROCEDURE — 84100 ASSAY OF PHOSPHORUS: CPT | Performed by: INTERNAL MEDICINE

## 2024-06-10 PROCEDURE — 83735 ASSAY OF MAGNESIUM: CPT | Performed by: SURGERY

## 2024-06-10 PROCEDURE — 999N000065 XR CHEST PORT 1 VIEW

## 2024-06-10 PROCEDURE — 250N000011 HC RX IP 250 OP 636: Performed by: STUDENT IN AN ORGANIZED HEALTH CARE EDUCATION/TRAINING PROGRAM

## 2024-06-10 PROCEDURE — 84478 ASSAY OF TRIGLYCERIDES: CPT | Performed by: INTERNAL MEDICINE

## 2024-06-10 PROCEDURE — 94799 UNLISTED PULMONARY SVC/PX: CPT

## 2024-06-10 PROCEDURE — 71045 X-RAY EXAM CHEST 1 VIEW: CPT

## 2024-06-10 PROCEDURE — 84132 ASSAY OF SERUM POTASSIUM: CPT | Performed by: STUDENT IN AN ORGANIZED HEALTH CARE EDUCATION/TRAINING PROGRAM

## 2024-06-10 PROCEDURE — 84100 ASSAY OF PHOSPHORUS: CPT | Performed by: SURGERY

## 2024-06-10 PROCEDURE — 99291 CRITICAL CARE FIRST HOUR: CPT | Mod: GC | Performed by: INTERNAL MEDICINE

## 2024-06-10 PROCEDURE — 84155 ASSAY OF PROTEIN SERUM: CPT | Performed by: STUDENT IN AN ORGANIZED HEALTH CARE EDUCATION/TRAINING PROGRAM

## 2024-06-10 PROCEDURE — 250N000012 HC RX MED GY IP 250 OP 636 PS 637: Performed by: NURSE PRACTITIONER

## 2024-06-10 PROCEDURE — 82805 BLOOD GASES W/O2 SATURATION: CPT | Performed by: INTERNAL MEDICINE

## 2024-06-10 PROCEDURE — 83605 ASSAY OF LACTIC ACID: CPT | Performed by: SURGERY

## 2024-06-10 PROCEDURE — 97530 THERAPEUTIC ACTIVITIES: CPT | Mod: GO

## 2024-06-10 PROCEDURE — 999N000259 HC STATISTIC EXTUBATION

## 2024-06-10 PROCEDURE — 93010 ELECTROCARDIOGRAM REPORT: CPT | Performed by: INTERNAL MEDICINE

## 2024-06-10 PROCEDURE — 94003 VENT MGMT INPAT SUBQ DAY: CPT

## 2024-06-10 PROCEDURE — 258N000003 HC RX IP 258 OP 636: Mod: JZ | Performed by: INTERNAL MEDICINE

## 2024-06-10 PROCEDURE — 250N000009 HC RX 250: Performed by: SURGERY

## 2024-06-10 PROCEDURE — G0463 HOSPITAL OUTPT CLINIC VISIT: HCPCS | Mod: 25

## 2024-06-10 PROCEDURE — 97166 OT EVAL MOD COMPLEX 45 MIN: CPT | Mod: GO

## 2024-06-10 PROCEDURE — 97530 THERAPEUTIC ACTIVITIES: CPT | Mod: GP

## 2024-06-10 PROCEDURE — 250N000009 HC RX 250: Performed by: INTERNAL MEDICINE

## 2024-06-10 PROCEDURE — 85610 PROTHROMBIN TIME: CPT | Performed by: SURGERY

## 2024-06-10 PROCEDURE — 97162 PT EVAL MOD COMPLEX 30 MIN: CPT | Mod: GP

## 2024-06-10 PROCEDURE — 83735 ASSAY OF MAGNESIUM: CPT | Performed by: INTERNAL MEDICINE

## 2024-06-10 PROCEDURE — 272N000054 HC CANNULA HIGH FLOW, ADULT

## 2024-06-10 PROCEDURE — 999N000065 XR ABDOMEN PORT 1 VIEW

## 2024-06-10 PROCEDURE — 250N000009 HC RX 250: Performed by: NURSE PRACTITIONER

## 2024-06-10 PROCEDURE — 250N000012 HC RX MED GY IP 250 OP 636 PS 637: Performed by: INTERNAL MEDICINE

## 2024-06-10 RX ORDER — NICOTINE POLACRILEX 4 MG
15-30 LOZENGE BUCCAL
Status: DISCONTINUED | OUTPATIENT
Start: 2024-06-10 | End: 2024-06-10

## 2024-06-10 RX ORDER — POTASSIUM PHOS IN 0.9 % NACL 15MMOL/250
15 PLASTIC BAG, INJECTION (ML) INTRAVENOUS
Status: COMPLETED | OUTPATIENT
Start: 2024-06-10 | End: 2024-06-11

## 2024-06-10 RX ORDER — POTASSIUM CHLORIDE 20MEQ/15ML
40 LIQUID (ML) ORAL ONCE
Status: COMPLETED | OUTPATIENT
Start: 2024-06-10 | End: 2024-06-10

## 2024-06-10 RX ORDER — DEXTROSE MONOHYDRATE 25 G/50ML
25-50 INJECTION, SOLUTION INTRAVENOUS
Status: DISCONTINUED | OUTPATIENT
Start: 2024-06-10 | End: 2024-06-10

## 2024-06-10 RX ORDER — POTASSIUM CHLORIDE 1.5 G/1.58G
40 POWDER, FOR SOLUTION ORAL ONCE
Status: COMPLETED | OUTPATIENT
Start: 2024-06-10 | End: 2024-06-10

## 2024-06-10 RX ORDER — ACETAZOLAMIDE 500 MG/5ML
500 INJECTION, POWDER, LYOPHILIZED, FOR SOLUTION INTRAVENOUS ONCE
Status: COMPLETED | OUTPATIENT
Start: 2024-06-10 | End: 2024-06-10

## 2024-06-10 RX ORDER — DEXMEDETOMIDINE HYDROCHLORIDE 4 UG/ML
.1-1.2 INJECTION, SOLUTION INTRAVENOUS CONTINUOUS
Status: DISCONTINUED | OUTPATIENT
Start: 2024-06-10 | End: 2024-06-11

## 2024-06-10 RX ORDER — ISOSORBIDE DINITRATE 5 MG/1
20 TABLET ORAL
Status: DISCONTINUED | OUTPATIENT
Start: 2024-06-10 | End: 2024-06-11

## 2024-06-10 RX ORDER — HYDRALAZINE HYDROCHLORIDE 25 MG/1
25 TABLET, FILM COATED ORAL EVERY 8 HOURS SCHEDULED
Status: DISCONTINUED | OUTPATIENT
Start: 2024-06-10 | End: 2024-06-11

## 2024-06-10 RX ORDER — ACETAZOLAMIDE 500 MG/5ML
500 INJECTION, POWDER, LYOPHILIZED, FOR SOLUTION INTRAVENOUS EVERY 12 HOURS
Status: DISCONTINUED | OUTPATIENT
Start: 2024-06-11 | End: 2024-06-12

## 2024-06-10 RX ORDER — HYDRALAZINE HYDROCHLORIDE 20 MG/ML
10 INJECTION INTRAMUSCULAR; INTRAVENOUS
Status: DISCONTINUED | OUTPATIENT
Start: 2024-06-10 | End: 2024-06-15 | Stop reason: HOSPADM

## 2024-06-10 RX ORDER — POTASSIUM CHLORIDE 20MEQ/15ML
60 LIQUID (ML) ORAL ONCE
Status: COMPLETED | OUTPATIENT
Start: 2024-06-10 | End: 2024-06-10

## 2024-06-10 RX ORDER — HALOPERIDOL 5 MG/ML
8 INJECTION INTRAMUSCULAR EVERY 6 HOURS PRN
Status: DISCONTINUED | OUTPATIENT
Start: 2024-06-10 | End: 2024-06-11

## 2024-06-10 RX ORDER — POTASSIUM CHLORIDE 29.8 MG/ML
20 INJECTION INTRAVENOUS
Status: COMPLETED | OUTPATIENT
Start: 2024-06-10 | End: 2024-06-10

## 2024-06-10 RX ORDER — CHLOROTHIAZIDE SODIUM 500 MG/1
1000 INJECTION INTRAVENOUS ONCE
Status: COMPLETED | OUTPATIENT
Start: 2024-06-10 | End: 2024-06-10

## 2024-06-10 RX ADMIN — POTASSIUM CHLORIDE 20 MEQ: 29.8 INJECTION, SOLUTION INTRAVENOUS at 07:27

## 2024-06-10 RX ADMIN — OXYCODONE HYDROCHLORIDE 5 MG: 5 TABLET ORAL at 19:19

## 2024-06-10 RX ADMIN — Medication 12.5 MG: at 05:49

## 2024-06-10 RX ADMIN — DEXMEDETOMIDINE HYDROCHLORIDE 1.2 MCG/KG/HR: 400 INJECTION INTRAVENOUS at 13:17

## 2024-06-10 RX ADMIN — Medication 25 MG: at 10:12

## 2024-06-10 RX ADMIN — OXYCODONE HYDROCHLORIDE 5 MG: 5 TABLET ORAL at 00:23

## 2024-06-10 RX ADMIN — AMIODARONE HYDROCHLORIDE 400 MG: 200 TABLET ORAL at 07:55

## 2024-06-10 RX ADMIN — OXYCODONE HYDROCHLORIDE 5 MG: 5 TABLET ORAL at 23:41

## 2024-06-10 RX ADMIN — ACETAZOLAMIDE 500 MG: 500 INJECTION, POWDER, LYOPHILIZED, FOR SOLUTION INTRAVENOUS at 23:41

## 2024-06-10 RX ADMIN — Medication 10 MG: at 13:37

## 2024-06-10 RX ADMIN — ACETAMINOPHEN 650 MG: 325 TABLET, FILM COATED ORAL at 23:53

## 2024-06-10 RX ADMIN — CHLORHEXIDINE GLUCONATE 0.12% ORAL RINSE 15 ML: 1.2 LIQUID ORAL at 07:55

## 2024-06-10 RX ADMIN — ACETAMINOPHEN 650 MG: 325 TABLET, FILM COATED ORAL at 14:07

## 2024-06-10 RX ADMIN — TICAGRELOR 90 MG: 90 TABLET ORAL at 07:55

## 2024-06-10 RX ADMIN — HEPARIN SODIUM 5000 UNITS: 5000 INJECTION, SOLUTION INTRAVENOUS; SUBCUTANEOUS at 19:19

## 2024-06-10 RX ADMIN — POTASSIUM CHLORIDE 20 MEQ: 29.8 INJECTION, SOLUTION INTRAVENOUS at 21:07

## 2024-06-10 RX ADMIN — QUETIAPINE FUMARATE 100 MG: 100 TABLET ORAL at 19:19

## 2024-06-10 RX ADMIN — PROPOFOL 60 MCG/KG/MIN: 10 INJECTION, EMULSION INTRAVENOUS at 02:42

## 2024-06-10 RX ADMIN — DEXMEDETOMIDINE HYDROCHLORIDE 1.2 MCG/KG/HR: 400 INJECTION INTRAVENOUS at 15:46

## 2024-06-10 RX ADMIN — CEFEPIME HYDROCHLORIDE 2 G: 2 INJECTION, POWDER, FOR SOLUTION INTRAVENOUS at 08:31

## 2024-06-10 RX ADMIN — POTASSIUM CHLORIDE 20 MEQ: 29.8 INJECTION, SOLUTION INTRAVENOUS at 22:02

## 2024-06-10 RX ADMIN — ACETAZOLAMIDE 500 MG: 500 INJECTION, POWDER, LYOPHILIZED, FOR SOLUTION INTRAVENOUS at 13:37

## 2024-06-10 RX ADMIN — OXYCODONE HYDROCHLORIDE 5 MG: 5 TABLET ORAL at 07:55

## 2024-06-10 RX ADMIN — VENLAFAXINE 37.5 MG: 37.5 TABLET ORAL at 07:54

## 2024-06-10 RX ADMIN — POTASSIUM PHOSPHATE, MONOBASIC POTASSIUM PHOSPHATE, DIBASIC 15 MMOL: 224; 236 INJECTION, SOLUTION, CONCENTRATE INTRAVENOUS at 22:06

## 2024-06-10 RX ADMIN — POTASSIUM CHLORIDE 60 MEQ: 1.5 SOLUTION ORAL at 11:00

## 2024-06-10 RX ADMIN — CEFEPIME HYDROCHLORIDE 2 G: 2 INJECTION, POWDER, FOR SOLUTION INTRAVENOUS at 16:17

## 2024-06-10 RX ADMIN — POTASSIUM CHLORIDE 40 MEQ: 1.5 POWDER, FOR SOLUTION ORAL at 15:30

## 2024-06-10 RX ADMIN — INSULIN HUMAN 3 UNITS/HR: 1 INJECTION, SOLUTION INTRAVENOUS at 06:06

## 2024-06-10 RX ADMIN — ISOSORBIDE DINITRATE 20 MG: 5 TABLET ORAL at 12:40

## 2024-06-10 RX ADMIN — INSULIN GLARGINE 30 UNITS: 100 INJECTION, SOLUTION SUBCUTANEOUS at 22:48

## 2024-06-10 RX ADMIN — ASPIRIN 81 MG CHEWABLE TABLET 81 MG: 81 TABLET CHEWABLE at 07:55

## 2024-06-10 RX ADMIN — ISOSORBIDE DINITRATE 10 MG: 5 TABLET ORAL at 07:55

## 2024-06-10 RX ADMIN — PROPOFOL 60 MCG/KG/MIN: 10 INJECTION, EMULSION INTRAVENOUS at 12:40

## 2024-06-10 RX ADMIN — INSULIN ASPART 2 UNITS: 100 INJECTION, SOLUTION INTRAVENOUS; SUBCUTANEOUS at 20:02

## 2024-06-10 RX ADMIN — Medication 25 MG: at 22:02

## 2024-06-10 RX ADMIN — AMIODARONE HYDROCHLORIDE 400 MG: 200 TABLET ORAL at 19:19

## 2024-06-10 RX ADMIN — PROPOFOL 25 MCG/KG/MIN: 10 INJECTION, EMULSION INTRAVENOUS at 08:32

## 2024-06-10 RX ADMIN — POTASSIUM CHLORIDE 40 MEQ: 20 SOLUTION ORAL at 17:06

## 2024-06-10 RX ADMIN — FUROSEMIDE 60 MG: 10 INJECTION, SOLUTION INTRAMUSCULAR; INTRAVENOUS at 07:55

## 2024-06-10 RX ADMIN — OXYCODONE HYDROCHLORIDE 5 MG: 5 TABLET ORAL at 12:39

## 2024-06-10 RX ADMIN — PROPOFOL 40 MCG/KG/MIN: 10 INJECTION, EMULSION INTRAVENOUS at 14:29

## 2024-06-10 RX ADMIN — OXYCODONE HYDROCHLORIDE 5 MG: 5 TABLET ORAL at 03:42

## 2024-06-10 RX ADMIN — HEPARIN SODIUM 5000 UNITS: 5000 INJECTION, SOLUTION INTRAVENOUS; SUBCUTANEOUS at 03:42

## 2024-06-10 RX ADMIN — HEPARIN SODIUM 5000 UNITS: 5000 INJECTION, SOLUTION INTRAVENOUS; SUBCUTANEOUS at 12:40

## 2024-06-10 RX ADMIN — OXYCODONE HYDROCHLORIDE 5 MG: 5 TABLET ORAL at 16:17

## 2024-06-10 RX ADMIN — TICAGRELOR 90 MG: 90 TABLET ORAL at 19:19

## 2024-06-10 RX ADMIN — Medication 1 MG/HR: at 04:25

## 2024-06-10 RX ADMIN — Medication 10 MG: at 10:59

## 2024-06-10 RX ADMIN — PROPOFOL 60 MCG/KG/MIN: 10 INJECTION, EMULSION INTRAVENOUS at 05:33

## 2024-06-10 RX ADMIN — VENLAFAXINE 37.5 MG: 37.5 TABLET ORAL at 17:43

## 2024-06-10 RX ADMIN — CHLOROTHIAZIDE SODIUM 1000 MG: 500 INJECTION, POWDER, LYOPHILIZED, FOR SOLUTION INTRAVENOUS at 09:44

## 2024-06-10 RX ADMIN — Medication 15 ML: at 07:55

## 2024-06-10 RX ADMIN — CEFEPIME HYDROCHLORIDE 2 G: 2 INJECTION, POWDER, FOR SOLUTION INTRAVENOUS at 00:23

## 2024-06-10 RX ADMIN — POTASSIUM CHLORIDE 20 MEQ: 29.8 INJECTION, SOLUTION INTRAVENOUS at 05:49

## 2024-06-10 RX ADMIN — Medication 25 MG: at 13:38

## 2024-06-10 RX ADMIN — ACETAMINOPHEN 650 MG: 325 TABLET, FILM COATED ORAL at 19:18

## 2024-06-10 RX ADMIN — CEFEPIME HYDROCHLORIDE 2 G: 2 INJECTION, POWDER, FOR SOLUTION INTRAVENOUS at 23:41

## 2024-06-10 RX ADMIN — ISOSORBIDE DINITRATE 20 MG: 5 TABLET ORAL at 17:42

## 2024-06-10 RX ADMIN — Medication 40 MG: at 07:55

## 2024-06-10 ASSESSMENT — ACTIVITIES OF DAILY LIVING (ADL)
ADLS_ACUITY_SCORE: 34
ADLS_ACUITY_SCORE: 38
ADLS_ACUITY_SCORE: 34

## 2024-06-10 NOTE — PLAN OF CARE
Goal Outcome Evaluation:    Major Shift Events: Pt requiring high doses of sedation to remain calm on the vent. Pt was hypertensive with SBP range from 130's-200's. Required multiple boluses of propofol and dilaudid to keep BP under control and for patient to tolerate vent. Before extubating, pt was on 70 of propofol, 1 of dilaudid, and 1.2 of precedex. Now since extubating at 1600, pt is off all sedation. Pt is following commands and responding appropriately. He is disoriented to time, but oriented to all other questions. Denies pain. On HFNC 40% 30L. Will encourage IS. Tmax 39.3, giving tylenol and applying ice packs. Placed NG, TF at goal, 60mL q4h FWF. Benjamin remains in place. Replacing K multiple times throughout shift.     Plan: Wean O2 as able, encourage mobility.     For vital signs and complete assessments, please see documentation flowsheets.

## 2024-06-10 NOTE — PROGRESS NOTES
"  [facility]  Cardiology ICU History & Physical  June 1, 2024            Assessment and Plan:   Cullen Reardon is a 48 year old male with no known past medical history who was admitted on 6/1/2024 following a witnessed VF cardiac arrest.    He presented to [facility] on 6/1/2024 with chest pain.  He collapsed in front of the triage desk and was found to be pulseless after saying \"I feel like I'm going to die\".  He received immediate bystander CPR in the waiting room of the emergency department.  He was found be in VF, was shocked x 3.  Estimated downtime was approximately 15 minutes per paramedic report.  Was cannulated in [facility] emergency department and was on circuit at 16:08.  He was brought to the Cath Lab for coronary angiogram where he was found to have a 100% ostial LAD thrombotic occlusion status post stenting.  He was residual severe disease in D1. S/p staged PCI 6/7 to D1.     Currently working on agitation with sedation changes and improving respiratory status with diuresis.     Interval changes   CXR improved, tolerating weaning PEEP and Fi02. Will attempt pressure support if he continues to tolerate lower PEEP and plan to extubate.  Plan to start max dose Precedex 1.2 in anticipation of that and have as needed Haldol available at bedside.  Will extubate patient to BiPAP pending clinical course.    Changes today   - Wean sedation and peep as tolerated   - Continue diuresis, He is likely Eu to hypovolumic today.   - Increase hydral + Isordil   - Cdiff testing, continued diarrhea/fevers/elevated WBC. Will start loperamide if negative.  - Switch insulin gtt to Glargine.     Neuro: #. At risk for anoxic brain injury  Following commands.   --Intubated and sedated. Wean sedation daily to assess neurological status.   --Maintain euthermia/warm by 0.5 C/hr to get to 37C  --continue Dilautid, propofol. Agitation despite high doses of fentanyl. Switched on 6/8 due to " concern for tachyphylaxis.  - Oxy 5q4  --Seroquel for delirium  --RASS goal -3 to -4   --CT head : No acute issues.     CV: #. Cardiogenic Shock s/p VA ECMO  #. ACUTE STEMI s/p KAYLYN to ostial LAD 6/1/24   #. Refractory VF arrest  s/p VA ECMO  --Peripheral V-A ECMO inserted via RCFA (17 Fr) and RCFV (25 Fr).  -- EF 15% -- 45% (6/8)  -- Diuresis as above, goal net neg 1L   --Continue ASA 81mg and ticagrelor 90mg BID  - Hydral+isordil   -- GDMT and Lipitor pending clinical course      Pulm: #. Acute hypoxic respiratory failure  #. Probable aspiration pneumonia  Vent Mode: SIMV/PS  (Synchronized Intermittent Mandatory Ventilation with Pressure Support)  FiO2 (%): 30 %  Resp Rate (Set): 10 breaths/min  Tidal Volume (Set, mL): 500 mL  PEEP (cm H2O): 10 cmH2O  Pressure Support (cm H2O): 5 cmH2O  Inspiratory Pressure (cm H2O) (Drager Myah): 30  Resp: 13    --ETT in stable position    --CXR: Lines in stable position.   --Wean vent as able  --Daily CXR  --MRSA nares - neg, vanco stopped 6/1-6/2.  --Q12h ABGs for now     GI: #. Shock Liver 2/2 cardiac arrest  --Monitor LFTs twice a day.  --Consider RUQ US if LFTs do not improve.     Renal: #. JEAN CLAUDE 2/2 cardiac arrest  Hematuria 2/2 traumatic schafer insertion   --Monitor urine output  --Maintain K>3 and Mg>2      ID: #. Probable aspiration pneumonia  - Ctx x5 days 6/1- 6/5  - Febrile to 38.3  - Zosyn 6/5- 6/7  - Cefipime 6/9-*  --Repeat pan cx     Hem/Onc: #. Acute blood loss anemia   --Continue Aspirin and Tacagrelor for the stent.  --Cryo PRN fibrinogen < 150; FFP for INR >2  --Transfuse for Hgb<7  --US LE w/ arterial duplex per ECMO protocol   --DVT PPX: STEVE     Endo: #. Hyperglycemia  --Insulin as needed.  --HgbA1c - 6.1.     Checklist: ICU Checklist:  #Feeding: tube feeds at goal  #Analgesia: propofol   #Sedation: fentanyl  #Thromboembolism ppx: heparin sq   #HOB: 30 degrees   #Ulcer ppx: ppi  #Glucose: insulin gtt   #SBT/SAT: as tolerated  #Bowel reg: miralax, senna  "  #Lines/tubes/drains: 6 Fr LCFA sheath, schafer, ETT, OG  #Code status: full   #Family: will update as able this evening   #Dispo: ICU          Code Status: Full    The pt was discussed with the attending physician. Dr. Wallace Hernandez MD  Cardiology fellow ([id number])  0458         Medications:   I have reviewed this patient's current medications    No past medical history on file.    No family history on file.  Social History     Socioeconomic History    Marital status:      Spouse name: Not on file    Number of children: Not on file    Years of education: Not on file    Highest education level: Not on file   Occupational History    Not on file   Tobacco Use    Smoking status: Not on file    Smokeless tobacco: Not on file   Substance and Sexual Activity    Alcohol use: Not on file    Drug use: Not on file    Sexual activity: Not on file   Other Topics Concern    Not on file   Social History Narrative    Not on file     Social Determinants of Health     Financial Resource Strain: Not At Risk (7/31/2023)    Received from [employer]    Financial Resource Strain     Is it hard for you to pay for the very basics like food, housing, medical care or heating?: No   Food Insecurity: Not At Risk (7/31/2023)    Received from [employer]    Food Insecurity     Does your food run out before you have the money to buy more?: No   Transportation Needs: Not At Risk (7/31/2023)    Received from [employer]    Transportation Needs     Does a lack of transportation keep you from your medical appointments or from getting your medications?: No   Physical Activity: Not on file   Stress: Not on file   Social Connections: Not on file   Interpersonal Safety: Not on file   Housing Stability: Not on file            Review of Systems:   Unable to obtain due to patients medical condition.            Physical Exam:   BP 92/60   Pulse 119   Temp 100.4  F (38  C)   Resp 13   Ht 1.886 m (6' 2.25\")   Wt 134.6 kg (296 lb " "11.8 oz)   SpO2 98%   BMI 37.84 kg/m        GENERAL: Intubated, sedated. NAD.  HEENT: No icterus. ETT in place, OG tube in place.  CARDIOVASCULAR: RRR. Normal S1 and S2.  RESP: Coarse bilaterally. Mechanical ventilation.    GI Soft, bowel sounds hypoactive but present.  GENITOURINARY: Benjamin in place.  EXTREMITIES: Cool, 1+ edema, pulses dopplered, as above.   NEURO: Sedated and intubated.  INTEGUMENTARY: No rashes. Cannula/Line sites CDI.      Resp: 13 SpO2: 98 % O2 Device: Mechanical Ventilator      Arterial Blood Gas:   Recent Labs   Lab 06/10/24  0415 06/09/24  1608 06/09/24  0859 06/09/24  0404   PH 7.44 7.37 7.47* 7.41   PCO2 51* 62* 48* 55*   PO2 86 98 83 161*   HCO3 35* 36* 35* 35*   O2PER 40 50 60 80     Vitals:    06/06/24 0000 06/07/24 0000 06/09/24 0000   Weight: 126.7 kg (279 lb 5.2 oz) 127.8 kg (281 lb 12 oz) 134.6 kg (296 lb 11.8 oz)   I/O last 3 completed shifts:  In: 3966.05 [I.V.:1616.05; NG/GT:810]  Out: 5400 [Urine:5300; Stool:100]  Recent Labs   Lab 06/10/24  1121 06/10/24  1020 06/10/24  1015 06/10/24  0417 06/10/24  0415 06/09/24  1433 06/09/24  1432   NA  --   --   --   --  144  --  147*   POTASSIUM  --   --  3.3*  --  3.3*   < > 3.4   CHLORIDE  --   --   --   --  102  --  103   CO2  --   --   --   --  32*  --  33*   ANIONGAP  --   --   --   --  10  --  11   * 201*  --    < > 140*   < > 152*   BUN  --   --   --   --  53.0*  --  50.0*   CR  --   --   --   --  1.34*  --  1.32*   RANDA  --   --   --   --  8.9  --  9.0    < > = values in this interval not displayed.     No components found for: \"URINE\"   Recent Labs   Lab 06/10/24  0415 06/09/24  1432 06/09/24  0354   AST 63* 61* 59*   ALT 81* 77* 75*   BILITOTAL 0.8 1.0 0.8   ALBUMIN 3.2* 3.3* 3.3*   PROTTOTAL 6.7 6.7 6.4   ALKPHOS 119 113 109     Temp: 100.4  F (38  C) Temp src: BladderTemp  Min: 98.6  F (37  C)  Max: 100.4  F (38  C)   Recent Labs   Lab 06/10/24  0415 06/09/24  1432 06/09/24  0354 06/08/24  1420 06/08/24  0540   WBC 9.3 " 11.0 10.9 11.0 9.2   HGB 9.4* 10.0* 9.6* 9.7* 9.9*   HCT 29.5* 30.9* 30.0* 29.2* 29.8*   MCV 97 97 97 95 95   RDW 15.1* 15.2* 15.4* 15.5* 15.3*    262 236 215 210     Recent Labs   Lab 06/10/24  0415 06/09/24  0354 06/08/24  0540 06/07/24  0344 06/06/24  0354 06/05/24  0343 06/04/24  2326 06/04/24  1620 06/04/24  1015 06/04/24  0346   INR 1.13 1.08 1.09 1.15 1.16* 1.11 1.21* 1.18* 1.23* 1.19*   PTT  --   --   --   --   --  29 42* 93* 100* 109*     Recent Labs   Lab 06/10/24  1121 06/10/24  1020 06/10/24  0803 06/10/24  0417 06/10/24  0415   * 201* 140* 131* 140*       Lines:           Data:     Recent Results (from the past 24 hour(s))   XR Chest Port 1 View    Narrative    XR CHEST PORT 1 VIEW  6/10/2024 1:45 AM     HISTORY:  Post-op pneumonia       COMPARISON:  6/9/2024    TECHNIQUE: Portable, semiupright, frontal projection radiograph of the  chest.    FINDINGS:   Stable support devices including ET tube, left-sided PICC, and gastric  tube. Stable enlarged cardiac mediastinal silhouette. No pleural  effusions. Similar bilateral mixed interstitial and airspace  opacities. No pneumothorax.        Impression    IMPRESSION:  Stable support devices and unchanged mixed interstitial and airspace  opacities.    I have personally reviewed the examination and initial interpretation  and I agree with the findings.    LILIAM GUZMAN DO         SYSTEM ID:  [id number]

## 2024-06-10 NOTE — PROGRESS NOTES
"[facility] Nurse Inpatient Assessment     Consulted for: Left posterior head, ECMO    Patient History (according to provider note(s):      Cullen Reardon is a 48 year old male with no known past medical history who was admitted on 6/1/2024 following a witnessed VF cardiac arrest.     He presented to [facility] on 6/1/2024 with chest pain.  He collapsed in front of the triage desk and was found to be pulseless after saying \"I feel like I'm going to die\".  He received immediate bystander CPR in the waiting room of the emergency department.  He was found be in VF, was shocked x 3.  Estimated downtime was approximately 15 minutes per paramedic report.  Was cannulated in [facility] emergency department and was on circuit at 16:08.  He was brought to the Cath Lab for coronary angiogram where he was found to have a 100% ostial LAD thrombotic occlusion status post stenting.  He was residual severe disease in D1 and OM2 that will need staged intervention. He was subsequently brought to [facility] for further care    Assessment:      Areas visualized during today's visit: Complete head to toe  and Occiput    ECMO cannula location: Right groin ECMO cannula  Areas assessed: Head-to-Toe  Positioning tolerance: Good  Date of ECMO cannulation: 6/2/24   Date of ECMO Decannulation: 6/4/24  Presence of ischemia: No  Location of ischemia: n/a  Pressure Injury Present::No  Pressure Injury Prevention Interventions In Place:  Optifoam Dressing under ECMO Cannula, Z flow Positioner under head, TAPs Wedge Positioners in use, Heel off-loading boots, Mepilex Sacral Dressing, and Dressing to sacrum for prevention        Pressure Injury Prevention Interventions Added:  None     Wound location: Left side of scalp    6/3                                                             6/10    Last photo: 6/10/24  Wound due to: Trauma  Wound history/plan of care: Wound from when Pt collapsed. Cards did " "laceration repair on 6/1/24. Defer questions about staples to cards team.  Wound base: 100 %  blood clot ,     Palpation of the wound bed: normal      Drainage: scant     Description of drainage: bloody     Measurements (length x width x depth, in cm): 4  x 0.3  x  0 cm      Tunneling: N/A     Undermining: N/A  Periwound skin: Intact      Color: normal and consistent with surrounding tissue      Temperature: normal   Odor: none  Pain: unable to assess due to  sedation ,  unable to assess  Pain interventions prior to dressing change: patient tolerated well  Treatment goal: Heal  and Infection control/prevention  STATUS: healing  Supplies ordered: supplies stored on unit     Treatment Plan:     Left scalp wound(s):  Orders per provider    Pressure Injury Prevention (PIP) Plan:  If patient is declining pressure injury prevention interventions: Explore reason why and address patient's concerns, Educate on pressure injury risk and prevention intervention(s), If patient is still declining, document \"informed refusal\" , and Ensure Care team is aware ( provider, charge nurse, etc)  Mattress: Follow bed algorithm, reassess daily and order specialty mattress, if indicated.  HOB: Maintain at or below 30 degrees, unless contraindicated  Repositioning in bed: Every 1-2 hours , Left/right positioning; avoid supine, Raise foot of bed prior to raising head of bed, to reduce patient sliding down (shear), and Frequent microturns using positioning wedges, as patient tolerates  Heels: Pillows under calves  Protective Dressing: Sacral Mepilex for prevention (#[id number]),  especially for the agitated patient   Positioning Equipment:Fluidized positioner (#[id number]) under head and Positioning wedges (#104878) to help maintain 30 degree side lying position   Chair positioning: Chair cushion (#[id number])    If patient has a buttock pressure injury, or high risk for PI use chair cushion or SPS.  Moisture Management: Perineal cleansing " /protection: Follow Incontinence Protocol, Avoid brief in bed, and Clean and dry skin folds with bathing   Under Devices: Inspect skin under all medical devices during skin inspection , Ensure tubes are stabilized without tension, and Ensure patient is not lying on medical devices or equipment when repositioned  Ask provider to discontinue device when no longer needed.     Orders: Written    RECOMMEND PRIMARY TEAM ORDER: None, at this time  Education provided: Not appropriate today   Discussed plan of care with: Nurse  [facility] nurse follow-up plan: signing off  Notify WOC if wound(s) deteriorate.  Nursing to notify the Provider(s) and re-consult the WO Nurse if new skin concern.    DATA:     Current support surface: Standard  Low air loss (ANNALISA pump, Isolibrium, Pulsate)  Containment of urine/stool: Incontinence Protocol  BMI: Body mass index is 37.84 kg/m .   Active diet order: Orders Placed This Encounter      Low Saturated Fat Na <2400 mg     Output: I/O last 3 completed shifts:  In: 3966.05 [I.V.:1616.05; NG/GT:810]  Out: 5400 [Urine:5300; Stool:100]     Labs:   Recent Labs   Lab 06/10/24  0415   ALBUMIN 3.2*   HGB 9.4*   INR 1.13   WBC 9.3     Pressure injury risk assessment:   Sensory Perception: 3-->slightly limited  Moisture: 3-->occasionally moist  Activity: 2-->chairfast  Mobility: 2-->very limited  Nutrition: 3-->adequate  Friction and Shear: 2-->potential problem  Ernie Score: 15    Mario Maldonado, RN   Pager no longer is use, please contact through [employer]: [facility] Nurse [place]    Dept. Office Number: [phone]

## 2024-06-10 NOTE — PROGRESS NOTES
[facility], Procedure Note          Extubation:       Cullen Reardon  MRN# [id number]   Brittany 10, 2024, 4:33 PM         Patient extubated at: Brittany 10, 2024, 4:05 PM   Supplemental Oxygen: Via high flow nasal cannula at 45L 70%   Cough: The cough is good and productive   Secretion Mode: PRN suction with assistance   Secretion Amount: Small amount, thick and ede and tan in color   Respiratory Exam:: Breath sounds: equal and clear     Location: bilaterally   Skin Exam:: Patient color: natural   Patient Status: Currently appears comfortable   Arterial Blood Gasses: pH Arterial (no units)   Date Value   06/10/2024 7.54 (H)     pO2 Arterial (mm Hg)   Date Value   06/10/2024 88     pCO2 Arterial (mm Hg)   Date Value   06/10/2024 37     Bicarbonate Arterial (mmol/L)   Date Value   06/10/2024 31 (H)          Cuff leak was heard before extubation and provider was at bedside for extubation.    Recorded by Farrah Brock, RT

## 2024-06-10 NOTE — PROGRESS NOTES
"   06/10/24 1000   Appointment Info   Signing Clinician's Name / Credentials (PT) Cindy Mckeon PT   Living Environment   People in Home significant other;child(adair), dependent  (2 daughters)   Current Living Arrangements house   Home Accessibility stairs to enter home;stairs within home   Number of Stairs, Main Entrance   (couple stairs to enter, one entry may have no stairs but cousin unsure)   Number of Stairs, Within Home, Primary   (stairs to basement but not a living area and does not need access)   Transportation Anticipated car, drives self;family or friend will provide   Living Environment Comments Pt lives with family in [facility], able to stay on 1 level of home. 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   General Information   Onset of Illness/Injury or Date of Surgery 06/10/24   Referring Physician Erich Angelo MD   Patient/Family Therapy Goals Statement (PT) unable to specifically state   Pertinent History of Current Problem (include personal factors and/or comorbidities that impact the POC) 48 year old male with no known past medical history who was admitted on 6/1/2024 following a witnessed VF cardiac arrest.     He presented to [facility] on 6/1/2024 with chest pain.  He collapsed in front of the triage desk and was found to be pulseless after saying \"I feel like I'm going to die\".  He received immediate bystander CPR in the waiting room of the emergency department.  He was found be in VF, was shocked x 3.  Estimated downtime was approximately 15 minutes per paramedic report. "  [facility] and [facility] circuit at 16:08.  He was brought to the Cath Lab for coronary angiogram where he was found to have a 100% ostial LAD thrombotic occlusion status post stenting.  He was residual severe disease in D1. S/p staged PCI 6/7 to D1.      Currently working on agitation with sedation changes and improving respiratory status with diuresis.   Existing Precautions/Restrictions fall;oxygen therapy device and L/min;cardiac   Heart Disease Risk Factors Overweight;Gender;Age   Cognition   Affect/Mental Status (Cognition) low arousal/lethargic   Orientation Status (Cognition) oriented to;person;place  (not oriented to current month)   Follows Commands (Cognition) follows one-step commands;50-74% accuracy;increased processing time needed;physical/tactile prompts required;repetition of directions required;verbal cues/prompting required   Pain Assessment   Patient Currently in Pain No   Integumentary/Edema   Integumentary/Edema Comments Noted edema in BUE and BLEs   Posture    Posture Forward head position;Protracted shoulders   Posture Comments Cervical Rotated L, sidebent R   Range of Motion (ROM)   ROM Comment BUE and BLE appears grossly WFL   Strength (Manual Muscle Testing)   Strength (Manual Muscle Testing) Deficits observed during functional mobility   Bed Mobility   Comment, (Bed Mobility) Impaired per clinical reasoning   Transfers   Comment, (Transfers) Impaired per clinical reasoning - requires lift   Gait/Stairs (Locomotion)   Comment, (Gait/Stairs) Impaired per clinical reasoning   Balance   Balance Comments mod A forward sitting, impaired standing balance   Clinical Impression   Criteria for Skilled Therapeutic Intervention Yes, treatment indicated   PT Diagnosis (PT) Impaired functional mobilty   Influenced by the following impairments strength, activity tolerance, respiratory status, edema, balance, posture, cognition   Functional limitations due to impairments  gait, stairs, transfers, bed mobility, functional endurance   Clinical Presentation (PT Evaluation Complexity) evolving   Clinical Presentation Rationale clinical reasoning   Clinical Decision Making (Complexity) moderate complexity   Planned Therapy Interventions (PT) balance training;bed mobility training;gait training;home exercise program;neuromuscular re-education;patient/family education;ROM (range of motion);stair training;strengthening;stretching;transfer training;progressive activity/exercise;risk factor education;home program guidelines   Risk & Benefits of therapy have been explained evaluation/treatment results reviewed;care plan/treatment goals reviewed;risks/benefits reviewed;patient   PT Total Evaluation Time   PT Eval, Moderate Complexity Minutes (62281) 10   Physical Therapy Goals   PT Frequency 4x/week   PT Predicted Duration/Target Date for Goal Attainment 07/22/24   PT Goals Bed Mobility;Transfers;Gait;Stairs   PT: Bed Mobility Independent;Supine to/from sit;Within precautions   PT: Transfers Independent;Sit to/from stand;Within precautions   PT: Gait Independent;Within precautions;Greater than 200 feet   PT: Stairs Independent;Within precautions;3 stairs  (to enter home)   PT Discharge Planning   PT Plan EOB, trial standing as appropriate, strengthening   PT Discharge Recommendation (DC Rec) [facility] patient will benefit from intensive, interdisciplinary therapy.  Anticipate will be able to tolerate 3 hours of therapy per day   PT Rationale for DC Rec anticipate pt will require inpatient rehab due to prolonged hospitalization, was previously IND, working full time and living in 1 level home, has supportive family, Pending progress towards extubation and with therapy anticipate pt will be good ARU candidate   PT Brief overview of current status LIFT   Total Session Time   Timed Code Treatment Minutes 25   Total Session Time (sum of timed and untimed services) 35

## 2024-06-10 NOTE — PROGRESS NOTES
"   Occupational Therapy Evaluation 06/10/24 1400   Appointment Info   Signing Clinician's Name / Credentials (OT) Leanna Chang, OTR/L   Living Environment   People in Home significant other;child(adair), dependent  (2 daughters)   Current Living Arrangements house   Home Accessibility stairs to enter home;stairs within home   Transportation Anticipated car, drives self;family or friend will provide   Living Environment Comments Cousin provided subjective info w/ pt nodding at times in response d/t ETT. Pt lives with family in [facility], able to stay on 1 level of home..   Self-Care   Usual Activity Tolerance good   Current Activity Tolerance moderate   Equipment Currently Used at Home none   Fall history within last six months yes   Number of times patient has fallen within last six months 1   Activity/Exercise/Self-Care Comment Per cousin, pt IND w/ I/ADLs at baseline. Works as a contractor and coaches [employer]   General Information   Onset of Illness/Injury or Date of Surgery 06/01/24   Referring Physician Stephan Kessler MD   Additional Occupational Profile Info/Pertinent History of Current Problem He presented to [facility] on 6/1/2024 with chest pain.  He collapsed in front of the triage desk and was found to be pulseless after saying \"I feel like I'm going to die\".  He received immediate bystander CPR in the waiting room of the emergency department.  He was found be in VF, was shocked x 3.  Estimated downtime was approximately 15 minutes per paramedic report.  Was cannulated in [facility] emergency department and was on circuit at 16:08.  He was brought to the Cath Lab for coronary angiogram where he was found to have a 100% ostial LAD thrombotic occlusion status post stenting.  He was residual severe disease in D1. S/p staged PCI 6/7 to D1.   Existing Precautions/Restrictions cardiac;oxygen therapy device and L/min   Cognitive Status Examination   Orientation Status person;place   Affect/Mental Status " (Cognitive) low arousal/lethargic   Follows Commands follows one-step commands;75-90% accuracy;delayed response/completion;increased processing time needed;initiation impaired   Safety Deficit impulsivity   Visual Perception   Impact of Vision Impairment on Function (Vision) Not formally tested, need to further asses R vision d/t appearing to not track in that direction during tx   Posture   Posture Comments R head turn preference   Range of Motion Comprehensive   General Range of Motion bilateral upper extremity ROM WFL   Strength Comprehensive (MMT)   Comment, General Manual Muscle Testing (MMT) Assessment Not formally tested. Pt presents w/ generalized weakness and decondiitoning   Coordination   Upper Extremity Coordination Left UE impaired;Right UE impaired   Functional Limitations Decreased speed;Fine motor ADL performance impaired;Impaired ability to perform bilateral tasks;Object transport impaired;Reach to targets impaired   Transfers   Transfers bed-chair transfer   Transfer Comments OH lift   Balance   Balance Assessment sitting static balance   Sitting Balance: Static minimal assist   Position, Sitting Balance sitting in chair   Activities of Daily Living   BADL Assessment/Intervention bathing;upper body dressing;lower body dressing;grooming;toileting   Bathing Assessment/Intervention   Comment, (Bathing) Per clinical judgement, pt dep   Upper Body Dressing Assessment/Training   Comment, (Upper Body Dressing) Per clinical judgement, pt dep   Lower Body Dressing Assessment/Training   [place] Level (Lower Body Dressing) socks;dependent (less than 25% patient effort)   Grooming Assessment/Training   Comment, (Grooming) Per clinical judgement, pt dep   Toileting   Comment, (Toileting) Per clinical judgement, pt dep   Clinical Impression   Criteria for Skilled Therapeutic Interventions Met (OT) Yes, treatment indicated   OT Diagnosis Decreased I/ADL IND and functional mobitlity   OT Problem  List-Impairments impacting ADL problems related to;activity tolerance impaired;balance;cognition;communication;coordination;eating/swallowing;fear & anxiety;mobility;strength;vision   Assessment of Occupational Performance 5 or more Performance Deficits   Identified Performance Deficits dressing, bathing, toileting, amb, transfers, bed mobility, g/h, home mgmt   Planned Therapy Interventions (OT) ADL retraining;IADL retraining;balance training;bed mobility training;cognition;fine motor coordination training;neuromuscular re-education;motor coordination training;strengthening;transfer training;visual perception;home program guidelines;progressive activity/exercise;risk factor education   Clinical Decision Making Complexity (OT) problem focused assessment/low complexity   Risk & Benefits of therapy have been explained patient   OT Total Evaluation Time   OT Eval, Moderate Complexity Minutes (11174) 10   OT Goals   Therapy Frequency (OT) 5 times/week   OT Predicted Duration/Target Date for Goal Attainment 07/12/24   OT Goals Hygiene/Grooming;Upper Body Dressing;Lower Body Dressing;Lower Body Bathing;Toilet Transfer/Toileting   OT: Hygiene/Grooming supervision/stand-by assist   OT: Upper Body Dressing Supervision/stand-by assist   OT: Lower Body Dressing Supervision/stand-by assist   OT: Lower Body Bathing Supervision/stand-by assist   OT: Toilet Transfer/Toileting Supervision/stand-by assist;toilet transfer;cleaning and garment management   Interventions   Interventions Quick Adds Therapeutic Activity   Therapeutic Activities   Therapeutic Activity Minutes (36042) 25   Symptoms noted during/after treatment fatigue   Treatment Detail/Skilled Intervention SIMV 30% FiO2, 10/5 peep, cotx w/ PT to maximize safety and progression of mobility. Pt able to answer yes/no questions w/ nodding head. Focused on progrssion of functoinal IND w/ OOB ax. OH lift bed > chair. Pt sitting forward in chair into unsupported sititng x 1 w/  min A and slow initiation of movement. min-mod A for forward reaching to jhit targe w/ RUE slower to mobilize than LUE. pt w/ head turn/tilt preference to R side w/ A required to corerct although pt unable to maintain neutral position requiring use of z flow pilllow. Pt needing B mitts and arm restrains d/t attempting to reach toward ETT. VSS throughout. Increased time during sessio nfor complex line mgmt   OT Discharge Planning   OT Plan OH lift to chair, unsupported sitting in chair, UE strength and motor planning, EOB sitting if lines allow   OT Discharge Recommendation (DC Rec) Acute [facility]hab [facility]-Motivated patient will benefit from intensive, interdisciplinary therapy.  Anticipate will be able to tolerate 3 hours of therapy per day   OT Rationale for DC Rec Pt presents far below functional baseline and will require intensive therapies to progress IND. ANticipate good ARU candidate   OT Brief overview of current status OH lift   Total Session Time   Timed Code Treatment Minutes 25   Total Session Time (sum of timed and untimed services) 35

## 2024-06-10 NOTE — PROGRESS NOTES
CLINICAL NUTRITION SERVICES - REASSESSMENT NOTE     Nutrition Prescription    RECOMMENDATIONS FOR MDs/PROVIDERS:  Daily fluid adjustments per MD    Malnutrition Status:    Moderate malnutrition in the context of acute illness    Recommendations already ordered by Registered Dietitian (RD):  -Continue current TF: Pivot 1.5 Carlos (or equivalent) @ goal of 70ml/hr (1680ml/day) provides: 2520 kcals, 157 g PRO, 1260 ml free H20, 289 g CHO, and 12 g fiber daily.  -Continue daily MVI   -Discontinued PO diet order (pt intubated)    Future/Additional Recommendations:  -Continue to monitor TF tolerance/adequacy  -Continue to monitor labs, stool output, weight trends      EVALUATION OF THE PROGRESS TOWARD GOALS   Diet: NPO  Nutrition Support: TFs currently running at goal rate. Pt seems to be tolerating well.     6/3-5: Pivot 1.5 Carlos (or equivalent) @ goal of  60ml/hr + 4 pkts prosource TF20 (1440ml/day) provides: 2480 kcals (26kcal/kg), 215 g PRO (2.2g/kg), 1080 ml free H20, 248 g CHO, and 10 g fiber daily.     6/5-current: Pivot 1.5 Carlos  (or equivalent) @ goal of  70ml/hr  (1680ml/day) provides: 2520 kcals, 157 g PRO, 1260 ml free H20, 289 g CHO, and 12 g fiber daily.     Intake: Pt received an average of 1013 ml TF/d x 7 days + an average of 1 pkt Prosource TF20 daily. This provided an average of 1600 kcal/d (17 kcal/kg) and 115 g protein/d (1.2 g/kg). This met 67% estimated energy needs and 79% estimated protein needs.      NEW FINDINGS   Overall: Pt decannulated from ECMO on 6/4. Remains intubated.     GI: 100 ml stool out yesterday. Rectal tube replaced this morning. 200 ml out so far.     Skin: No pressure injuries at this time     Labs: Reviewed - hypermagnesia, hypokalemia, hyperglycemia, elevated BUN/Cr    Medications: Reviewed    Weights: No weight loss over the past week.  06/09/24 0000 134.6 kg (296 lb 11.8 oz)   06/07/24 0000 127.8 kg (281 lb 12 oz)   06/06/24 0000 126.7 kg (279 lb 5.2 oz)   06/05/24 0000 128 kg  "(282 lb 3 oz)   06/04/24 0000 127 kg (279 lb 15.8 oz)   06/03/24 0000 126.1 kg (278 lb 1.6 oz)   06/02/24 0400 125.2 kg (276 lb 0.3 oz)     UPDATED NUTRITION NEEDS  Estimated Protein Needs: 145-190 grams protein/day (1.5 - 2 grams of pro/kg)     MALNUTRITION  % Intake: < 75% for > 7 days (moderate)  % Weight Loss: None noted  Subcutaneous Fat Loss: None observed  Muscle Loss: None observed  Fluid Accumulation/Edema: Mild  Malnutrition Diagnosis: Moderate malnutrition in the context of acute illness    Previous Goals   Total avg nutritional intake to meet a minimum of 25 kcal/kg and 2 g PRO/kg daily (per dosing wt 96 kg)   Evaluation: Not met    Previous Nutrition Diagnosis  Inadequate oral intake related to NPO with mechanical ventilation, nutrition support not yet initiated   Evaluation: Improving    CURRENT NUTRITION DIAGNOSIS  Inadequate oral intake related to intubation as evidenced by NPO status and need for EN to meet nutrition needs at this time.       INTERVENTIONS  Implementation  Enteral Nutrition - continue as ordered   Multivitamin/mineral supplement therapy    Goals  Total avg nutritional intake to meet a minimum of 25 kcal/kg and 1.5 g PRO/kg daily (per dosing wt 96 kg).    Monitoring/Evaluation  Progress toward goals will be monitored and evaluated per protocol.    Nisreen Funez, MS, RD, LD  Available on [employer] - \"4A Clinical Dietitian\"  Weekend/Holiday RD - \"Weekend Clinical Dietitian\"  "

## 2024-06-10 NOTE — PLAN OF CARE
Goal Outcome Evaluation:      Plan of Care Reviewed With: family, parent, spouse    Overall Patient Progress: improvingOverall Patient Progress: improving    Major Shift Events: Vent settings unchanged. 40% Fio2. Sedated 60 prop, Thick Can secretions. Strong cough, Large Volumes at times. Able to shake head no when asking about pain. Dilaudid gtt at 1. Good UOP. Normotensive overnight. HTN with agitation. K replaced this morning. Rectal tube in place. Afebrile.   Plan: R/U c.diff. Continue diuresis, Wean vent as able. Continue plan of care.   For vital signs and complete assessments, please see documentation flowsheets.

## 2024-06-11 ENCOUNTER — APPOINTMENT (OUTPATIENT)
Dept: OCCUPATIONAL THERAPY | Facility: CLINIC | Age: 49
DRG: 003 | End: 2024-06-11
Attending: INTERNAL MEDICINE
Payer: COMMERCIAL

## 2024-06-11 ENCOUNTER — APPOINTMENT (OUTPATIENT)
Dept: PHYSICAL THERAPY | Facility: CLINIC | Age: 49
DRG: 003 | End: 2024-06-11
Attending: INTERNAL MEDICINE
Payer: COMMERCIAL

## 2024-06-11 ENCOUNTER — APPOINTMENT (OUTPATIENT)
Dept: SPEECH THERAPY | Facility: CLINIC | Age: 49
DRG: 003 | End: 2024-06-11
Attending: STUDENT IN AN ORGANIZED HEALTH CARE EDUCATION/TRAINING PROGRAM
Payer: COMMERCIAL

## 2024-06-11 LAB
ALBUMIN SERPL BCG-MCNC: 3.1 G/DL (ref 3.5–5.2)
ALBUMIN SERPL BCG-MCNC: 3.2 G/DL (ref 3.5–5.2)
ALLEN'S TEST: ABNORMAL
ALLEN'S TEST: ABNORMAL
ALP SERPL-CCNC: 169 U/L (ref 40–150)
ALP SERPL-CCNC: 174 U/L (ref 40–150)
ALT SERPL W P-5'-P-CCNC: 114 U/L (ref 0–70)
ALT SERPL W P-5'-P-CCNC: 133 U/L (ref 0–70)
ANION GAP SERPL CALCULATED.3IONS-SCNC: 12 MMOL/L (ref 7–15)
ANION GAP SERPL CALCULATED.3IONS-SCNC: 13 MMOL/L (ref 7–15)
AST SERPL W P-5'-P-CCNC: 106 U/L (ref 0–45)
AST SERPL W P-5'-P-CCNC: 90 U/L (ref 0–45)
ATRIAL RATE - MUSE: 122 BPM
BASE EXCESS BLDA CALC-SCNC: -0.8 MMOL/L (ref -3–3)
BASE EXCESS BLDA CALC-SCNC: 2.1 MMOL/L (ref -3–3)
BILIRUB SERPL-MCNC: 0.7 MG/DL
BILIRUB SERPL-MCNC: 0.7 MG/DL
BUN SERPL-MCNC: 43.5 MG/DL (ref 6–20)
BUN SERPL-MCNC: 45.8 MG/DL (ref 6–20)
CALCIUM SERPL-MCNC: 8.6 MG/DL (ref 8.6–10)
CALCIUM SERPL-MCNC: 8.7 MG/DL (ref 8.6–10)
CHLORIDE SERPL-SCNC: 111 MMOL/L (ref 98–107)
CHLORIDE SERPL-SCNC: 111 MMOL/L (ref 98–107)
COHGB MFR BLD: 94.9 % (ref 96–97)
COHGB MFR BLD: 95.7 % (ref 96–97)
CREAT SERPL-MCNC: 1.03 MG/DL (ref 0.67–1.17)
CREAT SERPL-MCNC: 1.17 MG/DL (ref 0.67–1.17)
DEPRECATED HCO3 PLAS-SCNC: 18 MMOL/L (ref 22–29)
DEPRECATED HCO3 PLAS-SCNC: 21 MMOL/L (ref 22–29)
DIASTOLIC BLOOD PRESSURE - MUSE: NORMAL MMHG
EGFRCR SERPLBLD CKD-EPI 2021: 76 ML/MIN/1.73M2
EGFRCR SERPLBLD CKD-EPI 2021: 89 ML/MIN/1.73M2
ERYTHROCYTE [DISTWIDTH] IN BLOOD BY AUTOMATED COUNT: 14.7 % (ref 10–15)
ERYTHROCYTE [DISTWIDTH] IN BLOOD BY AUTOMATED COUNT: 15 % (ref 10–15)
GLUCOSE BLDC GLUCOMTR-MCNC: 136 MG/DL (ref 70–99)
GLUCOSE BLDC GLUCOMTR-MCNC: 138 MG/DL (ref 70–99)
GLUCOSE BLDC GLUCOMTR-MCNC: 155 MG/DL (ref 70–99)
GLUCOSE BLDC GLUCOMTR-MCNC: 160 MG/DL (ref 70–99)
GLUCOSE BLDC GLUCOMTR-MCNC: 164 MG/DL (ref 70–99)
GLUCOSE SERPL-MCNC: 167 MG/DL (ref 70–99)
GLUCOSE SERPL-MCNC: 170 MG/DL (ref 70–99)
HCO3 BLD-SCNC: 23 MMOL/L (ref 21–28)
HCO3 BLD-SCNC: 25 MMOL/L (ref 21–28)
HCT VFR BLD AUTO: 29.1 % (ref 40–53)
HCT VFR BLD AUTO: 29.3 % (ref 40–53)
HGB BLD-MCNC: 9.5 G/DL (ref 13.3–17.7)
HGB BLD-MCNC: 9.7 G/DL (ref 13.3–17.7)
INR PPP: 1.2 (ref 0.85–1.15)
INTERPRETATION ECG - MUSE: NORMAL
LACTATE SERPL-SCNC: 0.9 MMOL/L (ref 0.7–2)
MAGNESIUM SERPL-MCNC: 2.5 MG/DL (ref 1.7–2.3)
MCH RBC QN AUTO: 30.4 PG (ref 26.5–33)
MCH RBC QN AUTO: 30.9 PG (ref 26.5–33)
MCHC RBC AUTO-ENTMCNC: 32.4 G/DL (ref 31.5–36.5)
MCHC RBC AUTO-ENTMCNC: 33.3 G/DL (ref 31.5–36.5)
MCV RBC AUTO: 93 FL (ref 78–100)
MCV RBC AUTO: 94 FL (ref 78–100)
O2/TOTAL GAS SETTING VFR VENT: 40 %
O2/TOTAL GAS SETTING VFR VENT: 40 %
OXYCODONE SERPL-MCNC: NEGATIVE NG/ML
OXYCODONE SERPLBLD CFM-MCNC: NEGATIVE NG/ML
OXYMORPHONE SERPL-MCNC: NEGATIVE NG/ML
P AXIS - MUSE: 56 DEGREES
PCO2 BLD: 32 MM HG (ref 35–45)
PCO2 BLD: 34 MM HG (ref 35–45)
PEEP: 0 CM H2O
PEEP: 0 CM H2O
PH BLD: 7.45 [PH] (ref 7.35–7.45)
PH BLD: 7.49 [PH] (ref 7.35–7.45)
PHOSPHATE SERPL-MCNC: 3 MG/DL (ref 2.5–4.5)
PLATELET # BLD AUTO: 326 10E3/UL (ref 150–450)
PLATELET # BLD AUTO: 350 10E3/UL (ref 150–450)
PO2 BLD: 65 MM HG (ref 80–105)
PO2 BLD: 70 MM HG (ref 80–105)
POTASSIUM SERPL-SCNC: 3 MMOL/L (ref 3.4–5.3)
POTASSIUM SERPL-SCNC: 3 MMOL/L (ref 3.4–5.3)
POTASSIUM SERPL-SCNC: 3.1 MMOL/L (ref 3.4–5.3)
POTASSIUM SERPL-SCNC: 3.1 MMOL/L (ref 3.4–5.3)
PR INTERVAL - MUSE: 130 MS
PROT SERPL-MCNC: 6.5 G/DL (ref 6.4–8.3)
PROT SERPL-MCNC: 6.7 G/DL (ref 6.4–8.3)
QRS DURATION - MUSE: 92 MS
QT - MUSE: 342 MS
QTC - MUSE: 487 MS
R AXIS - MUSE: 67 DEGREES
RBC # BLD AUTO: 3.12 10E6/UL (ref 4.4–5.9)
RBC # BLD AUTO: 3.14 10E6/UL (ref 4.4–5.9)
SAO2 % BLDA: 93 % (ref 92–100)
SAO2 % BLDA: 94 % (ref 92–100)
SODIUM SERPL-SCNC: 142 MMOL/L (ref 135–145)
SODIUM SERPL-SCNC: 144 MMOL/L (ref 135–145)
SYSTOLIC BLOOD PRESSURE - MUSE: NORMAL MMHG
T AXIS - MUSE: 59 DEGREES
VENTRICULAR RATE- MUSE: 122 BPM
WBC # BLD AUTO: 12.2 10E3/UL (ref 4–11)
WBC # BLD AUTO: 9.6 10E3/UL (ref 4–11)

## 2024-06-11 PROCEDURE — 250N000013 HC RX MED GY IP 250 OP 250 PS 637: Performed by: STUDENT IN AN ORGANIZED HEALTH CARE EDUCATION/TRAINING PROGRAM

## 2024-06-11 PROCEDURE — 250N000013 HC RX MED GY IP 250 OP 250 PS 637: Performed by: SURGERY

## 2024-06-11 PROCEDURE — 80053 COMPREHEN METABOLIC PANEL: CPT | Performed by: INTERNAL MEDICINE

## 2024-06-11 PROCEDURE — 258N000003 HC RX IP 258 OP 636: Mod: JZ | Performed by: INTERNAL MEDICINE

## 2024-06-11 PROCEDURE — 97530 THERAPEUTIC ACTIVITIES: CPT | Mod: GP

## 2024-06-11 PROCEDURE — 250N000011 HC RX IP 250 OP 636: Performed by: NURSE PRACTITIONER

## 2024-06-11 PROCEDURE — 97535 SELF CARE MNGMENT TRAINING: CPT | Mod: GO | Performed by: OCCUPATIONAL THERAPIST

## 2024-06-11 PROCEDURE — 99207 PR NO CHARGE SIGN-OFF PS: CPT | Performed by: STUDENT IN AN ORGANIZED HEALTH CARE EDUCATION/TRAINING PROGRAM

## 2024-06-11 PROCEDURE — 85610 PROTHROMBIN TIME: CPT | Performed by: SURGERY

## 2024-06-11 PROCEDURE — 82805 BLOOD GASES W/O2 SATURATION: CPT | Performed by: SURGERY

## 2024-06-11 PROCEDURE — 250N000013 HC RX MED GY IP 250 OP 250 PS 637: Performed by: NURSE PRACTITIONER

## 2024-06-11 PROCEDURE — 250N000009 HC RX 250: Performed by: INTERNAL MEDICINE

## 2024-06-11 PROCEDURE — 92526 ORAL FUNCTION THERAPY: CPT | Mod: GN

## 2024-06-11 PROCEDURE — 85027 COMPLETE CBC AUTOMATED: CPT | Performed by: STUDENT IN AN ORGANIZED HEALTH CARE EDUCATION/TRAINING PROGRAM

## 2024-06-11 PROCEDURE — 84100 ASSAY OF PHOSPHORUS: CPT | Performed by: INTERNAL MEDICINE

## 2024-06-11 PROCEDURE — 99233 SBSQ HOSP IP/OBS HIGH 50: CPT | Mod: GC | Performed by: INTERNAL MEDICINE

## 2024-06-11 PROCEDURE — 83735 ASSAY OF MAGNESIUM: CPT | Performed by: SURGERY

## 2024-06-11 PROCEDURE — 999N000157 HC STATISTIC RCP TIME EA 10 MIN

## 2024-06-11 PROCEDURE — 92610 EVALUATE SWALLOWING FUNCTION: CPT | Mod: GN

## 2024-06-11 PROCEDURE — 120N000003 HC R&B IMCU UMMC

## 2024-06-11 PROCEDURE — 82247 BILIRUBIN TOTAL: CPT | Performed by: STUDENT IN AN ORGANIZED HEALTH CARE EDUCATION/TRAINING PROGRAM

## 2024-06-11 PROCEDURE — 99231 SBSQ HOSP IP/OBS SF/LOW 25: CPT

## 2024-06-11 PROCEDURE — 80053 COMPREHEN METABOLIC PANEL: CPT | Performed by: STUDENT IN AN ORGANIZED HEALTH CARE EDUCATION/TRAINING PROGRAM

## 2024-06-11 PROCEDURE — 84155 ASSAY OF PROTEIN SERUM: CPT | Performed by: STUDENT IN AN ORGANIZED HEALTH CARE EDUCATION/TRAINING PROGRAM

## 2024-06-11 PROCEDURE — 97530 THERAPEUTIC ACTIVITIES: CPT | Mod: GO | Performed by: OCCUPATIONAL THERAPIST

## 2024-06-11 PROCEDURE — 250N000011 HC RX IP 250 OP 636: Performed by: STUDENT IN AN ORGANIZED HEALTH CARE EDUCATION/TRAINING PROGRAM

## 2024-06-11 PROCEDURE — 83605 ASSAY OF LACTIC ACID: CPT | Performed by: SURGERY

## 2024-06-11 PROCEDURE — 250N000011 HC RX IP 250 OP 636: Mod: JZ | Performed by: INTERNAL MEDICINE

## 2024-06-11 RX ORDER — AMIODARONE HYDROCHLORIDE 200 MG/1
200 TABLET ORAL DAILY
Status: DISCONTINUED | OUTPATIENT
Start: 2024-06-11 | End: 2024-06-13

## 2024-06-11 RX ORDER — LOPERAMIDE HCL 1 MG/7.5ML
2 SOLUTION ORAL ONCE
Qty: 15 ML | Refills: 0 | Status: COMPLETED | OUTPATIENT
Start: 2024-06-11 | End: 2024-06-11

## 2024-06-11 RX ORDER — SALIVA STIMULANT COMB. NO.3
2 SPRAY, NON-AEROSOL (ML) MUCOUS MEMBRANE 4 TIMES DAILY PRN
Status: DISCONTINUED | OUTPATIENT
Start: 2024-06-11 | End: 2024-06-15 | Stop reason: HOSPADM

## 2024-06-11 RX ORDER — HYDROXYZINE HYDROCHLORIDE 25 MG/1
50 TABLET, FILM COATED ORAL EVERY 6 HOURS PRN
Status: DISCONTINUED | OUTPATIENT
Start: 2024-06-11 | End: 2024-06-15 | Stop reason: HOSPADM

## 2024-06-11 RX ORDER — POTASSIUM CHLORIDE 1.5 G/1.58G
40 POWDER, FOR SOLUTION ORAL ONCE
Status: COMPLETED | OUTPATIENT
Start: 2024-06-11 | End: 2024-06-11

## 2024-06-11 RX ORDER — POTASSIUM CHLORIDE 29.8 MG/ML
20 INJECTION INTRAVENOUS
Status: COMPLETED | OUTPATIENT
Start: 2024-06-11 | End: 2024-06-11

## 2024-06-11 RX ORDER — POTASSIUM CHLORIDE 1.5 G/1.58G
20 POWDER, FOR SOLUTION ORAL ONCE
Status: COMPLETED | OUTPATIENT
Start: 2024-06-11 | End: 2024-06-11

## 2024-06-11 RX ORDER — POTASSIUM CHLORIDE 20MEQ/15ML
40 LIQUID (ML) ORAL ONCE
Qty: 30 ML | Refills: 0 | Status: COMPLETED | OUTPATIENT
Start: 2024-06-11 | End: 2024-06-11

## 2024-06-11 RX ORDER — SPIRONOLACTONE 25 MG/1
25 TABLET ORAL DAILY
Status: DISCONTINUED | OUTPATIENT
Start: 2024-06-11 | End: 2024-06-13

## 2024-06-11 RX ORDER — LOPERAMIDE HCL 2 MG
2 CAPSULE ORAL 4 TIMES DAILY PRN
Status: DISCONTINUED | OUTPATIENT
Start: 2024-06-11 | End: 2024-06-15 | Stop reason: HOSPADM

## 2024-06-11 RX ADMIN — CEFEPIME HYDROCHLORIDE 2 G: 2 INJECTION, POWDER, FOR SOLUTION INTRAVENOUS at 23:59

## 2024-06-11 RX ADMIN — TICAGRELOR 90 MG: 90 TABLET ORAL at 07:33

## 2024-06-11 RX ADMIN — Medication 40 MG: at 07:33

## 2024-06-11 RX ADMIN — HEPARIN SODIUM 5000 UNITS: 5000 INJECTION, SOLUTION INTRAVENOUS; SUBCUTANEOUS at 20:51

## 2024-06-11 RX ADMIN — LOPERAMIDE HCL 2 MG: 1 SOLUTION ORAL at 11:32

## 2024-06-11 RX ADMIN — VENLAFAXINE 37.5 MG: 37.5 TABLET ORAL at 07:33

## 2024-06-11 RX ADMIN — INSULIN ASPART 1 UNITS: 100 INJECTION, SOLUTION INTRAVENOUS; SUBCUTANEOUS at 03:58

## 2024-06-11 RX ADMIN — AMIODARONE HYDROCHLORIDE 200 MG: 200 TABLET ORAL at 07:33

## 2024-06-11 RX ADMIN — POTASSIUM CHLORIDE 40 MEQ: 20 SOLUTION ORAL at 07:42

## 2024-06-11 RX ADMIN — Medication 25 MG: at 05:50

## 2024-06-11 RX ADMIN — TICAGRELOR 90 MG: 90 TABLET ORAL at 20:51

## 2024-06-11 RX ADMIN — POTASSIUM CHLORIDE 20 MEQ: 1.5 POWDER, FOR SOLUTION ORAL at 20:51

## 2024-06-11 RX ADMIN — OXYCODONE HYDROCHLORIDE 5 MG: 5 TABLET ORAL at 03:57

## 2024-06-11 RX ADMIN — INSULIN ASPART 1 UNITS: 100 INJECTION, SOLUTION INTRAVENOUS; SUBCUTANEOUS at 21:05

## 2024-06-11 RX ADMIN — HYDROXYZINE HYDROCHLORIDE 50 MG: 25 TABLET, FILM COATED ORAL at 20:51

## 2024-06-11 RX ADMIN — INSULIN GLARGINE 30 UNITS: 100 INJECTION, SOLUTION SUBCUTANEOUS at 21:05

## 2024-06-11 RX ADMIN — SACUBITRIL AND VALSARTAN 1 TABLET: 24; 26 TABLET, FILM COATED ORAL at 20:52

## 2024-06-11 RX ADMIN — INSULIN ASPART 1 UNITS: 100 INJECTION, SOLUTION INTRAVENOUS; SUBCUTANEOUS at 16:49

## 2024-06-11 RX ADMIN — QUETIAPINE FUMARATE 100 MG: 100 TABLET ORAL at 20:51

## 2024-06-11 RX ADMIN — CEFEPIME HYDROCHLORIDE 2 G: 2 INJECTION, POWDER, FOR SOLUTION INTRAVENOUS at 07:47

## 2024-06-11 RX ADMIN — POTASSIUM CHLORIDE 20 MEQ: 29.8 INJECTION, SOLUTION INTRAVENOUS at 05:42

## 2024-06-11 RX ADMIN — ACETAMINOPHEN 650 MG: 325 TABLET, FILM COATED ORAL at 03:57

## 2024-06-11 RX ADMIN — EMPAGLIFLOZIN 10 MG: 10 TABLET, FILM COATED ORAL at 07:33

## 2024-06-11 RX ADMIN — POTASSIUM CHLORIDE 40 MEQ: 1.5 POWDER, FOR SOLUTION ORAL at 18:47

## 2024-06-11 RX ADMIN — CEFEPIME HYDROCHLORIDE 2 G: 2 INJECTION, POWDER, FOR SOLUTION INTRAVENOUS at 16:47

## 2024-06-11 RX ADMIN — SACUBITRIL AND VALSARTAN 1 TABLET: 24; 26 TABLET, FILM COATED ORAL at 07:42

## 2024-06-11 RX ADMIN — HEPARIN SODIUM 5000 UNITS: 5000 INJECTION, SOLUTION INTRAVENOUS; SUBCUTANEOUS at 11:41

## 2024-06-11 RX ADMIN — ASPIRIN 81 MG CHEWABLE TABLET 81 MG: 81 TABLET CHEWABLE at 07:33

## 2024-06-11 RX ADMIN — SPIRONOLACTONE 25 MG: 25 TABLET, FILM COATED ORAL at 07:33

## 2024-06-11 RX ADMIN — OXYCODONE HYDROCHLORIDE 5 MG: 5 TABLET ORAL at 07:33

## 2024-06-11 RX ADMIN — VENLAFAXINE 37.5 MG: 37.5 TABLET ORAL at 17:15

## 2024-06-11 RX ADMIN — POTASSIUM CHLORIDE 20 MEQ: 29.8 INJECTION, SOLUTION INTRAVENOUS at 06:35

## 2024-06-11 RX ADMIN — ACETAZOLAMIDE 500 MG: 500 INJECTION, POWDER, LYOPHILIZED, FOR SOLUTION INTRAVENOUS at 11:41

## 2024-06-11 RX ADMIN — HEPARIN SODIUM 5000 UNITS: 5000 INJECTION, SOLUTION INTRAVENOUS; SUBCUTANEOUS at 03:57

## 2024-06-11 RX ADMIN — INSULIN ASPART 1 UNITS: 100 INJECTION, SOLUTION INTRAVENOUS; SUBCUTANEOUS at 00:00

## 2024-06-11 RX ADMIN — Medication 15 ML: at 07:33

## 2024-06-11 RX ADMIN — POTASSIUM PHOSPHATE, MONOBASIC POTASSIUM PHOSPHATE, DIBASIC 15 MMOL: 224; 236 INJECTION, SOLUTION, CONCENTRATE INTRAVENOUS at 00:04

## 2024-06-11 RX ADMIN — Medication 10 MG: at 20:52

## 2024-06-11 RX ADMIN — ACETAMINOPHEN 650 MG: 325 TABLET, FILM COATED ORAL at 20:51

## 2024-06-11 ASSESSMENT — ACTIVITIES OF DAILY LIVING (ADL)
ADLS_ACUITY_SCORE: 32
ADLS_ACUITY_SCORE: 34
ADLS_ACUITY_SCORE: 34
ADLS_ACUITY_SCORE: 35
ADLS_ACUITY_SCORE: 35
ADLS_ACUITY_SCORE: 32
ADLS_ACUITY_SCORE: 34
ADLS_ACUITY_SCORE: 35
ADLS_ACUITY_SCORE: 34
ADLS_ACUITY_SCORE: 32
ADLS_ACUITY_SCORE: 34
ADLS_ACUITY_SCORE: 38
ADLS_ACUITY_SCORE: 35
ADLS_ACUITY_SCORE: 34
ADLS_ACUITY_SCORE: 34

## 2024-06-11 NOTE — PROGRESS NOTES
"[facility]  Palliative Care Sign-Off Note    Cullen Reardon is a 48 year old male with no known past medical history. He presented to [facility] on 6/1/2024 due to chest pain. Per chart review, \"he collapsed in front of the triage desk and was found to be pulseless after saying \"I feel like I'm going to die\". Bystander CPR was started he was shocked x3 due to Vfib. Estimated downtime was 15 min. He was brought to the cath lab and had a 100% ostial LAD occlusion which was stented and cannulated on ECMO. Noted to have severe D1 and OM2 disease which will likely need further intervention. He was transferred to [facility] 6/1. Palliative was consulted routinely due to ECMO cannulation.      Decannulated overnight on 6/4. Normal BiV function noted on ALEXANDER during decannulation with trace/mild TR and trace MR/AI. S/p staged PCI of other coronary lesions 6/6. Extubated 6/10. Plan to transfer to floor to CSI team today 6/11. No palliative medical needs noted.      PCSW has tried several times to meet with wife at bedside. Not present and no needs noted.     At this time the patient does not have any needs we are actively addressing, and we are signing off.    However we know their condition may change -- please re-consult us if we can be helpful at any time in the future.     Thank you for the opportunity to be involved in the care of this patient.    The patient was not seen. This is a non-billable note.    Wenceslao Gillette DO / Palliative Medicine   Securely message with the Vocera Web Console (learn more here)   Text page via [employer] Paging/Directory   "

## 2024-06-11 NOTE — PROGRESS NOTES
"CVTS Progress Note     Cullen Reardon is a 48 year old male with no known past medical history who was admitted on 6/1/2024 following a witnessed VF cardiac arrest.     He presented to [facility] on 6/1/2024 with chest pain.  He collapsed in front of the triage desk and was found to be pulseless after saying \"I feel like I'm going to die\".  He received immediate bystander CPR in the waiting room of the emergency department.  He was found be in VF, was shocked x 3.  Estimated downtime was approximately 15 minutes per paramedic report.  Was cannulated in [facility] emergency department and was on circuit at 16:08. On 6/1.  He was brought to the Cath Lab for coronary angiogram where he was found to have a 100% ostial LAD thrombotic occlusion status post stenting.  He underwent ECMO decannulation on 6/4/24 by Dr. Ladd. He had residual severe disease in D1. S/p staged PCI 6/7 to D1. Extubated on 6/10.    - Skin wound vac removed. Incision c/d/i, no signs of infection. Retention staples in place. CVTS will recheck wound in ~1 week for assessment of staple removal.   - Daily wound care orders placed: Gently moisten gauze with Microklenz BID and clean incision each shift. Otherwise keep incision dry.  - Other cares per primary team. Please call CVTS with any questions or wound concerns.     Nataliya Cohen PA-C  Cardiothoracic Surgery  Pager [phone]    11:14 AM June 11, 2024    "

## 2024-06-11 NOTE — CONSULTS
Discharge Pharmacy Test Claim    [facility] covers Farxiga, Jardiance and Entresto. Expected monthly copays are listed below      Test Claim Copay   Farxiga 10.00   Jardiance 25.00   Entresto 25.00       Johanne Walls  [facility] Liaison  Phone: [phone] Fax: [phone]  Available on Teams & Vocera

## 2024-06-11 NOTE — PLAN OF CARE
Major Shift Events: A+Ox4; VSS; weaned down to RA; TF @ goal, schafer having good UO; rectal tube & a-line removed; able to make needs known    Plan: transfer to [facility]    For vital signs and complete assessments, please see documentation flowsheets.      Aidan Cast RN, BSN

## 2024-06-11 NOTE — PROGRESS NOTES
06/11/24 1400   Appointment Info   Signing Clinician's Name / Credentials (SLP) Darcy Monroe MA CCC-SLP   General Information   Onset of Illness/Injury or Date of Surgery 06/01/24   Referring Physician Jonathan Hernandez MD   Pertinent History of Current Problem Cullen Reardon is a 48 year old male with no known past medical history who was admitted on 6/1/2024 following a witnessed VF cardiac arrest.     He presented to [facility] on 6/1/2024 with chest pain.  He collapsed in front of the triage desk and was found to be pulseless. He received immediate bystander CPR in the waiting room of the emergency department.  He was found be in VF, was shocked x 3.  Estimated downtime was approximately 15 minutes per paramedic report.  Was cannulated in [facility] emergency department and was on circuit at 16:08. On 6/1 he was brought to the Cath Lab for coronary angiogram where he was found to have a 100% ostial LAD thrombotic occlusion status post stenting.  He underwent ECMO decannulation on 6/4/24 by Dr. Ladd. Pt intubated from 6/1-6/10.  Clinical swallow evaluation completed per MD order.   Pain Assessment   Patient Currently in Pain No   Type of Evaluation   Type of Evaluation Swallow Evaluation   Oral Motor   Oral Musculature generally intact   Structural Abnormalities none present   Mucosal Quality good   Dentition (Oral Motor)   Dentition (Oral Motor) natural dentition;adequate dentition   Facial Symmetry (Oral Motor)   Facial Symmetry (Oral Motor) WNL   Lip Function (Oral Motor)   Lip Range of Motion (Oral Motor) WNL   Tongue Function (Oral Motor)   Tongue Coordination/Speed (Oral Motor) WNL   Tongue ROM (Oral Motor) WNL   Cough/Swallow/Gag Reflex (Oral Motor)   Volitional Throat Clear/Cough (Oral Motor) WNL   Volitional Swallow (Oral Motor) WNL   Vocal Quality/Secretion Management (Oral Motor)   Vocal Quality (Oral Motor) WNL   Secretion Management (Oral Motor) WNL   General Swallowing Observations   Past  History of Dysphagia None per chart review or pt report   Respiratory Support oxygen mask;other (see comments)  (@ 8 LPM)   Current Diet/Method of Nutritional Intake (General Swallowing Observations, NIS) NPO;nasogastric tube (NG)   Swallowing Evaluation Clinical swallow evaluation   Clinical Swallow Evaluation   Feeding Assistance minimal assistance required   Clinical Swallow Evaluation Textures Trialed thin liquids;pureed   Clinical Swallow Eval: Thin Liquid Texture Trial   Mode of Presentation, Thin Liquids fed by clinician;spoon;self-fed;cup   Volume of Liquid or Food Presented 3 oz   Oral Phase of Swallow WFL   Pharyngeal Phase of Swallow impaired;coughing/choking;throat clearing   Diagnostic Statement S/sx of aspiration marked by throat clear and delayed cough following sips by cup   Clinical Swallow Evaluation: Puree Solid Texture Trial   Mode of Presentation, Puree fed by clinician;spoon   Volume of Puree Presented 3 tablespoons   Oral Phase, Puree WFL   Pharyngeal Phase, Puree intact   Diagnostic Statement No overt s/sx of aspiration   Esophageal Phase of Swallow   Patient reports or presents with symptoms of esophageal dysphagia No   Swallowing Recommendations   Diet Consistency Recommendations NPO;other (see comments)  (OK for ice chips or small sips of water via spoon after oral cares)   Medication Administration Recommendations, Swallowing (SLP) Non orally if possible, if not, in small amounts of puree   Instrumental Assessment Recommendations instrumental evaluation not recommended at this time   General Therapy Interventions   Planned Therapy Interventions Dysphagia Treatment   Dysphagia treatment Modified diet education;Instruction of safe swallow strategies   Clinical Impression   Criteria for Skilled Therapeutic Interventions Met (SLP Eval) Yes, treatment indicated   SLP Diagnosis Suspect moderate oropharyngeal swallowing deficits   Risks & Benefits of therapy have been explained  evaluation/treatment results reviewed;care plan/treatment goals reviewed;risks/benefits reviewed;current/potential barriers reviewed;participants voiced agreement with care plan;participants included;patient;spouse/significant other   Clinical Impression Comments Clinical swallow evaluation completed per MD order.  Pt presents with suspect moderate pharyngeal dysphagia in setting of prolonged intubation and weakness following ECMO.  Oral mech unremarkable. Pt assessed with ice chips, thin liquids via spoon and cup and puree. Oral phase observed to be unremarkable.  Pharyngeal phase remarkable for throat clearing and delayed cough after sips of water via cup.  Pt at hisk for silent aspiration d/t prolonged intubation.      Recommend pt remain NPO at this time.  Medications non orally if possible or in small amounts of puree if needed.  Pt OK for small sips of water via spoon and ice chips with staff supervision after oral cares for comfort. Speech to follow for PO readiness.   SLP Total Evaluation Time   Eval: oral/pharyngeal swallow function, clinical swallow Minutes (90959) 30   SLP Discharge Planning   SLP Plan PO readiness, please see in AM if possible   SLP Discharge Recommendation Transitional Care Facility   SLP Rationale for DC Rec Suspect moderate oropharyngeal swallowing deficits   SLP Brief overview of current status  Recommend pt remain NPO at this time. Medications non orally if possible or in small amounts of puree if needed. Pt OK for small sips of water via spoon and/or ice chips for comfort with staff supervision after oral cares. Speech to follow for PO readiness.

## 2024-06-11 NOTE — PROGRESS NOTES
"  [facility]  Cardiology ICU History & Physical  June 1, 2024            Assessment and Plan:   Cullen Reardon is a 48 year old male with no known past medical history who was admitted on 6/1/2024 following a witnessed VF cardiac arrest.    He presented to [facility] on 6/1/2024 with chest pain.  He collapsed in front of the triage desk and was found to be pulseless after saying \"I feel like I'm going to die\".  He received immediate bystander CPR in the waiting room of the emergency department.  He was found be in VF, was shocked x 3.  Estimated downtime was approximately 15 minutes per paramedic report.  Was cannulated in [facility] emergency department and was on circuit at 16:08. On 6/1.  He was brought to the Cath Lab for coronary angiogram where he was found to have a 100% ostial LAD thrombotic occlusion status post stenting.  Decanned on 6/4. He had residual severe disease in D1. S/p staged PCI 6/7 to D1. Extubated on 6/10    Currently working on GDMT, deconditioning, speech, nutrition. Respiratory status improving.      Interval changes   Extubated yesterday.  Oxygen requirements down trended over the course of the night.  Currently on oxygen 7 L.  Mental status significantly improved.  Patient alert and oriented and appropriately conversational.    Changes today   -Continue Diamox 500 BID today  - Decrease amio to 200 daily.   -As needed loperamide, remove rectal tube.  -Speech consult for swallow eval.  Plan to remove NG tube if passed.  -Stop hydralazine Isordil.  -Start Entresto, Jardiance and spironolactone.  - PT/OT to see  - Transfer to CSI floor later today. Once bed is available will remove art line.   - Discontinue scheduled oxy, PPI, PRN haldol and precedex.     Neuro: #. At risk for anoxic brain injury  Following commands.   - Oxy 5q4 PRN  --Seroquel for delirium, Can likely deescalate in the coming days.  --CT head : No acute issues.     CV: #. " Cardiogenic Shock s/p VA ECMO  #. ACUTE STEMI s/p KAYLYN to ostial LAD 6/1/24   #. Refractory VF arrest  s/p VA ECMO  -- Peripheral V-A ECMO inserted via RCFA (17 Fr) and RCFV (25 Fr).  -- EF 15% -- 45% (6/8)  -- Diuresis as above, He is likely Euvolimic. Plan to keep net even to -500.  -- Continue ASA 81mg and ticagrelor 90mg BID x 1 year then ASA indefinitely   -- Entresto, Jardiance and Spironolactone ( started 6/11)   -  Plan to start BB in the next 24-48h   - Amio 200 daily     Pulm: #. Acute hypoxic respiratory failure  #. Probable aspiration pneumonia  Extubated 6/10  - Abx and diuresis      GI: #. Shock Liver 2/2 cardiac arrest  --Monitor LFTs twice a day., Improving      Renal: #. JEAN CLAUDE 2/2 cardiac arrest  Hematuria 2/2 traumatic schafer insertion   --Monitor urine output  --Maintain K>3 and Mg>2      ID: #. Probable aspiration pneumonia  - Ctx x5 days 6/1- 6/5  - Febrile to 38.3  - Zosyn 6/5- 6/7  - Cefipime 6/9-*  --Repeat pan cx     Hem/Onc: #. Acute blood loss anemia   --Continue Aspirin and Tacagrelor for the stent.  --Cryo PRN fibrinogen < 150; FFP for INR >2  --Transfuse for Hgb<7  --US LE w/ arterial duplex per ECMO protocol   --DVT PPX: STEVE     Endo: #. Hyperglycemia  --Insulin as needed.  --HgbA1c - 6.1.     Checklist: ICU Checklist:  #Feeding: tube feeds at goal  #Analgesia: propofol   #Sedation: fentanyl  #Thromboembolism ppx: heparin sq   #HOB: 30 degrees   #Ulcer ppx: ppi  #Glucose: insulin gtt   #SBT/SAT: as tolerated  #Bowel reg: miralax, senna   #Lines/tubes/drains: 6 Fr LCFA sheath, schafer, ETT, OG  #Code status: full   #Family: will update as able this evening   #Dispo: ICU          Code Status: Full    The pt was discussed with the attending physician. Dr. Wallace Hernandez MD  Cardiology fellow (PGY4)  [id number]         Medications:   I have reviewed this patient's current medications    No past medical history on file.    No family history on file.  Social History     Socioeconomic History  "   Marital status:      Spouse name: Not on file    Number of children: Not on file    Years of education: Not on file    Highest education level: Not on file   Occupational History    Not on file   Tobacco Use    Smoking status: Not on file    Smokeless tobacco: Not on file   Substance and Sexual Activity    Alcohol use: Not on file    Drug use: Not on file    Sexual activity: Not on file   Other Topics Concern    Not on file   Social History Narrative    Not on file     Social Determinants of Health     Financial Resource Strain: Not At Risk (7/31/2023)    Received from [facility]    Financial Resource Strain     Is it hard for you to pay for the very basics like food, housing, medical care or heating?: No   Food Insecurity: Not At Risk (7/31/2023)    Received from [facility]    Food Insecurity     Does your food run out before you have the money to buy more?: No   Transportation Needs: Not At Risk (7/31/2023)    Received from [facility]    Transportation Needs     Does a lack of transportation keep you from your medical appointments or from getting your medications?: No   Physical Activity: Not on file   Stress: Not on file   Social Connections: Not on file   Interpersonal Safety: Not on file   Housing Stability: Not on file            Review of Systems:   Unable to obtain due to patients medical condition.            Physical Exam:   /73   Pulse 99   Temp 99.5  F (37.5  C) (Bladder)   Resp 21   Ht 1.886 m (6' 2.25\")   Wt 120.4 kg (265 lb 6.9 oz)   SpO2 99%   BMI 33.85 kg/m        GENERAL: Intubated, sedated. NAD.  HEENT: No icterus. ETT in place, OG tube in place.  CARDIOVASCULAR: RRR. Normal S1 and S2.  RESP: Coarse bilaterally. Mechanical ventilation.    GI Soft, bowel sounds hypoactive but present.  GENITOURINARY: Benjamin in place.  EXTREMITIES: Cool, 1+ edema, pulses dopplered, as above.   NEURO: Sedated and intubated.  INTEGUMENTARY: No rashes. Cannula/Line sites " "CDI.      Resp: 21 SpO2: 99 % O2 Device: Oxymask Oxygen Delivery: 7 LPM    Arterial Blood Gas:   Recent Labs   Lab 06/11/24  0352 06/10/24  2349 06/10/24  2000 06/10/24  1425   PH 7.45 7.49* 7.50* 7.54*   PCO2 32* 34* 35 37   PO2 70* 65* 87 88   HCO3 23 25 27 31*   O2PER 40 40 40 30     Vitals:    06/07/24 0000 06/09/24 0000 06/11/24 0000   Weight: 127.8 kg (281 lb 12 oz) 134.6 kg (296 lb 11.8 oz) 120.4 kg (265 lb 6.9 oz)   I/O last 3 completed shifts:  In: 5276.15 [P.O.:900; I.V.:1691.15; NG/GT:1005]  Out: 5755 [Urine:5255; Stool:500]  Recent Labs   Lab 06/11/24  0351 06/10/24  2349 06/10/24  2000     --  142   POTASSIUM 3.1*  3.1*  --  3.2*  3.2*   CHLORIDE 111*  --  106   CO2 21*  --  25   ANIONGAP 12  --  11   *  170*   < > 205*  220*   BUN 45.8*  --  51.9*   CR 1.17  --  1.37*   RANDA 8.7  --  8.9    < > = values in this interval not displayed.     No components found for: \"URINE\"   Recent Labs   Lab 06/11/24  0351 06/10/24  1345 06/10/24  0415   AST 90* 94* 63*   * 101* 81*   BILITOTAL 0.7 1.0 0.8   ALBUMIN 3.2* 3.3* 3.2*   PROTTOTAL 6.5 6.8 6.7   ALKPHOS 169* 162* 119     Temp: 99.5  F (37.5  C) Temp src: BladderTemp  Min: 99.1  F (37.3  C)  Max: 102.7  F (39.3  C)   Recent Labs   Lab 06/11/24  0351 06/10/24  1345 06/10/24  0415 06/09/24  1432 06/09/24  0354   WBC 9.6 11.6* 9.3 11.0 10.9   HGB 9.5* 10.0* 9.4* 10.0* 9.6*   HCT 29.3* 30.1* 29.5* 30.9* 30.0*   MCV 94 95 97 97 97   RDW 15.0 14.9 15.1* 15.2* 15.4*    316 261 262 236     Recent Labs   Lab 06/11/24  0351 06/10/24  0415 06/09/24  0354 06/08/24  0540 06/07/24  0344 06/06/24  0354 06/05/24  0343 06/04/24  2326 06/04/24  1620 06/04/24  1015   INR 1.20* 1.13 1.08 1.09 1.15   < > 1.11 1.21* 1.18* 1.23*   PTT  --   --   --   --   --   --  29 42* 93* 100*    < > = values in this interval not displayed.     Recent Labs   Lab 06/11/24  0351 06/10/24  2349 06/10/24  2000   *  170* 151* 205*  220*       Lines:           " Data:     Recent Results (from the past 24 hour(s))   XR Abdomen Port 1 View    Narrative    XR ABDOMEN PORT 1 VIEW  6/10/2024 1:18 PM     HISTORY:  NG tube     COMPARISON:  6/1/2024 abdominal radiograph    TECHNIQUE: Single supine view of the abdomen.     FINDINGS:   Enteric tube tip and sidehole projected over the stomach.    Nonobstructive bowel gas pattern. There are no abnormal calcifications  or evidence of organomegaly. Small stool burden in the colon. Streaky  basilar opacities in the lungs.       Impression    IMPRESSION:   Enteric tube tip and sidehole projected over the stomach.      I have personally reviewed the examination and initial interpretation  and I agree with the findings.    BLOSSOM CASTANEDA MD         SYSTEM ID:  [id number]   XR Chest Port 1 View    Narrative    Exam: XR CHEST PORT 1 VIEW, 6/10/2024 3:59 PM    Comparison: Same day radiograph    History: Miss placed NG    Findings:  Portable AP view of the chest. The patient is slightly rotated.  Endotracheal tube in the midthoracic trachea. Two enteric tubes course  inferior to the diaphragm and inferior to the field of view. Stable  left PICC. Unchanged cardiac silhouette. No discernible pneumothorax  or significant pleural effusion. Similar patchy bilateral pulmonary  opacities.      Impression    Impression:   1. Two enteric tubes course below the diaphragm partially visualized.  Unchanged additional support devices are unchanged.  2. Stable bilateral pulmonary opacities.    I have personally reviewed the examination and initial interpretation  and I agree with the findings.    TYRON ALVAREZ MD         SYSTEM ID:  [id number]   XR Abdomen Port 1 View    Narrative    EXAMINATION:  XR ABDOMEN PORT 1 VIEW 6/10/2024     COMPARISON: Same day abdominal radiograph    HISTORY: NG.    TECHNIQUE: One frontal supine view of the abdomen.    FINDINGS: Two gastric tubes terminate in the stomach. Both sideholes  project over the stomach. Partially visualized  prominent loops of  colon in the right hemiabdomen. The lung bases are unremarkable.       Impression    IMPRESSION: Enteric tubes with tip and sidehole projecting over the  stomach. Air-filled distended bowel loop in the right lower quadrant,  early/developing ileus not entirely excluded.    I have personally reviewed the examination and initial interpretation  and I agree with the findings.    TYRON ALVAREZ MD         SYSTEM ID:  [id number]

## 2024-06-12 ENCOUNTER — APPOINTMENT (OUTPATIENT)
Dept: PHYSICAL THERAPY | Facility: CLINIC | Age: 49
DRG: 003 | End: 2024-06-12
Attending: INTERNAL MEDICINE
Payer: COMMERCIAL

## 2024-06-12 ENCOUNTER — APPOINTMENT (OUTPATIENT)
Dept: SPEECH THERAPY | Facility: CLINIC | Age: 49
DRG: 003 | End: 2024-06-12
Attending: INTERNAL MEDICINE
Payer: COMMERCIAL

## 2024-06-12 LAB
7AMINOCLONAZEPAM SERPL-MCNC: NEGATIVE NG/ML
ALBUMIN SERPL BCG-MCNC: 3.1 G/DL (ref 3.5–5.2)
ALBUMIN SERPL BCG-MCNC: 3.3 G/DL (ref 3.5–5.2)
ALP SERPL-CCNC: 163 U/L (ref 40–150)
ALP SERPL-CCNC: 190 U/L (ref 40–150)
ALPRAZ SERPL-MCNC: NEGATIVE NG/ML
ALT SERPL W P-5'-P-CCNC: 118 U/L (ref 0–70)
ALT SERPL W P-5'-P-CCNC: 163 U/L (ref 0–70)
ANION GAP SERPL CALCULATED.3IONS-SCNC: 11 MMOL/L (ref 7–15)
ANION GAP SERPL CALCULATED.3IONS-SCNC: 12 MMOL/L (ref 7–15)
AST SERPL W P-5'-P-CCNC: 120 U/L (ref 0–45)
AST SERPL W P-5'-P-CCNC: 69 U/L (ref 0–45)
BACTERIA BLD CULT: NO GROWTH
BACTERIA BLD CULT: NO GROWTH
BENZODIAZ SPEC QL: POSITIVE
BILIRUB SERPL-MCNC: 0.5 MG/DL
BILIRUB SERPL-MCNC: 0.6 MG/DL
BUN SERPL-MCNC: 43.3 MG/DL (ref 6–20)
BUN SERPL-MCNC: 46.4 MG/DL (ref 6–20)
CALCIUM SERPL-MCNC: 8.5 MG/DL (ref 8.6–10)
CALCIUM SERPL-MCNC: 8.7 MG/DL (ref 8.6–10)
CHLORDIAZEP SERPL-MCNC: NEGATIVE NG/ML
CHLORIDE SERPL-SCNC: 115 MMOL/L (ref 98–107)
CHLORIDE SERPL-SCNC: 115 MMOL/L (ref 98–107)
CLONAZEPAM SERPL-MCNC: NEGATIVE NG/ML
CREAT SERPL-MCNC: 0.98 MG/DL (ref 0.67–1.17)
CREAT SERPL-MCNC: 1.05 MG/DL (ref 0.67–1.17)
DEPRECATED HCO3 PLAS-SCNC: 15 MMOL/L (ref 22–29)
DEPRECATED HCO3 PLAS-SCNC: 16 MMOL/L (ref 22–29)
DESALKYLFLURAZ SERPL CFM-MCNC: NEGATIVE NG/ML
DIAZEPAM SERPL-MCNC: NEGATIVE NG/ML
EGFRCR SERPLBLD CKD-EPI 2021: 87 ML/MIN/1.73M2
EGFRCR SERPLBLD CKD-EPI 2021: >90 ML/MIN/1.73M2
ERYTHROCYTE [DISTWIDTH] IN BLOOD BY AUTOMATED COUNT: 14.7 % (ref 10–15)
ERYTHROCYTE [DISTWIDTH] IN BLOOD BY AUTOMATED COUNT: 14.8 % (ref 10–15)
FLURAZEPAM SPEC-MCNC: NEGATIVE NG/ML
GLUCOSE BLDC GLUCOMTR-MCNC: 128 MG/DL (ref 70–99)
GLUCOSE BLDC GLUCOMTR-MCNC: 130 MG/DL (ref 70–99)
GLUCOSE BLDC GLUCOMTR-MCNC: 137 MG/DL (ref 70–99)
GLUCOSE BLDC GLUCOMTR-MCNC: 158 MG/DL (ref 70–99)
GLUCOSE BLDC GLUCOMTR-MCNC: 163 MG/DL (ref 70–99)
GLUCOSE BLDC GLUCOMTR-MCNC: 166 MG/DL (ref 70–99)
GLUCOSE BLDC GLUCOMTR-MCNC: 168 MG/DL (ref 70–99)
GLUCOSE BLDC GLUCOMTR-MCNC: 194 MG/DL (ref 70–99)
GLUCOSE SERPL-MCNC: 164 MG/DL (ref 70–99)
GLUCOSE SERPL-MCNC: 167 MG/DL (ref 70–99)
HCT VFR BLD AUTO: 30.7 % (ref 40–53)
HCT VFR BLD AUTO: 32.4 % (ref 40–53)
HGB BLD-MCNC: 10.1 G/DL (ref 13.3–17.7)
HGB BLD-MCNC: 10.9 G/DL (ref 13.3–17.7)
LORAZEPAM SERPL-MCNC: NEGATIVE NG/ML
MAGNESIUM SERPL-MCNC: 2.5 MG/DL (ref 1.7–2.3)
MCH RBC QN AUTO: 30.4 PG (ref 26.5–33)
MCH RBC QN AUTO: 30.8 PG (ref 26.5–33)
MCHC RBC AUTO-ENTMCNC: 32.9 G/DL (ref 31.5–36.5)
MCHC RBC AUTO-ENTMCNC: 33.6 G/DL (ref 31.5–36.5)
MCV RBC AUTO: 92 FL (ref 78–100)
MCV RBC AUTO: 93 FL (ref 78–100)
MIDAZOLAM SERPL-MCNC: 93.7 NG/ML
NORCHLORDIAZEP SERPL-MCNC: NEGATIVE UG/ML
NORDIAZEPAM SPEC-MCNC: NEGATIVE NG/ML
OXAZEPAM SERPL CFM-MCNC: NEGATIVE NG/ML
PHOSPHATE SERPL-MCNC: 2.4 MG/DL (ref 2.5–4.5)
PLATELET # BLD AUTO: 399 10E3/UL (ref 150–450)
PLATELET # BLD AUTO: 446 10E3/UL (ref 150–450)
POTASSIUM SERPL-SCNC: 3 MMOL/L (ref 3.4–5.3)
POTASSIUM SERPL-SCNC: 3.1 MMOL/L (ref 3.4–5.3)
POTASSIUM SERPL-SCNC: 3.4 MMOL/L (ref 3.4–5.3)
POTASSIUM SERPL-SCNC: 3.4 MMOL/L (ref 3.4–5.3)
POTASSIUM SERPL-SCNC: 3.7 MMOL/L (ref 3.4–5.3)
PROT SERPL-MCNC: 6.7 G/DL (ref 6.4–8.3)
PROT SERPL-MCNC: 7.1 G/DL (ref 6.4–8.3)
RBC # BLD AUTO: 3.32 10E6/UL (ref 4.4–5.9)
RBC # BLD AUTO: 3.54 10E6/UL (ref 4.4–5.9)
SODIUM SERPL-SCNC: 142 MMOL/L (ref 135–145)
SODIUM SERPL-SCNC: 142 MMOL/L (ref 135–145)
TEMAZEPAM SERPL-MCNC: NEGATIVE NG/ML
TRIAZOLAM SPEC-MCNC: NEGATIVE NG/ML
WBC # BLD AUTO: 13.5 10E3/UL (ref 4–11)
WBC # BLD AUTO: 15.6 10E3/UL (ref 4–11)

## 2024-06-12 PROCEDURE — 84132 ASSAY OF SERUM POTASSIUM: CPT | Performed by: STUDENT IN AN ORGANIZED HEALTH CARE EDUCATION/TRAINING PROGRAM

## 2024-06-12 PROCEDURE — 250N000013 HC RX MED GY IP 250 OP 250 PS 637: Performed by: SURGERY

## 2024-06-12 PROCEDURE — 250N000013 HC RX MED GY IP 250 OP 250 PS 637: Performed by: INTERNAL MEDICINE

## 2024-06-12 PROCEDURE — 80053 COMPREHEN METABOLIC PANEL: CPT | Performed by: STUDENT IN AN ORGANIZED HEALTH CARE EDUCATION/TRAINING PROGRAM

## 2024-06-12 PROCEDURE — 85027 COMPLETE CBC AUTOMATED: CPT | Performed by: STUDENT IN AN ORGANIZED HEALTH CARE EDUCATION/TRAINING PROGRAM

## 2024-06-12 PROCEDURE — 97116 GAIT TRAINING THERAPY: CPT | Mod: GP

## 2024-06-12 PROCEDURE — 250N000011 HC RX IP 250 OP 636: Performed by: NURSE PRACTITIONER

## 2024-06-12 PROCEDURE — 120N000005 HC R&B MS OVERFLOW UMMC

## 2024-06-12 PROCEDURE — 250N000011 HC RX IP 250 OP 636: Mod: JZ | Performed by: STUDENT IN AN ORGANIZED HEALTH CARE EDUCATION/TRAINING PROGRAM

## 2024-06-12 PROCEDURE — 84460 ALANINE AMINO (ALT) (SGPT): CPT | Performed by: STUDENT IN AN ORGANIZED HEALTH CARE EDUCATION/TRAINING PROGRAM

## 2024-06-12 PROCEDURE — 97530 THERAPEUTIC ACTIVITIES: CPT | Mod: GP

## 2024-06-12 PROCEDURE — 92526 ORAL FUNCTION THERAPY: CPT | Mod: GN

## 2024-06-12 PROCEDURE — 250N000013 HC RX MED GY IP 250 OP 250 PS 637: Performed by: STUDENT IN AN ORGANIZED HEALTH CARE EDUCATION/TRAINING PROGRAM

## 2024-06-12 PROCEDURE — 250N000013 HC RX MED GY IP 250 OP 250 PS 637

## 2024-06-12 PROCEDURE — 250N000011 HC RX IP 250 OP 636: Performed by: STUDENT IN AN ORGANIZED HEALTH CARE EDUCATION/TRAINING PROGRAM

## 2024-06-12 PROCEDURE — 250N000013 HC RX MED GY IP 250 OP 250 PS 637: Performed by: NURSE PRACTITIONER

## 2024-06-12 PROCEDURE — 84100 ASSAY OF PHOSPHORUS: CPT | Performed by: SURGERY

## 2024-06-12 PROCEDURE — 83735 ASSAY OF MAGNESIUM: CPT | Performed by: SURGERY

## 2024-06-12 PROCEDURE — 84450 TRANSFERASE (AST) (SGOT): CPT | Performed by: STUDENT IN AN ORGANIZED HEALTH CARE EDUCATION/TRAINING PROGRAM

## 2024-06-12 PROCEDURE — 99233 SBSQ HOSP IP/OBS HIGH 50: CPT | Mod: GC | Performed by: INTERNAL MEDICINE

## 2024-06-12 RX ORDER — POTASSIUM CHLORIDE 1.5 G/1.58G
40 POWDER, FOR SOLUTION ORAL ONCE
Status: COMPLETED | OUTPATIENT
Start: 2024-06-12 | End: 2024-06-12

## 2024-06-12 RX ORDER — POTASSIUM CHLORIDE 1.5 G/1.58G
20 POWDER, FOR SOLUTION ORAL ONCE
Status: COMPLETED | OUTPATIENT
Start: 2024-06-12 | End: 2024-06-12

## 2024-06-12 RX ORDER — POTASSIUM CHLORIDE 20MEQ/15ML
60 LIQUID (ML) ORAL ONCE
Status: COMPLETED | OUTPATIENT
Start: 2024-06-12 | End: 2024-06-12

## 2024-06-12 RX ORDER — TAMSULOSIN HYDROCHLORIDE 0.4 MG/1
0.4 CAPSULE ORAL DAILY
Status: DISCONTINUED | OUTPATIENT
Start: 2024-06-12 | End: 2024-06-15 | Stop reason: HOSPADM

## 2024-06-12 RX ORDER — POTASSIUM CHLORIDE 750 MG/1
40 TABLET, EXTENDED RELEASE ORAL DAILY
Status: DISCONTINUED | OUTPATIENT
Start: 2024-06-13 | End: 2024-06-15 | Stop reason: HOSPADM

## 2024-06-12 RX ORDER — FUROSEMIDE 40 MG
40 TABLET ORAL DAILY
Status: DISCONTINUED | OUTPATIENT
Start: 2024-06-12 | End: 2024-06-13

## 2024-06-12 RX ADMIN — INSULIN ASPART 2 UNITS: 100 INJECTION, SOLUTION INTRAVENOUS; SUBCUTANEOUS at 12:07

## 2024-06-12 RX ADMIN — ONDANSETRON 4 MG: 2 INJECTION INTRAMUSCULAR; INTRAVENOUS at 16:48

## 2024-06-12 RX ADMIN — ACETAMINOPHEN 650 MG: 325 TABLET, FILM COATED ORAL at 12:14

## 2024-06-12 RX ADMIN — SACUBITRIL AND VALSARTAN 1 TABLET: 24; 26 TABLET, FILM COATED ORAL at 09:06

## 2024-06-12 RX ADMIN — CEFEPIME HYDROCHLORIDE 2 G: 2 INJECTION, POWDER, FOR SOLUTION INTRAVENOUS at 23:52

## 2024-06-12 RX ADMIN — Medication 15 ML: at 09:05

## 2024-06-12 RX ADMIN — Medication 2 SPRAY: at 00:00

## 2024-06-12 RX ADMIN — SACUBITRIL AND VALSARTAN 1 TABLET: 24; 26 TABLET, FILM COATED ORAL at 20:20

## 2024-06-12 RX ADMIN — Medication 10 MG: at 20:20

## 2024-06-12 RX ADMIN — INSULIN ASPART 1 UNITS: 100 INJECTION, SOLUTION INTRAVENOUS; SUBCUTANEOUS at 00:07

## 2024-06-12 RX ADMIN — HEPARIN SODIUM 5000 UNITS: 5000 INJECTION, SOLUTION INTRAVENOUS; SUBCUTANEOUS at 04:19

## 2024-06-12 RX ADMIN — POTASSIUM & SODIUM PHOSPHATES POWDER PACK 280-160-250 MG 1 PACKET: 280-160-250 PACK at 06:32

## 2024-06-12 RX ADMIN — TICAGRELOR 90 MG: 90 TABLET ORAL at 20:20

## 2024-06-12 RX ADMIN — POTASSIUM & SODIUM PHOSPHATES POWDER PACK 280-160-250 MG 1 PACKET: 280-160-250 PACK at 12:14

## 2024-06-12 RX ADMIN — EMPAGLIFLOZIN 10 MG: 10 TABLET, FILM COATED ORAL at 09:05

## 2024-06-12 RX ADMIN — CEFEPIME HYDROCHLORIDE 2 G: 2 INJECTION, POWDER, FOR SOLUTION INTRAVENOUS at 09:07

## 2024-06-12 RX ADMIN — POTASSIUM CHLORIDE 20 MEQ: 1.5 POWDER, FOR SOLUTION ORAL at 22:56

## 2024-06-12 RX ADMIN — VENLAFAXINE 37.5 MG: 37.5 TABLET ORAL at 09:05

## 2024-06-12 RX ADMIN — AMIODARONE HYDROCHLORIDE 200 MG: 200 TABLET ORAL at 09:05

## 2024-06-12 RX ADMIN — INSULIN ASPART 1 UNITS: 100 INJECTION, SOLUTION INTRAVENOUS; SUBCUTANEOUS at 04:34

## 2024-06-12 RX ADMIN — INSULIN ASPART 1 UNITS: 100 INJECTION, SOLUTION INTRAVENOUS; SUBCUTANEOUS at 09:11

## 2024-06-12 RX ADMIN — POTASSIUM CHLORIDE 40 MEQ: 1.5 POWDER, FOR SOLUTION ORAL at 18:20

## 2024-06-12 RX ADMIN — TICAGRELOR 90 MG: 90 TABLET ORAL at 09:05

## 2024-06-12 RX ADMIN — HEPARIN SODIUM 5000 UNITS: 5000 INJECTION, SOLUTION INTRAVENOUS; SUBCUTANEOUS at 12:07

## 2024-06-12 RX ADMIN — POTASSIUM CHLORIDE 20 MEQ: 1.5 POWDER, FOR SOLUTION ORAL at 09:05

## 2024-06-12 RX ADMIN — POTASSIUM CHLORIDE 40 MEQ: 1.5 POWDER, FOR SOLUTION ORAL at 06:32

## 2024-06-12 RX ADMIN — HYDROXYZINE HYDROCHLORIDE 50 MG: 25 TABLET, FILM COATED ORAL at 04:19

## 2024-06-12 RX ADMIN — POTASSIUM CHLORIDE 60 MEQ: 1.5 SOLUTION ORAL at 09:06

## 2024-06-12 RX ADMIN — ACETAZOLAMIDE 500 MG: 500 INJECTION, POWDER, LYOPHILIZED, FOR SOLUTION INTRAVENOUS at 00:00

## 2024-06-12 RX ADMIN — HYDROXYZINE HYDROCHLORIDE 50 MG: 25 TABLET, FILM COATED ORAL at 23:57

## 2024-06-12 RX ADMIN — FUROSEMIDE 40 MG: 40 TABLET ORAL at 09:05

## 2024-06-12 RX ADMIN — Medication 2 SPRAY: at 04:22

## 2024-06-12 RX ADMIN — VENLAFAXINE 37.5 MG: 37.5 TABLET ORAL at 18:20

## 2024-06-12 RX ADMIN — INSULIN ASPART 1 UNITS: 100 INJECTION, SOLUTION INTRAVENOUS; SUBCUTANEOUS at 23:58

## 2024-06-12 RX ADMIN — ASPIRIN 81 MG CHEWABLE TABLET 81 MG: 81 TABLET CHEWABLE at 09:05

## 2024-06-12 RX ADMIN — ACETAMINOPHEN 650 MG: 325 TABLET, FILM COATED ORAL at 04:19

## 2024-06-12 RX ADMIN — HEPARIN SODIUM 5000 UNITS: 5000 INJECTION, SOLUTION INTRAVENOUS; SUBCUTANEOUS at 20:20

## 2024-06-12 RX ADMIN — CEFEPIME HYDROCHLORIDE 2 G: 2 INJECTION, POWDER, FOR SOLUTION INTRAVENOUS at 16:15

## 2024-06-12 RX ADMIN — SPIRONOLACTONE 25 MG: 25 TABLET, FILM COATED ORAL at 09:05

## 2024-06-12 ASSESSMENT — ACTIVITIES OF DAILY LIVING (ADL)
ADLS_ACUITY_SCORE: 32
ADLS_ACUITY_SCORE: 32
ADLS_ACUITY_SCORE: 35
ADLS_ACUITY_SCORE: 32
ADLS_ACUITY_SCORE: 35
ADLS_ACUITY_SCORE: 35
ADLS_ACUITY_SCORE: 32
ADLS_ACUITY_SCORE: 32
ADLS_ACUITY_SCORE: 35
ADLS_ACUITY_SCORE: 32
ADLS_ACUITY_SCORE: 35
ADLS_ACUITY_SCORE: 37
ADLS_ACUITY_SCORE: 32
ADLS_ACUITY_SCORE: 35
ADLS_ACUITY_SCORE: 32
ADLS_ACUITY_SCORE: 36
ADLS_ACUITY_SCORE: 35
ADLS_ACUITY_SCORE: 32
ADLS_ACUITY_SCORE: 35
ADLS_ACUITY_SCORE: 32
ADLS_ACUITY_SCORE: 35

## 2024-06-12 NOTE — PLAN OF CARE
Transferred to: Unit 4A  at 6C at 1740  Belongings: Sent with patient  Schafer removed? No; schafer placed by urology-need approval before removing  Central line removed? Yes  Chart and medications sent with patient Yes  Family notified: Yes, family at bedside.    Report given to Sarah HERCULES 6C RN.     Major Shift Events: A&Ox4. Follows commands and RUDD; neuro intact, forgetful at times. Denies pain. Tmax 100.4; PRN tylenol given. SR/ST, HR 90s-100s. MAP goal > 65 and SBP < 150 maintained without intervention. LS clear/diminished on RA. Cleared for full liquid diet by speech eval; ate pudding and applesauce and clear liquids today. TFs at goal of 70mL/hr with 60 q4H FWF. Tried swallowing 2 oral pills this afternoon approximately 1600, but had 1 bout of emesis after drinking oral potassium and phosphorus replacements; PRN zofran given with relief. 2 watery, liquid stools this shift. Large amounts of UOP in schafer. Bilateral groin sites JED; scab on head; old IO site on R shin. Up to chair for a few hours; 2A walker and gait belt; unsteady gait.    Lines:  -L 3x PICC  -R PIV    Plan: Work with therapies. Increase oral intake.  For vital signs and complete assessments, please see documentation flowsheets.      Goal Outcome Evaluation:      Plan of Care Reviewed With: patient, spouse, family    Overall Patient Progress: improvingOverall Patient Progress: improving    Outcome Evaluation: A&Ox4. Passed speech swallow eval; full liquid diet. Transferred to cardiology floor.

## 2024-06-12 NOTE — PROGRESS NOTES
ICU End of Shift Summary. See flowsheets for vital signs and detailed assessment.    Assessment:   Neuro: Neurologically intact. RUDD. Follows commands and makes needs known appropriately.      CV: Sinus. Able to palpate and doppler pulses. Afebrile. Meeting MAP and SBP goals without intervention    Pulm: RA    /GI: TF at goal. SFWF. Benjamin patent, adequate UOP. x1 BM per this shift.     Shift:  Labs drawn and reviewed per orders. Hourly rounded and call light in place. Delirium interventions in place. Patient turned every 2-4 hours and PRN. (See flow sheets for additional data). MD informed of all critical labs and patient condition as needed throughout the shift.     Intake/Output Summary (Last 24 hours) at 6/12/2024 0557  Last data filed at 6/12/2024 0500  Gross per 24 hour   Intake 2702 ml   Output 4160 ml   Net -1458 ml         Yuliana Mccann RN   Critical Care Flex Team

## 2024-06-12 NOTE — PROGRESS NOTES
ICU End of Shift Summary. See flowsheets for vital signs and detailed assessment.    Assessment:   Neuro: Neurologically unchanged. RUDD but not purposefully.     CV: Sinus to sinus tach when agitated or coughing. Able to palpate and doppler pulses. Afebrile. Meeting MAP and SBP goals without intervention    Pulm: CMV settings FIO2 40%/450/14/5/PIPs ~13    /GI: TF at goal. 80mL/hr FWF. Benjamin patent, adequate UOP. Rectal tube remains in place     Skin: Continually clamping down on tongue. Edematous and protruding from R side of mouth despite efforts to place behind teeth. Continuous, blood secretions. Gauze BB remains in place.    Shift:  Labs drawn and reviewed per orders. Hourly rounded and call light in place. Delirium interventions in place. Patient turned every 2-4 hours and PRN. (See flow sheets for additional data). MD informed of all critical labs and patient condition as needed throughout the shift.     Intake/Output Summary (Last 24 hours) at 6/12/2024 0531  Last data filed at 6/12/2024 0500  Gross per 24 hour   Intake 2702 ml   Output 4160 ml   Net -1458 ml       Yuliana Mccann RN   Critical Care Flex Team

## 2024-06-12 NOTE — PROGRESS NOTES
"  [facility]  Cardiology ICU History & Physical  June 1, 2024            Assessment and Plan:   Cullen Reardon is a 48 year old male with no known past medical history who was admitted on 6/1/2024 following a witnessed VF cardiac arrest.    He presented to [facility] on 6/1/2024 with chest pain.  He collapsed in front of the triage desk and was found to be pulseless after saying \"I feel like I'm going to die\".  He received immediate bystander CPR in the waiting room of the emergency department.  He was found be in VF, was shocked x 3.  Estimated downtime was approximately 15 minutes per paramedic report.  Was cannulated in [facility] emergency department and was on circuit at 16:08. On 6/1.  He was brought to the Cath Lab for coronary angiogram where he was found to have a 100% ostial LAD thrombotic occlusion status post stenting.  Decanned on 6/4. He had residual severe disease in D1. S/p staged PCI 6/7 to D1. Extubated on 6/10    Currently working on GDMT, deconditioning, speech, nutrition. Respiratory status improving.      Interval changes   NAEON. Tolerating GDMT, respiratory status improved.     Changes today   - KCL 60 + 20   - Scheduled K 40 tomorrow  -Speech consult for swallow eval.  Plan to remove NG tube if passed.  - PT/OT to see  - Transfer to I floorwhen bed available.   - Discontinue Seroquel   - Plan to start metop tomorrow.  - Start flomax today, Will attempt trial of void tomorrow. Per urology: trial of void on a weekday AM so that urology team is in-house to assist with catheter replacement should patient fail TOV .    Neuro: #. At risk for anoxic brain injury  Following commands.   - Oxy 5q4 PRN  --CT head : No acute issues.     CV: #. Cardiogenic Shock s/p VA ECMO  #. ACUTE STEMI s/p KAYLYN to ostial LAD 6/1/24   #. Refractory VF arrest  s/p VA ECMO  -- Peripheral V-A ECMO inserted via RCFA (17 Fr) and RCFV (25 Fr).  -- EF 15% -- 45% (6/8)  -- Diuresis " as above, He is likely Euvolimic. Plan to keep net even to -500.  -- Continue ASA 81mg and ticagrelor 90mg BID x 1 year then ASA indefinitely   -- Entresto, Jardiance and Spironolactone ( started 6/11)   -  Plan to start BB in the next 24-48h   - Amio 200 daily     Pulm: #. Acute hypoxic respiratory failure  #. Probable aspiration pneumonia  Extubated 6/10  - Abx and diuresis      GI: #. Shock Liver 2/2 cardiac arrest  Diarrhea   - Cdiff neg, PRN loperamide  --Monitor LFTs twice a day., Improving      Renal: #. JEAN CLAUDE 2/2 cardiac arrest  Hematuria 2/2 traumatic schafer insertion,   - Flomax   - Per urology: trial of void on a weekday AM so that urology team is in-house to assist with catheter replacement should patient fail TOV .  --Monitor urine output  --Maintain K>3 and Mg>2      ID: #. Probable aspiration pneumonia  - Ctx x5 days 6/1- 6/5  - Febrile to 38.3  - Zosyn 6/5- 6/7  - Cefipime 6/9-*  --Repeat pan cx     Hem/Onc: #. Acute blood loss anemia   --Continue Aspirin and Tacagrelor for the stent.  --Cryo PRN fibrinogen < 150; FFP for INR >2  --Transfuse for Hgb<7  --US LE w/ arterial duplex per ECMO protocol   --DVT PPX: TSEVE     Endo: #. Hyperglycemia  --Insulin as needed.  --HgbA1c - 6.1.     Checklist: ICU Checklist:  #Feeding: tube feeds at goal  #Analgesia: PRN oxy  #Sedation: N/a  #Thromboembolism ppx: heparin sq   #HOB: 30 degrees   #Ulcer ppx: N/A  #Glucose: glargine  #SBT/SAT: N/A  #Bowel reg: miralax, senna   #Lines/tubes/drains: NG  #Code status: full   #Family: will update as able this evening   #Dispo: ICU          Code Status: Full    The pt was discussed with the attending physician. Dr. Wallace Hernandez MD  Cardiology fellow ([id number])  [id number]         Medications:   I have reviewed this patient's current medications    No past medical history on file.    No family history on file.  Social History     Socioeconomic History    Marital status:      Spouse name: Not on file    Number of  "children: Not on file    Years of education: Not on file    Highest education level: Not on file   Occupational History    Not on file   Tobacco Use    Smoking status: Not on file    Smokeless tobacco: Not on file   Substance and Sexual Activity    Alcohol use: Not on file    Drug use: Not on file    Sexual activity: Not on file   Other Topics Concern    Not on file   Social History Narrative    Not on file     Social Determinants of Health     Financial Resource Strain: Not At Risk (7/31/2023)    Received from [facility]    Financial Resource Strain     Is it hard for you to pay for the very basics like food, housing, medical care or heating?: No   Food Insecurity: Not At Risk (7/31/2023)    Received from [facility]    Food Insecurity     Does your food run out before you have the money to buy more?: No   Transportation Needs: Not At Risk (7/31/2023)    Received from [facility]    Transportation Needs     Does a lack of transportation keep you from your medical appointments or from getting your medications?: No   Physical Activity: Not on file   Stress: Not on file   Social Connections: Not on file   Interpersonal Safety: Not on file   Housing Stability: Not on file            Review of Systems:   Unable to obtain due to patients medical condition.            Physical Exam:   /78   Pulse 95   Temp 99.9  F (37.7  C)   Resp 21   Ht 1.886 m (6' 2.25\")   Wt 120.4 kg (265 lb 6.9 oz)   SpO2 98%   BMI 33.85 kg/m        GENERAL: Intubated, sedated. NAD.  HEENT: No icterus. ETT in place, OG tube in place.  CARDIOVASCULAR: RRR. Normal S1 and S2.  RESP: Coarse bilaterally. Mechanical ventilation.    GI Soft, bowel sounds hypoactive but present.  GENITOURINARY: Benjamin in place.  EXTREMITIES: Cool, 1+ edema, pulses dopplered, as above.   NEURO: Sedated and intubated.  INTEGUMENTARY: No rashes. Cannula/Line sites CDI.      Resp: 21 SpO2: 98 % O2 Device: None (Room air) Oxygen Delivery: 8 LPM    Arterial " "Blood Gas:   Recent Labs   Lab 06/11/24  0352 06/10/24  2349 06/10/24  2000 06/10/24  1425   PH 7.45 7.49* 7.50* 7.54*   PCO2 32* 34* 35 37   PO2 70* 65* 87 88   HCO3 23 25 27 31*   O2PER 40 40 40 30     Vitals:    06/07/24 0000 06/09/24 0000 06/11/24 0000   Weight: 127.8 kg (281 lb 12 oz) 134.6 kg (296 lb 11.8 oz) 120.4 kg (265 lb 6.9 oz)   I/O last 3 completed shifts:  In: 3618 [P.O.:600; I.V.:778; NG/GT:630]  Out: 4310 [Urine:4310]  Recent Labs   Lab 06/12/24 0434 06/12/24 0431 06/12/24  0151 06/11/24  1649 06/11/24  1158   NA  --  142  --   --  142   POTASSIUM  --  3.0* 3.1*  --  3.0*  3.0*   CHLORIDE  --  115*  --   --  111*   CO2  --  16*  --   --  18*   ANIONGAP  --  11  --   --  13   * 164*  --    < > 167*   BUN  --  43.3*  --   --  43.5*   CR  --  0.98  --   --  1.03   RANDA  --  8.5*  --   --  8.6    < > = values in this interval not displayed.     No components found for: \"URINE\"   Recent Labs   Lab 06/12/24 0431 06/11/24  1158 06/11/24  0351   AST 69* 106* 90*   * 133* 114*   BILITOTAL 0.5 0.7 0.7   ALBUMIN 3.1* 3.1* 3.2*   PROTTOTAL 6.7 6.7 6.5   ALKPHOS 163* 174* 169*     Temp: 99.9  F (37.7  C) Temp src: BladderTemp  Min: 99.1  F (37.3  C)  Max: 100.2  F (37.9  C)   Recent Labs   Lab 06/12/24 0431 06/11/24  1158 06/11/24  0351 06/10/24  1345 06/10/24  0415   WBC 13.5* 12.2* 9.6 11.6* 9.3   HGB 10.1* 9.7* 9.5* 10.0* 9.4*   HCT 30.7* 29.1* 29.3* 30.1* 29.5*   MCV 93 93 94 95 97   RDW 14.7 14.7 15.0 14.9 15.1*    350 326 316 261     Recent Labs   Lab 06/11/24  0351 06/10/24  0415 06/09/24  0354 06/08/24  0540 06/07/24  0344   INR 1.20* 1.13 1.08 1.09 1.15     Recent Labs   Lab 06/12/24  0434 06/12/24  0431 06/12/24  0006 06/11/24  2104 06/11/24  1649   * 164* 158* 160* 155*       Lines:           Data:     No results found for this or any previous visit (from the past 24 hour(s)).         "

## 2024-06-13 ENCOUNTER — APPOINTMENT (OUTPATIENT)
Dept: PHYSICAL THERAPY | Facility: CLINIC | Age: 49
DRG: 003 | End: 2024-06-13
Attending: INTERNAL MEDICINE
Payer: COMMERCIAL

## 2024-06-13 ENCOUNTER — APPOINTMENT (OUTPATIENT)
Dept: SPEECH THERAPY | Facility: CLINIC | Age: 49
DRG: 003 | End: 2024-06-13
Attending: INTERNAL MEDICINE
Payer: COMMERCIAL

## 2024-06-13 ENCOUNTER — APPOINTMENT (OUTPATIENT)
Dept: OCCUPATIONAL THERAPY | Facility: CLINIC | Age: 49
DRG: 003 | End: 2024-06-13
Attending: INTERNAL MEDICINE
Payer: COMMERCIAL

## 2024-06-13 LAB
ALBUMIN SERPL BCG-MCNC: 3 G/DL (ref 3.5–5.2)
ALP SERPL-CCNC: 162 U/L (ref 40–150)
ALT SERPL W P-5'-P-CCNC: 135 U/L (ref 0–70)
ANION GAP SERPL CALCULATED.3IONS-SCNC: 12 MMOL/L (ref 7–15)
AST SERPL W P-5'-P-CCNC: 77 U/L (ref 0–45)
BACTERIA BLD CULT: NO GROWTH
BACTERIA BLD CULT: NO GROWTH
BILIRUB SERPL-MCNC: 0.5 MG/DL
BUN SERPL-MCNC: 45.2 MG/DL (ref 6–20)
CALCIUM SERPL-MCNC: 8.4 MG/DL (ref 8.6–10)
CHLORIDE SERPL-SCNC: 114 MMOL/L (ref 98–107)
CREAT SERPL-MCNC: 0.92 MG/DL (ref 0.67–1.17)
DEPRECATED HCO3 PLAS-SCNC: 16 MMOL/L (ref 22–29)
EGFRCR SERPLBLD CKD-EPI 2021: >90 ML/MIN/1.73M2
ERYTHROCYTE [DISTWIDTH] IN BLOOD BY AUTOMATED COUNT: 15.5 % (ref 10–15)
GLUCOSE BLDC GLUCOMTR-MCNC: 129 MG/DL (ref 70–99)
GLUCOSE BLDC GLUCOMTR-MCNC: 143 MG/DL (ref 70–99)
GLUCOSE BLDC GLUCOMTR-MCNC: 146 MG/DL (ref 70–99)
GLUCOSE BLDC GLUCOMTR-MCNC: 146 MG/DL (ref 70–99)
GLUCOSE BLDC GLUCOMTR-MCNC: 153 MG/DL (ref 70–99)
GLUCOSE BLDC GLUCOMTR-MCNC: 157 MG/DL (ref 70–99)
GLUCOSE SERPL-MCNC: 170 MG/DL (ref 70–99)
HCT VFR BLD AUTO: 31.9 % (ref 40–53)
HGB BLD-MCNC: 10.2 G/DL (ref 13.3–17.7)
MAGNESIUM SERPL-MCNC: 2.3 MG/DL (ref 1.7–2.3)
MCH RBC QN AUTO: 30.3 PG (ref 26.5–33)
MCHC RBC AUTO-ENTMCNC: 32 G/DL (ref 31.5–36.5)
MCV RBC AUTO: 95 FL (ref 78–100)
PHOSPHATE SERPL-MCNC: 2.5 MG/DL (ref 2.5–4.5)
PLATELET # BLD AUTO: 436 10E3/UL (ref 150–450)
POTASSIUM SERPL-SCNC: 3.7 MMOL/L (ref 3.4–5.3)
PROT SERPL-MCNC: 6.4 G/DL (ref 6.4–8.3)
RBC # BLD AUTO: 3.37 10E6/UL (ref 4.4–5.9)
SODIUM SERPL-SCNC: 142 MMOL/L (ref 135–145)
WBC # BLD AUTO: 13.6 10E3/UL (ref 4–11)

## 2024-06-13 PROCEDURE — 85027 COMPLETE CBC AUTOMATED: CPT | Performed by: STUDENT IN AN ORGANIZED HEALTH CARE EDUCATION/TRAINING PROGRAM

## 2024-06-13 PROCEDURE — 250N000011 HC RX IP 250 OP 636: Performed by: STUDENT IN AN ORGANIZED HEALTH CARE EDUCATION/TRAINING PROGRAM

## 2024-06-13 PROCEDURE — 250N000013 HC RX MED GY IP 250 OP 250 PS 637: Performed by: INTERNAL MEDICINE

## 2024-06-13 PROCEDURE — 97116 GAIT TRAINING THERAPY: CPT | Mod: GP

## 2024-06-13 PROCEDURE — 250N000013 HC RX MED GY IP 250 OP 250 PS 637: Performed by: NURSE PRACTITIONER

## 2024-06-13 PROCEDURE — 83735 ASSAY OF MAGNESIUM: CPT | Performed by: SURGERY

## 2024-06-13 PROCEDURE — 250N000013 HC RX MED GY IP 250 OP 250 PS 637: Performed by: SURGERY

## 2024-06-13 PROCEDURE — 84100 ASSAY OF PHOSPHORUS: CPT | Performed by: SURGERY

## 2024-06-13 PROCEDURE — 97530 THERAPEUTIC ACTIVITIES: CPT | Mod: GO

## 2024-06-13 PROCEDURE — 120N000005 HC R&B MS OVERFLOW UMMC

## 2024-06-13 PROCEDURE — 999N000007 HC SITE CHECK

## 2024-06-13 PROCEDURE — 250N000013 HC RX MED GY IP 250 OP 250 PS 637: Performed by: STUDENT IN AN ORGANIZED HEALTH CARE EDUCATION/TRAINING PROGRAM

## 2024-06-13 PROCEDURE — 99233 SBSQ HOSP IP/OBS HIGH 50: CPT

## 2024-06-13 PROCEDURE — 80069 RENAL FUNCTION PANEL: CPT | Performed by: STUDENT IN AN ORGANIZED HEALTH CARE EDUCATION/TRAINING PROGRAM

## 2024-06-13 PROCEDURE — 97530 THERAPEUTIC ACTIVITIES: CPT | Mod: GP

## 2024-06-13 PROCEDURE — 250N000013 HC RX MED GY IP 250 OP 250 PS 637

## 2024-06-13 PROCEDURE — 92526 ORAL FUNCTION THERAPY: CPT | Mod: GN

## 2024-06-13 RX ORDER — VENLAFAXINE 37.5 MG/1
37.5 TABLET ORAL 2 TIMES DAILY WITH MEALS
Status: DISCONTINUED | OUTPATIENT
Start: 2024-06-13 | End: 2024-06-15 | Stop reason: HOSPADM

## 2024-06-13 RX ORDER — FUROSEMIDE 40 MG
40 TABLET ORAL DAILY
Status: DISCONTINUED | OUTPATIENT
Start: 2024-06-14 | End: 2024-06-15 | Stop reason: HOSPADM

## 2024-06-13 RX ORDER — POTASSIUM CHLORIDE 1.5 G/1.58G
20 POWDER, FOR SOLUTION ORAL ONCE
Status: COMPLETED | OUTPATIENT
Start: 2024-06-13 | End: 2024-06-13

## 2024-06-13 RX ORDER — AMIODARONE HYDROCHLORIDE 200 MG/1
200 TABLET ORAL DAILY
Status: DISCONTINUED | OUTPATIENT
Start: 2024-06-14 | End: 2024-06-15 | Stop reason: HOSPADM

## 2024-06-13 RX ORDER — SPIRONOLACTONE 25 MG/1
25 TABLET ORAL DAILY
Status: DISCONTINUED | OUTPATIENT
Start: 2024-06-14 | End: 2024-06-15 | Stop reason: HOSPADM

## 2024-06-13 RX ADMIN — SPIRONOLACTONE 25 MG: 25 TABLET, FILM COATED ORAL at 09:04

## 2024-06-13 RX ADMIN — SACUBITRIL AND VALSARTAN 1 TABLET: 24; 26 TABLET, FILM COATED ORAL at 09:26

## 2024-06-13 RX ADMIN — EMPAGLIFLOZIN 10 MG: 10 TABLET, FILM COATED ORAL at 08:57

## 2024-06-13 RX ADMIN — AMIODARONE HYDROCHLORIDE 200 MG: 200 TABLET ORAL at 08:57

## 2024-06-13 RX ADMIN — ASPIRIN 81 MG CHEWABLE TABLET 81 MG: 81 TABLET CHEWABLE at 08:58

## 2024-06-13 RX ADMIN — INSULIN ASPART 1 UNITS: 100 INJECTION, SOLUTION INTRAVENOUS; SUBCUTANEOUS at 12:04

## 2024-06-13 RX ADMIN — ACETAMINOPHEN 650 MG: 325 TABLET, FILM COATED ORAL at 16:36

## 2024-06-13 RX ADMIN — HEPARIN SODIUM 5000 UNITS: 5000 INJECTION, SOLUTION INTRAVENOUS; SUBCUTANEOUS at 12:03

## 2024-06-13 RX ADMIN — ACETAMINOPHEN 650 MG: 325 TABLET, FILM COATED ORAL at 09:11

## 2024-06-13 RX ADMIN — Medication 10 MG: at 20:04

## 2024-06-13 RX ADMIN — INSULIN ASPART 1 UNITS: 100 INJECTION, SOLUTION INTRAVENOUS; SUBCUTANEOUS at 23:29

## 2024-06-13 RX ADMIN — TICAGRELOR 90 MG: 90 TABLET ORAL at 20:05

## 2024-06-13 RX ADMIN — FUROSEMIDE 40 MG: 40 TABLET ORAL at 08:58

## 2024-06-13 RX ADMIN — INSULIN ASPART 1 UNITS: 100 INJECTION, SOLUTION INTRAVENOUS; SUBCUTANEOUS at 04:39

## 2024-06-13 RX ADMIN — Medication 12.5 MG: at 08:57

## 2024-06-13 RX ADMIN — VENLAFAXINE 37.5 MG: 37.5 TABLET ORAL at 08:58

## 2024-06-13 RX ADMIN — POTASSIUM CHLORIDE 40 MEQ: 750 TABLET, EXTENDED RELEASE ORAL at 11:58

## 2024-06-13 RX ADMIN — POTASSIUM CHLORIDE 20 MEQ: 1.5 POWDER, FOR SOLUTION ORAL at 09:11

## 2024-06-13 RX ADMIN — TAMSULOSIN HYDROCHLORIDE 0.4 MG: 0.4 CAPSULE ORAL at 09:04

## 2024-06-13 RX ADMIN — SACUBITRIL AND VALSARTAN 1 TABLET: 24; 26 TABLET, FILM COATED ORAL at 20:05

## 2024-06-13 RX ADMIN — VENLAFAXINE 37.5 MG: 37.5 TABLET ORAL at 18:26

## 2024-06-13 RX ADMIN — TICAGRELOR 90 MG: 90 TABLET ORAL at 09:04

## 2024-06-13 RX ADMIN — POTASSIUM & SODIUM PHOSPHATES POWDER PACK 280-160-250 MG 1 PACKET: 280-160-250 PACK at 09:04

## 2024-06-13 RX ADMIN — POTASSIUM & SODIUM PHOSPHATES POWDER PACK 280-160-250 MG 1 PACKET: 280-160-250 PACK at 11:58

## 2024-06-13 RX ADMIN — Medication 12.5 MG: at 20:05

## 2024-06-13 RX ADMIN — Medication 15 ML: at 09:04

## 2024-06-13 RX ADMIN — CEFEPIME HYDROCHLORIDE 2 G: 2 INJECTION, POWDER, FOR SOLUTION INTRAVENOUS at 09:05

## 2024-06-13 RX ADMIN — HEPARIN SODIUM 5000 UNITS: 5000 INJECTION, SOLUTION INTRAVENOUS; SUBCUTANEOUS at 04:28

## 2024-06-13 RX ADMIN — INSULIN ASPART 1 UNITS: 100 INJECTION, SOLUTION INTRAVENOUS; SUBCUTANEOUS at 09:13

## 2024-06-13 RX ADMIN — INSULIN ASPART 1 UNITS: 100 INJECTION, SOLUTION INTRAVENOUS; SUBCUTANEOUS at 18:27

## 2024-06-13 RX ADMIN — HEPARIN SODIUM 5000 UNITS: 5000 INJECTION, SOLUTION INTRAVENOUS; SUBCUTANEOUS at 20:04

## 2024-06-13 RX ADMIN — ONDANSETRON 4 MG: 4 TABLET, ORALLY DISINTEGRATING ORAL at 09:12

## 2024-06-13 ASSESSMENT — ACTIVITIES OF DAILY LIVING (ADL)
ADLS_ACUITY_SCORE: 34
ADLS_ACUITY_SCORE: 36
ADLS_ACUITY_SCORE: 36
ADLS_ACUITY_SCORE: 34
ADLS_ACUITY_SCORE: 36
ADLS_ACUITY_SCORE: 34
ADLS_ACUITY_SCORE: 36
ADLS_ACUITY_SCORE: 34
ADLS_ACUITY_SCORE: 36
ADLS_ACUITY_SCORE: 34
ADLS_ACUITY_SCORE: 36
ADLS_ACUITY_SCORE: 36
ADLS_ACUITY_SCORE: 34
ADLS_ACUITY_SCORE: 36
ADLS_ACUITY_SCORE: 34
ADLS_ACUITY_SCORE: 36

## 2024-06-13 NOTE — PROGRESS NOTES
"  Interventional Cardiology Progress Note    Assessment & Plan:  Cullen Reardon is a 48 year old male with no known past medical history who was admitted on 6/1/2024 following a witnessed VF cardiac arrest.     Changes Today  - Start Lopressor 12.5mg BID  - Spiritual Health Consult for emotional support  - Attempt TOV today    # STEMI s/p KAYLYN to ostial LAD 6/1, PCI to D1 6/7  # Refractory VF Arrest s/p VA ECMO  # Chronic Systolic Heart Failure with moderately reduced EF (40-45%)   Patient presented to [facility] on 6/1/2024 with chest pain. He collapsed in front of the triage desk and was found to be pulseless after saying \"I feel like I'm going to die\". He received immediate bystander CPR in the waiting room of the emergency department. He was found be in VF, was shocked x 3. Estimated downtime was approximately 15 min per paramedic report. Cannulated in [facility] ED and brought to the Cath Lab for angiogram where he was found to have a 100% ostial LAD thrombotic occlusion s/p stenting. Decanned on 6/4. He had residual severe disease in D1. S/p staged PCI 6/7 to D1. Extubated on 6/10   - DAPT: ASA 81mg daily + Ticagrelor 90mg BID  - Volume Status: Euvolemic; continue Lasix 40mg daily   GDMT:   - BB: Start Lopressor 12.5mg BID; uptitrate as able  - ACE/ARB/ARNI: Continue Entresto 24-26 mg BID  - SGLT2i: Continue Jardiance 10mg daily  - AA: Continue Spironolactone 25mg daily  - Continue Amiodarone 200mg daily  - Daily weights + I/Os  - Telemetry  - Electrolyte Replacement protocols ordered   - Spiritual Health consulted 6/13 for emotional support     # Probable aspiration pneumonia  Extubated 6/10  - Cefepime 2g Q8H x 5 days (end 6/14)    # Shock Liver 2/2 cardiac arrest - improving  # Diarrhea  # Hypocalcemia  C. Diff testing negative  - PRN loperamide  - Daily CMP    # Acute Blood Loss Anemia  # Leukocytosis  -Transfuse for Hgb <7   - Daily CBC    # Hyperglycemia  Most recent A1c 6.1 (6/1/24)  - Medium " "sliding scale insulin  - Insulin Glargine 30 units at bedtime    # Moderate malnutrition in the context of acute illness   # Hypoalbuminemia  RD and SLP following   - Full Liquid diet  - TFs at goal of 70mL/hr with 60 q4H FWF   - Per RD, can adjust to cyclic TF once able to consume solids, patient reports feeling too full to increase PO intake    # Urinary Retention  # Hematuria 2/2 traumatic schafer insertion   Schafer placed by urology due to difficulty placing and bloody output  - TOV today  - Continue Flomax 0.4mg daily  - Per Urology \"For catheter removal, please perform trial of void on a weekday AM so that urology team is in-house to assist with catheter replacement should patient fail TOV\"    # Left scalp wound  - WOC signed off 6/10  - Keep clean and dry    Resolved Issues  - JEAN CLAUDE 2/2 cardiac arrest  - ARDS    Prophylaxis:  DVT: subcutaneous heparin    Diet: Full Liquid Diet; TF at 70ml/hr  Activity: Up with x2 assist  Code Status: Full Code   Disposition: Anticipate medical readiness in 1-3 days pending GDMT initiation; current recs ARU    Patient discussed w/ Dr. Weiner.    Nkechi Guerrero, MS, PA-C  [facility] Structural/Interventional Cardiology   Pager [phone]/Vocera    Interval History:  Patient resting comfortably with family at bedside. Discussed patient's clinical course at length and reviewed current medications including GDMT. Patient agreeable to speaking to someone for emotional support as he appears overwhelmed at his clinical status. Patient very motivated to have TF removed, understands the need to increase his PO calorie intake. VSS.    Most recent vital signs:  /77   Pulse 89   Temp 98.8  F (37.1  C) (Oral)   Resp 20   Ht 1.886 m (6' 2.25\")   Wt 118.9 kg (262 lb 1.6 oz)   SpO2 97%   BMI 33.43 kg/m    Temp:  [97.9  F (36.6  C)-100.4  F (38  C)] 98.8  F (37.1  C)  Pulse:  [] 89  Resp:  [14-29] 20  BP: (106-144)/(61-92) 127/77  SpO2:  [95 %-100 %] 97 %  Wt Readings from Last 2 Encounters: "   06/13/24 118.9 kg (262 lb 1.6 oz)       Intake/Output Summary (Last 24 hours) at 6/13/2024 0651  Last data filed at 6/13/2024 0600  Gross per 24 hour   Intake 3115 ml   Output 5115 ml   Net -2000 ml       Physical exam:  General: In bed, in NAD  HEENT: EOMI, PERRLA, no scleral icterus or injection  CARDIAC: RRR, no m/r/g appreciated.   RESP: CTAB, no wheezes, rhonchi or crackles appreciated.  GI: NABS, NT/ND, no guarding or rebound  EXTREMITIES: NO LE edema  SKIN: No acute lesions appreciated  NEURO: Alert and oriented X3,  no focal neurological deficits noted    Labs (Past three days):  CBC  Recent Labs   Lab 06/13/24 0448 06/12/24  1436 06/12/24  0431 06/11/24  1158   WBC 13.6* 15.6* 13.5* 12.2*   RBC 3.37* 3.54* 3.32* 3.14*   HGB 10.2* 10.9* 10.1* 9.7*   HCT 31.9* 32.4* 30.7* 29.1*   MCV 95 92 93 93   MCH 30.3 30.8 30.4 30.9   MCHC 32.0 33.6 32.9 33.3   RDW 15.5* 14.8 14.7 14.7    446 399 350     BMP  Recent Labs   Lab 06/13/24  0448 06/13/24  0438 06/12/24  2350 06/12/24  2200 06/12/24  2142 06/12/24  1625 06/12/24  1436 06/12/24  0434 06/12/24  0431 06/11/24  1649 06/11/24  1158 06/11/24  0741 06/11/24  0351 06/10/24  2349 06/10/24  2000     --   --   --   --   --  142  --  142  --  142  --  144  --  142   POTASSIUM 3.7  --   --  3.7  --   --  3.4  3.4  --  3.0*   < > 3.0*  3.0*  --  3.1*  3.1*  --  3.2*  3.2*   CHLORIDE 114*  --   --   --   --   --  115*  --  115*  --  111*  --  111*  --  106   CO2 16*  --   --   --   --   --  15*  --  16*  --  18*  --  21*  --  25   ANIONGAP 12  --   --   --   --   --  12  --  11  --  13  --  12  --  11   * 146* 163*  --  137*   < > 167*   < > 164*   < > 167*   < > 164*  170*   < > 205*  220*   BUN 45.2*  --   --   --   --   --  46.4*  --  43.3*  --  43.5*  --  45.8*  --  51.9*   CR 0.92  --   --   --   --   --  1.05  --  0.98  --  1.03  --  1.17  --  1.37*   GFRESTIMATED >90  --   --   --   --   --  87  --  >90  --  89  --  76  --  63   RANDA 8.4*   --   --   --   --   --  8.7  --  8.5*  --  8.6  --  8.7  --  8.9   MAG 2.3  --   --   --   --   --   --   --  2.5*  --   --   --  2.5*  --  2.5*   PHOS 2.5  --   --   --   --   --   --   --  2.4*  --   --   --  3.0  --  1.7*    < > = values in this interval not displayed.     Troponins:   Lab Results   Component Value Date    CTROPT 5,836 () 06/05/2024    CTROPT 6,791 () 06/04/2024    CTROPT 6,834 () 06/04/2024    CTROPT 7,914 () 06/04/2024    CTROPT 8,849 () 06/04/2024        INR  Recent Labs   Lab 06/11/24  0351 06/10/24  0415 06/09/24  0354 06/08/24  0540   INR 1.20* 1.13 1.08 1.09     Liver panel  Recent Labs   Lab 06/13/24  0448 06/12/24  1436 06/12/24  0431 06/11/24  1158   PROTTOTAL 6.4 7.1 6.7 6.7   ALBUMIN 3.0* 3.3* 3.1* 3.1*   BILITOTAL 0.5 0.6 0.5 0.7   ALKPHOS 162* 190* 163* 174*   AST 77* 120* 69* 106*   * 163* 118* 133*       Imaging/procedure results:  EKG 12 Lead 6/10/24:       ECHO 6/8/24:  Interpretation Summary  Left ventricular function is decreased. The ejection fraction is 40-45%  (mildly reduced). There are wall motion abnormalities that are consistent with  prior LAD/LCx culprit infarction.  Global right ventricular function is normal. The right ventricle is normal  size.  No significant valvular abnormalities present.  This study was compared with the study from 06/03/2024. The biventricular  function has improved.  ______________________________________________________________________________  Left Ventricle  Left ventricular wall thickness is normal. Left ventricular size is normal.  Left ventricular function is decreased. The ejection fraction is 40-45%  (mildly reduced). There is severe hypokinesis of the mid inferolateral, mid  anterolateral, mid anteroseptal and apical segments with mild diffuse  hypokinesis.     Right Ventricle  Global right ventricular function is normal. The right ventricle is normal  size.     Atria  Both atria appear normal.     Mitral Valve  The  mitral valve is normal. Trace mitral insufficiency is present.     Aortic Valve  The aortic valve is tricuspid. On Doppler interrogation, there is no  significant stenosis or regurgitation.     Tricuspid Valve  The tricuspid valve is normal. Trace tricuspid insufficiency is present.  Pulmonary artery systolic pressure cannot be assessed.     Pulmonic Valve  The pulmonic valve cannot be assessed.     Vessels  The aorta root cannot be assessed. The thoracic aorta cannot be assessed.  Unable to assess mean RA pressure given the patient is on a ventilator. IVC  1.8 cm and collapsing.     Pericardium  No pericardial effusion is present.     Miscellaneous  No significant valvular abnormalities present.     Compared to Previous Study  This study was compared with the study from 06/03/2024 . The biventricular  function has improved.  ______________________________________________________________________________  MMode/2D Measurements & Calculations  IVSd: 0.69 cm  LVIDd: 5.7 cm  LVPWd: 0.87 cm  LV mass(C)d: 162.6 grams  LV mass(C)dI: 64.7 grams/m2     EF(MOD-bp): 43.8 %  RWT: 0.30    Coronary Angiogram 6/6/24:  Left Anterior Descending   Previously placed Ost LAD to Prox LAD stent of unknown type is widely patent.   First Diagonal Branch   The vessel is large. There is mild diffuse disease throughout the vessel.   1st Diag lesion is 60% stenosed.   Right Coronary Artery   The vessel is small.   Right Posterior Descending Artery   The vessel is small.   Right Posterior Atrioventricular Artery   The vessel is small.     Intervention    1st Diag lesion   Stent   A stent was successfully placed.   There is a 0% residual stenosis post intervention.       Medical Decision Making       50 MINUTES SPENT BY ME on the date of service doing chart review, history, exam, documentation & further activities per the note.

## 2024-06-13 NOTE — PROGRESS NOTES
CLINICAL NUTRITION SERVICES - BRIEF NOTE     Nutrition Prescription    RECOMMENDATIONS FOR MDs/PROVIDERS TO ORDER:  Recommend pt consume at least 1500 kcals and 75 g protein (65% of low end needs) before discontinuing TF.    Recommendations already ordered by Registered Dietitian (RD):  Cycle TF:  Pivot 1.5 Carlos (or equivalent) @ goal of 120 ml/hr x 12 hrs - 8pm - 8am (1440ml/day) provides: 2160 kcals (22 kcal/kg), 135 g PRO (1.4 g/kg), 1080 ml free H20, 248 g CHO, and 10 g fiber daily.   Advancement: 6/13: advance by 10 mL/hr now. Continue advancing by 10 mL q 4 hrs to new goal rate of 120 mL/hr. Run at new goal rate tonight, shut off at 8 am on 6/14. Resume in evening on 6/14 at 120 mL/hr x 12 hrs.  Updated TF access - NGT    Ordered supplements PRN + provided supplement list    Future/Additional Recommendations:  Monitor oral intake, supplement preferences, ability to wean TF.     EVALUATION OF THE PROGRESS TOWARD GOALS   Diet: Soft & Bite Sized - advanced from full liquids today    Nutrition Support: TF via NGT - Pivot 1.5 Carlos (or equivalent) @ goal of 70ml/hr (1680ml/day) provides: 2520 kcals, 157 g PRO, 1260 ml free H20, 289 g CHO, and 12 g fiber daily.      NEW FINDINGS   Received request from provider to cycle TF as pt's diet has advanced and he feels full from TF.    Met with pt and sister in law. Pt reports that PTA he was eating normally, denies decreased appetite. He usually had coffee in the morning and ate lunch and dinner. Writer discussed process of weaning TF which involves cycling the TF and calorie counts to monitor PO. He hasn't tried nutrition supplements before. He's willing to try them PRN. Today he had mashed potatoes and lemon ice. Yesterday he ate pudding.    ASSESSED NUTRITION NEEDS - reassessed now that out of ICU  Estimated Protein Needs: 115 - 145 grams protein/day (1.2 - 1.5 grams of pro/kg)   Justification: Increased needs      INTERVENTIONS  Medical food supplement therapy  Enteral  "nutrition - cycle    Monitoring/Evaluation  Progress toward goals will be monitored and evaluated per protocol.     Rony Neri, RD, LD  Available on Vocera  Weekend/Holiday RD Voccarline - \"Weekend Clinical Dietitian\"  No longer available by paging   "

## 2024-06-13 NOTE — INTERIM SUMMARY
[facility] Pre-Admission Screen    Referral Source:  [facility] UN[address] [place] 6406-01  Admit date to referring facility: 6/1/2024    Physical Medicine and Rehab Consult Completed: No    Rehab Diagnosis:    09 Cardiac    Justification for Acute Inpatient Rehabilitation  Patient requires an intensive inpatient rehab program to address the following acute impairments:{ARC Acute Impairments:283415}    Current Active Medical Management Needs/Risks for Clinical Complications  The patient requires the high level of rehabilitation physician supervision that accompanies the provision of intensive rehabilitation therapy.  The patient needs the services of the rehabilitation physician to assess the patient medically and functionally and to modify the course of treatment as needed to maximize the patient's capacity to benefit from the rehabilitation process.  {ARC Med Mgmt Needs/Risks Clinical Complications:623503}    Past Medical/Surgical History   Surgery in the past 100 days: Yes   Additional relevant past medical history: ***    Level of Functioning Prior to Admission:    LIVING ENVIRONMENT  People in Home: wife, 2 dependent daughters   Current Living Arrangements: house  Home Accessibility: stairs to enter home, stairs within home  Number of Stairs, Main Entrance:  1 DEMIAN     Number of Stairs, Within Home, Primary:  flight stairs to basement but not a living area and does not need access.  Transportation Anticipated: car, drives self, family or friend will provide    SELF-CARE  Usual Activity Tolerance: good  Equipment Currently Used at Home: none  Activity/Exercise/Self-Care Comment: pt IND w/  mobility and I/ADLs at baseline.   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     Level of Function: GG Scale (Section GG Functional Ability and Goals; CMS's EDWARDS Version 3.0 Manual effective 10.1.2019):  PT Current  "Function Goals for Rehab   Bed Rolling {GG Scale:301778::\"6 Independent\"} {Goals for Rehab:131697}   Supine to Sit {GG Scale:924002::\"6 Independent\"} {Goals for Rehab:820828}   Sit to Stand {GG Scale:155619::\"6 Independent\"} {Goals for Rehab:532758}   Transfer {GG Scale:964458::\"6 Independent\"} {Goals for Rehab:567749}   Ambulation {GG Scale:047887::\"6 Independent\"} {Goals for Rehab:799812}   Stairs {GG Scale:818262::\"6 Independent\"} {Goals for Rehab:547166}     OT Current Function Goals for Rehab   Feeding {GG Scale:058871::\"6 Independent\"} {Goals for Rehab:847538}   Grooming {GG Scale:768474::\"6 Independent\"} {Goals for Rehab:448484}   Bathing {GG Scale:064473::\"6 Independent\"} {Goals for Rehab:800008}   Upper Body Dressing {GG Scale:755360::\"6 Independent\"} {Goals for Rehab:557865}   Lower Body Dressing {GG Scale:877567::\"6 Independent\"} {Goals for Rehab:266182}   Toileting {GG Scale:217920::\"6 Independent\"} {Goals for Rehab:840449}   Toilet Transfer {GG Scale:235135::\"6 Independent\"} {Goals for Rehab:453355}   Tub/Shower Transfer {GG Scale:371585::\"6 Independent\"} {Goals for Rehab:703032}   Cognition {ARC Impaired Not NA:171124} {ARC COGNITION GOAL:525992}     SLP Current Function Goals for Rehab   Swallow Impaired {ARC SWALLOW GOAL:487553::\"Tolerate least restrictive diet without signs & symptoms of aspiration and adhere to safe swallow strategies\"}   Communication {ARC Impaired Not NA:265808} {ARC COMM GOAL:715915::\"Communicate basic needs effectively\"}       Current Diet:  {ARC CURRENT DIET:693108}    Summary Statement:  ***    Expected Therapies and Services Required During Inpatient Rehab Admission  Intensity of Therapy: Patient requires intensive therapies not available in a lesser level of care. Patient is motivated, making gains, and can tolerate 3 hours of therapy a day.  Physical Therapy: 60 minutes per day, 6 days a week for *** days  Occupational Therapy: 60 minutes per day, 6 days a week for *** " days  Speech and Language Therapy: 60 minutes per day, 6 days a week for *** days  Rehabilitation Nursing Needs: Patient requires 24 hour Rehab Nursing to manage {ARC Nursing Needs:546963}.    Precautions/restrictions/special needs:   Precautions: {ARC Precautions:800432}   Restrictions: ***   Special Needs: {ARC Special Needs:994909}    Expected Level of Improvement: ***  Expected Length of time to achieve: ***    Anticipated Discharge Needs:  Anticipated Discharge Destination: {ARC Discharge Destination:640682}  Anticipated Discharge Support: {ARC Discharge Support:685843}  24/7 support available : {Yes-No:559139}  Identified caregiver(s):  ***  Anticipated Discharge Needs: {ARC Discharge Needs:180588}    Identified challenges/barriers:  ***    Liaison signature/date/time:    Physician statement of review and agreement:  I have reviewed and am in agreement of the need for IRF stay to address above functional and medical needs. In addition to above statements address, Patient requires intensive active and ongoing therapeutic intervention and multiple therapies; Patient requires medical supervision; Expected to actively participate in the intensive rehab program; Sufficiently stable to actively participate; Expectation for measurable improvement in functional capacity or adaption to impairments.    MD signature/date/time:

## 2024-06-13 NOTE — PLAN OF CARE
8638 - 9386     Neuro: A&Ox4. Able to make needs known.   Cardiac: Afebrile, VSS. SR at 90s on tele. Denies chest pain.    Respiratory: Sating well on RA. LS clear.   GI/: Benjamin cath intact - adequate output. No BM this shift.  Diet/appetite: Tolerating clear liquid diet. Continue TF at goal rate 70 ml/hr with 30 ml water flushes q4h. Denies nausea.  Activity: Up with x2 assist.    Pain: Denies pain.   Skin: ECMO site looks ok. Rashes on chest, arms and back. No new deficits noted.  Lines: Triple lumen PICC on left arm.   Drains: Benjamin cath and NG tube.   Replacements: K + mag + phos replacement protocol.     Plan: Continue with POC and update team with changes.

## 2024-06-13 NOTE — PROGRESS NOTES
D/was in chair and now back in bed. Continues on tube feeds and 8 pm tonight they will change to cyclical. He tells me he will order and try to eat so he can get the feeding tube out one of these days. He continues on scheduled tylenol., Days said they removed his schafer and he voided once. (Previously had urology place his schafer)  A/progressing  1715 another RN said he stood up and had loose stool all over and has yellow urine with some bleeding also. They cleaned him up. Paged team and keep them updated.   I/discussed with surgery that I wonder if this is left from traumatic insertion. I will watch to see if bleeding persists or disappears and will watch urine output to make sure he continues to have good urine output. I let him know I would keep him updated if this is a problem.  1900 wife said he voided in urinal and urine is yellow and clear  2130 stood to void and voided pink urine with some clots,  I/updated surgery that urine output has been good, but now some clots. Is he just clearing out bladder clots or is he forming new ones? Will continue to monitor and keep surgery updated  P/monitor for changes, keep him and team updated

## 2024-06-13 NOTE — CONSULTS
"   SPIRITUAL HEALTH SERVICES consult Note     ([place]) 6C ON-CALL     Referral Source/Reason for Visit: Routine consult: \"Patient coming to terms with recent arrest, overwhelmed by current situation.\" Important to note: Cullen suffered a cardiac arrest, he was not arrested.                Summary and Recommendations -   Cullen identifies as \"Atheist\" he was raised \"[facility] [employer]\".   He was tearful as he explained his recollection of collapsing at [facility] and the effect his health has on his family.   Cullen is supported by his family, including two teenage daughters.  He explained that \"I've had anxiety and depression and since becoming conscious again I've asked for more help.\"     PLAN: Cullen has asked for regular check-ins as he recognizes that \"I'm not doing well\", which I have assured him are possible. Will communicate care request to unit  [facility]. Spiritual health services remains available for any follow-up or requests. For further visits please place spiritual health consults.    Gina Luevano, [facility]  Associate   Pager: [phone]     Spiritual Health Services is available 24/7 for emergent requests and consults, either by paging the on-call  or by entering an ASAP/STAT consult in [facility], which will also page the on-call .        Assessment     Saw pt Cullen PATINO Alexys in his room, his sister-in-law was also present, but left to give us space. His room is decorated with pictures from and of his daughters.      Patient Understanding of Illness and Goals of Care - Cullen explained that he knew \"lifestyle changes need to happen\" and that he knows that \"I have to be in some intensive care place for three weeks after this.\" We talked about the importance of getting his strength back so that he could be safe at home.      Distress and Loss - Cullen shared how his inability to care for himself while in the hospital was difficult to accept, we discussed how going from being a " "caretaker to being taken care of can affect our identity and how it can be really difficult and embarrassing sometimes. I provided reflective listening.      Strengths, Coping, and Resources - Cullen expressed gratitude for being in the \"right place at the right time\" to receive help and how he has a lot of gratitude for \"all the doctors and nurses and nurse practitioners\" who have helped him and that he hopes \"they know how much it means to me.\"     Cullen is very connected with his family. His sister-in-law explained that \"there's a lot of us\". I oriented both of them to spiritual health services, they know that they can request a visit at any time.      Meaning, Beliefs, and Spirituality - Cullen shared that he is \"an atheist\" and that he and his wife value raising their daughters atheist to \"give them the best start in life, that we didn't get.\"     He feels most connected when he is with \"the people that I care about\".       "

## 2024-06-13 NOTE — PROGRESS NOTES
Rehab Admissions:  I met w/ Cullen and his mother Jazzmine this morning to discuss [facility]. Discussed setup and structure of [facility] level of care w/ ELOS of 7-10 days w/ skilled PT/OT/SLP, PMR, and skilled rehab nursing cares. Discussed differences between ARC and TCU levels of care and that all therapies are recommending ARC currently. Pt meets criteria for acute rehab and we would be happy to serve him at Critical access hospital.   Pt reports IND prior, lives w/ wife and 2 daughters in a home w/ 1 DEMIAN and flight to basement (laundry). 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   Discussed parking options and location of [facility]. Provided brochure. Pt wants to discuss ARC w/ wife prior to making any determination.     Thank you for the referral, we will continue to follow this patient for post acute placement.     Determination of admission is based upon the patient's need for an intensive, interdisciplinary approach to rehabilitation, their ability to progress, their ability to tolerate intensive therapies, their need for daily physician supervision, their need for twenty four hour nursing assistance, and their ability and willingness to participate in such a program.    Hannah Shin CM  Rehab Liaison/  [facility]  6/13/2024    11:34 AM

## 2024-06-13 NOTE — PROGRESS NOTES
Transfer from   D-Received as transfer from  around 1800, s/p Vfib arrest on 06/01/24 at [facility].  Rash noted on abdomen, back and arms. Pt denies itching. No previous documentation of rash on flow sheet. See flow sheets for vs and assessments. Spouse and daughters at bedside.  I-Called 4A RN, Uzma Marti to ask about pt's rash. Verbalized she was unaware of a rash. Discussed with cardiology fellow, Dr. Hernandez. Continue to monitor. Oriented to bedside controls and unit routines. Instructed to call for assistance to get OOB. Bed alarm activated per 6C protocol. Pt and family informed. 2 RN skin assessment completed by myself and circulating RNs.  A-Denies pain. Telemetry shows SR/ST.  P-Continue with current poc. Notify CSI provider of any concerns/changes.

## 2024-06-13 NOTE — PLAN OF CARE
Goal Outcome Evaluation:    Neuro: A&Ox4. Able to make needs known.   Cardiac: Afebrile, VSS. SR. Denies chest pain.          Respiratory: LS clear and on room air.  GI/: MD stated to remove schafer, pt would like to wait until family leaves.  Schafer in place. No BM this shift.  Diet/appetite: Tolerating full liquid diet. Continue TF at goal rate 70 ml/hr with 60 ml water flushes q4h. Speech consulted, would like patient to be advanced to soft and bite sized. Zofran given X1 for intermittent nausea.    Activity: Up with x2 assist. Refused to get into chair.  Pain: Denies pain.   Skin: R groin site staples CDI. Rashes on chest, arms and back. No new deficits noted.  Lines: Triple lumen PICC on left arm.   Drains: Schafer and NG tube.   Replacements: K + mag + phos replacement protocol.      Plan: Continue with POC and update team with changes.  Plan to remove schafer when family leaves.

## 2024-06-14 ENCOUNTER — APPOINTMENT (OUTPATIENT)
Dept: OCCUPATIONAL THERAPY | Facility: CLINIC | Age: 49
DRG: 003 | End: 2024-06-14
Attending: INTERNAL MEDICINE
Payer: COMMERCIAL

## 2024-06-14 ENCOUNTER — APPOINTMENT (OUTPATIENT)
Dept: SPEECH THERAPY | Facility: CLINIC | Age: 49
DRG: 003 | End: 2024-06-14
Attending: INTERNAL MEDICINE
Payer: COMMERCIAL

## 2024-06-14 ENCOUNTER — APPOINTMENT (OUTPATIENT)
Dept: PHYSICAL THERAPY | Facility: CLINIC | Age: 49
DRG: 003 | End: 2024-06-14
Attending: INTERNAL MEDICINE
Payer: COMMERCIAL

## 2024-06-14 LAB
6MAM SERPL-MCNC: NEGATIVE NG/ML
ALBUMIN SERPL BCG-MCNC: 3.1 G/DL (ref 3.5–5.2)
ALP SERPL-CCNC: 163 U/L (ref 40–150)
ALT SERPL W P-5'-P-CCNC: 170 U/L (ref 0–70)
ANION GAP SERPL CALCULATED.3IONS-SCNC: 13 MMOL/L (ref 7–15)
AST SERPL W P-5'-P-CCNC: 91 U/L (ref 0–45)
BILIRUB SERPL-MCNC: 0.5 MG/DL
BUN SERPL-MCNC: 47.8 MG/DL (ref 6–20)
CALCIUM SERPL-MCNC: 8.8 MG/DL (ref 8.6–10)
CHLORIDE SERPL-SCNC: 108 MMOL/L (ref 98–107)
CODEINE SERPL-MCNC: NEGATIVE NG/ML
CREAT SERPL-MCNC: 0.93 MG/DL (ref 0.67–1.17)
DEPRECATED HCO3 PLAS-SCNC: 18 MMOL/L (ref 22–29)
DHC SERPLBLD CFM-MCNC: NEGATIVE NG/ML
EGFRCR SERPLBLD CKD-EPI 2021: >90 ML/MIN/1.73M2
ERYTHROCYTE [DISTWIDTH] IN BLOOD BY AUTOMATED COUNT: 15.3 % (ref 10–15)
GLUCOSE BLDC GLUCOMTR-MCNC: 104 MG/DL (ref 70–99)
GLUCOSE BLDC GLUCOMTR-MCNC: 105 MG/DL (ref 70–99)
GLUCOSE BLDC GLUCOMTR-MCNC: 117 MG/DL (ref 70–99)
GLUCOSE BLDC GLUCOMTR-MCNC: 121 MG/DL (ref 70–99)
GLUCOSE BLDC GLUCOMTR-MCNC: 122 MG/DL (ref 70–99)
GLUCOSE BLDC GLUCOMTR-MCNC: 131 MG/DL (ref 70–99)
GLUCOSE BLDC GLUCOMTR-MCNC: 136 MG/DL (ref 70–99)
GLUCOSE BLDC GLUCOMTR-MCNC: 150 MG/DL (ref 70–99)
GLUCOSE SERPL-MCNC: 108 MG/DL (ref 70–99)
HCT VFR BLD AUTO: 30.2 % (ref 40–53)
HGB BLD-MCNC: 9.9 G/DL (ref 13.3–17.7)
HYDROCODONE SERPL-MCNC: NEGATIVE NG/ML
HYDROMORPHONE SERPL-MCNC: 9.9 NG/ML
MAGNESIUM SERPL-MCNC: 2.4 MG/DL (ref 1.7–2.3)
MCH RBC QN AUTO: 30.5 PG (ref 26.5–33)
MCHC RBC AUTO-ENTMCNC: 32.8 G/DL (ref 31.5–36.5)
MCV RBC AUTO: 93 FL (ref 78–100)
MORPHINE SERPL-MCNC: NEGATIVE NG/ML
OPIATES SPEC QL: POSITIVE
PHOSPHATE SERPL-MCNC: 3.6 MG/DL (ref 2.5–4.5)
PLATELET # BLD AUTO: 448 10E3/UL (ref 150–450)
POTASSIUM SERPL-SCNC: 3.9 MMOL/L (ref 3.4–5.3)
PROT SERPL-MCNC: 6.7 G/DL (ref 6.4–8.3)
RBC # BLD AUTO: 3.25 10E6/UL (ref 4.4–5.9)
SODIUM SERPL-SCNC: 139 MMOL/L (ref 135–145)
WBC # BLD AUTO: 12.5 10E3/UL (ref 4–11)

## 2024-06-14 PROCEDURE — 92526 ORAL FUNCTION THERAPY: CPT | Mod: GN

## 2024-06-14 PROCEDURE — 120N000005 HC R&B MS OVERFLOW UMMC

## 2024-06-14 PROCEDURE — 250N000013 HC RX MED GY IP 250 OP 250 PS 637: Performed by: STUDENT IN AN ORGANIZED HEALTH CARE EDUCATION/TRAINING PROGRAM

## 2024-06-14 PROCEDURE — 250N000011 HC RX IP 250 OP 636: Mod: JZ | Performed by: STUDENT IN AN ORGANIZED HEALTH CARE EDUCATION/TRAINING PROGRAM

## 2024-06-14 PROCEDURE — 97110 THERAPEUTIC EXERCISES: CPT | Mod: GO

## 2024-06-14 PROCEDURE — 97530 THERAPEUTIC ACTIVITIES: CPT | Mod: GP

## 2024-06-14 PROCEDURE — 250N000013 HC RX MED GY IP 250 OP 250 PS 637: Performed by: SURGERY

## 2024-06-14 PROCEDURE — 83735 ASSAY OF MAGNESIUM: CPT | Performed by: SURGERY

## 2024-06-14 PROCEDURE — 80053 COMPREHEN METABOLIC PANEL: CPT

## 2024-06-14 PROCEDURE — 250N000013 HC RX MED GY IP 250 OP 250 PS 637: Performed by: INTERNAL MEDICINE

## 2024-06-14 PROCEDURE — 97116 GAIT TRAINING THERAPY: CPT | Mod: GP

## 2024-06-14 PROCEDURE — 250N000011 HC RX IP 250 OP 636: Performed by: STUDENT IN AN ORGANIZED HEALTH CARE EDUCATION/TRAINING PROGRAM

## 2024-06-14 PROCEDURE — 250N000013 HC RX MED GY IP 250 OP 250 PS 637

## 2024-06-14 PROCEDURE — 99232 SBSQ HOSP IP/OBS MODERATE 35: CPT

## 2024-06-14 PROCEDURE — 85027 COMPLETE CBC AUTOMATED: CPT

## 2024-06-14 PROCEDURE — 84100 ASSAY OF PHOSPHORUS: CPT | Performed by: SURGERY

## 2024-06-14 RX ORDER — METOPROLOL SUCCINATE 25 MG/1
25 TABLET, EXTENDED RELEASE ORAL ONCE
Status: COMPLETED | OUTPATIENT
Start: 2024-06-14 | End: 2024-06-14

## 2024-06-14 RX ORDER — GUAIFENESIN 600 MG/1
15 TABLET, EXTENDED RELEASE ORAL DAILY
Status: DISCONTINUED | OUTPATIENT
Start: 2024-06-15 | End: 2024-06-15 | Stop reason: HOSPADM

## 2024-06-14 RX ORDER — METOPROLOL SUCCINATE 25 MG/1
25 TABLET, EXTENDED RELEASE ORAL DAILY
Status: DISCONTINUED | OUTPATIENT
Start: 2024-06-14 | End: 2024-06-14

## 2024-06-14 RX ORDER — METOPROLOL SUCCINATE 50 MG/1
50 TABLET, EXTENDED RELEASE ORAL DAILY
Status: DISCONTINUED | OUTPATIENT
Start: 2024-06-15 | End: 2024-06-15 | Stop reason: HOSPADM

## 2024-06-14 RX ADMIN — ONDANSETRON 4 MG: 2 INJECTION INTRAMUSCULAR; INTRAVENOUS at 00:25

## 2024-06-14 RX ADMIN — TICAGRELOR 90 MG: 90 TABLET ORAL at 20:24

## 2024-06-14 RX ADMIN — Medication 15 ML: at 08:06

## 2024-06-14 RX ADMIN — Medication 10 MG: at 20:24

## 2024-06-14 RX ADMIN — EMPAGLIFLOZIN 10 MG: 10 TABLET, FILM COATED ORAL at 08:07

## 2024-06-14 RX ADMIN — TAMSULOSIN HYDROCHLORIDE 0.4 MG: 0.4 CAPSULE ORAL at 08:06

## 2024-06-14 RX ADMIN — POTASSIUM CHLORIDE 40 MEQ: 750 TABLET, EXTENDED RELEASE ORAL at 08:07

## 2024-06-14 RX ADMIN — TICAGRELOR 90 MG: 90 TABLET ORAL at 08:07

## 2024-06-14 RX ADMIN — SACUBITRIL AND VALSARTAN 1 TABLET: 24; 26 TABLET, FILM COATED ORAL at 08:05

## 2024-06-14 RX ADMIN — VENLAFAXINE 37.5 MG: 37.5 TABLET ORAL at 18:28

## 2024-06-14 RX ADMIN — LOPERAMIDE HYDROCHLORIDE 2 MG: 2 CAPSULE ORAL at 01:12

## 2024-06-14 RX ADMIN — AMIODARONE HYDROCHLORIDE 200 MG: 200 TABLET ORAL at 08:08

## 2024-06-14 RX ADMIN — SACUBITRIL AND VALSARTAN 1 TABLET: 24; 26 TABLET, FILM COATED ORAL at 20:24

## 2024-06-14 RX ADMIN — METOPROLOL SUCCINATE 25 MG: 25 TABLET, EXTENDED RELEASE ORAL at 08:07

## 2024-06-14 RX ADMIN — HEPARIN SODIUM 5000 UNITS: 5000 INJECTION, SOLUTION INTRAVENOUS; SUBCUTANEOUS at 20:24

## 2024-06-14 RX ADMIN — HEPARIN SODIUM 5000 UNITS: 5000 INJECTION, SOLUTION INTRAVENOUS; SUBCUTANEOUS at 03:25

## 2024-06-14 RX ADMIN — LOPERAMIDE HYDROCHLORIDE 2 MG: 2 CAPSULE ORAL at 22:22

## 2024-06-14 RX ADMIN — ASPIRIN 81 MG CHEWABLE TABLET 81 MG: 81 TABLET CHEWABLE at 08:05

## 2024-06-14 RX ADMIN — METOPROLOL SUCCINATE 25 MG: 25 TABLET, EXTENDED RELEASE ORAL at 10:50

## 2024-06-14 RX ADMIN — FUROSEMIDE 40 MG: 40 TABLET ORAL at 08:07

## 2024-06-14 RX ADMIN — VENLAFAXINE 37.5 MG: 37.5 TABLET ORAL at 08:06

## 2024-06-14 RX ADMIN — SPIRONOLACTONE 25 MG: 25 TABLET, FILM COATED ORAL at 08:07

## 2024-06-14 RX ADMIN — HEPARIN SODIUM 5000 UNITS: 5000 INJECTION, SOLUTION INTRAVENOUS; SUBCUTANEOUS at 11:39

## 2024-06-14 ASSESSMENT — ACTIVITIES OF DAILY LIVING (ADL)
ADLS_ACUITY_SCORE: 32
ADLS_ACUITY_SCORE: 34
ADLS_ACUITY_SCORE: 35
ADLS_ACUITY_SCORE: 31
ADLS_ACUITY_SCORE: 31
ADLS_ACUITY_SCORE: 32
ADLS_ACUITY_SCORE: 30
ADLS_ACUITY_SCORE: 34
ADLS_ACUITY_SCORE: 30
ADLS_ACUITY_SCORE: 34
ADLS_ACUITY_SCORE: 34
ADLS_ACUITY_SCORE: 32
ADLS_ACUITY_SCORE: 34
ADLS_ACUITY_SCORE: 31
ADLS_ACUITY_SCORE: 34
ADLS_ACUITY_SCORE: 32
ADLS_ACUITY_SCORE: 34
ADLS_ACUITY_SCORE: 31
ADLS_ACUITY_SCORE: 30
ADLS_ACUITY_SCORE: 34

## 2024-06-14 NOTE — CONSULTS
SPIRITUAL HEALTH SERVICES  SPIRITUAL ASSESSMENT consult Note  [facility] 6C  ON-CALL     REFERRAL SOURCE: Routine consult    Met Marta's family member and friends in the hallway, they shared they had received good news about Belias health and recovery and were looking forward to things continuing to progress positively. I oriented his friends and family to spiritual health services and they know they can request a visit at any time.     Ended the visit to allow them to have private time as friends and family.        PLAN: Will check back in later. Spiritual health services remains available for any follow-up or requests. For further visits please place spiritual health consults.    Gina Luevano [facility]  Associate   Pager: [phone]

## 2024-06-14 NOTE — PROGRESS NOTES
D/He plans to order supper, and I encouraged also eating a bedtime snack since he has feeding tube out and just started on diet yesterday. He has visitors and walked in the halls twice today. He look forward to discharge  A/progressing, needs to eat well. He told me he ate his whole tray, I did not see it to record it  P/monitor for changes, ARU WBA

## 2024-06-14 NOTE — OP NOTE
Pre-operative diagnosis:         Peripheral ECMO, Cardiogenic shock  Post-operative diagnosis        Same as pre-operative diagnosis     Procedure:      Decannulation of peripheral ECMO and repair of right femoral artery and right femoral vein      Surgeon:             Demond Ladd MD - Primary     Stephan BILLS- Assisting     OPERATIVE PROCEDURE:     Patient was brought to operating room in stable condition. Following the administration of general anesthesia, patient's chest, abdomen and lower extremities was prepped and draped in usual sterile manner. ECMO support was turned down to 1 liter flow. Hemodynamics were stable.. A right groin incision was made and the right  femoral artery and vein dissected and proximal and distal vascular control obtained.  ECMO support was turned off and patient was stable. The cannulas were removed and the vessels repaired in usual standard fashion. Distal pulses were obtained. Wound was closed in layers. Patient's hemodynamics were stable and he was transferred to ICU in stable condition.

## 2024-06-14 NOTE — PLAN OF CARE
A:   Neuro: A/Ox4. Calls appropriately. Pleasant and cooperative.   Cardiac/Tele:  VSS. SR-ST, HR 80s-100s. Denies chest pain.  Respiratory: Room air. Tolerating well, no SOB reported. DUE.  GI/: Continent. Urinal at bedside. Up to commode w/ 1A. BM x1 this shift.   Diet/Appetite: Regular diet, thin liquids. Carlso counts through 6/16. Fair appetite.  Skin: No new deficits noted.   LDAs: L TL PICC- SL  Activity: Up with SBA/1A, gait belt and walker. Walked halls with PT and OT, up in chair.  Pain: Denied pain this shift.     P: Encourage PO and protein intake. Discharge to [facility] when bed is available. Continue to monitor and notify Cards CSI with changes.      Goal Outcome Evaluation:      Plan of Care Reviewed With: patient, spouse, family    Overall Patient Progress: improvingOverall Patient Progress: improving    Outcome Evaluation: Speech cleared pt. for regular diet, NG discontinued, up with SBA/1A walking in halls with PT, denies pain    Pascale DIAZ RN  Shift [phone]

## 2024-06-14 NOTE — PROGRESS NOTES
"CLINICAL NUTRITION SERVICES    Reason for Assessment:  Heart-healthy nutrition education, received consult.    Diet History:  Pt reports no history of receiving heart-healthy nutrition education in the past. He does lots of cooking at home, uses some salt. Doesn't drink much milk, does do meat. His appetite is decreased, but it's getting there.    Nutrition Diagnosis:  Food- and nutrition-related knowledge deficit r/t no previous knowledge of heart-healthy diet AEB pt report of no previous formal heart-healthy nutrition education.    Nutrition Prescription/Recs:  Continue heart-healthy diet.      Interventions:  Nutrition Education  1. Provided verbal instruction on a heart-healthy diet.  2. Provided handout: Heart Healthy Nutrition Therapy    Goals:    1. Pt will verbalize at least four foods high in saturated fat and five foods high in sodium.    2. Pt will list at least two interventions to make current meal plan more heart-healthy.     Follow-up:   Patient to ask any further nutrition-related questions before discharge. In addition, pt may request outpatient RD appointment.     Rony Neri RD, LD  Available on [facility]  Weekend/Holiday [facility] - \"Weekend Clinical Dietitian\"  No longer available by paging   "

## 2024-06-14 NOTE — DISCHARGE SUMMARY
"    [address] [address]  p: [phone]    Discharge Summary: Cardiology Service    Cullen Reardon MRN# [id number]   YOB: 1975 Age: 48 year old       Admission Date: 6/1/2024  Discharge Date: 06/15/24    Discharge Diagnoses:  # STEMI s/p KAYLYN to ostial LAD 6/1, PCI to D1 6/7  # Refractory VF Arrest s/p VA ECMO  # Acute Systolic Heart Failure with moderately reduced EF (40-45%)   # Hypokalemia  # Shock Liver 2/2 cardiac arrest   # Diarrhea  # Hypocalcemia  # Acute Blood Loss Anemia  # Leukocytosis  # Hyperglycemia  # Moderate malnutrition in the context of acute illness   # Hypoalbuminemia  # Generalized Deconditioning  # Hematuria 2/2 traumatic schafer insertion   # Left scalp wound  # Hypogonadism  # Concern for skin infection post ECMO decannulation  # JEAN CLAUDE 2/2 cardiac arrest  # ARDS  # Urinary Retention  # Probable aspiration pneumonia    Brief HPI:  Cullen Reardon is a 48 year old male with no known past medical history who was admitted on 6/1/2024 following a witnessed VF cardiac arrest. Patient was cannulated on ECMO and received KAYLYN to 100% thrombotic culprit lesion of LAD. Once stabilized, he also received KAYLYN to D1. Patient continued to improve and was decannulated and extubated. EF moderately reduced and he tolerated GDMT well. Due to extended hospitalization, PT recommended ARU and patient was discharged to [place] [facility] 6/15 with plan for close cardiology follow up post discharge.     Hospital Course by Diagnosis:  # STEMI s/p KAYLYN to ostial LAD 6/1, PCI to D1 6/7  # Refractory VF Arrest s/p VA ECMO  # Acute Systolic Heart Failure with moderately reduced EF (40-45%)   Patient presented to [facility] on 6/1/2024 with chest pain. He collapsed in front of the triage desk and was found to be pulseless after saying \"I feel like I'm going to die\". He received immediate bystander CPR in the waiting room of the emergency " department. He was found be in VF, was shocked x 3. Estimated downtime was approximately 15 min per paramedic report. Cannulated in [facility] ED and brought to the Cath Lab for angiogram where he was found to have a 100% ostial LAD thrombotic occlusion s/p stenting. Decanned on 6/4. He had residual severe disease in D1. S/p staged PCI 6/7 to D1. Extubated on 6/10   - DAPT: ASA 81mg daily (lifelong) + Ticagrelor 90mg BID (1 year)  - Crestor 20 mg daily  - Volume Status: Euvolemic; continue Lasix 40mg daily, weight at discharge 260 lbs   GDMT:   - BB: Continue Toprol 50mg daily   - ACE/ARB/ARNI: Continue Entresto 24-26 mg BID  - SGLT2i: Continue Jardiance 10mg daily  - AA: Continue Spironolactone 25mg daily  - Continue Amiodarone 200mg daily  - Daily weights, strict I&O's  - Follow up post arrest clinic and CORE     # Shock Liver 2/2 cardiac arrest   # Diarrhea  # Hypocalcemia  C. Diff testing negative  - PRN loperamide  - LFT's improving, discussed with Dr. Mai, ok to start statin     # Acute Blood Loss Anemia  # Leukocytosis  - WBC at discharge 11.9 (12.5), Hgb 9.6 (9.9)  - ABX as below, CBC 1 week     # Hyperglycemia  Most recent A1c 6.1 (6/1/24)  - Insulin Glargine 15 units at bedtime, this was new this admission, could likely be discontinued prior to leaving ARU     # Moderate malnutrition in the context of acute illness   # Hypoalbuminemia  RD and SLP following   - Diet upgraded to regular diet 6/14  - Patient accidentally removed NG on evening of 6/13, left out due to adequate PO intake    # Generalized Deconditioning  Deconditioned due to illness and extended hospitalization  - PT/OT recommending ARU  - SW consulted, accepted to [facility] 6/15     # Hematuria 2/2 traumatic schafer insertion   Schafer placed by urology due to difficulty placing and bloody output  - Schafer removed 6/13 with adequate UOP; per patient, 2 episodes of bloody urine overnight 6/13-14, none since     # Left scalp wound  - Removed  staples 6/14     # Hypogonadism  PTA, patient was taking Depotestosterone Q2 weeks. Per UpToDate, best to avoid testosterone for 6 months after a cardiac event   - Follow up with PCP as OP     # Concern for skin infection post ECMO decannulation  RN stated groin site appeared more raised, patient did not notice, no complaints of increased pain, drainage. Area around skin CDI no erythema.  It appears the raised area below the incision is scar tissue formation but with midly elevated WBC, will treat aggressively to avoid infection.  - Per CVTS PA, staples to remain in-place until 6/18/24. At that time, ok to remove every other staple. The following week (6/25), ok to remove the remaining stables.    - PO Keflex 500 mg QID X 5 days  - Daily wound care orders placed: Gently moisten gauze with Microklenz BID and clean incision each shift. Otherwise keep incision dry.   - If site starts to drain, erythema or increased pain, would culture and escalate ABX per results     Resolved Issues  - JEAN CLAUDE 2/2 cardiac arrest  - ARDS  - Urinary Retention  - Probable aspiration pneumonia    Pertinent Procedures:  1. ECMO Decannulation  2. Coronary Angiogram with PCI    Consults:  CVTS, Neurology, Palliative, Spiritual Health, Urology, WOC    Medication Changes:  See below    Discharge medications:   Current Discharge Medication List        START taking these medications    Details   amiodarone (PACERONE) 200 MG tablet 1 tablet (200 mg) by Oral or Feeding Tube route daily    Associated Diagnoses: Cardiac arrest (H)      aspirin (ASA) 81 MG chewable tablet 1 tablet (81 mg) by Oral or Feeding Tube route daily    Associated Diagnoses: Cardiac arrest (H); Coronary artery disease involving native coronary artery of native heart without angina pectoris; Status post percutaneous transluminal coronary angioplasty      cephALEXin (KEFLEX) 500 MG capsule Take 1 capsule (500 mg) by mouth every 6 hours for 5 days    Associated Diagnoses: Local  infection of skin and subcutaneous tissue      empagliflozin (JARDIANCE) 10 MG TABS tablet 1 tablet (10 mg) by Oral or Feeding Tube route daily    Associated Diagnoses: Acute on chronic systolic heart failure (H)      furosemide (LASIX) 40 MG tablet 1 tablet (40 mg) by Oral or Feeding Tube route daily    Associated Diagnoses: Acute on chronic systolic heart failure (H)      hydrOXYzine HCl (ATARAX) 50 MG tablet Take 1 tablet (50 mg) by mouth every 6 hours as needed for anxiety    Associated Diagnoses: Anxiety      insulin glargine (LANTUS PEN) 100 UNIT/ML pen Inject 15 Units Subcutaneous at bedtime    Comments: If Lantus is not covered by insurance, may substitute Basaglar or Semglee or other insulin glargine product per insurance preference at same dose and frequency.    Associated Diagnoses: Hyperglycemia      loperamide (IMODIUM) 2 MG capsule Take 1 capsule (2 mg) by mouth 4 times daily as needed for diarrhea    Associated Diagnoses: Drug-induced constipation      melatonin 10 MG TABS tablet 1 tablet (10 mg) by Oral or Feeding Tube route every evening    Associated Diagnoses: Insomnia, unspecified type      metoprolol succinate ER (TOPROL XL) 50 MG 24 hr tablet Take 1 tablet (50 mg) by mouth daily    Associated Diagnoses: Acute on chronic systolic heart failure (H)      multivitamins w/minerals liquid 15 mLs by Oral or Feeding Tube route daily    Associated Diagnoses: Cardiac arrest (H)      ondansetron (ZOFRAN ODT) 4 MG ODT tab Take 1 tablet (4 mg) by mouth every 6 hours as needed for nausea or vomiting    Associated Diagnoses: Nausea      potassium chloride khushboo ER (KLOR-CON M20) 20 MEQ CR tablet Take 2 tablets (40 mEq) by mouth daily    Associated Diagnoses: Hypokalemia      rosuvastatin (CRESTOR) 20 MG tablet Take 1 tablet (20 mg) by mouth daily    Associated Diagnoses: Coronary artery disease involving native coronary artery of native heart without angina pectoris      sacubitril-valsartan (ENTRESTO) 24-26  MG per tablet 1 tablet by Oral or Feeding Tube route 2 times daily    Associated Diagnoses: Acute on chronic systolic heart failure (H)      spironolactone (ALDACTONE) 25 MG tablet 1 tablet (25 mg) by Oral or Feeding Tube route daily    Associated Diagnoses: Acute on chronic systolic heart failure (H)      ticagrelor (BRILINTA) 90 MG tablet 1 tablet (90 mg) by Oral or Feeding Tube route 2 times daily    Associated Diagnoses: Cardiac arrest (H); Coronary artery disease involving native coronary artery of native heart without angina pectoris; Status post percutaneous transluminal coronary angioplasty           CONTINUE these medications which have CHANGED    Details   venlafaxine (EFFEXOR XR) 37.5 MG 24 hr capsule Take 1 capsule (37.5 mg) by mouth 2 times daily    Associated Diagnoses: Anxiety           CONTINUE these medications which have NOT CHANGED    Details   cholecalciferol (VITAMIN D3) 25 mcg (1000 units) capsule Take 2 capsules by mouth daily           STOP taking these medications       losartan-hydrochlorothiazide (HYZAAR) 50-12.5 MG tablet Comments:   Reason for Stopping:         tadalafil (CIALIS) 2.5 MG tablet Comments:   Reason for Stopping:         testosterone cypionate (DEPOTESTOSTERONE) 200 MG/ML injection Comments:   Reason for Stopping:               Follow-up:  Critical Care Cardiology Clinic in 1-2 weeks post ARU  General Cardiology in 4-6 weeks   PCP in 1 week     Labs or imaging requiring follow-up after discharge:  CMP, CBC    Code status:  Full Code     Condition on discharge  Temp:  [97.7  F (36.5  C)-98.8  F (37.1  C)] 98.8  F (37.1  C)  Pulse:  [86-98] 86  Resp:  [18-20] 18  BP: (106-122)/(70-86) 115/79  SpO2:  [95 %-100 %] 97 %  General: Alert, interactive, NAD  Eyes: sclera anicteric, EOMI  Neck: JVP not elevatd, carotid 2+ bilaterally  Cardiovascular: regular rate and rhythm, normal S1 and S2, no murmurs, gallops, or rubs  Resp: clear to auscultation bilaterally, no rales, wheezes, or  rhonchi  GI: Soft, nontender, nondistended. +BS.  No HSM or masses, no rebound or guarding.  Extremities: no edema, no cyanosis or clubbing, dorsalis pedis and posterior tibialis pulses 2+ bilaterally  Skin: Warm and dry, no jaundice or rash. RFA incision CDI, staples intact, mild firmness, no erythema  Neuro: CN 2-12 intact, moves all extremities equally  Psych: Alert & oriented x 3    Imaging with results:  EKG 12 Lead 6/10/24:        ECHO 6/8/24:  Interpretation Summary  Left ventricular function is decreased. The ejection fraction is 40-45%  (mildly reduced). There are wall motion abnormalities that are consistent with  prior LAD/LCx culprit infarction.  Global right ventricular function is normal. The right ventricle is normal  size.  No significant valvular abnormalities present.  This study was compared with the study from 06/03/2024. The biventricular  function has improved.  ______________________________________________________________________________  Left Ventricle  Left ventricular wall thickness is normal. Left ventricular size is normal.  Left ventricular function is decreased. The ejection fraction is 40-45%  (mildly reduced). There is severe hypokinesis of the mid inferolateral, mid  anterolateral, mid anteroseptal and apical segments with mild diffuse  hypokinesis.     Right Ventricle  Global right ventricular function is normal. The right ventricle is normal  size.     Atria  Both atria appear normal.     Mitral Valve  The mitral valve is normal. Trace mitral insufficiency is present.     Aortic Valve  The aortic valve is tricuspid. On Doppler interrogation, there is no  significant stenosis or regurgitation.     Tricuspid Valve  The tricuspid valve is normal. Trace tricuspid insufficiency is present.  Pulmonary artery systolic pressure cannot be assessed.     Pulmonic Valve  The pulmonic valve cannot be assessed.     Vessels  The aorta root cannot be assessed. The thoracic aorta cannot be  assessed.  Unable to assess mean RA pressure given the patient is on a ventilator. IVC  1.8 cm and collapsing.     Pericardium  No pericardial effusion is present.     Miscellaneous  No significant valvular abnormalities present.     Compared to Previous Study  This study was compared with the study from 06/03/2024 . The biventricular  function has improved.  ______________________________________________________________________________  MMode/2D Measurements & Calculations  IVSd: 0.69 cm  LVIDd: 5.7 cm  LVPWd: 0.87 cm  LV mass(C)d: 162.6 grams  LV mass(C)dI: 64.7 grams/m2     EF(MOD-bp): 43.8 %  RWT: 0.30     Coronary Angiogram 6/6/24:  Left Anterior Descending   Previously placed Ost LAD to Prox LAD stent of unknown type is widely patent.   First Diagonal Branch   The vessel is large. There is mild diffuse disease throughout the vessel.   1st Diag lesion is 60% stenosed.   Right Coronary Artery   The vessel is small.   Right Posterior Descending Artery   The vessel is small.   Right Posterior Atrioventricular Artery   The vessel is small.     Intervention     1st Diag lesion   Stent   A stent was successfully placed.   There is a 0% residual stenosis post intervention.       Patient Care Team:  Teresita Velásquez as PCP - General (Internal Medicine)    VJ Roman CNP on 6/15/2024 at 10:47 AM  [facility] Cardiology  Patient discussed with staff cardiologist, Dr. Mai, who agrees with the above documentation and plan. Documentation represents joint decision making.   Time Spent on this Encounter   I, VJ Roman CNP, personally saw the patient today and spent greater than 30 minutes discharging this patient.

## 2024-06-14 NOTE — PROGRESS NOTES
"  Interventional Cardiology Progress Note      Assessment & Plan:  Cullen Reardon is a 48 year old male with no known past medical history who was admitted on 6/1/2024 following a witnessed VF cardiac arrest.      Changes Today  - Transition from Lopressor to Toprol and increase to 50mg daily  - Continue Calorie Counts     # STEMI s/p KAYLYN to ostial LAD 6/1, PCI to D1 6/7  # Refractory VF Arrest s/p VA ECMO  # Chronic Systolic Heart Failure with moderately reduced EF (40-45%)   Patient presented to [facility] on 6/1/2024 with chest pain. He collapsed in front of the triage desk and was found to be pulseless after saying \"I feel like I'm going to die\". He received immediate bystander CPR in the waiting room of the emergency department. He was found be in VF, was shocked x 3. Estimated downtime was approximately 15 min per paramedic report. Cannulated in [facility] ED and brought to the Cath Lab for angiogram where he was found to have a 100% ostial LAD thrombotic occlusion s/p stenting. Decanned on 6/4. He had residual severe disease in D1. S/p staged PCI 6/7 to D1. Extubated on 6/10   - DAPT: ASA 81mg daily + Ticagrelor 90mg BID  - Volume Status: Euvolemic; continue Lasix 40mg daily   GDMT:   - BB: Transition from Lopressor 12.5mg BID to Toprol at increased dose of 50mg daily  - ACE/ARB/ARNI: Continue Entresto 24-26 mg BID  - SGLT2i: Continue Jardiance 10mg daily  - AA: Continue Spironolactone 25mg daily  - Continue Amiodarone 200mg daily  - Daily weights + I/Os  - Telemetry  - Electrolyte Replacement protocols ordered   - Spiritual Health consulted 6/13 for emotional support, will continue to follow      # Probable aspiration pneumonia  Extubated 6/10  - Cefepime 2g Q8H x 5 days completed 6/14     # Shock Liver 2/2 cardiac arrest   # Diarrhea  # Hypocalcemia  C. Diff testing negative  - PRN loperamide  - Daily CMP     # Acute Blood Loss Anemia  # Leukocytosis  -Transfuse for Hgb <7   - Daily CBC     # " Hyperglycemia  Most recent A1c 6.1 (6/1/24)  - Medium sliding scale insulin  - Insulin Glargine 30 units at bedtime     # Moderate malnutrition in the context of acute illness   # Hypoalbuminemia  RD and SLP following   - Diet upgraded to soft and bite-sized 6/13  - Patient accidentally removed NG on evening of 6/13, will leave out and continue calorie counts x3 days; encouraged PO intake    # Hematuria 2/2 traumatic schafer insertion   Schafer placed by urology due to difficulty placing and bloody output  - Schafer removed 6/14 with adequate UOP; per patient, 2 episodes of bloody urine overnight, none this AM   - Will CTM and re-consult urology if bloody UOP persists  - Continue Flomax 0.4mg daily     # Left scalp wound  - WOC signed off 6/10  - Keep clean and dry    # Hypogonadism  PTA, patient was taking Depotestosterone Q2 weeks. Per UpToDate, best to avoid testosterone for 6 months after a cardiac event   - Follow up with PCP as OP      Resolved Issues  - JEAN CLAUDE 2/2 cardiac arrest  - ARDS  - Urinary Retention     Prophylaxis:  DVT: subcutaneous heparin     Diet: Full Liquid Diet; TF at 70ml/hr  Activity: Up with x2 assist  Code Status: Full Code   Disposition: Anticipate medical readiness in 1-3 days pending GDMT initiation; current recs ARU    Patient discussed w/ Dr. Mai.      Nkechi Guerrero MS, PA-C  [facility] Structural/Interventional Cardiology   Pager [phone]/Francesca[place]      Interval History:  Patient reports feeling well on exam this morning. Overnight, patient accidentally removed his NG tube. Denies pain and reports he knows he needs to keep up his calorie intake to avoid replacement of tube. Patient also with 2 episodes of bloody urine post-schafer removal. No bloody urine this AM. Discussed GDMT again with wife this morning and answered questions to patient and family satisfaction. VSS.       Most recent vital signs:  /74 (BP Location: Right arm)   Pulse 88   Temp 98.9  F (37.2  C) (Oral)   Resp 18   Ht 1.886 m  "(6' 2.25\")   Wt 119.1 kg (262 lb 8 oz)   SpO2 96%   BMI 33.48 kg/m    Temp:  [97.8  F (36.6  C)-98.9  F (37.2  C)] 98.9  F (37.2  C)  Pulse:  [] 88  Resp:  [16-18] 18  BP: (105-127)/(63-79) 121/74  SpO2:  [95 %-100 %] 96 %  Wt Readings from Last 2 Encounters:   06/14/24 119.1 kg (262 lb 8 oz)       Intake/Output Summary (Last 24 hours) at 6/14/2024 0702  Last data filed at 6/14/2024 0600  Gross per 24 hour   Intake 3130 ml   Output 3400 ml   Net -270 ml       Physical exam:  General: In bed, in NAD  HEENT: EOMI, PERRLA, no scleral icterus or injection  CARDIAC: RRR, no m/r/g appreciated.   RESP: CTAB, no wheezes, rhonchi or crackles appreciated.  GI: NABS, NT/ND, no guarding or rebound  EXTREMITIES: NO LE edema  SKIN: No acute lesions appreciated  NEURO: Alert and oriented X3,  no focal neurological deficits noted      Labs (Past three days):  CBC  Recent Labs   Lab 06/14/24  0415 06/13/24  0448 06/12/24  1436 06/12/24  0431   WBC 12.5* 13.6* 15.6* 13.5*   RBC 3.25* 3.37* 3.54* 3.32*   HGB 9.9* 10.2* 10.9* 10.1*   HCT 30.2* 31.9* 32.4* 30.7*   MCV 93 95 92 93   MCH 30.5 30.3 30.8 30.4   MCHC 32.8 32.0 33.6 32.9   RDW 15.3* 15.5* 14.8 14.7    436 446 399     BMP  Recent Labs   Lab 06/14/24  0640 06/14/24  0331 06/14/24  0324 06/13/24  2328 06/13/24  0903 06/13/24  0448 06/12/24  2350 06/12/24  2200 06/12/24  1625 06/12/24  1436 06/12/24  0434 06/12/24  0431 06/11/24  0741 06/11/24  0351   NA  --  139  --   --   --  142  --   --   --  142  --  142   < > 144   POTASSIUM  --  3.9  --   --   --  3.7  --  3.7  --  3.4  3.4  --  3.0*   < > 3.1*  3.1*   CHLORIDE  --  108*  --   --   --  114*  --   --   --  115*  --  115*   < > 111*   CO2  --  18*  --   --   --  16*  --   --   --  15*  --  16*   < > 21*   ANIONGAP  --  13  --   --   --  12  --   --   --  12  --  11   < > 12   * 108* 104* 153*   < > 170*   < >  --    < > 167*   < > 164*   < > 164*  170*   BUN  --  47.8*  --   --   --  45.2*  --   --  "  --  46.4*  --  43.3*   < > 45.8*   CR  --  0.93  --   --   --  0.92  --   --   --  1.05  --  0.98   < > 1.17   GFRESTIMATED  --  >90  --   --   --  >90  --   --   --  87  --  >90   < > 76   RANDA  --  8.8  --   --   --  8.4*  --   --   --  8.7  --  8.5*   < > 8.7   MAG  --  2.4*  --   --   --  2.3  --   --   --   --   --  2.5*  --  2.5*   PHOS  --  3.6  --   --   --  2.5  --   --   --   --   --  2.4*  --  3.0    < > = values in this interval not displayed.     Troponins:   Lab Results   Component Value Date    CTROPT 5,836 () 06/05/2024    CTROPT 6,791 () 06/04/2024    CTROPT 6,834 () 06/04/2024    CTROPT 7,914 () 06/04/2024    CTROPT 8,849 () 06/04/2024        INR  Recent Labs   Lab 06/11/24  0351 06/10/24  0415 06/09/24  0354 06/08/24  0540   INR 1.20* 1.13 1.08 1.09     Liver panel  Recent Labs   Lab 06/14/24  0331 06/13/24  0448 06/12/24  1436 06/12/24  0431   PROTTOTAL 6.7 6.4 7.1 6.7   ALBUMIN 3.1* 3.0* 3.3* 3.1*   BILITOTAL 0.5 0.5 0.6 0.5   ALKPHOS 163* 162* 190* 163*   AST 91* 77* 120* 69*   * 135* 163* 118*       Imaging/procedure results:  EKG 12 Lead 6/10/24:        ECHO 6/8/24:  Interpretation Summary  Left ventricular function is decreased. The ejection fraction is 40-45%  (mildly reduced). There are wall motion abnormalities that are consistent with  prior LAD/LCx culprit infarction.  Global right ventricular function is normal. The right ventricle is normal  size.  No significant valvular abnormalities present.  This study was compared with the study from 06/03/2024. The biventricular  function has improved.  ______________________________________________________________________________  Left Ventricle  Left ventricular wall thickness is normal. Left ventricular size is normal.  Left ventricular function is decreased. The ejection fraction is 40-45%  (mildly reduced). There is severe hypokinesis of the mid inferolateral, mid  anterolateral, mid anteroseptal and apical segments with  mild diffuse  hypokinesis.     Right Ventricle  Global right ventricular function is normal. The right ventricle is normal  size.     Atria  Both atria appear normal.     Mitral Valve  The mitral valve is normal. Trace mitral insufficiency is present.     Aortic Valve  The aortic valve is tricuspid. On Doppler interrogation, there is no  significant stenosis or regurgitation.     Tricuspid Valve  The tricuspid valve is normal. Trace tricuspid insufficiency is present.  Pulmonary artery systolic pressure cannot be assessed.     Pulmonic Valve  The pulmonic valve cannot be assessed.     Vessels  The aorta root cannot be assessed. The thoracic aorta cannot be assessed.  Unable to assess mean RA pressure given the patient is on a ventilator. IVC  1.8 cm and collapsing.     Pericardium  No pericardial effusion is present.     Miscellaneous  No significant valvular abnormalities present.     Compared to Previous Study  This study was compared with the study from 06/03/2024 . The biventricular  function has improved.  ______________________________________________________________________________  MMode/2D Measurements & Calculations  IVSd: 0.69 cm  LVIDd: 5.7 cm  LVPWd: 0.87 cm  LV mass(C)d: 162.6 grams  LV mass(C)dI: 64.7 grams/m2     EF(MOD-bp): 43.8 %  RWT: 0.30     Coronary Angiogram 6/6/24:  Left Anterior Descending   Previously placed Ost LAD to Prox LAD stent of unknown type is widely patent.   First Diagonal Branch   The vessel is large. There is mild diffuse disease throughout the vessel.   1st Diag lesion is 60% stenosed.   Right Coronary Artery   The vessel is small.   Right Posterior Descending Artery   The vessel is small.   Right Posterior Atrioventricular Artery   The vessel is small.     Intervention     1st Diag lesion   Stent   A stent was successfully placed.   There is a 0% residual stenosis post intervention.       Medical Decision Making       45 MINUTES SPENT BY ME on the date of service doing chart  review, history, exam, documentation & further activities per the note.

## 2024-06-14 NOTE — PLAN OF CARE
Hours of care 2300 - 0730     PRN: Zofran, imodium.     Neuro: A&Ox4. Forgetful at times.  Cardiac: VSS. SR, HR 90s. Normotensive.   Respiratory: RA, sating >92%.   GI/: Voids spontaneously. One loose BM overnight. NG tube removed, see previous note.   Diet: Soft/mechanical diet.   Skin: See adult PCS, no new deficits noted.   LDAs: R PIV, L triple lumen PICC.   Activity: Assist x2, w/ walker and gait belt.   Pain: Denies.     A: VSS. Afebrile. Urinating adequately.      P: Will continue POC and report changes to treatment team.

## 2024-06-14 NOTE — PROGRESS NOTES
Calorie Count  Intake recorded for: 6/13  Total Kcals: 651 Total Protein: 5g  Kcals from Hospital Food: 221   Protein: 2g  Kcals from Outside Food (average):430 Protein: 3g  # Meals Ordered from Kitchen: 2  # Meals Recorded: 1   Meal 1: 100% lemon fruit ice, izze 16oz drink, naked tropical guava drink 16oz, otis cracker packet, 50% mashed potatoes with beef gravy  # Supplements Recorded: No supplements recorded

## 2024-06-14 NOTE — PROGRESS NOTES
Pt got up to commode without calling and accidentally pulled NG tube, when trying to reinsert pt began throwing up. Reinsertion unsuccessful, NG removed, and Zofran given. Cross cover provider at bedside to assess, pt will remain without NG until morning.

## 2024-06-15 ENCOUNTER — APPOINTMENT (OUTPATIENT)
Dept: SPEECH THERAPY | Facility: CLINIC | Age: 49
DRG: 003 | End: 2024-06-15
Attending: INTERNAL MEDICINE
Payer: COMMERCIAL

## 2024-06-15 ENCOUNTER — HOSPITAL ENCOUNTER (INPATIENT)
Facility: CLINIC | Age: 49
LOS: 3 days | Discharge: HOME | DRG: 949 | End: 2024-06-18
Attending: PHYSICAL MEDICINE & REHABILITATION | Admitting: STUDENT IN AN ORGANIZED HEALTH CARE EDUCATION/TRAINING PROGRAM
Payer: COMMERCIAL

## 2024-06-15 VITALS
RESPIRATION RATE: 16 BRPM | HEIGHT: 74 IN | BODY MASS INDEX: 33.43 KG/M2 | WEIGHT: 260.5 LBS | OXYGEN SATURATION: 99 % | HEART RATE: 88 BPM | DIASTOLIC BLOOD PRESSURE: 73 MMHG | SYSTOLIC BLOOD PRESSURE: 103 MMHG | TEMPERATURE: 98.1 F

## 2024-06-15 DIAGNOSIS — Z98.61 STATUS POST PERCUTANEOUS TRANSLUMINAL CORONARY ANGIOPLASTY: ICD-10-CM

## 2024-06-15 DIAGNOSIS — I46.9 CARDIAC ARREST (H): Primary | ICD-10-CM

## 2024-06-15 DIAGNOSIS — L08.9 LOCAL INFECTION OF SKIN AND SUBCUTANEOUS TISSUE: ICD-10-CM

## 2024-06-15 DIAGNOSIS — E87.6 HYPOKALEMIA: ICD-10-CM

## 2024-06-15 DIAGNOSIS — I25.10 CORONARY ARTERY DISEASE INVOLVING NATIVE CORONARY ARTERY OF NATIVE HEART WITHOUT ANGINA PECTORIS: ICD-10-CM

## 2024-06-15 DIAGNOSIS — I50.23 ACUTE ON CHRONIC SYSTOLIC HEART FAILURE (H): ICD-10-CM

## 2024-06-15 PROBLEM — Q24.9 CARDIAC ABNORMALITY: Status: ACTIVE | Noted: 2024-06-15

## 2024-06-15 LAB
ALBUMIN SERPL BCG-MCNC: 3.1 G/DL (ref 3.5–5.2)
ALP SERPL-CCNC: 139 U/L (ref 40–150)
ALT SERPL W P-5'-P-CCNC: 153 U/L (ref 0–70)
ANION GAP SERPL CALCULATED.3IONS-SCNC: 14 MMOL/L (ref 7–15)
AST SERPL W P-5'-P-CCNC: 69 U/L (ref 0–45)
BILIRUB SERPL-MCNC: 0.5 MG/DL
BUN SERPL-MCNC: 43 MG/DL (ref 6–20)
CALCIUM SERPL-MCNC: 8.5 MG/DL (ref 8.6–10)
CHLORIDE SERPL-SCNC: 105 MMOL/L (ref 98–107)
CREAT SERPL-MCNC: 0.86 MG/DL (ref 0.67–1.17)
DEPRECATED HCO3 PLAS-SCNC: 17 MMOL/L (ref 22–29)
EGFRCR SERPLBLD CKD-EPI 2021: >90 ML/MIN/1.73M2
ERYTHROCYTE [DISTWIDTH] IN BLOOD BY AUTOMATED COUNT: 15 % (ref 10–15)
GLUCOSE BLDC GLUCOMTR-MCNC: 103 MG/DL (ref 70–99)
GLUCOSE BLDC GLUCOMTR-MCNC: 110 MG/DL (ref 70–99)
GLUCOSE BLDC GLUCOMTR-MCNC: 112 MG/DL (ref 70–99)
GLUCOSE BLDC GLUCOMTR-MCNC: 96 MG/DL (ref 70–99)
GLUCOSE BLDC GLUCOMTR-MCNC: 97 MG/DL (ref 70–99)
GLUCOSE BLDC GLUCOMTR-MCNC: 99 MG/DL (ref 70–99)
GLUCOSE SERPL-MCNC: 105 MG/DL (ref 70–99)
HCT VFR BLD AUTO: 29.1 % (ref 40–53)
HGB BLD-MCNC: 9.6 G/DL (ref 13.3–17.7)
MAGNESIUM SERPL-MCNC: 2.2 MG/DL (ref 1.7–2.3)
MCH RBC QN AUTO: 30.4 PG (ref 26.5–33)
MCHC RBC AUTO-ENTMCNC: 33 G/DL (ref 31.5–36.5)
MCV RBC AUTO: 92 FL (ref 78–100)
PHOSPHATE SERPL-MCNC: 3.6 MG/DL (ref 2.5–4.5)
PLATELET # BLD AUTO: 518 10E3/UL (ref 150–450)
POTASSIUM SERPL-SCNC: 3.9 MMOL/L (ref 3.4–5.3)
PROT SERPL-MCNC: 6.4 G/DL (ref 6.4–8.3)
RBC # BLD AUTO: 3.16 10E6/UL (ref 4.4–5.9)
SODIUM SERPL-SCNC: 136 MMOL/L (ref 135–145)
WBC # BLD AUTO: 11.9 10E3/UL (ref 4–11)

## 2024-06-15 PROCEDURE — 83735 ASSAY OF MAGNESIUM: CPT | Performed by: SURGERY

## 2024-06-15 PROCEDURE — 36415 COLL VENOUS BLD VENIPUNCTURE: CPT

## 2024-06-15 PROCEDURE — 250N000012 HC RX MED GY IP 250 OP 636 PS 637: Performed by: CLINIC/CENTER

## 2024-06-15 PROCEDURE — 250N000013 HC RX MED GY IP 250 OP 250 PS 637: Performed by: CLINIC/CENTER

## 2024-06-15 PROCEDURE — 250N000013 HC RX MED GY IP 250 OP 250 PS 637: Performed by: NURSE PRACTITIONER

## 2024-06-15 PROCEDURE — 92526 ORAL FUNCTION THERAPY: CPT | Mod: GN

## 2024-06-15 PROCEDURE — 250N000011 HC RX IP 250 OP 636: Performed by: CLINIC/CENTER

## 2024-06-15 PROCEDURE — 99222 1ST HOSP IP/OBS MODERATE 55: CPT | Mod: AI | Performed by: STUDENT IN AN ORGANIZED HEALTH CARE EDUCATION/TRAINING PROGRAM

## 2024-06-15 PROCEDURE — 82247 BILIRUBIN TOTAL: CPT

## 2024-06-15 PROCEDURE — 250N000011 HC RX IP 250 OP 636: Performed by: STUDENT IN AN ORGANIZED HEALTH CARE EDUCATION/TRAINING PROGRAM

## 2024-06-15 PROCEDURE — 128N000003 HC R&B REHAB

## 2024-06-15 PROCEDURE — 85027 COMPLETE CBC AUTOMATED: CPT

## 2024-06-15 PROCEDURE — 250N000013 HC RX MED GY IP 250 OP 250 PS 637

## 2024-06-15 PROCEDURE — 250N000013 HC RX MED GY IP 250 OP 250 PS 637: Performed by: INTERNAL MEDICINE

## 2024-06-15 PROCEDURE — 84100 ASSAY OF PHOSPHORUS: CPT | Performed by: SURGERY

## 2024-06-15 PROCEDURE — 250N000013 HC RX MED GY IP 250 OP 250 PS 637: Performed by: STUDENT IN AN ORGANIZED HEALTH CARE EDUCATION/TRAINING PROGRAM

## 2024-06-15 PROCEDURE — 250N000011 HC RX IP 250 OP 636: Mod: JZ

## 2024-06-15 PROCEDURE — 99239 HOSP IP/OBS DSCHRG MGMT >30: CPT | Performed by: NURSE PRACTITIONER

## 2024-06-15 RX ORDER — VENLAFAXINE HYDROCHLORIDE 75 MG/1
75 CAPSULE, EXTENDED RELEASE ORAL DAILY
COMMUNITY

## 2024-06-15 RX ORDER — METOPROLOL SUCCINATE 50 MG/1
50 TABLET, EXTENDED RELEASE ORAL DAILY
Status: ON HOLD | DISCHARGE
Start: 2024-06-16 | End: 2024-06-18

## 2024-06-15 RX ORDER — SPIRONOLACTONE 25 MG/1
25 TABLET ORAL DAILY
Status: DISCONTINUED | OUTPATIENT
Start: 2024-06-16 | End: 2024-06-18 | Stop reason: HOSPADM

## 2024-06-15 RX ORDER — TAMSULOSIN HYDROCHLORIDE 0.4 MG/1
0.4 CAPSULE ORAL DAILY
Status: DISCONTINUED | OUTPATIENT
Start: 2024-06-16 | End: 2024-06-15

## 2024-06-15 RX ORDER — ROSUVASTATIN CALCIUM 20 MG/1
20 TABLET, COATED ORAL DAILY
Status: DISCONTINUED | OUTPATIENT
Start: 2024-06-16 | End: 2024-06-18 | Stop reason: HOSPADM

## 2024-06-15 RX ORDER — AMIODARONE HYDROCHLORIDE 200 MG/1
200 TABLET ORAL DAILY
Status: DISCONTINUED | OUTPATIENT
Start: 2024-06-16 | End: 2024-06-18 | Stop reason: HOSPADM

## 2024-06-15 RX ORDER — ASPIRIN 81 MG/1
81 TABLET, CHEWABLE ORAL DAILY
Status: ON HOLD | DISCHARGE
Start: 2024-06-16 | End: 2024-06-18

## 2024-06-15 RX ORDER — NICOTINE POLACRILEX 4 MG
15-30 LOZENGE BUCCAL
Status: DISCONTINUED | OUTPATIENT
Start: 2024-06-15 | End: 2024-06-18 | Stop reason: HOSPADM

## 2024-06-15 RX ORDER — ONDANSETRON 4 MG/1
4 TABLET, ORALLY DISINTEGRATING ORAL EVERY 6 HOURS PRN
Status: ON HOLD | DISCHARGE
Start: 2024-06-15 | End: 2024-06-18

## 2024-06-15 RX ORDER — FUROSEMIDE 40 MG
40 TABLET ORAL DAILY
Status: DISCONTINUED | OUTPATIENT
Start: 2024-06-16 | End: 2024-06-18 | Stop reason: HOSPADM

## 2024-06-15 RX ORDER — METOPROLOL SUCCINATE 50 MG/1
50 TABLET, EXTENDED RELEASE ORAL DAILY
Status: DISCONTINUED | OUTPATIENT
Start: 2024-06-16 | End: 2024-06-18 | Stop reason: HOSPADM

## 2024-06-15 RX ORDER — PANTOPRAZOLE SODIUM 40 MG/1
40 TABLET, DELAYED RELEASE ORAL
Status: DISCONTINUED | OUTPATIENT
Start: 2024-06-16 | End: 2024-06-18 | Stop reason: HOSPADM

## 2024-06-15 RX ORDER — PHENOL 1.4 %
10 AEROSOL, SPRAY (ML) MUCOUS MEMBRANE EVERY EVENING
DISCHARGE
Start: 2024-06-15 | End: 2024-07-15

## 2024-06-15 RX ORDER — CEPHALEXIN 500 MG/1
500 CAPSULE ORAL EVERY 6 HOURS
Status: ON HOLD | DISCHARGE
Start: 2024-06-15 | End: 2024-06-18

## 2024-06-15 RX ORDER — GUAIFENESIN 600 MG/1
15 TABLET, EXTENDED RELEASE ORAL DAILY
Status: ON HOLD | DISCHARGE
Start: 2024-06-16 | End: 2024-06-18

## 2024-06-15 RX ORDER — VENLAFAXINE HYDROCHLORIDE 37.5 MG/1
37.5 CAPSULE, EXTENDED RELEASE ORAL 2 TIMES DAILY
Status: ON HOLD | DISCHARGE
Start: 2024-06-15 | End: 2024-06-15

## 2024-06-15 RX ORDER — HYDROXYZINE HYDROCHLORIDE 50 MG/1
50 TABLET, FILM COATED ORAL EVERY 6 HOURS PRN
Status: DISCONTINUED | OUTPATIENT
Start: 2024-06-15 | End: 2024-06-18 | Stop reason: HOSPADM

## 2024-06-15 RX ORDER — VENLAFAXINE HYDROCHLORIDE 75 MG/1
75 CAPSULE, EXTENDED RELEASE ORAL
Status: DISCONTINUED | OUTPATIENT
Start: 2024-06-16 | End: 2024-06-18 | Stop reason: HOSPADM

## 2024-06-15 RX ORDER — VENLAFAXINE 37.5 MG/1
37.5 TABLET ORAL ONCE
Status: COMPLETED | OUTPATIENT
Start: 2024-06-15 | End: 2024-06-15

## 2024-06-15 RX ORDER — HYDROXYZINE HYDROCHLORIDE 50 MG/1
50 TABLET, FILM COATED ORAL EVERY 6 HOURS PRN
Status: ON HOLD | DISCHARGE
Start: 2024-06-15 | End: 2024-06-18

## 2024-06-15 RX ORDER — ACETAMINOPHEN 325 MG/1
650 TABLET ORAL EVERY 4 HOURS PRN
Status: DISCONTINUED | OUTPATIENT
Start: 2024-06-15 | End: 2024-06-18 | Stop reason: HOSPADM

## 2024-06-15 RX ORDER — POTASSIUM CHLORIDE 1500 MG/1
40 TABLET, EXTENDED RELEASE ORAL DAILY
Status: DISCONTINUED | OUTPATIENT
Start: 2024-06-16 | End: 2024-06-18 | Stop reason: HOSPADM

## 2024-06-15 RX ORDER — HEPARIN SODIUM 5000 [USP'U]/.5ML
5000 INJECTION, SOLUTION INTRAVENOUS; SUBCUTANEOUS EVERY 8 HOURS
Status: DISCONTINUED | OUTPATIENT
Start: 2024-06-15 | End: 2024-06-17

## 2024-06-15 RX ORDER — AMIODARONE HYDROCHLORIDE 200 MG/1
200 TABLET ORAL DAILY
Status: ON HOLD | DISCHARGE
Start: 2024-06-16 | End: 2024-06-18

## 2024-06-15 RX ORDER — FUROSEMIDE 40 MG
40 TABLET ORAL DAILY
Status: ON HOLD | DISCHARGE
Start: 2024-06-16 | End: 2024-06-18

## 2024-06-15 RX ORDER — CALCIUM CARBONATE 500 MG/1
500 TABLET, CHEWABLE ORAL DAILY PRN
Status: DISCONTINUED | OUTPATIENT
Start: 2024-06-15 | End: 2024-06-18 | Stop reason: HOSPADM

## 2024-06-15 RX ORDER — CEPHALEXIN 500 MG/1
500 CAPSULE ORAL EVERY 6 HOURS SCHEDULED
Qty: 20 CAPSULE | Refills: 0 | Status: DISCONTINUED | OUTPATIENT
Start: 2024-06-15 | End: 2024-06-15 | Stop reason: HOSPADM

## 2024-06-15 RX ORDER — LOPERAMIDE HCL 2 MG
2 CAPSULE ORAL 4 TIMES DAILY PRN
Status: ON HOLD | DISCHARGE
Start: 2024-06-15 | End: 2024-06-18

## 2024-06-15 RX ORDER — ASPIRIN 81 MG/1
81 TABLET ORAL DAILY
Status: DISCONTINUED | OUTPATIENT
Start: 2024-06-16 | End: 2024-06-18 | Stop reason: HOSPADM

## 2024-06-15 RX ORDER — SPIRONOLACTONE 25 MG/1
25 TABLET ORAL DAILY
Status: ON HOLD | DISCHARGE
Start: 2024-06-16 | End: 2024-06-18

## 2024-06-15 RX ORDER — DEXTROSE MONOHYDRATE 25 G/50ML
25-50 INJECTION, SOLUTION INTRAVENOUS
Status: DISCONTINUED | OUTPATIENT
Start: 2024-06-15 | End: 2024-06-18 | Stop reason: HOSPADM

## 2024-06-15 RX ORDER — TAMSULOSIN HYDROCHLORIDE 0.4 MG/1
0.4 CAPSULE ORAL DAILY
DISCHARGE
Start: 2024-06-16 | End: 2024-06-15

## 2024-06-15 RX ORDER — ROSUVASTATIN CALCIUM 20 MG/1
20 TABLET, COATED ORAL DAILY
Status: DISCONTINUED | OUTPATIENT
Start: 2024-06-15 | End: 2024-06-15 | Stop reason: HOSPADM

## 2024-06-15 RX ORDER — POTASSIUM CHLORIDE 1500 MG/1
40 TABLET, EXTENDED RELEASE ORAL DAILY
Status: ON HOLD | DISCHARGE
Start: 2024-06-16 | End: 2024-06-18

## 2024-06-15 RX ORDER — LOPERAMIDE HCL 2 MG
2 CAPSULE ORAL 4 TIMES DAILY PRN
Status: DISCONTINUED | OUTPATIENT
Start: 2024-06-15 | End: 2024-06-18 | Stop reason: HOSPADM

## 2024-06-15 RX ORDER — CEPHALEXIN 500 MG/1
500 CAPSULE ORAL EVERY 6 HOURS
Qty: 19 CAPSULE | Refills: 0 | Status: DISCONTINUED | OUTPATIENT
Start: 2024-06-15 | End: 2024-06-18 | Stop reason: HOSPADM

## 2024-06-15 RX ORDER — VENLAFAXINE HYDROCHLORIDE 37.5 MG/1
37.5 CAPSULE, EXTENDED RELEASE ORAL 2 TIMES DAILY
Status: DISCONTINUED | OUTPATIENT
Start: 2024-06-15 | End: 2024-06-15

## 2024-06-15 RX ORDER — GUAIFENESIN 600 MG/1
15 TABLET, EXTENDED RELEASE ORAL DAILY
Status: DISCONTINUED | OUTPATIENT
Start: 2024-06-16 | End: 2024-06-16

## 2024-06-15 RX ORDER — ROSUVASTATIN CALCIUM 20 MG/1
20 TABLET, COATED ORAL DAILY
Status: ON HOLD | DISCHARGE
Start: 2024-06-15 | End: 2024-06-18

## 2024-06-15 RX ORDER — POLYETHYLENE GLYCOL 3350 17 G/17G
17 POWDER, FOR SOLUTION ORAL DAILY PRN
Status: DISCONTINUED | OUTPATIENT
Start: 2024-06-15 | End: 2024-06-18 | Stop reason: HOSPADM

## 2024-06-15 RX ADMIN — HEPARIN SODIUM 5000 UNITS: 5000 INJECTION, SOLUTION INTRAVENOUS; SUBCUTANEOUS at 20:32

## 2024-06-15 RX ADMIN — HEPARIN SODIUM 5000 UNITS: 5000 INJECTION, SOLUTION INTRAVENOUS; SUBCUTANEOUS at 13:13

## 2024-06-15 RX ADMIN — SACUBITRIL AND VALSARTAN 1 TABLET: 24; 26 TABLET, FILM COATED ORAL at 08:39

## 2024-06-15 RX ADMIN — POTASSIUM CHLORIDE 40 MEQ: 750 TABLET, EXTENDED RELEASE ORAL at 08:39

## 2024-06-15 RX ADMIN — CEPHALEXIN 500 MG: 500 CAPSULE ORAL at 23:10

## 2024-06-15 RX ADMIN — ASPIRIN 81 MG CHEWABLE TABLET 81 MG: 81 TABLET CHEWABLE at 08:39

## 2024-06-15 RX ADMIN — SACUBITRIL AND VALSARTAN 1 TABLET: 24; 26 TABLET, FILM COATED ORAL at 20:34

## 2024-06-15 RX ADMIN — ROSUVASTATIN CALCIUM 20 MG: 20 TABLET, FILM COATED ORAL at 11:13

## 2024-06-15 RX ADMIN — METOPROLOL SUCCINATE 50 MG: 50 TABLET, EXTENDED RELEASE ORAL at 08:40

## 2024-06-15 RX ADMIN — FUROSEMIDE 40 MG: 40 TABLET ORAL at 08:40

## 2024-06-15 RX ADMIN — CEPHALEXIN 500 MG: 500 CAPSULE ORAL at 11:13

## 2024-06-15 RX ADMIN — TICAGRELOR 90 MG: 90 TABLET ORAL at 20:34

## 2024-06-15 RX ADMIN — TAMSULOSIN HYDROCHLORIDE 0.4 MG: 0.4 CAPSULE ORAL at 08:40

## 2024-06-15 RX ADMIN — CEPHALEXIN 500 MG: 500 CAPSULE ORAL at 17:33

## 2024-06-15 RX ADMIN — VENLAFAXINE 37.5 MG: 37.5 TABLET ORAL at 17:33

## 2024-06-15 RX ADMIN — AMIODARONE HYDROCHLORIDE 200 MG: 200 TABLET ORAL at 08:40

## 2024-06-15 RX ADMIN — TICAGRELOR 90 MG: 90 TABLET ORAL at 08:40

## 2024-06-15 RX ADMIN — HEPARIN SODIUM 5000 UNITS: 5000 INJECTION, SOLUTION INTRAVENOUS; SUBCUTANEOUS at 04:16

## 2024-06-15 RX ADMIN — VENLAFAXINE 37.5 MG: 37.5 TABLET ORAL at 08:39

## 2024-06-15 RX ADMIN — EMPAGLIFLOZIN 10 MG: 10 TABLET, FILM COATED ORAL at 08:40

## 2024-06-15 RX ADMIN — Medication 10 MG: at 20:32

## 2024-06-15 RX ADMIN — SPIRONOLACTONE 25 MG: 25 TABLET, FILM COATED ORAL at 08:40

## 2024-06-15 RX ADMIN — ALTEPLASE 1 MG: 2.2 INJECTION, POWDER, LYOPHILIZED, FOR SOLUTION INTRAVENOUS at 06:21

## 2024-06-15 RX ADMIN — INSULIN GLARGINE 15 UNITS: 100 INJECTION, SOLUTION SUBCUTANEOUS at 22:02

## 2024-06-15 ASSESSMENT — ACTIVITIES OF DAILY LIVING (ADL)
ADLS_ACUITY_SCORE: 30
ADLS_ACUITY_SCORE: 30
ADLS_ACUITY_SCORE: 31
ADLS_ACUITY_SCORE: 30
ADLS_ACUITY_SCORE: 31
ADLS_ACUITY_SCORE: 30
ADLS_ACUITY_SCORE: 38
ADLS_ACUITY_SCORE: 30
ADLS_ACUITY_SCORE: 33
ADLS_ACUITY_SCORE: 30
ADLS_ACUITY_SCORE: 33
ADLS_ACUITY_SCORE: 31
ADLS_ACUITY_SCORE: 30
ADLS_ACUITY_SCORE: 33
ADLS_ACUITY_SCORE: 33
ADLS_ACUITY_SCORE: 30
ADLS_ACUITY_SCORE: 30
ADLS_ACUITY_SCORE: 33

## 2024-06-15 NOTE — PROGRESS NOTES
DISCHARGE                         06/15/2024  ----------------------------------------------------------------------------  Discharged to: [facility]  Via: Arranged transport, wheelchair   Accompanied by: Self  Discharge Instructions: diet, activity, medications, follow up appointments, when to call the MD, aftercare instructions, and what to watchout for (i.e. s/s of infection, increasing SOB, palpitations, chest pain,)  Prescriptions: Medication list reviewed & sent with pt  Follow Up Appointments: arranged; information given  Belongings: All sent with pt  IV: out  Telemetry: off  Pt exhibits understanding of above discharge instructions; all questions answered.    Discharge Paperwork: Sent with patient.   Report given to: JESSE Mendoza RN

## 2024-06-15 NOTE — PROGRESS NOTES
"CVTS Progress Note     Cullen Reardon is a 48 year old male with no known past medical history who was admitted on 6/1/2024 following a witnessed VF cardiac arrest.     He presented to [facility] on 6/1/2024 with chest pain.  He collapsed in front of the triage desk and was found to be pulseless after saying \"I feel like I'm going to die\".  He received immediate bystander CPR in the waiting room of the emergency department.  He was found be in VF, was shocked x 3.  Estimated downtime was approximately 15 minutes per paramedic report.  Was cannulated in [facility] emergency department and was on circuit at 16:08. On 6/1.  He was brought to the Cath Lab for coronary angiogram where he was found to have a 100% ostial LAD thrombotic occlusion status post stenting.  He underwent ECMO decannulation on 6/4/24 by Dr. Ladd. He had residual severe disease in D1. S/p staged PCI 6/7 to D1. Extubated on 6/10.    - Asked by primary team to evaluate patient's incision prior to discharge. Incision with very small amount of dried, clear drainage at inferior aspect of incision. Otherwise clean, dry, intact. Unable to express any drainage from incision. No fluctuance, induration, or erythema noted. Healing ridge present under incision. Staples intact. Very low concern for infection or seroma.  - Continue stapes until 6/18, then remove every other staple. It is okay for primary team, primary care, or cardiology to remove staples. Place steri strips. Continue remainder of staples for 1 week then remove.   - Continue regular wound care. Recommend regular cleaning. Okay to shower; recommend against baths/submerged water for 1 month. Otherwise keep incision dry.  - Please call CVTS with any questions or wound concerns.     Nataliya Cohen PA-C  Cardiothoracic Surgery  Pager [phone]    2:47 PM Brittany 15, 2024    "

## 2024-06-15 NOTE — PROGRESS NOTES
Care Management Discharge Note    Discharge Date: 06/15/2024       Discharge Disposition: Acute Rehab    Discharge Services: Transportation Services ([facility] via w/c  [phone]  ride window [phone])    Discharge DME:      Discharge Transportation: car, drives self, family or friend will provide    Private pay costs discussed: Not applicable    Does the patient's insurance plan have a 3 day qualifying hospital stay waiver?  No    PAS Confirmation Code:  n/a  Patient/family educated on Medicare website which has current facility and service quality ratings:  by previous SW    Education Provided on the Discharge Plan:    Persons Notified of Discharge Plans: patient  Patient/Family in Agreement with the Plan:  yes    Handoff Referral Completed: Yes    Additional Information:  Per team, patient medically ready for discharge to [facility] today.  [facility] has a bed and can admit.  [facility] scheduled to transport  contact and ride time noted above.  Met with patient at bedside to discuss.  He is agreeable. 6C RN and provider aware.      Cynthia Nye, [facility]   6/15/2024       Social Work and Care Management Department       SEARCHABLE in [facility] - search SOCIAL WORK       [place] (0800 - 1630) Saturday and Sunday     Units: 4A Vocera, 4C Vocera, & 4E Vocera        Units: 5A [id number] Vocera, 5A 7094-1950 Vocera , BMT SW 1 BMT SW 2, BMT SW 3 & BMT SW 4  5C Off Service 5401 - 5416  5C Off Service 0401-1246     Units: 6A Vocera & 6B Vocera      Units: 6C Vocera     Units: 7A Vocera & 7B Vocera      Units: 7C Med Surg 7401 thru 7418 and 7C Med Surg 7502 thru 7521      Unit: [place] ED Vocera & [place] Obs Vocera     [facility] (8738-2703) Saturday and Sunday      Units: 5 Ortho Vocera, 5 Med Surg Vocera & WB ED Vocera     Units: 6 Med Surg Vocera, 8 Med Surg Vocera, & 10 ICU Vocera      After hours Vocera [facility] and After Hours Vocera [place]      Saturday & Sunday (1630 - 0000)    Mon-Fri (7019-9314)     [facility] Recognized Holidays  (6443-6742)    Units: ALL   - see above VOCERA links to units and after hours

## 2024-06-15 NOTE — CARE PLAN
Speech Language Therapy Discharge Summary    Reason for therapy discharge:    All goals and outcomes met, no further needs identified.    Progress towards therapy goal(s). See goals on Care Plan in [facility] electronic health record for goal details.  Goals met    Therapy recommendation(s):    No further therapy is recommended.    Recommend pt continue regular/thin liquid diet. Suspect pt is at or close to baseline oropharyngeal swallow function, no ongoing speech therapy warranted. SLP will complete orders and sign off at this time.

## 2024-06-15 NOTE — CONSULTS
"Social Work: Initial Assessment with Discharge Plan    Patient Name: Cullen Reardon  : 1975  Age: 48 year old  MRN: [id number]  Completed assessment with: Chart review and interview with patient.   Admitted to ARU: 6.15.24    Presenting Information   Date of SW assessment: Brittany 15, 2024  Health Care Directive: Declined completing  Primary Health Care Agent: Patient/self   Secondary Health Care Agent: ARAK  Living Situation: Lives in house with significant other and 2 dependent daughters  Previous Functional Status: Pt was independent with mobility, ADL/IADLs, and working as a contractor   DME available: See therapy evaluation for more information   Patient and family understanding of hospitalization: Pleasant and appropriate  Cultural/Language/Spiritual Considerations: 48 year old white male, , english speaking.    Physical Health  Reason for admission: Cullen Reardon is a 48 year old male with no known past medical history presented to [facility] on 2024 with chest pain. He collapsed in front of the triage desk and was found to be pulseless after saying \"I feel like I'm going to die\". He received immediate bystander CPR in the waiting room of the emergency department. He was found be in VF, was shocked x 3. Estimated downtime was approximately 15 min per paramedic report. Cannulated in [facility] ED and brought to the Cath Lab for angiogram where he was found to have a 100% ostial LAD thrombotic occlusion s/p stenting. Decanned on . He had residual severe disease in D1. S/p staged PCI  to D1. He was subsequently brought to [facility] for further care. Extubated on 6/10. Hospital course c/b acute hypoxic respiratory failure and probable aspiration PNA, shock liver, JEAN CLAUDE, acute blood loss anemia, hyperglycemia, hematuria, and moderate malnutrition. Pt is medically ready to admit to Acute inpatient Rehab.         Pt was independent with mobility, ADL/IADLs, and working as a contractor and will " benefit from intensive therapies in order to return to previous level of functioning in his home environment. Patient requires an intensive inpatient rehab program to address the following acute impairments:impaired activity tolerance, impaired balance, impaired ROM, impaired sensation, impaired strength, and impaired tone    Provider Information   Primary Care Physician:Teresita Velásquez [facility] will schedule PCP apt at discharge.   : None    Mental Health/Chemical Dependency:   Diagnosis: Anxiety, followed by PCP  Alcohol/Tobacco/Narcotis: No concerns  Support/Services in Place: N/A  Services Needed/Recommended: [facility]ie and Health Psychology support while on ARU available.   Sexuality/Intimacy: Not discussed     Support System  Marital Status:   Family support: spouse, mother, brother, MAREK  Other support available: friends    Community Resources  Current in home services: None  Previous services: N/A    Financial/Employment/Education  Employment Status: working as contractor  Income Source: wages  Education: college  Financial Concerns:  none  Insurance: [employer] CARE     Discharge Plan   Patient and family discharge goal: TBD, pending progress  Provided Education on discharge plan: Evaluations and discharge recommendations pending.   Patient agreeable to discharge plan:  Pending further discussion. Evaluations and discharge recommendations pending.   Provided education and attained signature for Medicare IM and IRF Patient Rights and Privacy Information provided to patient : Yes  Provided patient with [facility] Resources: N/A  Barriers to discharge: N/A    Discharge Recommendations   Disposition: See above   Transportation Needs: Patient, family/friends, paid transport, insurance transport (if applicable)     Additional comments   Discharge TBD, ELOS 7 days. Evals and discharge needs pending. SW will remain available and continue to follow as needs arise.  "  -------------------------------------------------------------------------------------------------------------  SARABJIT Pain Assessment    Pain Effect on Sleep  Over the past 5 days, how much of the time has pain made it hard for you to sleep at night?\"    1. Rarely or not at all    Pain Interference with Therapy Activities  \"Over the past 5 days, how often have you limited your participation in rehabilitation therapy sessions due to pain?\"  1. Rarely or not at all    Pain Interference with Day-to-Day Activities  \"Over the past 5 days, how often have you limited your day-to-day activities (excluding rehabilitation therapy sessions) because of pain?\"  1. Rarely or not at all  -------------------------------------------------------------------------------------------------------------    RANSPORTATION:    Has lack of transportation kept you from medical appointments, meetings, work, or from getting things needed for daily living?  A. Yes, it has kept me from medical appointments or from getting my medications  B. Yes, it has kept me from non-medical meetings, appointments, work or from getting things that I need  C. No  X. Patient Unable to respond  Y. Patient declines to respond  -------------------------------------------------------------------------------------------------------------  Health Literacy:   How Often do you need to have someone help you when you read instructions, pamphlets, or other written material from your doctor or pharmacy?  0.       Never  1.       Rarely  2.       Sometimes  3.       Often  4.       Always  5.       Patient declines to respond  6.       Patient unable to respond  ------------------------------------------------------------------------------------------------------------   BIMS:  See flow sheet   Tell the pt : \"I am going to say three words for you to remember.  Please repeat the words after I have said all three.  The words are:Sock, Blue and Bed. Now tell me the three words.\" " "    Number of words repeated after the first attempt.  0: None   1: One  2: Two  3: Three  Ask the pt: \"Please tell me what year it is right now?\"  0: Missed by 5 >5 years or no answer   1: Missed by 2-5 years  2: Missed by a 1 year  3: Correct      Ask the pt: \"What month are we in right now?\"  0: Missed by > 1 month or no answer.  1: Missed by 6 days to 1 month  2: Accurate within 5 days.     Ask pt: \"what day of the week is today?\"  0: Incorrect day of the week.  1: Correct.     Ask pt:\" Let's go back to an earlier question.  What were those three words that I asked you to repeat?\" If unable to remember a word, give a cue (something to wear, a color, a piece of furniture) for that word.   Able to recall Sock.  0: No, could not recall.  1: Yes, after cueing (something to wear)  2: Yes, no cueing required.   Able to recall Blue.  0: No, could not recall.  1:Yes, after cueing (a color)  2:Yes, no cueing required.   Able to recall Bed.  0: No,  1: Yes, after cueing (a piece of furniture)  2: Yes, no cueing required.   -----------------------------------------------------------------------------------------------------------  CAM:  1.) Acute Onset/Fluctuating Course:  Is there evidence of an acute change in mental status from the patient's baseline?  0. No  Yes  2.) Inattention:  Does the patient have difficulty focusing attention, for example, being easily distractable, or having difficulty keeping track of what was being said?  0. No - Behavior not present  1. Yes - Behavior present  3.) Disorganized Thinking:  Is the patient's speech disorganized or incoherent, such as rambling or irrelevant conversation, unclear or illogical flow of ideas, or unpredictable switching from subject to subject?  0. No - Behavior not present  1. Yes - Behavior present  4.) Altered level of consciousness:  Did the patient have altered level of consciousness as indicated by any of the following criteria?  0. No - Alert  1. Yes - Vigilant " (hyperalert), lethargic (drowsy) , stupor (difficult to arouse) or comatose (unable to be aroused)  -------------------------------------------------------------------------------------------------------------  PHQ-9:   Over the last 2 weeks, have you been bothered by any of the following problems?     If symptom is present, then ask the patient:  About how often have you been bothered by this?   Symptom Presence                                              Symptom Frequency  0. No                                                                     0. Never or 1 day  1. Yes                                                                   1. 2-6 days (several days)  9. No Response                                                    2. 7-11 days (half or more of the days)                                                                                3. 12-14 days (nearly every day)       Present Frequency   A.       Little interest of pleasure doing things       B.       Feeling down, depressed, or hopeless       C.       Trouble falling or staying asleep, or sleeping too much       D.       Feeling tired or having little energy       E.       Poor appetite or overeating       F.        Feeling bad about yourself - or that you are a failure, or have let yourself or your family down       G.      Trouble concentrating on things, such as reading the newspaper or watching television       H.      Moving or speaking so slowly that other people could have noticed. Or the opposite - being so fidgety or restless that you have been moving around a lot more than usual       I.         Thoughts that you would be better off dead, or of hurting yourself in some way         -------------------------------------------------------------------------------------------------------------  SOCIAL ISOLATION  How often do you feel lonely or isolated from those around you?  0.       Never  1.       Rarely  2.       Sometimes  3.        Often  4.       Always  5.       Patient declines to respond  6.       Patient unable to respond  -------------------------------------------------------------------------------------------------------------    [facility]   [address] [place] [address], [address] [place] [address]  Phone: [phone]  Fax: [phone]

## 2024-06-15 NOTE — PROGRESS NOTES
Calorie Count  Intake recorded for: 6/14  Total Kcals: 608 Total Protein: 26g  Kcals from Hospital Food: 608   Protein: 26g  Kcals from Outside Food (average):0 Protein: 0g  # Meals Ordered from Kitchen: 3  # Meals Recorded: 2 meals recorded   Meal 1: 100% cream of wheat w/ brown sugar, 4 oz cranberry juice, lemon fruit ice   Meal 2: 100% 8 oz whole milk; 50% sandwich w/ ham, turkey, swiss cheese, lettuce, tomato, pickle  # Supplements Recorded: 0  Note: pt had 3 scoops of michell ice from outside the hospital, but not enough information was given to calculate protein/caloric intake.

## 2024-06-15 NOTE — PHARMACY-ADMISSION MEDICATION HISTORY
Pharmacist Admission Medication History    Admission medication history is complete. The information provided in this note is only as accurate as the sources available at the time of the update.    Information Source(s[facility]    Pertinent Information:   - Full medication history completed on 6/3 by pharmacist Angela Jeffers. See note from that time for pertinent information. Patient discharged from [facility] with updated med list.   - Prior to admission to [place], PTA venlafaxine was 75 mg ER daily. Patient transitioned to 37.5 mg IR BID while inpatient due to NPO status. Discharge prescription written as 37.5 mg ER BID on PTA med list. Reordered as 75 mg ER every day on admission to ARU as patient is taking meds orally. Changed PTA medication list entry to venlafaxine 75 mg ER daily to reflect previous home dosing.     Changes made to PTA medication list:  Added: None  Deleted: None  Changed:   Venlafaxine ER 37.5 mg BID changed to Venlafaxine ER 75 mg every day. See note above.     Allergies reviewed with patient and updates made in EHR: unable to assess    Medication History Completed By: José Luis Phipps [facility] 6/15/2024 4:20 PM    Medications Prior to Admission   Medication Sig Dispense Refill Last Dose    [START ON 6/16/2024] amiodarone (PACERONE) 200 MG tablet 1 tablet (200 mg) by Oral or Feeding Tube route daily       [START ON 6/16/2024] aspirin (ASA) 81 MG chewable tablet 1 tablet (81 mg) by Oral or Feeding Tube route daily       cephALEXin (KEFLEX) 500 MG capsule Take 1 capsule (500 mg) by mouth every 6 hours for 5 days       cholecalciferol (VITAMIN D3) 25 mcg (1000 units) capsule Take 2 capsules by mouth daily       [START ON 6/16/2024] empagliflozin (JARDIANCE) 10 MG TABS tablet 1 tablet (10 mg) by Oral or Feeding Tube route daily       [START ON 6/16/2024] furosemide (LASIX) 40 MG tablet 1 tablet (40 mg) by Oral or Feeding Tube route daily        hydrOXYzine HCl (ATARAX) 50 MG tablet Take 1 tablet (50 mg) by mouth every 6 hours as needed for anxiety       insulin glargine (LANTUS PEN) 100 UNIT/ML pen Inject 15 Units Subcutaneous at bedtime       loperamide (IMODIUM) 2 MG capsule Take 1 capsule (2 mg) by mouth 4 times daily as needed for diarrhea       melatonin 10 MG TABS tablet 1 tablet (10 mg) by Oral or Feeding Tube route every evening       [START ON 6/16/2024] metoprolol succinate ER (TOPROL XL) 50 MG 24 hr tablet Take 1 tablet (50 mg) by mouth daily       [START ON 6/16/2024] multivitamins w/minerals liquid 15 mLs by Oral or Feeding Tube route daily       ondansetron (ZOFRAN ODT) 4 MG ODT tab Take 1 tablet (4 mg) by mouth every 6 hours as needed for nausea or vomiting       [START ON 6/16/2024] potassium chloride khushboo ER (KLOR-CON M20) 20 MEQ CR tablet Take 2 tablets (40 mEq) by mouth daily       rosuvastatin (CRESTOR) 20 MG tablet Take 1 tablet (20 mg) by mouth daily       sacubitril-valsartan (ENTRESTO) 24-26 MG per tablet 1 tablet by Oral or Feeding Tube route 2 times daily       [START ON 6/16/2024] spironolactone (ALDACTONE) 25 MG tablet 1 tablet (25 mg) by Oral or Feeding Tube route daily       ticagrelor (BRILINTA) 90 MG tablet 1 tablet (90 mg) by Oral or Feeding Tube route 2 times daily       venlafaxine (EFFEXOR XR) 75 MG 24 hr capsule Take 75 mg by mouth daily

## 2024-06-15 NOTE — PLAN OF CARE
D: 48 year old male with no known past medical history who was admitted on 6/1/2024 following a witnessed VF cardiac arrest      I: Monitored vitals and assessed pt status. Encouraged activity.     IV Access: PICC triple lumen, Alteplase in Red lumen  Tele: SR  O2: RA  Mobility: up to bedside commode with standby assist  Skin: R groin site with staple. No hematoma. No discharge.     A: A&Ox4. VSS. Afebrile. Pt denies pain.      P: Continue to monitor pt status and report changes to treatment team. Pt will be discharged to ARU when bed available.

## 2024-06-15 NOTE — H&P
[facility]   Acute Rehabilitation Unit  Admission History and Physical  Patient Name: Cullen Reardon   : 1975   Medical Record: [id number]    CHIEF COMPLAINT   STEMI resulting in V fib and ECMO    HISTORY OF PRESENT ILLNESS  Cullen Reardon is a 48 year old with a PMHx of depression, ED, and hld. On 24 he presented to the [facility] ED with chest pain and collapsed.     He was found to have VF and was shocked 3 times with estimated downtime of 15 minutes approximately per paramedic. He was cannulated on ECMO. He was found to have STEMI (100% thrombotic LAD lesion) and underwent KAYLYN placement on . He obtained Echo 24 that showed EF 43.8%, mildly reduced. He was extubated on 6/10. NG tube and Benjamin were removed on  with improved PO intake and adequate urinary output.     His hospital course has been complicated by infection and traumatic cath. He had probably aspiration pna and cefepime was completed on . He has had diarrhea since starting tube feeds with C diff negative. He had 2 episodes of hematuria on - with traumatic intermittent cath, but otherwise none since. He has multiple wounds, one over right cath insertion site, which had surrounding erythema and suspected infection and he was started on abx. Plans to remove 1/2 of staples at this site on . He has a left scalp wound after falling at the ED with staples removed .     During this acute hospitalization, patient was seen and evaluated by PT, OT, SLP.  All specialties collectively recommended that patient would benefit from ongoing therapies in the acute inpatient rehabilitation setting.     In review of the therapy notes, PT on  noted transfers with FWW and CGA-min AX2 progressing to min A. Total ambulation per day approximately 250'. OT on  noted ambulating with CGA and 4WW with seated rest breaks for fatigue and SOB. SLP on 6/15 noted for dysphagia follow-up without  signs or symptoms of aspiration.    Currently, the patient is medically appropriate and is assessed to have needs and will benefit from an inpatient acute rehabilitation comprehensive program working with PT, OT and SLP, and will also benefit from supervision and management of Rehab Nursing and Rehab MD.     Today, he reports continued loose stools approximately 2 times a day without painful abdomen. He notes a rash that has been going on since he was extubated, but has not be painful or pruritic. He denies coughing or choking with eating. He reports eating and drinking okay, but difficulty chewing tough foods. He has been sleeping poorly with injections of heparin in the middle of the night. He reports therapies are going well. He denies pain, changes in sensation, weakness isolated, headache, dizziness, diplopia, vision cuts, rhinorrhea, cough, congestion, abdominal pain, chest pain, nausea, dysuria, erythema, or edema.    MEDICAL HISTORY   No past medical history on file.    SURGICAL HISTORY  Past Surgical History:   Procedure Laterality Date    PICC INSERTION - TRIPLE LUMEN Left 06/03/2024    left basilic 5 fr tl power picc 50 cm    REMOVE EXTRACORPORAL MEMBRANE OXYGENATOR ADULT N/A 6/4/2024    Procedure: REMOVAL, CANNULA, ADULT, FOR ECMO right femoral artery and right femoral vein, primary repair of right femoral artery.;  Surgeon: Demond Ladd MD;  Location: [facility] OR       PRIOR FUNCTIONAL HISTORY   Pt was independent with all ADLs/IADLs, transfers, mobility and gait.  Driving independently.     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    MEDICATIONS  Scheduled  meds  Current Facility-Administered Medications   Medication Dose Route Frequency Provider Last Rate Last Admin    [START ON 6/16/2024] amiodarone (PACERONE) tablet 200 mg  200 mg Oral or Feeding Tube Daily Radha Wade MD        [START ON 6/16/2024] aspirin EC tablet 81 mg  81 mg Oral Daily Radha Wade MD        cephALEXin (KEFLEX) capsule 500 mg  500 mg Oral Q6H Radha Wade MD        [START ON 6/16/2024] empagliflozin (JARDIANCE) tablet 10 mg  10 mg Oral or Feeding Tube Daily Radha Wade MD        [START ON 6/16/2024] furosemide (LASIX) tablet 40 mg  40 mg Oral or Feeding Tube Daily Radha Wade MD        heparin ANTICOAGULANT injection 5,000 Units  5,000 Units Subcutaneous Q8H Radha Wade MD        insulin glargine (LANTUS PEN) injection 30 Units  30 Units Subcutaneous At Bedtime Radha Wade MD        melatonin tablet 10 mg  10 mg Oral At Bedtime Radha Wade MD        [START ON 6/16/2024] metoprolol succinate ER (TOPROL XL) 24 hr tablet 50 mg  50 mg Oral Daily Radha Wade MD        [START ON 6/16/2024] multivitamins w/minerals liquid 15 mL  15 mL Oral or Feeding Tube Daily Radha Wade MD        [START ON 6/16/2024] potassium chloride khushboo ER (KLOR-CON M20) CR tablet 40 mEq  40 mEq Oral Daily Radha Wade MD        [START ON 6/16/2024] rosuvastatin (CRESTOR) tablet 20 mg  20 mg Oral Daily Radha Wade MD        sacubitril-valsartan (ENTRESTO) 24-26 MG per tablet 1 tablet  1 tablet Oral or Feeding Tube BID Radha Wade MD        [START ON 6/16/2024] spironolactone (ALDACTONE) tablet 25 mg  25 mg Oral or Feeding Tube Daily Radha Wade MD        [START ON 6/16/2024] tamsulosin (FLOMAX) capsule 0.4 mg  0.4 mg Oral Daily Radha Wade MD        ticagrelor (BRILINTA) tablet 90 mg  90 mg Oral or Feeding Tube BID Radha Wade MD        venlafaxine (EFFEXOR XR) 24 hr capsule 37.5 mg  37.5 mg Oral BID Radha Wade,  "MD           PRN meds:  Current Facility-Administered Medications   Medication Dose Route Frequency Provider Last Rate Last Admin    acetaminophen (TYLENOL) tablet 650 mg  650 mg Oral Q4H PRN Radha Wade MD        calcium carbonate (TUMS) chewable tablet 500 mg  500 mg Oral Daily PRN Radha Wade MD        glucose gel 15-30 g  15-30 g Oral Q15 Min PRN Radha Wade MD        Or    dextrose 50 % injection 25-50 mL  25-50 mL Intravenous Q15 Min PRN Radha Wdae MD        Or    glucagon injection 1 mg  1 mg Subcutaneous Q15 Min PRN Radha Wade MD        hydrOXYzine HCl (ATARAX) tablet 50 mg  50 mg Oral Q6H PRN Radha Wade MD        polyethylene glycol (MIRALAX) Packet 17 g  17 g Oral Daily PRN Radha Wade MD           ALLERGIES  No Known Allergies     REVIEW OF SYSTEMS  12 point ROS negative unless otherwise noted in HPI.    PHYSICAL EXAM  VITAL SIGNS:  BP 92/59 (BP Location: Right arm, Patient Position: Supine, Cuff Size: Adult Regular)   Pulse 81   Temp 97.6  F (36.4  C) (Oral)   Resp 18   Ht 1.854 m (6' 1\")   Wt 116.9 kg (257 lb 11.2 oz)   SpO2 98%   BMI 34.00 kg/m    BMI:  Estimated body mass index is 34 kg/m  as calculated from the following:    Height as of this encounter: 1.854 m (6' 1\").    Weight as of this encounter: 116.9 kg (257 lb 11.2 oz).     General: in no acute distress, conversational and alert, resting comfortably in bed  HEENT: atraumatic, nares clear, conjunctiva white  Pulmonary: on room air, lungs clear to auscultation bilaterally  Cardiovascular: RRR, no murmur auscultated, well-perfused peripherally, radial pulses full on right  Abdominal: soft, non-tender to palpation, non-distended, ecchymosis over abdomen where heparin administered  Extremities: warm peripherally, no edema in BLE  Skin: warm, dry, without erythema, rash over BUE and abdomen, healing and scabbing over with small papules, erythematous, non-pruritic, left scalp wound " well-healed without staples in place, no erythema or fluctuance, right groin wound with minimal erythema surrounding site, no discharge, staples in place  MSK/neuro:   Mental Status:  alert and oriented x3   Cranial Nerves:   2nd CN: Pupils equal, round, reactive to light and accomodation. Visual fields intact to confrontation.   3rd,4th,6th CN:  EOMI, appropriate pupillary responses  5th CN: facial sensation intact   7th CN: face symmetrical   8th CN: functional hearing bilaterally  9th, 10th CN: palate elevates symmetrically   11th CN: sternocleidomastoids and trapezii strong   12th CN: tongue midline and without fasciculations     Sensory: Normal to light touch in bilateral upper and lower extremities   Strength:    EF  EE   I  HF  KE  DF  EHL  PF   L  5/5 5/5 5/5 5/5 5/5 5/5 5/5 5/5 5/5  R  5/5 5/5 5/5 5/5 5/5 5/5 5/5 5/5 5/5   Reflexes: Present and symmetrical 2/4   Oakes's test: negative bilaterally   Babinski reflex: downgoing bilaterally   Tone per modified Tammy Scale (0/4 if not noted): no clonus bilaterally   Abnormal movements: None    Coordination: No dysmetria on finger to nose.   Speech: no dysarthria, speech fluent           LABS  Lab Results   Component Value Date    WBC 11.9 (H) 06/15/2024    HGB 9.6 (L) 06/15/2024    HCT 29.1 (L) 06/15/2024    MCV 92 06/15/2024     (H) 06/15/2024     Lab Results   Component Value Date     06/15/2024    POTASSIUM 3.9 06/15/2024    CHLORIDE 105 06/15/2024    CO2 17 (L) 06/15/2024    GLC 97 06/15/2024     Lab Results   Component Value Date    GFRESTIMATED >90 06/15/2024     Lab Results   Component Value Date    AST 69 (H) 06/15/2024     (H) 06/15/2024    ALKPHOS 139 06/15/2024    BILITOTAL 0.5 06/15/2024     Lab Results   Component Value Date    INR 1.20 (H) 06/11/2024     Lab Results   Component Value Date    BUN 43.0 (H) 06/15/2024    CR 0.86 06/15/2024       ASSESSMENT/PLAN:  Cullen SUKUMAR Alexys is a 48 year old with a PMHx of depression,  ED, and hld. On 6/1/24 he presented to the [facility] ED with chest pain and collapsed. He was found to VF and was shocked 3 times with estimated downtime was 15 minutes approximately per paramedic. He was found to have STEMI (100% thrombotic LAD lesion) and underwent KAYLYN placement on 6/1. He was extubated on 6/10.       Admission to acute inpatient rehab 6/15/24.    Impairment group code: 09 Cardiac - Refractory VF arrest s/p ECMO with acute hypoxic respiratory failure.       PT and OT 75 minutes of each on a daily basis, and SLP 30 minutes per day, in addition to rehab nursing and close management of physiatrist.  (Consider discontinuing SLP once cleared for speech and swallow)    Impairment of ADL's: OT for 60 min daily to work on upper and lower body self care, dressing, toileting, bathing, energy conservation techniques with use of ADs as needed.     Impairment of mobility:  PT for 60 min daily to work on gait exercises, strengthening, endurance buildup, transfers with use of walker as needed.     Impairment of cognition/language/swallow:  SLP for 60 min daily for cognitive evaluation and treatment strategies for higher level cognitive deficits and memory impairment.     Rehab RN to administer medication, patient education on medication taking, VS monitoring, bowel regimen, glucose monitoring and wound care/surgical wound dressing changes and monitoring.       Medical Conditions  #STEMI s/p KAYLYN to ostial LAD (6/1),   #Ventricular fibrillation arrest (6/1, 2 shocks and downtime approximately 15 minutes)  #s/p ECMO  #Acute systolic heart failure (EF 43.8% on 6/8/24)  #Hld  6/1/24 collapsed at [facility] ED, was in V fib and then placed on ECMO and had KAYLYN placement to LAD. He was extubated 6/10. Wt upon admission at 260 lbs.   - Continue asa 81 every day (lifelong) + ticagrelor 90 mg BID (1 year, 6/1/2025)  - Continue PTA Crestor 20 mg every day  - Continue lasix 40 mg every day, daily weights while inpt   - Continue  metoprolol 50 mg daily (increased on 6/14 from 25 BID), sacubitril-valsartan BID, amiodarone 200 mg daily, spironolactone 25 mg every day, empagliflozin 10 mg  daily  - Follow-up outpt post-arrest clinic and CORE    #Liver shock (secondary to cardiac arrest)  #Diarrhea  Since starting tube feeds has had loose stools without incontinence, likely to improve on oral diet. Upon admission 6/14 had 2 loose stools per day with use of loperamide. C diff testing 6/10 negative. Liver enzymes on 6/15 at 153 and 69 (ALT and AST).  - Loperamide continue prn, discontinue after lack of use over 2 days  - CMP 6/16 and qM and Th    #Hyperglycemia  Hgb A1c on 6/1 at 6.1 (mildly elevated). At admission had not been utilizing insulin aspart for 2 days with stable BG. Stopped insulin aspart upon admission to ARU.   - Continue to monitor BG for 2 days with aim to discontinue checks and discontinue glargine by discharge.  - Continue glargine 15 unit(s) at bedtime, decreased on 6/15  - see empagliflozin as above    #Concern for RLE cath site infection  #Leukocytosis  Erythematous wound site without fluctuance or discharge.  - Remove every other staple on 6/18/24. On 6/24 remove remaining staples. Continue to monitor for infectious etiology. RLE wound sen by CT surgery on 6/15.  - Moisten gauze with Microklenz BID and clean incision every shift. Okay to shower; recommend against baths/submerged water for 1 month. Otherwise keep incision dry.  - Continue cephelexin 500 mg q6h for 5 days (6/20 end)  - CBC qM and Th  - CBC 6/16      #Wound care  It is okay for primary team, primary care, or cardiology to remove staples. Place steri strips. Continue remainder of staples for 1 week then remove.   - Continue regular wound care. Recommend regular cleaning. Okay to shower; recommend against baths/submerged water for 1 month. Otherwise keep incision dry.      #Pain   Denies pain upon admission.  -Tylenol prn    #Sleep  Difficulty sleeping in  hospital due to medication management.   -Continue Melatonin at bedtime 10 mg, improving sleep onset     #Papular rash  #Left scalp wound, healing   Rash over BUE and abdomen, healing with scabbing over. Staples over left scalp removed 6/14 and well-healing.   - Continue to monitor, no tx indicated upon admission    #Moderate malnutrition  #Vitamin D deficiency  #Hypokalemia, resolved 6/13  K 3.9 upon admission.   -Regular diet  -PTA vit D supplementation  -Continue Kcl 40 mEQ daily  - prn TUMs available  - Could consider bite sized diet if difficulty chewing foods    Adjustment to disability:  Clinical psychology to eval and treat  Bowel: PRN loperamide, see above, daily loose bowel movements upon admission  Bladder: Voiding spontaneously upon admission, PVR X3 <100 upon admission, discharge tamsulosin upon admission while voiding spontaneously without urinary hesitancy  DVT Prophylaxis: heparin q8 hour, discontinue as able when ambulating regularly  GI Prophylaxis: Protonix 40 mg qAM started 6/15, discontinue once discontinue heparin  Code Status: Full  Disposition: Home with wife  KERRI:  7 days  Rehab prognosis:  Good  Follow up Appointments on Discharge:  Critical Care Cardiology Clinic in 1-2 weeks post ARU  General Cardiology in 4-6 weeks   PCP        Seen and discussed with Dr. Fernandez, PM&R staff physician     Radha Wade DO  PGY-3  P: [phone]  Physical Medicine and Rehabilitation

## 2024-06-15 NOTE — INTERIM SUMMARY
"[facility] Pre-Admission Screen    Referral Source:  [facility] [address] [place] 6406-01  Admit date to referring facility: 6/1/2024    Physical Medicine and Rehab Consult Completed: No    Rehab Diagnosis:    09 Cardiac - Refractory VF arrest s/p ECMO with acute hypoxic respiratory failure.     Justification for Acute Inpatient Rehabilitation  Cullen Reardon is a 48 year old male with no known past medical history presented to [facility] on 6/1/2024 with chest pain. He collapsed in front of the triage desk and was found to be pulseless after saying \"I feel like I'm going to die\". He received immediate bystander CPR in the waiting room of the emergency department. He was found be in VF, was shocked x 3. Estimated downtime was approximately 15 min per paramedic report. Cannulated in [facility] ED and brought to the Cath Lab for angiogram where he was found to have a 100% ostial LAD thrombotic occlusion s/p stenting. Decanned on 6/4. He had residual severe disease in D1. S/p staged PCI 6/7 to D1. He was subsequently brought to [facility] for further care. Extubated on 6/10. Hospital course c/b acute hypoxic respiratory failure and probable aspiration PNA, shock liver, JEAN CLAUDE, acute blood loss anemia, hyperglycemia, hematuria, and moderate malnutrition. Pt is medically ready to admit to Acute inpatient Rehab.       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 Patient requires an intensive inpatient rehab program to address the following acute impairments:impaired activity tolerance, impaired balance, impaired ROM, impaired sensation, impaired strength, and impaired tone    Current Active Medical Management Needs/Risks for Clinical Complications  The patient requires the high level of rehabilitation physician supervision that accompanies the " provision of intensive rehabilitation therapy.  The patient needs the services of the rehabilitation physician to assess the patient medically and functionally and to modify the course of treatment as needed to maximize the patient's capacity to benefit from the rehabilitation process.  Cardiac: In the setting of VF arrest with STEMi and chronic systolic heart failure, continue DAPT with ASA and Ticagrelor. Monitor fluid status with continuation of oral diuretic. Ongoing management of cardiac function with Entresto and Toprol.   Endocrine: In the setting of recent hyperglycemia with A1c of 6.1 , monitor blood glucose levels. Continue Glargine and medium sliding scale insulin.   Nutrition: In the setting of moderate malnutrition due to complex illness, dietician to monitor caloric and nutritional intake as transitioned from TF to oral diet. Calorie counts started 6/14. Supplements as ordered. Diet advanced to regular.   Hematuria: Due to traumatic Benjamin insertion, monitor UOP and re-consult urology if hematuria persists, Benjamin catheter removed 6/14.  Skin: Wound care to ECMO decannulation site - continue Keflex po for five days. Monitor for s/s of infection.   Prophylaxis: subcutaneous heparin.  Mental Health - Pt at risk depression with recent cardiac arrest and hospitalization impacting functional quality of life. Continue venlafaxine. Health psychology services as need to assist with transition back to home and work.  -Monitor labs with recent hypocalcemia. Electrolyte Replacement protocols ordered      Past Medical/Surgical History   Surgery in the past 100 days: No   Additional relevant past medical history: no known past medical history     Level of Functioning Prior to Admission:    LIVING ENVIRONMENT   People in Home: significant other, child(adair), dependent (2 daughters)   Current Living Arrangements: house   Home Accessibility: stairs to enter home, stairs within home    Number of Stairs, Main Entrance:   (couple stairs to enter, one entry may have no stairs but cousin unsure)         Number of Stairs, Within Home, Primary:  (stairs to basement but not a living area and does not need access)        Transportation Anticipated: car, drives self, family or friend will provide   Living Environment Comments:  Pt lives with family in [place], able to stay on 1 level of home..    SELF-CARE   Usual Activity Tolerance: good    Equipment Currently Used at Home: none   Activity/Exercise/Self-Care Comment: Per cousin, pt IND w/ I/ADLs at baseline.   Additional Comments: Works as a contractor and [employer]    Level of Function: GG Scale (Section GG Functional Ability and Goals; CMS's EDWARDS Version 3.0 Manual effective 10.1.2019):  PT Current Function Goals for Rehab   Bed Rolling 6 Independent 6 Independent   Supine to Sit 5 Setup or clean-up assistance 6 Independent   Sit to Stand 4 Supervision or touching assitance 6 Independent   Transfer 4 Supervision or touching assitance 6 Independent   Ambulation 3 Partial/moderate assistance 6 Independent   Stairs Not completed 6 Independent     OT Current Function Goals for Rehab   Feeding 5 Setup or clean-up assistance 6 Independent   Grooming 5 Setup or clean-up assistance 6 Independent   Bathing Not completed 6 Independent   Upper Body Dressing Not completed 6 Independent   Lower Body Dressing 4 Supervision or touching assitance 6 Independent   Toileting 4 Supervision or touching assitance 6 Independent   Toilet Transfer 4 Supervision or touching assitance 6 Independent   Tub/Shower Transfer Not completed 6 Independent   Cognition Not Assessed Independent     SLP Current Function Goals for Rehab   Swallow Not Impaired Tolerate least restrictive diet without signs & symptoms of aspiration and adhere to safe swallow strategies   Communication Not Impaired Communicate basic needs effectively       Current Diet:  0-Thin and 7-Easy to chew    Summary Statement:  Pt requires intensive  PT/OT/SLP in order to return to previous living environment and previous level of functioning as independent at baseline.Pt is participating in PT/OT and making progress with transfers and mobility. Pt currently requires CGA to Assist x1 for short ambulation of 35-50' with FWW which is below pts baseline and is limited by decreased strength and activity tolerance. Continue OT for functional ALDs. In the setting of VF arrest, pt requires full higher level evaluation by SLP to identify barriers to return to work and identification of possible accommodations and compensatory strategies. Pt georgie great family support and was previously working full time,is motivated to progress.     Expected Therapies and Services Required During Inpatient Rehab Admission  Intensity of Therapy: Patient requires intensive therapies not available in a lesser level of care. Patient is motivated, making gains, and can tolerate 3 hours of therapy a day.  Physical Therapy: 75 minutes per day, 6 days a week for 7 days  Occupational Therapy: 75 minutes per day, 6 days a week for 7 days  Speech and Language Therapy: 30 minutes per day, 6 days a week for 7 days  Rehabilitation Nursing Needs: Patient requires 24 hour Rehab Nursing to manage vitals, medication education, positioning, carryover of new rehab techniques, care coordination, skin integrity, blood sugar management, diabetes education, provide safe environment for patient at falls risk, and monitor nutritional intake. Wound care.     Precautions/restrictions/special needs:   Precautions: fall precautions   Restrictions: N/A   Special Needs: N/A    Expected Level of Improvement: Mod I to independent with mobility, transfers, stairs, ADL/IADs, and cognitive abilities.   Expected Length of time to achieve: 7 days    Anticipated Discharge Needs:  Anticipated Discharge Destination: Home  Anticipated Discharge Support: Family member  24/7 support available : Unknown  Identified caregiver(s):   Spouse  Anticipated Discharge Needs: Home with outpatient therapy    Identified challenges/barriers:  N/A    Liaison signature/date/time:    Physician statement of review and agreement:  I have reviewed and am in agreement of the need for IRF stay to address above functional and medical needs. In addition to above statements address, Patient requires intensive active and ongoing therapeutic intervention and multiple therapies; Patient requires medical supervision; Expected to actively participate in the intensive rehab program; Sufficiently stable to actively participate; Expectation for measurable improvement in functional capacity or adaption to impairments.    MD signature/date/time:

## 2024-06-15 NOTE — PLAN OF CARE
Occupational Therapy Discharge Summary    Reason for therapy discharge:    Discharged to acute rehabilitation facility.    Progress towards therapy goal(s). See goals on Care Plan in [facility] electronic health record for goal details.  Goals partially met.  Barriers to achieving goals:   discharge from facility.    Therapy recommendation(s):    Continued therapy is recommended.  Rationale/Recommendations:  Recommend continued skilled OT services at ARU to progress IND w/ ADLs.

## 2024-06-16 ENCOUNTER — APPOINTMENT (OUTPATIENT)
Dept: PHYSICAL THERAPY | Facility: CLINIC | Age: 49
DRG: 949 | End: 2024-06-16
Attending: PHYSICAL MEDICINE & REHABILITATION
Payer: COMMERCIAL

## 2024-06-16 ENCOUNTER — APPOINTMENT (OUTPATIENT)
Dept: OCCUPATIONAL THERAPY | Facility: CLINIC | Age: 49
DRG: 949 | End: 2024-06-16
Attending: PHYSICAL MEDICINE & REHABILITATION
Payer: COMMERCIAL

## 2024-06-16 ENCOUNTER — APPOINTMENT (OUTPATIENT)
Dept: SPEECH THERAPY | Facility: CLINIC | Age: 49
DRG: 949 | End: 2024-06-16
Attending: PHYSICAL MEDICINE & REHABILITATION
Payer: COMMERCIAL

## 2024-06-16 LAB
ALBUMIN SERPL BCG-MCNC: 3.4 G/DL (ref 3.5–5.2)
ALP SERPL-CCNC: 151 U/L (ref 40–150)
ALT SERPL W P-5'-P-CCNC: 195 U/L (ref 0–70)
ANION GAP SERPL CALCULATED.3IONS-SCNC: 12 MMOL/L (ref 7–15)
AST SERPL W P-5'-P-CCNC: 76 U/L (ref 0–45)
BILIRUB SERPL-MCNC: 0.6 MG/DL
BUN SERPL-MCNC: 37.5 MG/DL (ref 6–20)
CALCIUM SERPL-MCNC: 8.9 MG/DL (ref 8.6–10)
CHLORIDE SERPL-SCNC: 102 MMOL/L (ref 98–107)
CREAT SERPL-MCNC: 0.92 MG/DL (ref 0.67–1.17)
DEPRECATED HCO3 PLAS-SCNC: 20 MMOL/L (ref 22–29)
EGFRCR SERPLBLD CKD-EPI 2021: >90 ML/MIN/1.73M2
ERYTHROCYTE [DISTWIDTH] IN BLOOD BY AUTOMATED COUNT: 14.8 % (ref 10–15)
GLUCOSE BLDC GLUCOMTR-MCNC: 101 MG/DL (ref 70–99)
GLUCOSE BLDC GLUCOMTR-MCNC: 111 MG/DL (ref 70–99)
GLUCOSE BLDC GLUCOMTR-MCNC: 144 MG/DL (ref 70–99)
GLUCOSE BLDC GLUCOMTR-MCNC: 152 MG/DL (ref 70–99)
GLUCOSE BLDC GLUCOMTR-MCNC: 94 MG/DL (ref 70–99)
GLUCOSE SERPL-MCNC: 167 MG/DL (ref 70–99)
HCT VFR BLD AUTO: 33.1 % (ref 40–53)
HGB BLD-MCNC: 10.6 G/DL (ref 13.3–17.7)
MCH RBC QN AUTO: 30.3 PG (ref 26.5–33)
MCHC RBC AUTO-ENTMCNC: 32 G/DL (ref 31.5–36.5)
MCV RBC AUTO: 95 FL (ref 78–100)
PLATELET # BLD AUTO: 615 10E3/UL (ref 150–450)
POTASSIUM SERPL-SCNC: 4.5 MMOL/L (ref 3.4–5.3)
PROT SERPL-MCNC: 7.1 G/DL (ref 6.4–8.3)
RBC # BLD AUTO: 3.5 10E6/UL (ref 4.4–5.9)
SODIUM SERPL-SCNC: 134 MMOL/L (ref 135–145)
WBC # BLD AUTO: 12.3 10E3/UL (ref 4–11)

## 2024-06-16 PROCEDURE — 250N000013 HC RX MED GY IP 250 OP 250 PS 637: Performed by: CLINIC/CENTER

## 2024-06-16 PROCEDURE — 128N000003 HC R&B REHAB

## 2024-06-16 PROCEDURE — 85027 COMPLETE CBC AUTOMATED: CPT | Performed by: CLINIC/CENTER

## 2024-06-16 PROCEDURE — 250N000011 HC RX IP 250 OP 636: Performed by: CLINIC/CENTER

## 2024-06-16 PROCEDURE — 36415 COLL VENOUS BLD VENIPUNCTURE: CPT | Performed by: CLINIC/CENTER

## 2024-06-16 PROCEDURE — 97162 PT EVAL MOD COMPLEX 30 MIN: CPT | Mod: GP

## 2024-06-16 PROCEDURE — 97535 SELF CARE MNGMENT TRAINING: CPT | Mod: GO

## 2024-06-16 PROCEDURE — 80053 COMPREHEN METABOLIC PANEL: CPT | Performed by: CLINIC/CENTER

## 2024-06-16 PROCEDURE — 96125 COGNITIVE TEST BY HC PRO: CPT | Mod: GN | Performed by: SPEECH-LANGUAGE PATHOLOGIST

## 2024-06-16 PROCEDURE — 97110 THERAPEUTIC EXERCISES: CPT | Mod: GP

## 2024-06-16 PROCEDURE — 97165 OT EVAL LOW COMPLEX 30 MIN: CPT | Mod: GO

## 2024-06-16 RX ORDER — MULTIVITAMIN,THERAPEUTIC
1 TABLET ORAL DAILY
Status: DISCONTINUED | OUTPATIENT
Start: 2024-06-17 | End: 2024-06-18 | Stop reason: HOSPADM

## 2024-06-16 RX ADMIN — TICAGRELOR 90 MG: 90 TABLET ORAL at 20:41

## 2024-06-16 RX ADMIN — EMPAGLIFLOZIN 10 MG: 10 TABLET, FILM COATED ORAL at 07:49

## 2024-06-16 RX ADMIN — Medication 15 ML: at 07:49

## 2024-06-16 RX ADMIN — SACUBITRIL AND VALSARTAN 1 TABLET: 24; 26 TABLET, FILM COATED ORAL at 20:40

## 2024-06-16 RX ADMIN — HEPARIN SODIUM 5000 UNITS: 5000 INJECTION, SOLUTION INTRAVENOUS; SUBCUTANEOUS at 04:49

## 2024-06-16 RX ADMIN — HEPARIN SODIUM 5000 UNITS: 5000 INJECTION, SOLUTION INTRAVENOUS; SUBCUTANEOUS at 20:40

## 2024-06-16 RX ADMIN — CEPHALEXIN 500 MG: 500 CAPSULE ORAL at 22:12

## 2024-06-16 RX ADMIN — SACUBITRIL AND VALSARTAN 1 TABLET: 24; 26 TABLET, FILM COATED ORAL at 07:51

## 2024-06-16 RX ADMIN — INSULIN GLARGINE 15 UNITS: 100 INJECTION, SOLUTION SUBCUTANEOUS at 22:12

## 2024-06-16 RX ADMIN — CEPHALEXIN 500 MG: 500 CAPSULE ORAL at 17:23

## 2024-06-16 RX ADMIN — Medication 10 MG: at 20:41

## 2024-06-16 RX ADMIN — PANTOPRAZOLE SODIUM 40 MG: 40 TABLET, DELAYED RELEASE ORAL at 07:49

## 2024-06-16 RX ADMIN — POTASSIUM CHLORIDE 40 MEQ: 1500 TABLET, EXTENDED RELEASE ORAL at 07:49

## 2024-06-16 RX ADMIN — ASPIRIN 81 MG: 81 TABLET, COATED ORAL at 07:49

## 2024-06-16 RX ADMIN — VENLAFAXINE HYDROCHLORIDE 75 MG: 75 CAPSULE, EXTENDED RELEASE ORAL at 07:51

## 2024-06-16 RX ADMIN — METOPROLOL SUCCINATE 50 MG: 50 TABLET, EXTENDED RELEASE ORAL at 07:51

## 2024-06-16 RX ADMIN — SPIRONOLACTONE 25 MG: 25 TABLET ORAL at 07:49

## 2024-06-16 RX ADMIN — AMIODARONE HYDROCHLORIDE 200 MG: 200 TABLET ORAL at 07:49

## 2024-06-16 RX ADMIN — FUROSEMIDE 40 MG: 40 TABLET ORAL at 07:49

## 2024-06-16 RX ADMIN — HEPARIN SODIUM 5000 UNITS: 5000 INJECTION, SOLUTION INTRAVENOUS; SUBCUTANEOUS at 12:28

## 2024-06-16 RX ADMIN — CEPHALEXIN 500 MG: 500 CAPSULE ORAL at 04:50

## 2024-06-16 RX ADMIN — CEPHALEXIN 500 MG: 500 CAPSULE ORAL at 12:27

## 2024-06-16 RX ADMIN — TICAGRELOR 90 MG: 90 TABLET ORAL at 07:51

## 2024-06-16 RX ADMIN — ROSUVASTATIN 20 MG: 20 TABLET, FILM COATED ORAL at 07:49

## 2024-06-16 ASSESSMENT — ACTIVITIES OF DAILY LIVING (ADL)
ADLS_ACUITY_SCORE: 33
BADLS,_PREVIOUS_FUNCTIONAL_LEVEL: INDEPENDENT
ADLS_ACUITY_SCORE: 33
BADLS,_PREVIOUS_FUNCTIONAL_LEVEL: INDEPENDENT
ADLS_ACUITY_SCORE: 33
IADLS,_PREVIOUS_FUNCTIONAL_LEVEL: INDEPENDENT
ADLS_ACUITY_SCORE: 33
IADLS,_PREVIOUS_FUNCTIONAL_LEVEL: INDEPENDENT
ADLS_ACUITY_SCORE: 33

## 2024-06-16 NOTE — PLAN OF CARE
"Goal Outcome Evaluation:  Please view flowsheets for assessment data     Plan of Care Reviewed With: patient    Overall Patient Progress: improving         VS: Blood pressure 105/66, pulse 82, temperature 98.8  F (37.1  C), temperature source Oral, resp. rate 18, height 1.854 m (6' 1\"), weight 116.9 kg (257 lb 11.2 oz), SpO2 97%.    O2: 97% on room air. Respirations even and non labored    Output: Continent. Voids spontaneously in toilet.    Last BM: 6/15   Activity: SBA - Ax1 with gait belt and walker    Skin: R groin surgical incision. Bruising on abdomen. Head laceration. RUE mepilex.    Pain: Pt denies pain.    Neuro: A/O x4. Pleasant and cooperative    Dressing: None    Diet: Regular and thin liquids    LDA: None    Equipment: Walker and gait belt    Plan: Continue plan of care    Additional Info:                  "

## 2024-06-16 NOTE — PROGRESS NOTES
06/16/24 0930   Appointment Info   Signing Clinician's Name / Credentials (PT) Lexi Mckeon DPT      Language English   Living Environment   People in Home child(adair), dependent;spouse  (lives with wife and 12 and 14 year old children)   Current Living Arrangements house   Home Accessibility stairs to enter home   Number of Stairs, Main Entrance 1   Stair Railings, Main Entrance none   Transportation Anticipated car, drives self;family or friend will provide   Living Environment Comments Home is all on one floor, bathroom has a walk in shower, built in bench in shower, standard height toilets   Self-Care   Usual Activity Tolerance good   Current Activity Tolerance moderate   Equipment Currently Used at Home walker, standard   Fall history within last six months yes   Number of times patient has fallen within last six months 1   Activity/Exercise/Self-Care Comment  and hockey    Post-Acute Assessment Only   Post-Acute Functional Assessment See below   Previous Level of Function/Home Environm   Bathing, Previous Functional Level independent   Grooming, Previous Functional Level independent   Dressing, Previous Functional Level independent   Eating/Feeding, Previous Functional Level independent   Toileting, Previous Functional Level independent   BADLs, Previous Functional Level independent   IADLs, Previous Functional Level independent   Bed Mobility, Previous Functional Level independent   Transfers, Previous Functional Level independent   Household Ambulation, Previous Functional Level independent   Stairs, Previous Functional Level independent   Community Ambulation, Previous Functional Level independent   General Information   Onset of Illness/Injury or Date of Surgery 06/01/24   Referring Physician Dr. Kadeem Fernandez   Pertinent History of Current Problem (include personal factors and/or comorbidities that impact the POC) Per chart: 48 year old with a PMHx of depression, ED, and  hld. On 6/1/24 he presented to the [facility] ED with chest pain and collapsed. He was found to VF and was shocked 3 times with estimated downtime was 15 minutes approximately per paramedic. He was found to have STEMI (100% thrombotic LAD lesion) and underwent KAYLYN placement on 6/1. He was extubated on 6/10   Existing Precautions/Restrictions fall   Cognition   Cognitive Status Comments Intact and appropriate   Pain Assessment   Patient Currently in Pain No   Integumentary/Edema   Integumentary/Edema Comments No edema - rash throughout body, MDs aware.   Posture    Posture Not impaired   Range of Motion (ROM)   Range of Motion ROM is WNL   Strength (Manual Muscle Testing)   Strength Comments 5/5 to muscle testing in BLE.   Balance   Balance Comments Dynamic balance impaired - ignificant LOB during head turns and stuggles with finding midline on ramps.   Sensory Examination   Sensory Perception Comments Sensation testing intact and equal in all 4 limbs. Wears glasses.   Coordination   Coordination Comments Slowed reaction time in all 4 limbs.   Muscle Tone   Muscle Tone no deficits were identified   Clinical Impression   Criteria for Skilled Therapeutic Intervention Yes, treatment indicated   PT Diagnosis (PT) functional activity intolerance, gait instability   Influenced by the following impairments see clinical impression comments   Functional limitations due to impairments see clinical impression comments   Clinical Presentation (PT Evaluation Complexity) evolving   Clinical Presentation Rationale moderately complex d/t multisystem deficits   Clinical Decision Making (Complexity) moderate complexity   Planned Therapy Interventions (PT) balance training;gait training;groups;home exercise program;neuromuscular re-education;patient/family education;strengthening;stair training;stretching;progressive activity/exercise;risk factor education;home program guidelines   Risk & Benefits of therapy have been explained  evaluation/treatment results reviewed;care plan/treatment goals reviewed;risks/benefits reviewed;current/potential barriers reviewed;participants voiced agreement with care plan;participants included;patient   Clinical Impression Comments Pt is below baseline for functional mobility s/p STEMI and prolonged ICU stay. He presents with intact strength and sensation, but general imbalance, gait instability, and impaired activity tolerance. He is safe to be IND in room without AD. ELOS 4-5 days on ARU. OP CR after d/c.   PT Total Evaluation Time   PT Eval, Moderate Complexity Minutes (82743) 15   Physical Therapy Goals   PT Frequency 6x/week   PT Predicted Duration/Target Date for Goal Attainment 06/19/24   PT Goals Gait;Stairs;PT Goal 1;PT Goal 2   PT: Gait Independent;Greater than 200 feet   PT: Stairs Greater than 10 stairs;Independent   PT: Goal 1 Pt will be able to tolerate 10 minutes of continuous ambulation in order to return to home and community   PT: Goal 2 Car transfer into family vehicle, IND   Interventions   Interventions Quick Adds Therapeutic Procedure   Therapeutic Procedure/Exercise   Ther. Procedure: strength, endurance, ROM, flexibillity Minutes (90763) 45   Treatment Detail/Skilled Intervention Functional gait endurance with planned rest breaks for energy conservation. Progressed to up/down ramps in tunnel, pt needing close SBA throughout d/t LOB with head turns, rodney changes, and pivot turns. Lost balance into walls x 3. Balance stepping reactions are intact and does not fall. BP stable, but fatigues easily. Total distance ambulated during session: 2,000 ft.   PT Discharge Planning   PT Plan FGA, 6 min walk test, high level balance (enjoys hockey)   Total Session Time   Timed Code Treatment Minutes 45   Total Session Time (sum of timed and untimed services) 60   Post Acute Settings Only   What unit is patient on? Acute Rehab   PT - Acute Rehab Center Time   Individual Time (minutes) - PT 60    Group Time (minutes) - PT 0   Concurrent Time (minutes) - PT 0   Co-Treatment Time (minutes) - PT 0   ARC Total Session Time (minutes) - PT 60   ARC Daily Total Session Time   PT ARC Daily Total Session Time 60   ARC Daily Rehab Total Minutes 120   Toilet Hygiene   Complete independence Toileting Task yes   Toilet Transfer   Complete independence with getting on and off a toilet or commode yes   Chair/bed-to-chair Transfer   Complete independence for chair-bed-to-chair transfer yes   Roll Left and Right   Assistance Needed Independent   Physical Assistance Level No physical assistance   Roll Left to Right CARE Score 6   Sit to Lying   Assistance Needed Independent   Physical Assistance Level No physical assistance   Sit to Lying CARE Score 6   Lying to Sitting on Side of Bed   Assistance Needed Independent   Physical Assistance Level No physical assistance   Lying to Sitting CARE Score 6   Sit to Stand   Assistance Needed Independent   Physical Assistance Level No physical assistance   Sitting to Standing CARE Score 6   Locomotion   Locomotion Comment IND in room, SBA in halls up to 200 ft continuous. Scissoring at times, lateral path deviation with all head and body turns.   Walk 10 Feet   Assistance Needed Independent   Physical Assistance Level No physical assistance   Walk 10 Ft. CARE Score 6   Walk 50 Feet with Two Turns   Patient Performance Supervision   Physical Assistance Level No physical assistance   Walk 50 Ft. CARE Score 4   Walk 150 Feet   Assistance Needed Supervision   Physical Assistance Level No physical assistance   Walk 150 Ft. CARE Score 4   Walking 10 Feet on Uneven Surfaces   Assistance Needed Supervision   Physical Assistance Level No physical assistance   Walking 10 Feet on Uneven Surfaces CARE Score 4   Wheel 50 Feet with Two Turns   Reason if not Attempted Activity not applicable   Wheel 50 Feet with Two Turns CARE Score 9   Wheel 150 Feet   Reason if not Attempted Activity not  applicable   Wheel 150 Feet CARE Score 9   Stairs   Assistive Devices 2 rails   Stairs Comment SBA on stairs with B rails. Snapping knee extension on ascent, snapping knee flexion on descent. Improves with practice, but still unstable.   1 Step (Curb)   Assistance Needed Supervision   Physical Assistance Level No physical assistance   1 Step CARE Score 4   4 Steps   Assistance Needed Supervision   Physical Assistance Level No physical assistance   4 Steps CARE Score 4   12 Steps   Assistance Needed Supervision   Physical Assistance Level No physical assistance   12 Steps CARE Score 4   Picking Up Object   Assistance Needed Supervision   Physical Assistance Level No physical assistance    CARE Score 4   Car Transfer   Assistance Needed Supervision   Physical Assistance Level No physical assistance   Car Transfer CARE Score 4

## 2024-06-16 NOTE — PROGRESS NOTES
"   06/16/24 7635   Appointment Info   Signing Clinician's Name / Credentials (SLP) Norman Davidson MS, CCC-SLP   General Information   Onset of Illness/Injury or Date of Surgery 06/01/24   Referring Physician Radha Wade MD   Pertinent History of Current Problem Per H&P: Patient is \"48 year old with a PMHx of depression, ED, and hld. On 6/1/24 he presented to the [facility] ED with chest pain and collapsed.      He was found to have VF and was shocked 3 times with estimated downtime of 15 minutes approximately per paramedic. He was cannulated on ECMO. He was found to have STEMI (100% thrombotic LAD lesion) and underwent KAYLYN placement on 6/1. He obtained Echo 6/8/24 that showed EF 43.8%, mildly reduced. He was extubated on 6/10. NG tube and Benjamin were removed on 6/13 with improved PO intake and adequate urinary output.      His hospital course has been complicated by infection and traumatic cath. He had probably aspiration pna and cefepime was completed on 6/14. He has had diarrhea since starting tube feeds with C diff negative. He had 2 episodes of hematuria on 6/13-6/14 with traumatic intermittent cath, but otherwise none since. He has multiple wounds, one over right cath insertion site, which had surrounding erythema and suspected infection and he was started on abx. Plans to remove 1/2 of staples at this site on 6/18. He has a left scalp wound after falling at the ED with staples removed 6/14.\" SLP consult received for evaluation and treatment as indicated.   General Observations Pt followed by acute SLP for dysphagia, all swallow goals met 06/11 with regular solids and thin liquids. Full cognitive-linguistic evaluation recommended with ARU SLP. Pt agreeable to cognitive evaluation and appeared to give full effort. Pt reported sleeping poorly last night and feeling tired this afternoon during evaluation time.   Type of Evaluation   Type of Evaluation Speech, Language, Cognition   Motor Speech   Speech " "Intelligibility (Motor Speech) WNL   Auditory Comprehension   Follows Commands (Auditory Comprehension) multi-step commands;WNL   Verbal Expression   Conversational Speech (Verbal Expression) WNL   Cognition   Cognitive Function WFL  (minimal attention/concentration deficits noted, suspect due to fatigue during testing rather than true deficit.)   Cognitive Status Alert, pleasant, cooperative   Additional cognitive-linguistic evaluation indicated  Completed   Cognitive Status Exam Comments RBANS form A administered and interpreted, please see progress note/below for details.   Orientation Status (Cognition) oriented to;person;place;situation;time   Affect/Mental Status (Cognition) WFL   Follows Commands (Cognition) follows multi-step commands;over 90% accuracy   Clinical Impression   Criteria for Skilled Therapeutic Interventions Met (SLP Eval) Evaluation only   SLP Diagnosis WFL cognitive function   Problem List (SLP) None   Activity Limitations Related to Problem List (SLP) None   Risks & Benefits of therapy have been explained evaluation/treatment results reviewed;care plan/treatment goals reviewed;participants voiced agreement with care plan;participants included;patient   Clinical Impression Comments SLP: Motor speech, language intact. RBANS form A administered and interpreted, overall score within functional limits for pt's age. Facilitated discussion with pt regarding performance on test, pt reported he feels he would \"have done about the same\" a few months ago. SLP evaluation only.   SLP Total Evaluation Time   Cognitive  Performance Testing Minutes, per hour - includes time for administering test, interp results & prep report (20616) 60   SLP Discharge Planning   SLP Plan SLP: ARU SLP eval only. No ongoing SLP recommended upon return to home/community.   SLP - Acute Rehab Center Time   Individual Time (minutes) - SLP 60   Group Time (minutes) - SLP 0   Concurrent Time (minutes) - SLP 0   Co-Treatment Time " (minutes) - SLP 0   ARC Total Session Time (minutes) - SLP 60   ARC Daily Total Session Time   SLP ARC Daily Total Session Time 60   ARC Daily Rehab Total Minutes 180       Repeatable Battery for the Assessment of Neuropsychological Status (RBANS) FORM    Immediate Memory Visuospatial/  Constructional Language Attention Delayed Memory Total Scale   Index Score 85 112 91 79 86 86   Percentile Rank 16 79 27 8 18 18     SLP:  Pt seen for administration of RBANS. Results are based on a mean of 100 and a standard deviation of +/- 15.   Interpretation: Please see clinical impressions above.  Face to Face Administration: 45  Scoring/Interpretation: 15  Total Time: 60

## 2024-06-16 NOTE — PLAN OF CARE
Discharge Planner Post-Acute Rehab OT:      Discharge Plan: Home w/ wife to help as needed, OP cardiac rehab      Precautions: Fall, cardiac      Current Status:  ADLs:  Mobility: mod I FWW   Grooming: CGA at sink  Dressing: UB: set up seated, LB: set up seated, Feet: mod I  Bathing: CGA when standing to wash backside. Pt able to complete all wash/rinse/dry of all body parts    Toileting: CGA with FWW   IADLs: Independent at baseline, pt was working as a contractor and hockey    Vision/Cognition: pt wears corrective lenses at baseline,      Assessment:   Pt is a 48 year old male who is below baseline for functional mobility, ADLS, and IADLs s/p STEMI and prolonged ICU stay. Needs to improve activity tolerance and endurance. Would benefit from skilled OT services to return to PLOF. ELOS 4-5 days on ARU. OP CR after d/c.      Other Barriers to Discharge (DME, Family Training, etc):

## 2024-06-16 NOTE — PLAN OF CARE
Goal Outcome Evaluation:    Outcome Evaluation: Pt AxO, able to make needs known. VSS, denied pain. On regular texture, thin liquid diet. Able to take his pills whole with thin liquids. Now Independent in the room without device, SBA in the hallway. Continent of bowel and bladder, able to use the urinal. No BM reported. Participated in therapies. Continue with POC.

## 2024-06-16 NOTE — PLAN OF CARE
Patient alert and oriented. Able to make needs known. Denied pain or any discomfort. Vitals stable. BG checks within parameters. Voiding adequately without concern. No care concern voiced at this time. Call light within reach. Continue with plan of care.

## 2024-06-16 NOTE — PLAN OF CARE
Goal Outcome Evaluation:    Outcome Evaluation: Patient admitted today, arrived in unit around 1330H. AxO, able to make needs known. VSS, denied pain. On regular texture, thin liquid diet. Able to take his pills whole with thin liquids, prefers to order own meals. A1 with the walker and gait belt. Continent of bowel and bladder, loose BM reported x1. PVRs done. Admission orientation and assessment done. Endorsed accordingly to incoming NOD.

## 2024-06-16 NOTE — PROGRESS NOTES
06/16/24 0800   Appointment Info   Signing Clinician's Name / Credentials (OT) Monica Linda, OTR/L   Living Environment   People in Home child(adair), dependent;spouse  (lives with wife and 12 and 14 year old children. Wife has flexible hrs and is able to assist as needed.)   Current Living Arrangements house   Home Accessibility stairs to enter home   Number of Stairs, Main Entrance 1   Stair Railings, Main Entrance none   Transportation Anticipated car, drives self;family or friend will provide   Living Environment Comments Home is all on one floor, bathroom has a walk in shower, built in bench in shower, standard height toilets   Self-Care   Usual Activity Tolerance good   Current Activity Tolerance moderate   Regular Exercise Yes   Activity/Exercise Type team sports  (hockey )   Exercise Amount/Frequency 3-5 times/wk;30 mins   Equipment Currently Used at Home walker, standard   Fall history within last six months yes   Number of times patient has fallen within last six months 1   Activity/Exercise/Self-Care Comment Pt is a hockey  and    Instrumental Activities of Daily Living (IADL)   Previous Responsibilities shopping;yardwork;laundry;housekeeping;meal prep;medication management;finances;driving;work;   IADL Comments Ind with all IADLs at baseline   Previous Level of Function/Home Environm   Bathing, Previous Functional Level independent   Grooming, Previous Functional Level independent   Dressing, Previous Functional Level independent   Eating/Feeding, Previous Functional Level independent   Toileting, Previous Functional Level independent   BADLs, Previous Functional Level independent   IADLs, Previous Functional Level independent   Bed Mobility, Previous Functional Level independent   Transfers, Previous Functional Level independent   Household Ambulation, Previous Functional Level independent   Stairs, Previous Functional Level independent   Community Ambulation,  Previous Functional Level independent   General Information   Onset of Illness/Injury or Date of Surgery 06/01/24   Referring Physician Kadeem Fernandez MD   Patient/Family Therapy Goal Statement (OT) Get home as soon as I can   Additional Occupational Profile Info/Pertinent History of Current Problem Pt works as a contractor and a girls hockey .   Performance Patterns (Routines, Roles, Habits) father, , , employee   Existing Precautions/Restrictions cardiac   Left Upper Extremity (Weight-bearing Status) full weight-bearing (FWB)   Right Upper Extremity (Weight-bearing Status) full weight-bearing (FWB)   Left Lower Extremity (Weight-bearing Status) full weight-bearing (FWB)   Right Lower Extremity (Weight-bearing Status) full weight-bearing (FWB)   General Observations and Info Per chart: Cullen Reardon is a 48 year old with a PMHx of depression, ED, and hld. On 6/1/24 he presented to the [facility] ED with chest pain and collapsed. He was found to VF and was shocked 3 times with estimated downtime was 15 minutes approximately per paramedic. He was found to have STEMI (100% thrombotic LAD lesion) and underwent KAYLYN placement on 6/1. He was extubated on 6/10. Pt is below baseline for ADLS and IADLs. Would benefit from skilled OT services to return to PLOF.   Cognitive Status Examination   Orientation Status orientation to person, place and time   Follows Commands WNL   Visual Perception   Visual Impairment/Limitations corrective lenses full-time   Pain Assessment   Patient Currently in Pain No   Posture   Posture not impaired   Range of Motion Comprehensive   General Range of Motion bilateral upper extremity ROM WNL   Coordination   Fine Motor Coordination Pt reports decreased FMC since being intubated. reports it is starting to improve   Clinical Impression   OT Diagnosis Pt is deconditioned and needs to improve activity tolerance to return to plof in adls/iadls   OT Problem List-Impairments  impacting ADL problems related to;activity tolerance impaired;balance;strength;mobility;vision   ADL comments/analysis below baseline for ADLs/IADLs   Identified Performance Deficits bathing. dressing, toileting, transfers, functional mobility,   Planned Therapy Interventions (OT) ADL retraining;IADL retraining;balance training;bed mobility training;groups;motor coordination training;E-stim;neuromuscular re-education;strengthening;transfer training;visual perception;home program guidelines   Risk & Benefits of therapy have been explained evaluation/treatment results reviewed;care plan/treatment goals reviewed;risks/benefits reviewed;current/potential barriers reviewed;participants voiced agreement with care plan;participants included;patient   Clinical Impression Comments  Pt is a 48 year old male who is below baseline for functional mobility, ADLS, and IADLs s/p STEMI and prolonged ICU stay. Needs to improve activity tolerance and endurance. Would benefit from skilled OT services to return to PLOF. ELOS 4-5 days on ARU. OP CR after d/c.   OT Total Evaluation Time   OT Eval, Low Complexity Minutes (39617) 10   OT Goals   Therapy Frequency (OT) 6 times/week   OT Goals Hygiene/Grooming;Upper Body Dressing;Lower Body Dressing;Upper Body Bathing;Lower Body Bathing;Bed Mobility;Transfers;Toilet Transfer/Toileting;OT Goal 1;OT Goal 2   OT: Hygiene/Grooming modified independent   OT: Upper Body Dressing Modified independent   OT: Lower Body Dressing Modified independent   OT: Upper Body Bathing Modified independent   OT: Lower Body Bathing Modified independent   OT: Bed Mobility Independent   OT: Transfer Modified independent   OT: Toilet Transfer/Toileting Modified independent   OT: Goal 1 Pt will be mod I for shower transfer   OT: Goal 2 Pt will demonstrate Ind with HEP   Self-Care/Home Management   Self-Care/Home Mgmt/ADL, Compensatory, Meal Prep Minutes (09633) 50   Symptoms Noted During/After Treatment (Meal  Preparation/Planning Training) fatigue   Treatment Detail/Skilled Intervention Completed shower assessment as well as dressing, oral cares, g/h. See below for add details.   OT Discharge Planning   OT Plan asses for mod I in room, FMC, activity tolerance   Total Session Time   Timed Code Treatment Minutes 50   Total Session Time (sum of timed and untimed services) 60   Post Acute Settings Only   What unit is patient on? Acute Rehab   OT - Acute Rehab Center Time   Individual Time (minutes) - OT 60   Group Time (minutes) - OT 0   Concurrent Time (minutes) - OT 0   Co-Treatment Time (minutes) - OT 0   ARC Total Session Time (minutes) - OT 60   ARC Daily Total Session Time   OT ARC Daily Total Session Time 60   ARC Daily Rehab Total Minutes 60   Oral Hygiene   Describe performance CGA standing at sink   Grooming (except oral cares)   Grooming Comment CGA standing at sink   Upper Body Dressing   Describe performance set up A seated   Lower Body Dressing (Pants/Undergarments)   Describe performance set up A seated, CGA to stand and pull over hips   Lower Body Dressing putting on/taking off footwear   Describe performance mod I   Shower/Bathe self   Describe performance CGA to stannd, otherwise pt able to complete wash/rinse/dry of all body parts   Completely independent with Shower/Bath no   Environmental assistance to shower/bathe including washing/rinsing and drying all body parts Set-up assistance prior to the activity   Physical assistance to complete bathing task Steadying assist provided to wash/rinse/dry body parts   Tub / Shower Transfer   Tub/Shower Transfer Comment CGA with FWW and grab bars   Toilet Hygiene   Describe performance CGA standing norm cares   Toilet Transfer   Describe performance CGA with FWW   Chair/bed-to-chair Transfer   Environmental assistance for getting from chair-bed-to-chair Supervision for safety concerns   Physical assistance for getting from chair-bed-to-chair Steadying assist for  getting from chair-bed-to-chair   Roll Left and Right   Assistance Needed Independent   Roll Left to Right CARE Score 6   Sit to Lying   Assistance Needed Independent   Sit to Lying CARE Score 6   Lying to Sitting on Side of Bed   Assistance Needed Adaptive equipment   Physical Assistance Level Contact guard assist   Lying to Sitting CARE Score 4   Sit to Stand   Assistance Needed Supervision   Physical Assistance Level Contact guard assist   Sitting to Standing CARE Score 4

## 2024-06-17 ENCOUNTER — APPOINTMENT (OUTPATIENT)
Dept: PHYSICAL THERAPY | Facility: CLINIC | Age: 49
DRG: 949 | End: 2024-06-17
Attending: PHYSICAL MEDICINE & REHABILITATION
Payer: COMMERCIAL

## 2024-06-17 ENCOUNTER — APPOINTMENT (OUTPATIENT)
Dept: OCCUPATIONAL THERAPY | Facility: CLINIC | Age: 49
DRG: 949 | End: 2024-06-17
Attending: PHYSICAL MEDICINE & REHABILITATION
Payer: COMMERCIAL

## 2024-06-17 LAB
ALBUMIN SERPL BCG-MCNC: 3.3 G/DL (ref 3.5–5.2)
ALP SERPL-CCNC: 140 U/L (ref 40–150)
ALT SERPL W P-5'-P-CCNC: 166 U/L (ref 0–70)
AMPHET BLD CFM-MCNC: NEGATIVE NG/ML
ANION GAP SERPL CALCULATED.3IONS-SCNC: 11 MMOL/L (ref 7–15)
APAP BLD-MCNC: NEGATIVE UG/ML
AST SERPL W P-5'-P-CCNC: 53 U/L (ref 0–45)
BARBITURATES SPEC-MCNC: NEGATIVE UG/ML
BENZODIAZ SPEC-MCNC: ABNORMAL NG/ML
BILIRUB SERPL-MCNC: 0.6 MG/DL
BUN SERPL-MCNC: 37.5 MG/DL (ref 6–20)
BUPRENORPHINE SERPL-MCNC: NEGATIVE NG/ML
BZE BLD CFM-MCNC: NEGATIVE NG/ML
CALCIUM SERPL-MCNC: 8.5 MG/DL (ref 8.6–10)
CARBOXYTHC BLD-MCNC: NEGATIVE NG/ML
CARISOPRODOL IA: NEGATIVE UG/ML
CHLORIDE SERPL-SCNC: 102 MMOL/L (ref 98–107)
CREAT SERPL-MCNC: 0.98 MG/DL (ref 0.67–1.17)
DECLARED MEDICATIONS: ABNORMAL
DEPRECATED HCO3 PLAS-SCNC: 19 MMOL/L (ref 22–29)
DRUGS BLD SCN NOM: ABNORMAL
DRUGS FLD: ABNORMAL
DRUGS FLD: ABNORMAL
DRUGS FLD: POSITIVE
EGFRCR SERPLBLD CKD-EPI 2021: >90 ML/MIN/1.73M2
ERYTHROCYTE [DISTWIDTH] IN BLOOD BY AUTOMATED COUNT: 14.6 % (ref 10–15)
ETHANOL BLD-MCNC: NEGATIVE GM/DL
FENTANYL IA: NEGATIVE NG/ML
GABAPENTIN IA: NEGATIVE UG/ML
GLUCOSE BLDC GLUCOMTR-MCNC: 104 MG/DL (ref 70–99)
GLUCOSE BLDC GLUCOMTR-MCNC: 113 MG/DL (ref 70–99)
GLUCOSE BLDC GLUCOMTR-MCNC: 121 MG/DL (ref 70–99)
GLUCOSE SERPL-MCNC: 97 MG/DL (ref 70–99)
HCT VFR BLD AUTO: 33.8 % (ref 40–53)
HGB BLD-MCNC: 11.1 G/DL (ref 13.3–17.7)
MCH RBC QN AUTO: 30.5 PG (ref 26.5–33)
MCHC RBC AUTO-ENTMCNC: 32.8 G/DL (ref 31.5–36.5)
MCV RBC AUTO: 93 FL (ref 78–100)
MEPERIDINE SERPLBLD-MCNC: NEGATIVE NG/ML
METHADONE SAL CFM-MCNC: NEGATIVE NG/ML
OPIATES SPEC-MCNC: ABNORMAL NG/ML
OXYCODONE SERPLBLD SCN-MCNC: NEGATIVE NG/ML
PCP SPEC-MCNC: NEGATIVE NG/ML
PLATELET # BLD AUTO: 593 10E3/UL (ref 150–450)
POTASSIUM SERPL-SCNC: 4.1 MMOL/L (ref 3.4–5.3)
PROPOXYPH SPEC-MCNC: NEGATIVE NG/ML
PROT SERPL-MCNC: 6.9 G/DL (ref 6.4–8.3)
RBC # BLD AUTO: 3.64 10E6/UL (ref 4.4–5.9)
SODIUM SERPL-SCNC: 132 MMOL/L (ref 135–145)
TRAMADOL BLD-MCNC: NEGATIVE NG/ML
WBC # BLD AUTO: 8.5 10E3/UL (ref 4–11)

## 2024-06-17 PROCEDURE — 250N000011 HC RX IP 250 OP 636: Performed by: CLINIC/CENTER

## 2024-06-17 PROCEDURE — 85027 COMPLETE CBC AUTOMATED: CPT | Performed by: CLINIC/CENTER

## 2024-06-17 PROCEDURE — 250N000013 HC RX MED GY IP 250 OP 250 PS 637: Performed by: PHYSICIAN ASSISTANT

## 2024-06-17 PROCEDURE — 97750 PHYSICAL PERFORMANCE TEST: CPT | Mod: GP | Performed by: PHYSICAL THERAPIST

## 2024-06-17 PROCEDURE — 250N000013 HC RX MED GY IP 250 OP 250 PS 637: Performed by: STUDENT IN AN ORGANIZED HEALTH CARE EDUCATION/TRAINING PROGRAM

## 2024-06-17 PROCEDURE — 36415 COLL VENOUS BLD VENIPUNCTURE: CPT | Performed by: CLINIC/CENTER

## 2024-06-17 PROCEDURE — 97110 THERAPEUTIC EXERCISES: CPT | Mod: GP

## 2024-06-17 PROCEDURE — 80053 COMPREHEN METABOLIC PANEL: CPT | Performed by: CLINIC/CENTER

## 2024-06-17 PROCEDURE — 99232 SBSQ HOSP IP/OBS MODERATE 35: CPT | Performed by: PHYSICIAN ASSISTANT

## 2024-06-17 PROCEDURE — 97530 THERAPEUTIC ACTIVITIES: CPT | Mod: GO

## 2024-06-17 PROCEDURE — 97110 THERAPEUTIC EXERCISES: CPT | Mod: GP | Performed by: PHYSICAL THERAPIST

## 2024-06-17 PROCEDURE — 128N000003 HC R&B REHAB

## 2024-06-17 PROCEDURE — 250N000013 HC RX MED GY IP 250 OP 250 PS 637: Performed by: CLINIC/CENTER

## 2024-06-17 RX ORDER — DEXTROSE MONOHYDRATE 25 G/50ML
25-50 INJECTION, SOLUTION INTRAVENOUS
Status: DISCONTINUED | OUTPATIENT
Start: 2024-06-17 | End: 2024-06-17

## 2024-06-17 RX ORDER — NICOTINE POLACRILEX 4 MG
15-30 LOZENGE BUCCAL
Status: DISCONTINUED | OUTPATIENT
Start: 2024-06-17 | End: 2024-06-17

## 2024-06-17 RX ADMIN — SPIRONOLACTONE 25 MG: 25 TABLET ORAL at 08:59

## 2024-06-17 RX ADMIN — THERA TABS 1 TABLET: TAB at 08:59

## 2024-06-17 RX ADMIN — CEPHALEXIN 500 MG: 500 CAPSULE ORAL at 23:35

## 2024-06-17 RX ADMIN — VENLAFAXINE HYDROCHLORIDE 75 MG: 75 CAPSULE, EXTENDED RELEASE ORAL at 08:59

## 2024-06-17 RX ADMIN — POTASSIUM CHLORIDE 40 MEQ: 1500 TABLET, EXTENDED RELEASE ORAL at 08:59

## 2024-06-17 RX ADMIN — HEPARIN SODIUM 5000 UNITS: 5000 INJECTION, SOLUTION INTRAVENOUS; SUBCUTANEOUS at 05:45

## 2024-06-17 RX ADMIN — ROSUVASTATIN 20 MG: 20 TABLET, FILM COATED ORAL at 09:01

## 2024-06-17 RX ADMIN — TICAGRELOR 90 MG: 90 TABLET ORAL at 08:58

## 2024-06-17 RX ADMIN — Medication 10 MG: at 20:37

## 2024-06-17 RX ADMIN — SACUBITRIL AND VALSARTAN 1 TABLET: 24; 26 TABLET, FILM COATED ORAL at 20:37

## 2024-06-17 RX ADMIN — SACUBITRIL AND VALSARTAN 1 TABLET: 24; 26 TABLET, FILM COATED ORAL at 09:00

## 2024-06-17 RX ADMIN — CEPHALEXIN 500 MG: 500 CAPSULE ORAL at 11:59

## 2024-06-17 RX ADMIN — CEPHALEXIN 500 MG: 500 CAPSULE ORAL at 05:45

## 2024-06-17 RX ADMIN — METOPROLOL SUCCINATE 50 MG: 50 TABLET, EXTENDED RELEASE ORAL at 08:58

## 2024-06-17 RX ADMIN — CEPHALEXIN 500 MG: 500 CAPSULE ORAL at 17:37

## 2024-06-17 RX ADMIN — ASPIRIN 81 MG: 81 TABLET, COATED ORAL at 09:00

## 2024-06-17 RX ADMIN — PANTOPRAZOLE SODIUM 40 MG: 40 TABLET, DELAYED RELEASE ORAL at 05:51

## 2024-06-17 RX ADMIN — FUROSEMIDE 40 MG: 40 TABLET ORAL at 08:59

## 2024-06-17 RX ADMIN — AMIODARONE HYDROCHLORIDE 200 MG: 200 TABLET ORAL at 08:58

## 2024-06-17 RX ADMIN — EMPAGLIFLOZIN 10 MG: 10 TABLET, FILM COATED ORAL at 08:58

## 2024-06-17 RX ADMIN — TICAGRELOR 90 MG: 90 TABLET ORAL at 20:37

## 2024-06-17 ASSESSMENT — ACTIVITIES OF DAILY LIVING (ADL)
ADLS_ACUITY_SCORE: 33

## 2024-06-17 NOTE — PHARMACY-MEDICATION REGIMEN REVIEW
Pharmacy Medication Regimen Review  Cullen Reardon is a 48 year old male who is currently in the Acute Rehab Unit.    Assessment: All medications have an appropriate indications, durations and no unnecessary use was found    Plan:   Continue current medication regimen.     Attending provider will be sent this note for review.  If there are any emergent issues noted above, pharmacist will contact provider directly by phone.      Pharmacy will periodically review the resident's medication regimen for any PRN medications not administered in > 72 hours and discontinue them. The pharmacist will discuss gradual dose reductions of psychopharmacologic medications with interdisciplinary team on a regular basis.    Please contact pharmacy if the above does not answer specific medication questions/concerns.    Background:  A pharmacist has reviewed all medications and pertinent medical history today.  Medications were reviewed for appropriate use and any irregularities found are listed with recommendations.      Current Facility-Administered Medications:     acetaminophen (TYLENOL) tablet 650 mg, 650 mg, Oral, Q4H PRN, Radha Wade MD    amiodarone (PACERONE) tablet 200 mg, 200 mg, Oral or Feeding Tube, Daily, Radha Wade MD, 200 mg at 06/17/24 0858    aspirin EC tablet 81 mg, 81 mg, Oral, Daily, Radha Wade MD, 81 mg at 06/17/24 0900    calcium carbonate (TUMS) chewable tablet 500 mg, 500 mg, Oral, Daily PRN, Radha Wade MD    cephALEXin (KEFLEX) capsule 500 mg, 500 mg, Oral, Q6H, Radha Wade MD, 500 mg at 06/17/24 1159    glucose gel 15-30 g, 15-30 g, Oral, Q15 Min PRN **OR** dextrose 50 % injection 25-50 mL, 25-50 mL, Intravenous, Q15 Min PRN **OR** glucagon injection 1 mg, 1 mg, Subcutaneous, Q15 Min PRN, Radha Wade MD    empagliflozin (JARDIANCE) tablet 10 mg, 10 mg, Oral or Feeding Tube, Daily, Radha Wade MD, 10 mg at 06/17/24 0858    furosemide (LASIX) tablet 40 mg, 40  mg, Oral or Feeding Tube, Daily, Radha Wade MD, 40 mg at 06/17/24 0859    hydrOXYzine HCl (ATARAX) tablet 50 mg, 50 mg, Oral, Q6H PRN, Radha Wade MD    insulin aspart (NovoLOG) injection (RAPID ACTING), 1-3 Units, Subcutaneous, TID AC, Lynne Butler PA-C    insulin aspart (NovoLOG) injection (RAPID ACTING), 1-3 Units, Subcutaneous, At Bedtime, Lynne Butler PA-C    [Held by provider] insulin glargine (LANTUS PEN) injection 15 Units, 15 Units, Subcutaneous, At Bedtime, Radha Wade MD, 15 Units at 06/16/24 2212    loperamide (IMODIUM) capsule 2 mg, 2 mg, Oral, 4x Daily PRN, Radha Wade MD    melatonin tablet 10 mg, 10 mg, Oral, At Bedtime, Radha Wade MD, 10 mg at 06/16/24 2041    metoprolol succinate ER (TOPROL XL) 24 hr tablet 50 mg, 50 mg, Oral, Daily, Radha Wade MD, 50 mg at 06/17/24 0858    multivitamin, therapeutic (THERA-VIT) tablet 1 tablet, 1 tablet, Oral, Daily, Kadeem Fernandez MD, 1 tablet at 06/17/24 0859    pantoprazole (PROTONIX) EC tablet 40 mg, 40 mg, Oral, QAM , Radha Wade MD, 40 mg at 06/17/24 0551    polyethylene glycol (MIRALAX) Packet 17 g, 17 g, Oral, Daily PRN, Radha Wade MD    potassium chloride khushboo ER (KLOR-CON M20) CR tablet 40 mEq, 40 mEq, Oral, Daily, Radha Wade MD, 40 mEq at 06/17/24 0859    rosuvastatin (CRESTOR) tablet 20 mg, 20 mg, Oral, Daily, Lynne Butler PA-C, 20 mg at 06/17/24 0901    sacubitril-valsartan (ENTRESTO) 24-26 MG per tablet 1 tablet, 1 tablet, Oral or Feeding Tube, BID, Radha Wade MD, 1 tablet at 06/17/24 0900    spironolactone (ALDACTONE) tablet 25 mg, 25 mg, Oral or Feeding Tube, Daily, Radha Wade MD, 25 mg at 06/17/24 0859    ticagrelor (BRILINTA) tablet 90 mg, 90 mg, Oral or Feeding Tube, BID, Radha Wade MD, 90 mg at 06/17/24 0858    venlafaxine (EFFEXOR XR) 24 hr capsule 75 mg, 75 mg, Oral, Daily with breakfast, Radha Wade MD, 75 mg at  06/17/24 0859  No current outpatient prescriptions on file.   PMH: STEMI, depression, ED, and HLD

## 2024-06-17 NOTE — DISCHARGE INSTRUCTIONS
Follow up Appointments on Discharge:     PCP in 1-2 weeks  You are scheduled to see Dr. Verma on July 1 2024 at 2:02 pm.     Address  [address] [place] [address]  Phone   [phone]     Cardiology (x2: post-arrest and CORE clinics)  You are scheduled to see *** on *** at ***.  You will be called to schedule this appointment.    Address  [address] [place] [id number]  Phone   [phone]     Cardiology  (x2: post-arrest and CORE clinics)  You are scheduled to see Emma Godwin PA-C on June 24 2024 at 12:45 pm.    Address  [address] [place] [id number]  Phone   [phone]

## 2024-06-17 NOTE — PROGRESS NOTES
Alert and oriented times four. Able to make needs known. Regular diet. BG 97 and . Independent in room with transfers. Pain rated 0/10. Continent of bowel and bladder. Last BM 6/15. Planned discharge for 6/19.     Patient's most recent vital signs are:     Vital signs:  BP: 110/72  Temp: 98.3  HR: 76  RR: 24  SpO2: 96 %     Patient does not have new respiratory symptoms.  Patient does not have new sore throat.  Patient does not have a fever greater than 99.5.

## 2024-06-17 NOTE — PLAN OF CARE
Patient alert and oriented. Pleasant and cooperative with care. Denied pain or discomfort. Voiding adequately without concern. BG within parameter. No care concerns voiced at this time. Call light within reach. Continue with plan of care.

## 2024-06-17 NOTE — PROGRESS NOTES
"  [facility]   Acute Rehabilitation Unit  Daily progress note    INTERVAL HISTORY  Cullen Reardon was seen and examined at bedside this morning.  No acute events reported overnight.  He reports to be feeling well overall, notes that therapies have been \"tough\".  He is still noticing generalized weakness, especially in his legs which still feel a bit wobbly.  He also notes poor endurance.  He has not had any chest or other pain, palpitations, shortness of breath.  He does note some intermittent mild lightheadedness with activity but passes quickly.  His appetite is still diminished but \"finally\" improving.  He feels bowels are moving more regularly now.  He is still having some difficulty with sleep, as he has throughout this admission, but again improving; notes last night was his best night of sleep.  Reviewed some questions he had about new medications, cardiac function and anticipated recovery.  He denies other questions or concerns at this time.    With therapies, he is IND in room with no assistive device.  He I son track for discharge home on Wednesday.  Anticipate to cardiac rehab.    MEDICATIONS  Current Facility-Administered Medications   Medication Dose Route Frequency Provider Last Rate Last Admin    amiodarone (PACERONE) tablet 200 mg  200 mg Oral or Feeding Tube Daily Radha Wade MD   200 mg at 06/16/24 0749    aspirin EC tablet 81 mg  81 mg Oral Daily Radha Wade MD   81 mg at 06/16/24 0749    cephALEXin (KEFLEX) capsule 500 mg  500 mg Oral Q6H Radha Wade MD   500 mg at 06/17/24 0545    empagliflozin (JARDIANCE) tablet 10 mg  10 mg Oral or Feeding Tube Daily Radha Wade MD   10 mg at 06/16/24 0749    furosemide (LASIX) tablet 40 mg  40 mg Oral or Feeding Tube Daily Radha Wade MD   40 mg at 06/16/24 0749    heparin ANTICOAGULANT injection 5,000 Units  5,000 Units Subcutaneous Q8H Radha Wade MD   5,000 Units at 06/17/24 " 0545    insulin glargine (LANTUS PEN) injection 15 Units  15 Units Subcutaneous At Bedtime Radha Wade MD   15 Units at 06/16/24 2212    melatonin tablet 10 mg  10 mg Oral At Bedtime Radha Wade MD   10 mg at 06/16/24 2041    metoprolol succinate ER (TOPROL XL) 24 hr tablet 50 mg  50 mg Oral Daily Radha Wade MD   50 mg at 06/16/24 0751    multivitamin, therapeutic (THERA-VIT) tablet 1 tablet  1 tablet Oral Daily Kadeem Fernandez MD        pantoprazole (PROTONIX) EC tablet 40 mg  40 mg Oral QAM AC Radha Wade MD   40 mg at 06/17/24 0551    potassium chloride khushboo ER (KLOR-CON M20) CR tablet 40 mEq  40 mEq Oral Daily Radha Wade MD   40 mEq at 06/16/24 0749    [Held by provider] rosuvastatin (CRESTOR) tablet 20 mg  20 mg Oral Daily Radha Wade MD   20 mg at 06/16/24 0749    sacubitril-valsartan (ENTRESTO) 24-26 MG per tablet 1 tablet  1 tablet Oral or Feeding Tube BID Radha Wade MD   1 tablet at 06/16/24 2040    spironolactone (ALDACTONE) tablet 25 mg  25 mg Oral or Feeding Tube Daily Radha Wade MD   25 mg at 06/16/24 0749    ticagrelor (BRILINTA) tablet 90 mg  90 mg Oral or Feeding Tube BID Radha Wade MD   90 mg at 06/16/24 2041    venlafaxine (EFFEXOR XR) 24 hr capsule 75 mg  75 mg Oral Daily with breakfast Radha Wade MD   75 mg at 06/16/24 0751          Current Facility-Administered Medications   Medication Dose Route Frequency Provider Last Rate Last Admin    acetaminophen (TYLENOL) tablet 650 mg  650 mg Oral Q4H PRN Radha Wade MD        calcium carbonate (TUMS) chewable tablet 500 mg  500 mg Oral Daily PRN Radha Wade MD        glucose gel 15-30 g  15-30 g Oral Q15 Min PRN Radha Wade MD        Or    dextrose 50 % injection 25-50 mL  25-50 mL Intravenous Q15 Min PRN Radha Wade MD        Or    glucagon injection 1 mg  1 mg Subcutaneous Q15 Min PRN Radha Wade MD        hydrOXYzine HCl (ATARAX) tablet  "50 mg  50 mg Oral Q6H PRN Radha Wade MD        loperamide (IMODIUM) capsule 2 mg  2 mg Oral 4x Daily PRN Radha Wade MD        polyethylene glycol (MIRALAX) Packet 17 g  17 g Oral Daily PRN Radha Wade MD            PHYSICAL EXAM  /62 (BP Location: Right arm)   Pulse 73   Temp 98.1  F (36.7  C) (Oral)   Resp 18   Ht 1.854 m (6' 1\")   Wt 114.3 kg (251 lb 15.8 oz)   SpO2 96%   BMI 33.25 kg/m    Gen: NAD, lying in bed  HEENT: NC/AT, MMM  Cardio: RRR, no murmurs  Pulm: non-labored on room air, lungs CTA bilaterally  Abd: soft, non-tender, non-distended, bowel sounds present  Ext: no edema in BLE  Neuro/MSK: awake, alert, moving all extremities actively in bed  Skin: right groin incision healing with staples in place, minimal erythema and slightly macerated appearance at very inferior portion, no drainage    LABS  CBC RESULTS:   Recent Labs   Lab Test 06/17/24  0544 06/16/24  0755 06/15/24  0526   WBC 8.5 12.3* 11.9*   RBC 3.64* 3.50* 3.16*   HGB 11.1* 10.6* 9.6*   HCT 33.8* 33.1* 29.1*   MCV 93 95 92   MCH 30.5 30.3 30.4   MCHC 32.8 32.0 33.0   RDW 14.6 14.8 15.0   * 615* 518*       Last Basic Metabolic Panel:  Recent Labs   Lab Test 06/17/24  0544 06/16/24  2211 06/16/24  1726 06/16/24  1221 06/16/24  0755 06/15/24  0620 06/15/24  0526   *  --   --   --  134*  --  136   POTASSIUM 4.1  --   --   --  4.5  --  3.9   CHLORIDE 102  --   --   --  102  --  105   CO2 19*  --   --   --  20*  --  17*   ANIONGAP 11  --   --   --  12  --  14   GLC 97 94 101*   < > 167*   < > 105*   BUN 37.5*  --   --   --  37.5*  --  43.0*   CR 0.98  --   --   --  0.92  --  0.86   GFRESTIMATED >90  --   --   --  >90  --  >90   RANDA 8.5*  --   --   --  8.9  --  8.5*    < > = values in this interval not displayed.       Liver Function Studies -   Recent Labs   Lab Test 06/17/24  0544 06/16/24  0755 06/15/24  0526   PROTTOTAL 6.9 7.1 6.4   ALBUMIN 3.3* 3.4* 3.1*   BILITOTAL 0.6 0.6 0.5   ALKPHOS 140 151* " 139   AST 53* 76* 69*   * 195* 153*       Rehabilitation - continue comprehensive acute inpatient rehabilitation program with multidisciplinary approach including therapies, rehab nursing, and physiatry following. See interval history for updates.      ASSESSMENT AND PLAN  Cullen Reardon is a 48 year old male with past medical history of HLD, depression, pre-diabetes, hypogonadism, and ED who was admitted on 6/1/24 with vfib cardiac arrest and STEMI s/p ECMO, KAYLYN with hospital course complicated by acute systolic heart failure, ARDS, JEAN CLAUDE, shock liver, probable aspiration pneumonia, urinary retention, diarrhea, hematuria, skin and soft tissue infection (at right groin cath site), multiple electrolyte derangements, and malnutrition.  He was admitted to ARU on 6/15/24 for multidisciplinary rehabilitation and ongoing medical management.     Medical Conditions  New actions/orders/updates for today are in blue.    STEMI s/p KAYLYN to ostial LAD (6/1, 6/7)   Ventricular fibrillation arrest (6/1, 2 shocks and downtime approximately 15 minutes)  Acute systolic heart failure (EF 43.8% on 6/8/24)  Hyperlipidemia  6/1/24 collapsed at [facility], was in V fib and then placed on ECMO and had KAYLYN placement to LAD. He was extubated 6/10. Wt upon admission at 260 lbs.   - Continue ASA 81 every day (lifelong) + ticagrelor 90 mg BID (1 year, 6/1/2025)  - Continue PTA Crestor 20 mg every day  - Continue lasix 40 mg every day, daily weights while inpt   - Continue metoprolol 50 mg daily (increased on 6/14 from 25 BID), sacubitril-valsartan BID, amiodarone 200 mg daily, spironolactone 25 mg every day, empagliflozin 10 mg daily  - Wound care: cleanse daily, keep clean and dry, ok to shower, do not scrub/soak (do not bathe/submerge x1 month).  Remove every staple on 6/18 (per cardiology, ok for primary team to perform).  Place steri strips.  Continue remaining staples for 1 more week (6/25), then remove.    - Continue PT/OT/SLP  -  Follow-up outpt cardiolgy - post-arrest clinic and CORE     Diarrhea  Since starting tube feeds has had loose stools without incontinence, likely to improve on oral diet. Upon admission 6/14 had 2 loose stools per day with use of loperamide. C diff testing 6/10 negative.   - Continue Loperamide PRN, discontinue after lack of use over 2 days    Liver shock (secondary to cardiac arrest)  LFTs have been elevated throughout admission.    6/17: alk phos now normalized, ALT/AST remain slightly elevated but continue to downtrend  - Continue to trend qM/Th     Hyperglycemia  Prediabetes  Hgb A1c on 6/1 at 6.1 (mildly elevated). At admission had not been utilizing insulin aspart for 2 days with stable BG, though still on Lantus in addition to Jardiance  - Monitor BG TID AC + HS, goal to discontinue routine checks by discharge  - Resume Aspart low intensity sliding scale insulin in case blood glucose levels spike while coming off Lantus  - Hold Lantus with goal to discontinue prior to discharge  - Continue empagliflozin as above     Concern for RLE cath site infection  Leukocytosis  Erythematous wound site without fluctuance or discharge.  6/17: WBC now normalized  - Moisten gauze with Microklenz BID and clean incision every shift. Okay to shower; recommend against baths/submerged water for 1 month. Otherwise keep incision dry.  - Continue cephelexin 500 mg q6h for 5 days (ending 6/20)  - Trend CBC every M/Th     Hyponatremia  Has downtrended from 142 (6/13) -> 132 on 6/17.  Suspect 2/2 Lasix, spironolactone.  Also back on PTA venlafaxine (though has been since ~6/7).  6/17: Na slightly low at 132  - Appears euvolemic on exam.  Will continue current diuretics and recheck on Wed prior to discharge in case adjustments needed to avoid further drop.  - Continue to trend qM/Th    Hypokalemia, resolved   6/17: K remains WNL at 4.1  - Continue Kcl 40 mEQ daily  - Trend BMP qM/Th     Papular rash  Rash over BUE and abdomen, healing  with scabbing over.   - Continue to monitor, no tx indicated upon admission    Left scalp wound, healing   2/2 fall in ED with cardiac arrest.  Staples over left scalp removed 6/14 and well-healing.   - Continue to monitor, no tx indicated upon admission     Moderate malnutrition in context of acute illness  - Continue regular diet, thin liquids  - Continue multivitamin    Vitamin D deficiency  - Continue PTA vit D supplementation    Anxiety  - Continue PTA venlafaxine XR 75 mg daily  - Monitor mood, consult to health psychology if indicated    Impaired Sleep  Difficulty sleeping in hospital due to medication management.   - Continue Melatonin at bedtime 10 mg, improving sleep onset         Adjustment to disability:  clinical psychology to be consulted if indicated  FEN: regular diet, thin liquids  Bowel: continent, loose stools as above  Bladder: continent, PVRs at admission without evidence of retention  DVT Prophylaxis: subcutaneous Heparin discontinued as ambulating long distances  GI Prophylaxis: PPI  Code: full  Disposition: home   ELOS: target 6/19/24  Follow up Appointments on Discharge: PCP in 1-2 weeks, cardiology (x2: post-arrest and CORE clinics)      35 minutes spent on the date of service doing chart review, history and exam, documentation, and further activities as noted above.       Plan of care was discussed with Dr. Julius Alfonso, PM&R Staff Physician    Lynne Butler PA-C  Physical Medicine & Rehabilitation

## 2024-06-17 NOTE — PLAN OF CARE
Discharge Planner Post-Acute Rehab OT:      Discharge Plan: Home w/ wife to help as needed, OP cardiac rehab      Precautions: Fall, cardiac      Current Status:  ADLs:  Mobility: mod I FWW   Grooming: IND  Dressing: UB: IND, LB: IND, Feet: mod I  Bathing: CGA when standing to wash backside. Pt able to complete all wash/rinse/dry of all body parts    Toileting: IND no AD  IADLs: Independent at baseline, pt was working as a contractor and hockey    Vision/Cognition: pt wears corrective lenses at baseline,      Assessment:   Pt was upgraded to IND previous day with no right ear. Engaged in activity sonny building activities with weight for additional challenge. Rest breaks as needed, good body posture throughout.     Other Barriers to Discharge (DME, Family Training, etc):

## 2024-06-17 NOTE — PLAN OF CARE
Goal Outcome Evaluation:  Overall Patient Progress: improvingOverall Patient Progress: improving    Patient is alert and oriented x4. Make needs known. Was upgraded to ind in the room in transfers. Progressing. Remains free of pain this shift. Not in distress. Continent of B&B. Appears asleep during safety rounds. Continue poc.

## 2024-06-17 NOTE — PLAN OF CARE
"     Discharge Planner Post-Acute Rehab PT:      Discharge Plan: Home w/ wife to help as needed, OP cardiac rehab      Precautions: Fall, cardiac      Current Status:  Bed Mobility: IND  Transfers: IND  Gait: IND in room, SBA hallways/outdoors 1000+ft  Stairs: SBA 12x8\" steps with railing x1 reciprocal pattern  Balance: good sitting balance, good standing balance     Outcome Measures:   FGA             6/17: 20/30  6MWT             6/17: 922ft (281m)     Assessment: Performed both an FGA and 6MWT to assess functional gait and endurance. Pt ambulated outdoors around the perimeter of the building 1000ft+ with SBA, with the addition of ascending/descending the garage ramp, and figure 8 patterns around bushes over mulch to test gait over different incline/decline, terrain and navigating obstacles. Pt tolerated this well with slight fatigue noted at end of session. Did have one instance of LOB requiring CGA as pt caught his toe on crack/threshold change in pavement but did use stepping reaction to regain balance approprietly. Pt states no major concerns or worries for upcoming discharge date.      Other Barriers to Discharge (DME, Family Training, etc):  No equipment or formal family training required from PT perspective.        "

## 2024-06-17 NOTE — CONSULTS
Consulted to run a test claim for Glucose Meter/Supplies, Rapid Analog, & Glargine.    Patient has pharmacy benefits through [employer]. Per insurance, the following are covered and preferred under the patient's plans:     Accu-chek meter/supplies - $0  Contour meter/supplies - $0  Lantus pens/vials - $25  Insulin Aspart pens/vials - $25  Novolog pens/vials - $25  Insulin Lispro pens/vials - $25  Humalog pens/vials - $25     The following are not covered:  Freestyle meter/supplies  OneTouch meter/supplies  Semglee  Basaglar  Fiasp  Admelog  Basaglar      Please feel free to contact me with any other test claims, prior authorizations, or insurance questions regarding outpatient medications.     Thanks!      Aide Reid [facility]  Discharge Pharmacy Liaison  [facility]  Pronouns: She/Her/Hers    Securely message with [employer], [employer] Secure [employer], or [employer]  Phone: [phone]  Fax: [phone]  [email or web address]

## 2024-06-17 NOTE — PROGRESS NOTES
06/17/24 1600   Signing Clinician's Name / Credentials   Signing clinician's name / credentials FRANCISCO Prater   Functional Gait Assessment (NYASIA Ayon, CROW Burciaga, et al. (2004))   1. GAIT LEVEL SURFACE 3   2. CHANGE IN GAIT SPEED 3   3. GAIT WITH HORIZONTAL HEAD TURNS 2   4. GAIT WITH VERTICAL HEAD TURNS 2   5. GAIT AND PIVOT TURN 3   6. STEP OVER OBSTACLE 2   7. GAIT WITH NARROW BASE OF SUPPORT 0   8. GAIT WITH EYES CLOSED 2   9. AMBULATING BACKWARDS 1   10. STEPS 2   Total Functional Gait Assessment Score   TOTAL SCORE: (MAXIMUM SCORE 30) 20     Functional Gait Assessment (FGA): The FGA assesses postural stability during various walking tasks.   Gait assistive device used: None    Scores of <22 /30 have been correlated with predicting falls in community-dwelling older adults according to Gabo & Josué 2010.   Scores of <18 /30 have been correlated with increased risk for falls in patients with Parkinsons Disease according to Abhilash Gallegos Zhou et al 2014.  Minimal Detectable Change for patients with acute/chronic stroke = 4.2 according to Aster & Merissaschjerry 2009  Minimal Detectable Change for patients with vestibular disorder = 8 according to Gabo & Josué 2010              6 Minute Walk Test    Setup:  ambulated throughout North, West, and East hallways on [facility] while measuring distance walked with a walking tape measure.    Distance Ambulated: 922 ft (281.0 meters)    Total Rest Time During 6MWT: none    Age Related Norms in Community Dwelling Adults:  Age  Male   Female  60-69 yrs 572 m (1864 ft) 538 m (1753 ft)  70-79 yrs 527 m (1718 ft) 471 m (1535 ft)  80-89 yrs 417 m (1359 ft) 392 m (1278 ft)    (Phil et al, 2002; n = 96; community dwelling elderly people with independent function who were nonsmokers with no history of dizziness; > 59 yo and did not use assistive devices, Community-dwelling Elderly)

## 2024-06-18 ENCOUNTER — APPOINTMENT (OUTPATIENT)
Dept: OCCUPATIONAL THERAPY | Facility: CLINIC | Age: 49
DRG: 949 | End: 2024-06-18
Attending: PHYSICAL MEDICINE & REHABILITATION
Payer: COMMERCIAL

## 2024-06-18 ENCOUNTER — APPOINTMENT (OUTPATIENT)
Dept: PHYSICAL THERAPY | Facility: CLINIC | Age: 49
DRG: 949 | End: 2024-06-18
Attending: PHYSICAL MEDICINE & REHABILITATION
Payer: COMMERCIAL

## 2024-06-18 ENCOUNTER — TELEPHONE (OUTPATIENT)
Dept: CARDIOLOGY | Facility: CLINIC | Age: 49
End: 2024-06-18
Payer: COMMERCIAL

## 2024-06-18 VITALS
WEIGHT: 251.99 LBS | HEART RATE: 75 BPM | OXYGEN SATURATION: 97 % | TEMPERATURE: 98.5 F | HEIGHT: 73 IN | BODY MASS INDEX: 33.4 KG/M2 | SYSTOLIC BLOOD PRESSURE: 117 MMHG | DIASTOLIC BLOOD PRESSURE: 68 MMHG | RESPIRATION RATE: 17 BRPM

## 2024-06-18 LAB
GLUCOSE BLDC GLUCOMTR-MCNC: 132 MG/DL (ref 70–99)
GLUCOSE BLDC GLUCOMTR-MCNC: 133 MG/DL (ref 70–99)

## 2024-06-18 PROCEDURE — 97535 SELF CARE MNGMENT TRAINING: CPT | Mod: GO

## 2024-06-18 PROCEDURE — 250N000013 HC RX MED GY IP 250 OP 250 PS 637: Performed by: CLINIC/CENTER

## 2024-06-18 PROCEDURE — 99239 HOSP IP/OBS DSCHRG MGMT >30: CPT | Mod: FS | Performed by: PHYSICAL MEDICINE & REHABILITATION

## 2024-06-18 PROCEDURE — 97530 THERAPEUTIC ACTIVITIES: CPT | Mod: GO

## 2024-06-18 PROCEDURE — 999N000125 HC STATISTIC PATIENT MED CONFERENCE < 30 MIN

## 2024-06-18 PROCEDURE — 97110 THERAPEUTIC EXERCISES: CPT | Mod: GP

## 2024-06-18 PROCEDURE — 999N000150 HC STATISTIC PT MED CONFERENCE < 30 MIN

## 2024-06-18 PROCEDURE — 250N000013 HC RX MED GY IP 250 OP 250 PS 637: Performed by: STUDENT IN AN ORGANIZED HEALTH CARE EDUCATION/TRAINING PROGRAM

## 2024-06-18 PROCEDURE — 250N000013 HC RX MED GY IP 250 OP 250 PS 637: Performed by: PHYSICIAN ASSISTANT

## 2024-06-18 PROCEDURE — 97530 THERAPEUTIC ACTIVITIES: CPT | Mod: GP

## 2024-06-18 RX ORDER — ACETAMINOPHEN 325 MG/1
650 TABLET ORAL EVERY 4 HOURS PRN
COMMUNITY
Start: 2024-06-18

## 2024-06-18 RX ORDER — AMIODARONE HYDROCHLORIDE 200 MG/1
200 TABLET ORAL DAILY
Qty: 30 TABLET | Refills: 0 | Status: SHIPPED | OUTPATIENT
Start: 2024-06-19 | End: 2024-06-24

## 2024-06-18 RX ORDER — MULTIVITAMIN,THERAPEUTIC
1 TABLET ORAL DAILY
COMMUNITY
Start: 2024-06-19

## 2024-06-18 RX ORDER — SPIRONOLACTONE 25 MG/1
25 TABLET ORAL DAILY
Qty: 30 TABLET | Refills: 0 | Status: SHIPPED | OUTPATIENT
Start: 2024-06-18 | End: 2024-06-24

## 2024-06-18 RX ORDER — ASPIRIN 81 MG/1
81 TABLET ORAL DAILY
Qty: 30 TABLET | Refills: 0 | Status: SHIPPED | OUTPATIENT
Start: 2024-06-19 | End: 2024-06-24

## 2024-06-18 RX ORDER — PANTOPRAZOLE SODIUM 40 MG/1
40 TABLET, DELAYED RELEASE ORAL
Qty: 30 TABLET | Refills: 0 | Status: SHIPPED | OUTPATIENT
Start: 2024-06-18 | End: 2024-07-15

## 2024-06-18 RX ORDER — POTASSIUM CHLORIDE 1500 MG/1
40 TABLET, EXTENDED RELEASE ORAL DAILY
Qty: 60 TABLET | Refills: 0 | Status: SHIPPED | OUTPATIENT
Start: 2024-06-18 | End: 2024-06-24

## 2024-06-18 RX ORDER — CALCIUM CARBONATE 500 MG/1
1 TABLET, CHEWABLE ORAL DAILY PRN
COMMUNITY
Start: 2024-06-18 | End: 2024-07-15

## 2024-06-18 RX ORDER — CEPHALEXIN 500 MG/1
500 CAPSULE ORAL 4 TIMES DAILY
Qty: 7 CAPSULE | Refills: 0 | Status: SHIPPED | OUTPATIENT
Start: 2024-06-18 | End: 2024-06-20

## 2024-06-18 RX ORDER — PHENOL 1.4 %
10 AEROSOL, SPRAY (ML) MUCOUS MEMBRANE
COMMUNITY
Start: 2024-06-18

## 2024-06-18 RX ORDER — FUROSEMIDE 40 MG
40 TABLET ORAL DAILY
Qty: 30 TABLET | Refills: 0 | Status: SHIPPED | OUTPATIENT
Start: 2024-06-19 | End: 2024-06-24

## 2024-06-18 RX ORDER — METOPROLOL SUCCINATE 50 MG/1
50 TABLET, EXTENDED RELEASE ORAL DAILY
Qty: 30 TABLET | Refills: 0 | Status: SHIPPED | OUTPATIENT
Start: 2024-06-19 | End: 2024-06-24

## 2024-06-18 RX ORDER — ROSUVASTATIN CALCIUM 20 MG/1
20 TABLET, COATED ORAL DAILY
Qty: 30 TABLET | Refills: 0 | Status: SHIPPED | OUTPATIENT
Start: 2024-06-18 | End: 2024-06-24

## 2024-06-18 RX ADMIN — SACUBITRIL AND VALSARTAN 1 TABLET: 24; 26 TABLET, FILM COATED ORAL at 07:51

## 2024-06-18 RX ADMIN — VENLAFAXINE HYDROCHLORIDE 75 MG: 75 CAPSULE, EXTENDED RELEASE ORAL at 07:51

## 2024-06-18 RX ADMIN — TICAGRELOR 90 MG: 90 TABLET ORAL at 07:50

## 2024-06-18 RX ADMIN — ROSUVASTATIN 20 MG: 20 TABLET, FILM COATED ORAL at 07:50

## 2024-06-18 RX ADMIN — POTASSIUM CHLORIDE 40 MEQ: 1500 TABLET, EXTENDED RELEASE ORAL at 07:50

## 2024-06-18 RX ADMIN — ASPIRIN 81 MG: 81 TABLET, COATED ORAL at 07:51

## 2024-06-18 RX ADMIN — CEPHALEXIN 500 MG: 500 CAPSULE ORAL at 14:25

## 2024-06-18 RX ADMIN — FUROSEMIDE 40 MG: 40 TABLET ORAL at 07:50

## 2024-06-18 RX ADMIN — CEPHALEXIN 500 MG: 500 CAPSULE ORAL at 05:55

## 2024-06-18 RX ADMIN — THERA TABS 1 TABLET: TAB at 07:50

## 2024-06-18 RX ADMIN — SPIRONOLACTONE 25 MG: 25 TABLET ORAL at 07:51

## 2024-06-18 RX ADMIN — EMPAGLIFLOZIN 10 MG: 10 TABLET, FILM COATED ORAL at 07:50

## 2024-06-18 RX ADMIN — METOPROLOL SUCCINATE 50 MG: 50 TABLET, EXTENDED RELEASE ORAL at 07:50

## 2024-06-18 RX ADMIN — PANTOPRAZOLE SODIUM 40 MG: 40 TABLET, DELAYED RELEASE ORAL at 05:54

## 2024-06-18 RX ADMIN — AMIODARONE HYDROCHLORIDE 200 MG: 200 TABLET ORAL at 07:51

## 2024-06-18 ASSESSMENT — ACTIVITIES OF DAILY LIVING (ADL)
ADLS_ACUITY_SCORE: 33

## 2024-06-18 NOTE — CONSULTS
Diabetes CNS/Educator Consult    Cullen SUKUMAR Reardon is a 48 year old male, per notes, with past medical history of HLD, depression, pre-diabetes, hypogonadism, and ED who was admitted on 6/1/24 with vfib cardiac arrest and STEMI s/p ECMO, KAYLYN with hospital course complicated by acute systolic heart failure, ARDS, JEAN CLAUDE, shock liver, probable aspiration pneumonia, urinary retention, diarrhea, hematuria, skin and soft tissue infection (at right groin cath site), multiple electrolyte derangements, and malnutrition. He was admitted to ARU on 6/15/24 for multidisciplinary rehabilitation and ongoing medical management. Discharging home today.    Diabetes Educator consulted for prediabetes and possible need for glargine at discharge. A1c 6.1% on 6/1/24. Glargine now weaned off 6/16 with BGs stable. Discussed with Lynne Butler PA-C, no insulin or monitoring at discharge, will not need to be seen by Diabetes Educator. Recommend review of prediabetes by bedside RN and follow-up with PCP for ongoing management of prediabetes.     VJ Jose, [facility]  Diabetes Educator  Pager: [phone]  Office: [phone]  Securely message with [employer]

## 2024-06-18 NOTE — TELEPHONE ENCOUNTER
Patient had cardiac arrest/ECMO.  Last EF 45%.  He is scheduled to see Aretha Godwin NP on 6/24 and the Critical Care team on 7/15.  He does not need to see a HF provider until the critical care team sees him in July and if they feel he needs to see HF MD, they recommend he follow up with heart failure MD at that time.

## 2024-06-18 NOTE — DISCHARGE SUMMARY
"    [facility]   Acute Rehabilitation Unit  Discharge summary     Date of Admission: 6/15/2024  Date of Discharge: 6/18/2024  Disposition: home with outpatient cardiac rehab  Primary Care Physician: Teresita Velásquez  Attending physician: Julius Alfonso MD      DISCHARGE DIAGNOSIS    STEMI s/p KAYLYN  Vfib arrest  Acute systolic heart failure  Hyperlipidemia  Prediabetes  R groin SSTI  Hyponatremia  Hypokalemia  Moderate malnutrition      BRIEF SUMMARY  Cullen Reardon is a 48 year old male with past medical history of HLD, depression, pre-diabetes, hypogonadism, and ED who was admitted on 6/1/24 with vfib cardiac arrest and STEMI s/p ECMO, KAYLYN with hospital course complicated by acute systolic heart failure, ARDS, JEAN CLAUDE, shock liver, probable aspiration pneumonia, urinary retention, diarrhea, hematuria, skin and soft tissue infection (at right groin cath site), multiple electrolyte derangements, and malnutrition. He was admitted to ARU on 6/15/24 for multidisciplinary rehabilitation and ongoing medical management. Details of rehab and medical course at ARU as below.    REHABILITATION COURSE  PT: Discharged to home with outpatient cardiac rehab    Current Status:  Bed Mobility: IND  Transfers: IND  Gait: IND in room, SBA hallways/outdoors 1000+ft  Stairs: SBA 12x8\" steps with railing x1 reciprocal pattern  Balance: good sitting balance, good standing balance      Outcome Measures:   FGA             6/17: 20/30  6MWT             6/17: 922ft (281m)     OT: Discharged to home with outpatient cardiac rehab.   All functional goals met.   Progress towards therapy goal(s). See goals on Care Plan in Epic electronic health record for goal details.  Goals met     Therapy recommendation(s):    Continued therapy is recommended.  Rationale/Recommendations:  outpatient cardiac rehab recommended to address activity tolerance impairments secondary to cardiac abnormality.    SLP: Discharged as of " "6/16    Motor speech, language intact. RBANS form A administered and interpreted, overall score within functional limits for pt's age. Facilitated discussion with pt regarding performance on test, pt reported he feels he would \"have done about the same\" a few months ago. SLP evaluation only.     MEDICAL COURSE  STEMI s/p KAYLYN to ostial LAD (6/1, 6/7)   Ventricular fibrillation arrest (6/1, 2 shocks and downtime approximately 15 minutes)  Acute systolic heart failure (EF 43.8% on 6/8/24)  Hyperlipidemia  6/1/24 collapsed at [facility], was in V fib and then placed on ECMO and had KAYLYN placement to LAD. He was extubated 6/10. Wt upon admission at 260 lbs.   - Continue ASA 81 every day (lifelong) + ticagrelor 90 mg BID (1 year, 6/1/2025)  - Continue PTA Crestor 20 mg every day  - Continue lasix 40 mg every day   - Continue metoprolol 50 mg daily (increased on 6/14 from 25 BID), sacubitril-valsartan BID, amiodarone 200 mg daily, spironolactone 25 mg every day, empagliflozin 10 mg daily  - Right groin wound care: cleanse daily, keep clean and dry, ok to shower, do not scrub/soak (do not bathe/submerge x1 month).  Every other staple was removed on 6/18 with placement of steri strips (per cardiology).  Continue remaining staples for 1 more week (6/25), then remove.    - Continue cardiac rehab  - Follow-up outpt cardiolgy - post-arrest clinic and CORE     Diarrhea, resolved  Since starting tube feeds has had loose stools without incontinence, likely to improve on oral diet. Upon admission 6/14 had 2 loose stools per day with use of loperamide. C diff testing 6/10 negative.  No ongoing loose stools at ARU without need for loperamide.     Liver shock (secondary to cardiac arrest)  LFTs have been elevated throughout admission.  As of 6/17: alk phos now normalized, ALT/AST remain slightly elevated but continue to downtrend  - Follow up with PCP in 1-2 weeks, repeat LFTs at that time     Hyperglycemia  Prediabetes  Hgb A1c on 6/1 " at 6.1 (mildly elevated). At admission had not been utilizing insulin aspart for 2 days with stable BG, though still on Lantus in addition to Jardiance.  Lantus held starting on 6/17.   - Given prediabetes diagnosis and A1c in good control, will discharge off insulin.  - Continue empagliflozin as above  - Follow up with PCP, would recommend repeat A1c in ~3 months for ongoing surveillance/management     Concern for RLE cath site infection  Leukocytosis, resolved  Erythematous wound site without fluctuance or discharge.  As of 6/17: WBC normalized  - Moisten gauze with Microklenz BID and clean incision every shift. Okay to shower; recommend against baths/submerged water for 1 month. Otherwise keep incision dry.  - Continue cephelexin 500 mg q6h thru 6/20  - Follow up with PCP in 1-2 weeks, repeat CBC     Hyponatremia  Has downtrended from 142 (6/13) -> 132 on 6/17.  Suspect 2/2 Lasix, spironolactone.  Also back on PTA venlafaxine (though has been since ~6/7).  As of 6/17: Na slightly low at 132  - Follow up with PCP in 1 week with repeat BMP     Hypokalemia, resolved   As of 6/17: K remains WNL at 4.1  - Continue Kcl 40 mEQ daily  - Follow up with PCP in 1 week with repeat BMP     Papular rash  Rash over BUE and abdomen, healing with scabbing over.   - Continue to monitor, no tx indicated upon admission     Left scalp wound, healing   2/2 fall in ED with cardiac arrest.  Staples over left scalp removed 6/14 and well-healing.   - Continue to monitor, no tx indicated upon admission     Moderate malnutrition in context of acute illness  - Continue regular diet, thin liquids  - Continue multivitamin     Vitamin D deficiency  - Continue PTA vit D supplementation     Anxiety  - Continue PTA venlafaxine XR 75 mg daily     Impaired Sleep  Difficulty sleeping in hospital due to medication management.   - Continue Melatonin at bedtime 10 mg PRN       DISCHARGE MEDICATIONS  Current Discharge Medication List        START taking  these medications    Details   acetaminophen (TYLENOL) 325 MG tablet Take 2 tablets (650 mg) by mouth every 4 hours as needed for mild pain or fever    Associated Diagnoses: Coronary artery disease involving native coronary artery of native heart without angina pectoris      aspirin (ASPIRIN REGIMEN) 81 MG EC tablet Take 1 tablet (81 mg) by mouth daily  Qty: 30 tablet, Refills: 0    Associated Diagnoses: Coronary artery disease involving native coronary artery of native heart without angina pectoris      calcium carbonate (TUMS) 500 MG chewable tablet Take 1 tablet (500 mg) by mouth daily as needed for heartburn    Associated Diagnoses: Coronary artery disease involving native coronary artery of native heart without angina pectoris      !! melatonin 10 MG TABS tablet Take 1 tablet (10 mg) by mouth nightly as needed for sleep    Associated Diagnoses: Coronary artery disease involving native coronary artery of native heart without angina pectoris      multivitamin, therapeutic (THERA-VIT) TABS tablet Take 1 tablet by mouth daily    Associated Diagnoses: Coronary artery disease involving native coronary artery of native heart without angina pectoris      pantoprazole (PROTONIX) 40 MG EC tablet Take 1 tablet (40 mg) by mouth every morning (before breakfast)  Qty: 30 tablet, Refills: 0    Associated Diagnoses: Coronary artery disease involving native coronary artery of native heart without angina pectoris       !! - Potential duplicate medications found. Please discuss with provider.        CONTINUE these medications which have CHANGED    Details   amiodarone (PACERONE) 200 MG tablet Take 1 tablet (200 mg) by mouth daily  Qty: 30 tablet, Refills: 0    Associated Diagnoses: Cardiac arrest (H)      cephALEXin (KEFLEX) 500 MG capsule Take 1 capsule (500 mg) by mouth 4 times daily for 7 doses  Qty: 7 capsule, Refills: 0    Associated Diagnoses: Local infection of skin and subcutaneous tissue      empagliflozin (JARDIANCE) 10  MG TABS tablet 1 tablet (10 mg) by Oral or Feeding Tube route daily  Qty: 30 tablet, Refills: 0    Associated Diagnoses: Acute on chronic systolic heart failure (H)      furosemide (LASIX) 40 MG tablet 1 tablet (40 mg) by Oral or Feeding Tube route daily  Qty: 30 tablet, Refills: 0    Associated Diagnoses: Acute on chronic systolic heart failure (H)      metoprolol succinate ER (TOPROL XL) 50 MG 24 hr tablet Take 1 tablet (50 mg) by mouth daily  Qty: 30 tablet, Refills: 0    Associated Diagnoses: Coronary artery disease involving native coronary artery of native heart without angina pectoris      potassium chloride khushboo ER (KLOR-CON M20) 20 MEQ CR tablet Take 2 tablets (40 mEq) by mouth daily  Qty: 60 tablet, Refills: 0    Associated Diagnoses: Hypokalemia      rosuvastatin (CRESTOR) 20 MG tablet Take 1 tablet (20 mg) by mouth daily  Qty: 30 tablet, Refills: 0    Associated Diagnoses: Coronary artery disease involving native coronary artery of native heart without angina pectoris      sacubitril-valsartan (ENTRESTO) 24-26 MG per tablet 1 tablet by Oral or Feeding Tube route 2 times daily  Qty: 60 tablet, Refills: 0    Associated Diagnoses: Acute on chronic systolic heart failure (H)      spironolactone (ALDACTONE) 25 MG tablet Take 1 tablet (25 mg) by mouth daily  Qty: 30 tablet, Refills: 0    Associated Diagnoses: Acute on chronic systolic heart failure (H)      ticagrelor (BRILINTA) 90 MG tablet Take 1 tablet (90 mg) by mouth 2 times daily  Qty: 60 tablet, Refills: 0    Associated Diagnoses: Cardiac arrest (H); Coronary artery disease involving native coronary artery of native heart without angina pectoris; Status post percutaneous transluminal coronary angioplasty           CONTINUE these medications which have NOT CHANGED    Details   cholecalciferol (VITAMIN D3) 25 mcg (1000 units) capsule Take 2 capsules by mouth daily      !! melatonin 10 MG TABS tablet 1 tablet (10 mg) by Oral or Feeding Tube route every  evening    Associated Diagnoses: Insomnia, unspecified type      venlafaxine (EFFEXOR XR) 75 MG 24 hr capsule Take 75 mg by mouth daily       !! - Potential duplicate medications found. Please discuss with provider.        STOP taking these medications       aspirin (ASA) 81 MG chewable tablet Comments:   Reason for Stopping:         hydrOXYzine HCl (ATARAX) 50 MG tablet Comments:   Reason for Stopping:         insulin glargine (LANTUS PEN) 100 UNIT/ML pen Comments:   Reason for Stopping:         loperamide (IMODIUM) 2 MG capsule Comments:   Reason for Stopping:         multivitamins w/minerals liquid Comments:   Reason for Stopping:         ondansetron (ZOFRAN ODT) 4 MG ODT tab Comments:   Reason for Stopping:                 DISCHARGE INSTRUCTIONS AND FOLLOW UP  Discharge Procedure Orders   Cardiac Rehab  Referral   Standing Status: Future   Referral Priority: Routine Referral Type: Rehab Therapy Cardiac Therapy   Number of Visits Requested: 1     Reason for your hospital stay   Order Comments: You were admitted for rehabilitation following hospital stay after cardiac arrest.  You have made good progress with therapies and are discharging after a short rehab stay to home with cardiac rehab.     Activity   Order Comments: Your activity upon discharge: activity as tolerated and as directed by therapies.  We would recommend something with you at this time for walking longer distances in the community.  We would advise against driving initially.  We have referred you to cardiac rehab to continue to progress your function and independence.     Order Specific Question Answer Comments   Is discharge order? Yes      Adult [facility] Follow-up and recommended labs and tests   Order Comments: Follow up with primary care provider, Teresita Velásquez, within 7 days for hospital follow- up.  The following labs/tests are recommended: BMP, CBC.    You will also need remaining staples removed from right groin by approximately  6/24.  This can be done in PCP or cardiology clinics.    Follow up with cardiology (x2: post-arrest and CORE clinics)      Appointments on [place] and/or [facility] (with [facility] or [facility] provider or service). Call [phone] if you haven't heard regarding these appointments within 7 days of discharge.     Wound care and dressings   Order Comments: Instructions to care for your wound at home:   Right groin: cleanse incision daily, keep clean and dry.  Ok to shower but do not scrub or soak.  You have some staples and steri-strips remaining.  The steri strips will come off on their own, please do not remove.  The remaining strips and staples should be removed in about 1 week (6/24) and this can be done by your primary care or cardiology clinic.  Please contact provider if you note any increased redness, discharge, pain, warmth, of fever, which may be signs of infection.     Diet   Order Comments: Follow this diet upon discharge:       Regular Diet Adult Thin Liquids (level 0)     Order Specific Question Answer Comments   Is discharge order? Yes           PHYSICAL EXAMINATION    Most recent Vital Signs:   Vitals:    06/17/24 0900 06/17/24 1532 06/17/24 2036 06/18/24 0738   BP:  111/68 111/64 112/63   BP Location:  Right arm Right arm Right arm   Patient Position:   Semi-Daley's    Cuff Size:   Adult Regular    Pulse:  76 90 81   Resp:  20  18   Temp: 98.3  F (36.8  C) 98  F (36.7  C)  98.4  F (36.9  C)   TempSrc:  Oral  Oral   SpO2:  98% 98% 97%   Weight:       Height:           Gen: NAD, pleasant, seated upright in bed  HEENT: NC/AT, MMM  Cardio: RRR, no murmurs  Pulm: non-labored on room air, lungs CTA bilaterally  Abd: soft, non-tender, non-distended, bowel sounds present  Extr: no edema or calf tenderness in bilateral lower extremities  Neuro/MSK: AAOx3, moving all extremities actively in bed    35 minutes spent in discharge, including >50% in counseling and coordination of care, medication review and plan  of care recommended on follow up.     Patient was evaluated on day of discharge by attending physician, Julius Alfonso MD, who agrees with plan of care.    Discharge summary was forwarded to Teresita Velásquez (PCP) at the time of discharge, so as to bridge from hospital to outpatient care.     It was our pleasure to care for Cullen Reardon during this hospitalization. Please do not hesitate to contact me should there be questions regarding the hospital course or discharge plan.          Lynne Butler PA-C  Physical Medicine & Rehabilitation

## 2024-06-18 NOTE — PLAN OF CARE
Goal Outcome Evaluation:      Plan of Care Reviewed With: patient    Overall Patient Progress: improvingOverall Patient Progress: improving     Orientation: Alert and oriented x4; able to make needs known. VSS.   O2: on RA. Denies of SOB/dizziness  Pain: denies of pain  Diet:Regular diet thin liquid.  Consumed 100% during dinner  Bladder: continent of bladder. Using urinal at night  Bowel: continent of BM. Patient reported of having BM 6/17/24  Ambulation/Transfer: independent in room  Blood sugar: BG check AC- 104mg/dl  HS 113mg/dl no corrective dose given  Tubes/Lines/Drains: none  Skin: right groin healed incision-   Sleep: Complained of difficulty sleeping; scheduled melatonin previously given  Safety: safety checks done.

## 2024-06-18 NOTE — PROGRESS NOTES
Acute Rehab Care Conference/Team Rounds      Type: Team Rounds    Present: Dr. Alfonso, Lynne Butler PA, Dr. Fitzgerald Neuropsychologist, Nolan Dawkins PT, Salomon Decker OT, Becca Arias SLP,  Elba Smith , Genevieve Gurrola RD, Sukhdeep Herrera RN, and Cullen Reardon Patient.      Discharge Barriers/Treatment/Education    Rehab Diagnosis: ***    Active Medical Co-morbidities/Prognosis: ***    Safety: Fall    Pain: Denies of pain    Medications, Skin, Tubes/Lines: right groin healed incision. Medications whole with thin liquids.     Swallowing/Nutrition: Regular diet thin liquids    Bowel/Bladder: Continent of bladder. Uses urinal at night  Continent of BM. Last BM reported 6/17/24    Psychosocial: Lives in house with significant other and 2 dependent daughters. Hx of anxiety. Working as a contractor.    ADLs/IADLs: Pt on track for discharge tomorrow 6/19. Progressed to IND in-room mobility and ADLs within precautions with energy conservation principles; recommend initial supervision with shower transfer and bathing tasks due to fatigue and environmental mgmt of fall risk variables. Pt with good application of energy conservation principles in tasks. Performance primarily limited by decreased activity tolerance; pt endorsing bilateral UE fine motor strength deficits improving. DME: Recommend shower chair. Plan to discharge home with family A for IADLs; issued HEP for fine motor strengthening and recommend OP cardiac rehab to progress functional endurance to progress independence and safety.     Mobility: Pt is meeting goals for IND household mobility and is on track for discharge tomorrow with OP CR follow-up. No equipment needs at this time. Recommend supervision in community as pt has higher level balance deficits primarily related to fatigue at this time.  FGA             6/17: 20/30  6MWT             6/17: 922ft (281m)     Cognition/Language: SLP eval only. Pt scored total scale on cognitive assessment of  "86 indicating 18th percentile compared to age related norms. Pt reported to feel at baseline level of function cognitively.     Community Re-Entry: Recommend supervision in community based on higher level balance deficit and fatigue    Transportation: Family to provide, transfer not a barrier    Decision maker: {ACR DECISION MAKER:1061226}    Plan of Care and goals reviewed and updated.    Discharge Plan/Recommendations    Fall Precautions: {ACR FALL PRECAUTIONS:6982028}    Patient/Family input to goals: ***    Anticipated rehab needs following discharge: ***    Anticipated care giver support after discharge: ***    Estimated length of stay: ***    Overall plan for the patient: ***      Utilization Review and Continued Stay Justification    Medical Necessity Criteria:    For any criteria that is not met, please document reason and plan for discharge, transfer, or modification of plan of care to address.    Requires intensive rehabilitation program to treat functional deficits?: {YES/NO :522426::\"Yes\"}    Requires 3x per week or greater involvement of rehabilitation physician to oversee rehabilitation program?: {YES/NO :847481::\"Yes\"}    Requires rehabilitation nursing interventions?: {YES/NO :535803::\"Yes\"}    Patient is making functional progress?: {YES/NO :971996::\"Yes\"}    There is a potential for additional functional progress? {YES/NO :967944::\"Yes\"}    Patient is participating in therapy 3 hours per day a minimum of 5 days per week or 15 hours per week in 7 day period?:{YES/NO :664318::\"Yes\"}    Has discharge needs that require coordinated discharge planning approach?:{YES/NO :096943::\"Yes\"}      Barriers/Concerns related to meeting medical necessity criteria:  ***    Team Plan to Address Concern:  ***      Final Physician Sign off    Statement of Approval: ***    Patient Goals  Social Work Goals: Confirm discharge recommendations with therapy, coordinate safe discharge plan and remain available to support and " assist as needed.       Therapy Frequency (OT): 6 times/week  OT: Hygiene/Grooming: Completed  OT: Upper Body Dressing: Completed  OT: Lower Body Dressing: Completed  OT: Upper Body Bathing: Completed  OT: Lower Body Bathing: Completed  OT: Bed Mobility: Completed  OT: Transfer: Completed  OT: Toilet Transfer/Toileting: Completed  OT: Goal 1: Pt will be mod I for shower transfer - Completed  OT: Goal 2: Pt will demonstrate Ind with HEP - Completed                      PT Predicted Duration/Target Date for Goal Attainment: 06/19/24  PT Frequency: 6x/week  PT: Gait: Completed  PT: Stairs: Completed  PT: Goal 1: Completed  PT: Goal 2: Completed                      {Speech Goals:311064}    {RN Goals:256287}

## 2024-06-18 NOTE — PROGRESS NOTES
Occupational Therapy Discharge Summary    Reason for therapy discharge:    Discharged to home with family IADL A, outpatient cardiac rehab.     Progress towards therapy goal(s). See goals on Care Plan in [facility] electronic health record for goal details.  Goals met.    Current status at discharge:       Mobility: IND no device in home. Recommend SBA in community.  Grooming: IND  Dressing: UB: IND, LB: IND, Feet: IND  Bathing: Transfer IND with shower chair.  Pt able to complete all wash/rinse/dry of all body parts.  Recommend initial supervision at discharge for environmental mgmt and fatigue.  Toileting: IND no AD  IADLs: Independent at baseline, pt was working as a contractor and hockey . A for driving and heavier IADLs from spouse at discharge.  Vision/Cognition: Pt wears corrective lenses at baseline. WNL.      Therapy recommendation(s):    Continued therapy is recommended.  Rationale/Recommendations:  Recommend OP cardiac rehab to progress functional endurance required to promote increased independence in IADLs. Issued HEP for [facility]. No OT needs identified at this time; open for MD referral pending future needs.

## 2024-06-18 NOTE — PROGRESS NOTES
Occupational Therapy Discharge Summary    Reason for therapy discharge:    {REHAB REASON FOR DISCHARGE:071107877}    Progress towards therapy goal(s). See goals on Care Plan in [facility] electronic health record for goal details.  {REHAB PROGRESS TOWARDS GOALS:766185145}    Therapy recommendation(s):    {REHAB CONTINUED THERAPY RECOMMENDED:156486044}

## 2024-06-18 NOTE — PROGRESS NOTES
"Team rounds today. Pt discharging home today with OP Cardiac Rehab. IRF questions completed on Sunday, pt denies any pain, updated pain assessment below. Pt had no questions for SW.    IRF-SARABJIT Pain Assessment    Pain Effect on Sleep  \"Over the past 5 days, how much of the time has pain made it hard for you to sleep at night?\"    0. Does not apply - I have not had any pain or hurting in the past 5 days    LUCAS Jackson  Post Acute Float   [facility]    Phone: [phone]  Fax: [phone]    "

## 2024-06-18 NOTE — PROGRESS NOTES
Physical Therapy Discharge Summary    Reason for therapy discharge:    Discharged to home with outpatient therapy.    Progress towards therapy goal(s). See goals on Care Plan in [facility] electronic health record for goal details.  Goals met    Therapy recommendation(s):    Continued therapy is recommended.  Rationale/Recommendations:  OP CR to continue to progress CV endurance post cardiac arrest. At discharge pt is IND with household mobility. Recommend support in community settings due to higher level balance deficits.    FGA             6/17: 20/30  6MWT             6/17: 922ft (281m)

## 2024-06-18 NOTE — PROGRESS NOTES
Pt was discharged to home with discharge instruction and medication at about 1615. He left the unit walking independently and he was stable when he left. Wife picked up pt when he left from here.

## 2024-06-18 NOTE — TELEPHONE ENCOUNTER
[facility] Call Center    Phone Message    May a detailed message be left on voicemail: yes     Reason for Call: Other: Please reach out to pt to schedule new HF at the [facility] for cardiac arrest     Action Taken: Other: Cardio    Travel Screening: Not Applicable     Date of Service:                                                                      none

## 2024-06-19 ENCOUNTER — PATIENT OUTREACH (OUTPATIENT)
Dept: CARE COORDINATION | Facility: CLINIC | Age: 49
End: 2024-06-19
Payer: COMMERCIAL

## 2024-06-19 DIAGNOSIS — I21.3 ST ELEVATION MYOCARDIAL INFARCTION (STEMI), UNSPECIFIED ARTERY (H): Primary | ICD-10-CM

## 2024-06-19 NOTE — PROGRESS NOTES
"Clinic Care Coordination Contact  Transitions of Care Outreach  Chief Complaint   Patient presents with    Clinic Care Coordination - Post Hospital       Most Recent Admission Date: 6/15/2024   Most Recent Admission Diagnosis:      Most Recent Discharge Date: 6/18/2024   Most Recent Discharge Diagnosis: Cardiac arrest (H) - I46.9  Local infection of skin and subcutaneous tissue - L08.9  Acute on chronic systolic heart failure (H) - I50.23  Coronary artery disease involving native coronary artery of native heart without angina pectoris - I25.10  Status post percutaneous transluminal coronary angioplasty - Z98.61  Hypokalemia - E87.6     Transitions of Care Assessment    Discharge Assessment  How are you doing now that you are home?: \"Doing pretty good, I slept well. It's good to be back with family.\"  How are your symptoms? (Red Flag symptoms escalate to triage hotline per guidelines): Improved  Do you know how to contact your clinic care team if you have future questions or changes to your health status? : Yes  Does the patient have their discharge instructions? : Yes  Does the patient have questions regarding their discharge instructions? : No  Were you started on any new medications or were there changes to any of your previous medications? : Yes  Does the patient have all of their medications?: Yes  Do you have questions regarding any of your medications? : No  Do you have all of your needed medical supplies or equipment (DME)?  (i.e. oxygen tank, CPAP, cane, etc.): Yes    Post-op ([facility] Only)  If the patient had a surgery or procedure, do they have any questions for a nurse?: No    Follow up Plan     Discharge Follow-Up  Discharge follow up appointment scheduled in alignment with recommended follow up timeframe or Transitions of Risk Category? (Low = within 30 days; Moderate= within 14 days; High= within 7 days): Yes  Discharge Follow Up Appointment Date: 06/24/24  Discharge Follow Up Appointment Scheduled " with?: Specialty Care Provider (Cardiac Rehab appt.)    Future Appointments   Date Time Provider Department Center   6/24/2024  1:00 PM Aretha Godwin NP [facility]   6/25/2024  3:15 PM 1, Ur Cardiac Rehab [facility]   7/15/2024 12:30 PM  CRITICAL CARE [facility]   8/7/2024  3:15 PM Lynne Carcamo, NP [facility]       Outpatient Plan as outlined on AVS reviewed with patient.    For any urgent concerns, please contact our 24 hour nurse triage line: [phone])       MEGA Garrett  [phone]  Connected Care Resource [facility]

## 2024-06-21 ENCOUNTER — TELEPHONE (OUTPATIENT)
Dept: CARDIOLOGY | Facility: CLINIC | Age: 49
End: 2024-06-21
Payer: COMMERCIAL

## 2024-06-21 NOTE — TELEPHONE ENCOUNTER
Spoke with Arlyn. Cullen is experiencing the light headedness when standing up from a sitting position. His fluid intake has been good. Last home weight was 260. B/p 120/80.    Aretha recommended that Cullen take a 1/2 tablet of his spirolactone, increase his oral fluid intake. Have Arlyn perform orthostatic b/p. This was recommended for possible slight dehydration vs medication induced hypotension.    Arlyn was instructed to have a low threshold to seek emergency care for Culeln if he experiences racing or pounding heart, any chest pain or SOB.    Arlyn stated understanding of instructions all questions answered.

## 2024-06-21 NOTE — TELEPHONE ENCOUNTER
[facility] Call Center    Phone Message    May a detailed message be left on voicemail: yes     Reason for Call: Other: Today pt has been experiencing light headedness when he gets up from sitting.  Please call his wife Arlyn to discusss     Action Taken: Message routed to:  [facility] cardio    Travel Screening: Not Applicable    Thank you!  Specialty Access Center       Date of Service:

## 2024-06-21 NOTE — TELEPHONE ENCOUNTER
[facility] Call Center    Phone Message    May a detailed message be left on voicemail: yes     Reason for Call: Other: Pt wife would like a call back as she took the pt Bp and Pulse at Standing, Pulse is 96 and Bp 85/65, Please reach out to pt wife to discuss      Action Taken: Other: Cardio    Travel Screening: Not Applicable     Date of Service:

## 2024-06-24 ENCOUNTER — OFFICE VISIT (OUTPATIENT)
Dept: CARDIOLOGY | Facility: CLINIC | Age: 49
End: 2024-06-24
Attending: STUDENT IN AN ORGANIZED HEALTH CARE EDUCATION/TRAINING PROGRAM
Payer: COMMERCIAL

## 2024-06-24 VITALS
BODY MASS INDEX: 32.8 KG/M2 | DIASTOLIC BLOOD PRESSURE: 69 MMHG | HEART RATE: 88 BPM | SYSTOLIC BLOOD PRESSURE: 106 MMHG | WEIGHT: 248.6 LBS | OXYGEN SATURATION: 96 %

## 2024-06-24 DIAGNOSIS — I25.10 CORONARY ARTERY DISEASE INVOLVING NATIVE CORONARY ARTERY OF NATIVE HEART WITHOUT ANGINA PECTORIS: ICD-10-CM

## 2024-06-24 DIAGNOSIS — I50.23 ACUTE ON CHRONIC SYSTOLIC HEART FAILURE (H): ICD-10-CM

## 2024-06-24 DIAGNOSIS — E87.6 HYPOKALEMIA: ICD-10-CM

## 2024-06-24 DIAGNOSIS — I46.9 CARDIAC ARREST (H): Primary | ICD-10-CM

## 2024-06-24 DIAGNOSIS — Z98.61 STATUS POST PERCUTANEOUS TRANSLUMINAL CORONARY ANGIOPLASTY: ICD-10-CM

## 2024-06-24 PROCEDURE — 93005 ELECTROCARDIOGRAM TRACING: CPT

## 2024-06-24 PROCEDURE — 99215 OFFICE O/P EST HI 40 MIN: CPT | Performed by: NURSE PRACTITIONER

## 2024-06-24 PROCEDURE — 93010 ELECTROCARDIOGRAM REPORT: CPT | Performed by: INTERNAL MEDICINE

## 2024-06-24 PROCEDURE — G0463 HOSPITAL OUTPT CLINIC VISIT: HCPCS | Performed by: NURSE PRACTITIONER

## 2024-06-24 RX ORDER — SPIRONOLACTONE 25 MG/1
12.5 TABLET ORAL DAILY
Qty: 45 TABLET | Refills: 3 | Status: SHIPPED | OUTPATIENT
Start: 2024-06-24

## 2024-06-24 RX ORDER — FUROSEMIDE 20 MG
20 TABLET ORAL DAILY
Qty: 90 TABLET | Refills: 3 | Status: SHIPPED | OUTPATIENT
Start: 2024-06-24 | End: 2024-07-15

## 2024-06-24 RX ORDER — ROSUVASTATIN CALCIUM 20 MG/1
20 TABLET, COATED ORAL DAILY
Qty: 90 TABLET | Refills: 3 | Status: SHIPPED | OUTPATIENT
Start: 2024-06-24

## 2024-06-24 RX ORDER — ASPIRIN 81 MG/1
81 TABLET ORAL DAILY
Qty: 90 TABLET | Refills: 3 | Status: SHIPPED | OUTPATIENT
Start: 2024-06-24

## 2024-06-24 RX ORDER — CEPHALEXIN 500 MG/1
500 CAPSULE ORAL 4 TIMES DAILY
Qty: 20 CAPSULE | Refills: 0 | Status: SHIPPED | OUTPATIENT
Start: 2024-06-24 | End: 2024-07-15

## 2024-06-24 RX ORDER — AMIODARONE HYDROCHLORIDE 200 MG/1
200 TABLET ORAL DAILY
Qty: 30 TABLET | Refills: 0 | Status: SHIPPED | OUTPATIENT
Start: 2024-06-24 | End: 2024-08-22

## 2024-06-24 RX ORDER — POTASSIUM CHLORIDE 1500 MG/1
20 TABLET, EXTENDED RELEASE ORAL DAILY
Qty: 90 TABLET | Refills: 3 | Status: SHIPPED | OUTPATIENT
Start: 2024-06-24 | End: 2024-07-15

## 2024-06-24 RX ORDER — METOPROLOL SUCCINATE 50 MG/1
50 TABLET, EXTENDED RELEASE ORAL DAILY
Qty: 90 TABLET | Refills: 3 | Status: SHIPPED | OUTPATIENT
Start: 2024-06-24

## 2024-06-24 ASSESSMENT — PAIN SCALES - GENERAL: PAINLEVEL: NO PAIN (0)

## 2024-06-24 NOTE — PROGRESS NOTES
Cardiology Clinic Note  June 24, 2024      HPI:  Cullen Reardon is a 48 year old who presents for cardiac arrest follow-up.  Patient with no prior past medical history who presented to [facility] on 6/1/2024 with chest pain where he had a cardiac arrest with immediate bystander CPR in the emergency department.  He was shocked x 3 with estimated downtime of 15 minutes.  He was cannulated on [facility] ECMO and went to the Cath Lab where he was found to have 100% ostial LAD thrombotic occlusion status post stenting.  He was decannulated on 6/4/2024.  He underwent staged PCI of D1 on 6/7/2024 and was eventually extubated on 6/10/2024.  His echocardiogram showed ejection fraction of 40 to 45%.  He was started on GDMT, as well as amiodarone.  He was discharged to acute rehab on 6/15/2024.    Cullen presents to clinic with his wife. He was recently discharged from acute rehab and is back home. He has made a remarkable recovery. He is feeling a little better everyday. He is ambulating on his own. He went to his [employer] hockey game this weekend. Walked up the stairs in the arena and walked to and from the car. No chest pain or pressure. He feels mild SOB and fatigue at the top of the stairs which he attributes to deconditioning. No orthopnea, PND, leg swelling., has lost weight. He does have a dry cough occurs randomly, itchy/scratchy, persistent since he got home. He is not on lisinopril. Does not occur with laying flat. No associated fevers or chills. He has having positional lightheadedness, worse with standing.  Was lightheaded over the weekend, had a presyncopal event on Saturday afternoon. His wife checked his BP sitting and standing and he was orthostatic with supine /80 and standing BP 85/65. We decreased his jovanna and pushed fluids, symptoms maybe improved slightly. In terms of neurological symptoms nothing major but maybe a little forgetful at times. Patient has a history of hypogonadism and wonders if he  will be able to resume testosterone at any point in time. Has staples in the right groin that need to be removed.     Past medical history:  Cardiac arrest 6/1/2024 status post [facility] ECMO  STEMI status post PCI of the proximal LAD on 6/1/2024  Staged PCI of D1 on 6/7/2024  Ischemic cardiomyopathy EF 40 to 45%    Medications:  Current Outpatient Medications   Medication Sig Dispense Refill    acetaminophen (TYLENOL) 325 MG tablet Take 2 tablets (650 mg) by mouth every 4 hours as needed for mild pain or fever      amiodarone (PACERONE) 200 MG tablet Take 1 tablet (200 mg) by mouth daily 30 tablet 0    aspirin (ASPIRIN REGIMEN) 81 MG EC tablet Take 1 tablet (81 mg) by mouth daily 30 tablet 0    calcium carbonate (TUMS) 500 MG chewable tablet Take 1 tablet (500 mg) by mouth daily as needed for heartburn      cholecalciferol (VITAMIN D3) 25 mcg (1000 units) capsule Take 2 capsules by mouth daily      empagliflozin (JARDIANCE) 10 MG TABS tablet 1 tablet (10 mg) by Oral or Feeding Tube route daily 30 tablet 0    furosemide (LASIX) 40 MG tablet 1 tablet (40 mg) by Oral or Feeding Tube route daily 30 tablet 0    melatonin 10 MG TABS tablet Take 1 tablet (10 mg) by mouth nightly as needed for sleep      melatonin 10 MG TABS tablet 1 tablet (10 mg) by Oral or Feeding Tube route every evening      metoprolol succinate ER (TOPROL XL) 50 MG 24 hr tablet Take 1 tablet (50 mg) by mouth daily 30 tablet 0    multivitamin, therapeutic (THERA-VIT) TABS tablet Take 1 tablet by mouth daily      pantoprazole (PROTONIX) 40 MG EC tablet Take 1 tablet (40 mg) by mouth every morning (before breakfast) 30 tablet 0    potassium chloride khushboo ER (KLOR-CON M20) 20 MEQ CR tablet Take 2 tablets (40 mEq) by mouth daily 60 tablet 0    rosuvastatin (CRESTOR) 20 MG tablet Take 1 tablet (20 mg) by mouth daily 30 tablet 0    sacubitril-valsartan (ENTRESTO) 24-26 MG per tablet 1 tablet by Oral or Feeding Tube route 2 times daily 60 tablet 0    spironolactone  (ALDACTONE) 25 MG tablet Take 1 tablet (25 mg) by mouth daily 30 tablet 0    ticagrelor (BRILINTA) 90 MG tablet Take 1 tablet (90 mg) by mouth 2 times daily 60 tablet 0    venlafaxine (EFFEXOR XR) 75 MG 24 hr capsule Take 75 mg by mouth daily       No current facility-administered medications for this visit.       Allergies:    No Known Allergies    Family and social history:    No family history on file.    Social History     Socioeconomic History    Marital status:      Spouse name: Not on file    Number of children: Not on file    Years of education: Not on file    Highest education level: Not on file   Occupational History    Not on file   Tobacco Use    Smoking status: Not on file    Smokeless tobacco: Not on file   Substance and Sexual Activity    Alcohol use: Not on file    Drug use: Not on file    Sexual activity: Not on file   Other Topics Concern    Not on file   Social History Narrative    Not on file     Social Determinants of Health     Financial Resource Strain: Not At Risk (7/31/2023)    Received from [employer]    Financial Resource Strain     Is it hard for you to pay for the very basics like food, housing, medical care or heating?: No   Food Insecurity: Not At Risk (7/31/2023)    Received from [employer]    Food Insecurity     Does your food run out before you have the money to buy more?: No   Transportation Needs: Not At Risk (7/31/2023)    Received from [employer]    Transportation Needs     Does a lack of transportation keep you from your medical appointments or from getting your medications?: No   Physical Activity: Not on file   Stress: Not on file   Social Connections: Not on file   Interpersonal Safety: Not on file   Housing Stability: Not on file     Review of Systems:  Skin: No skin rash or ulcers.  Respiratory: No cough or hemoptysis.  Cardiovascular: See HPI.    Gastrointestinal: No abdominal pain, nausea, vomiting, hematemesis or melena.  Genitourinary: No increased  frequency or urgency of urine. No dysuria or hematuria.  Musculoskeletal: No polyarthralgia or myalgias.  Neurologic: No headaches, seizure or focal weakness.  Hematologic/Lymphatic/Immunologic: No bleeding tendency.    Vital signs:  /69 (BP Location: Right arm, Patient Position: Chair, Cuff Size: Adult Regular)   Pulse 88   Wt 112.8 kg (248 lb 9.6 oz)   SpO2 96%   BMI 32.80 kg/m     Wt Readings from Last 2 Encounters:   06/16/24 114.3 kg (251 lb 15.8 oz)   06/15/24 118.2 kg (260 lb 8 oz)       Physical Exam:  Gen: NAD.    HEENT: No conjunctival pallor or scleral icterus, MMM. Clear oropharynx.    Neck: No JVD. No thyroid enlargement or cervical adenopathy.    Chest: Clear to auscultation bilaterally.    CV: Normal first and second heart sounds. No murmurs or gallop appreciated.    Abdomen: Soft, non-tender, non-distended, BS+.  Ext: No edema. Warm and well perfused with normal capillary refill.    Skin: No skin rash or ulcers.  Neuro: alert, oriented and appropriately conversant.    Psych: Normal affect and speech.    Labs:  Last Basic Metabolic Panel:  Lab Results   Component Value Date     06/17/2024      Lab Results   Component Value Date    POTASSIUM 4.1 06/17/2024    POTASSIUM 3.5 06/07/2024     Lab Results   Component Value Date    CHLORIDE 102 06/17/2024     Lab Results   Component Value Date    RANDA 8.5 06/17/2024     Lab Results   Component Value Date    CO2 19 06/17/2024     Lab Results   Component Value Date    BUN 37.5 06/17/2024     Lab Results   Component Value Date    CR 0.98 06/17/2024     Lab Results   Component Value Date     06/18/2024     06/05/2024       Lab Results   Component Value Date    WBC 8.5 06/17/2024     Lab Results   Component Value Date    RBC 3.64 06/17/2024     Lab Results   Component Value Date    HGB 11.1 06/17/2024     Lab Results   Component Value Date    HCT 33.8 06/17/2024     Lab Results   Component Value Date    MCV 93 06/17/2024     Lab  Results   Component Value Date    MCH 30.5 06/17/2024     Lab Results   Component Value Date    MCHC 32.8 06/17/2024     Lab Results   Component Value Date    RDW 14.6 06/17/2024     Lab Results   Component Value Date     06/17/2024       Lab Results   Component Value Date    INR 1.20 06/11/2024    INR 1.13 06/10/2024    INR 1.08 06/09/2024    INR 1.09 06/08/2024    INR 1.15 06/07/2024    INR 1.16 06/06/2024    INR 1.11 06/05/2024    INR 1.21 06/04/2024    INR 1.18 06/04/2024    INR 1.23 06/04/2024    INR 1.19 06/04/2024    INR 1.18 06/03/2024         Diagnostics:    EKG 12 Lead 6/10/24:        ECHO 6/8/24:  Interpretation Summary  Left ventricular function is decreased. The ejection fraction is 40-45%  (mildly reduced). There are wall motion abnormalities that are consistent with  prior LAD/LCx culprit infarction.  Global right ventricular function is normal. The right ventricle is normal  size.  No significant valvular abnormalities present.  This study was compared with the study from 06/03/2024. The biventricular  function has improved.  ______________________________________________________________________________  Left Ventricle  Left ventricular wall thickness is normal. Left ventricular size is normal.  Left ventricular function is decreased. The ejection fraction is 40-45%  (mildly reduced). There is severe hypokinesis of the mid inferolateral, mid  anterolateral, mid anteroseptal and apical segments with mild diffuse  hypokinesis.     Right Ventricle  Global right ventricular function is normal. The right ventricle is normal  size.     Atria  Both atria appear normal.     Mitral Valve  The mitral valve is normal. Trace mitral insufficiency is present.     Aortic Valve  The aortic valve is tricuspid. On Doppler interrogation, there is no  significant stenosis or regurgitation.     Tricuspid Valve  The tricuspid valve is normal. Trace tricuspid insufficiency is present.  Pulmonary artery systolic  pressure cannot be assessed.     Pulmonic Valve  The pulmonic valve cannot be assessed.     Vessels  The aorta root cannot be assessed. The thoracic aorta cannot be assessed.  Unable to assess mean RA pressure given the patient is on a ventilator. IVC  1.8 cm and collapsing.     Pericardium  No pericardial effusion is present.     Miscellaneous  No significant valvular abnormalities present.     Compared to Previous Study  This study was compared with the study from 06/03/2024 . The biventricular  function has improved.  ______________________________________________________________________________  MMode/2D Measurements & Calculations  IVSd: 0.69 cm  LVIDd: 5.7 cm  LVPWd: 0.87 cm  LV mass(C)d: 162.6 grams  LV mass(C)dI: 64.7 grams/m2     EF(MOD-bp): 43.8 %  RWT: 0.30     Coronary Angiogram 6/6/24:  Left Anterior Descending   Previously placed Ost LAD to Prox LAD stent of unknown type is widely patent.   First Diagonal Branch   The vessel is large. There is mild diffuse disease throughout the vessel.   1st Diag lesion is 60% stenosed.   Right Coronary Artery   The vessel is small.   Right Posterior Descending Artery   The vessel is small.   Right Posterior Atrioventricular Artery   The vessel is small.     Intervention     1st Diag lesion   Stent   A stent was successfully placed.   There is a 0% residual stenosis post intervention.     Coronary angiogram 6/1/24:    Conclusions    1. Refractory out-of-hospital VT/VF cardiac arrest status-post VA ECMO cannulation (ECPR) in the emergency department.     2. Anterolateral STEMI due to thrombotic occlusion (100%) of the ostial LAD with SARATH 0 flow.     3. Two vessel obstructive coronary artery disease of the LAD/D1 and OM2.     4. Successful thrombectomy of the LAD with Penumbra.     5. Successful Shockwave lithotripsy of the LAD with a 3.5 mm balloon.     6. Successful IVUS-guided PCI of the ostial to mid LAD with a 3.5x38mm Synergy KAYLYN post-dilated to 4.5mm proximally.      Recommendations    * Ticagrelor 90mg BID for 12 months (180 mg load given in the cath lab via an OG tube). Aspirin 81 mg daily indefinitely.     * Transfer [facility] for further ECMO care.     * Consider staged PCI of the D1 and OM2 versus medical management pending his clinical recovery and symptoms in the future.       Report Signatures   Finalized by Shaun Grove MD on 06/01/2024 07:08 PM       Assessment and Plan:  This is a 48 year old with:    # STEMI s/p KAYLYN to ostial LAD 6/1, PCI to D1 6/7  # Refractory VF Arrest s/p VA ECMO  # Chronic Systolic Heart Failure with moderately reduced EF (40-45%)   # Right groin incision  Patient presented to [facility] on 6/1/2024 with chest pain, had a cardiac arrest, received immediate bystander CPR. He was found be in VF, was shocked x 3. Estimated downtime was approximately 15 min. Cannulated in [facility] ED and brought to the Cath Lab for angiogram where he was found to have a 100% ostial LAD thrombotic occlusion s/p stenting. Decanned on 6/4. He had residual severe disease in D1. S/p staged PCI 6/7 to D1. Extubated on 6/10. Doing remarkably well. No cardiac symptoms. He is orthostatic today, will adjust medications accordingly. Removed staples from right groin, also treated for infection.   - DAPT: ASA 81mg daily + Ticagrelor 90mg BID lifelong as tolerated   - Volume Status: Reduce Lasix to 20mg daily and K to 20 mEq daily   - Patient to start weighing himself and call us if he gains > 3 lbs in a day or 5 lbs in a week  GDMT:   - BB: Toprol XL 50mg daily  - ACE/ARB/ARNI: Continue Entresto 24-26 mg BID  - SGLT2i: Continue Jardiance 10mg daily  - AA: Continue Spironolactone 12.5mg daily  - ICD: Not indicated, EF > 35%  - Continue Amiodarone 200mg daily  - Keflex for possible groin site infection   - Cardiac MRI to assess EF and scar burden    Follow-up: RTC in critical care 7/15/24 with labs prior       A total of 45 minutes spent face to  face with patient and > 50% of the time spent counseling and coordinating care.

## 2024-06-24 NOTE — LETTER
6/24/2024      RE: Cullen Reardon  [address]  [place] [address]       Dear Colleague,    Thank you for the opportunity to participate in the care of your patient, Cullen Reardon, at the [facility] [place] [facility]. Please see a copy of my visit note below.    Cardiology Clinic Note  June 24, 2024      HPI:  Cullen Reardon is a 48 year old who presents for cardiac arrest follow-up.  Patient with no prior past medical history who presented to [facility] on 6/1/2024 with chest pain where he had a cardiac arrest with immediate bystander CPR in the emergency department.  He was shocked x 3 with estimated downtime of 15 minutes.  He was cannulated on VA ECMO and went to the Cath Lab where he was found to have 100% ostial LAD thrombotic occlusion status post stenting.  He was decannulated on 6/4/2024.  He underwent staged PCI of D1 on 6/7/2024 and was eventually extubated on 6/10/2024.  His echocardiogram showed ejection fraction of 40 to 45%.  He was started on GDMT, as well as amiodarone.  He was discharged to acute rehab on 6/15/2024.    Cullen presents to clinic with his wife. He was recently discharged from acute rehab and is back home. He has made a remarkable recovery. He is feeling a little better everyday. He is ambulating on his own. He went to his [employer] hockey game this weekend. Walked up the stairs in the arena and walked to and from the car. No chest pain or pressure. He feels mild SOB and fatigue at the top of the stairs which he attributes to deconditioning. No orthopnea, PND, leg swelling., has lost weight. He does have a dry cough occurs randomly, itchy/scratchy, persistent since he got home. He is not on lisinopril. Does not occur with laying flat. No associated fevers or chills. He has having positional lightheadedness, worse with standing.  Was lightheaded over the weekend, had a presyncopal event on Saturday  afternoon. His wife checked his BP sitting and standing and he was orthostatic with supine /80 and standing BP 85/65. We decreased his jovanna and pushed fluids, symptoms maybe improved slightly. In terms of neurological symptoms nothing major but maybe a little forgetful at times. Patient has a history of hypogonadism and wonders if he will be able to resume testosterone at any point in time. Has staples in the right groin that need to be removed.     Past medical history:  Cardiac arrest 6/1/2024 status post VA ECMO  STEMI status post PCI of the proximal LAD on 6/1/2024  Staged PCI of D1 on 6/7/2024  Ischemic cardiomyopathy EF 40 to 45%    Medications:  Current Outpatient Medications   Medication Sig Dispense Refill    acetaminophen (TYLENOL) 325 MG tablet Take 2 tablets (650 mg) by mouth every 4 hours as needed for mild pain or fever      amiodarone (PACERONE) 200 MG tablet Take 1 tablet (200 mg) by mouth daily 30 tablet 0    aspirin (ASPIRIN REGIMEN) 81 MG EC tablet Take 1 tablet (81 mg) by mouth daily 30 tablet 0    calcium carbonate (TUMS) 500 MG chewable tablet Take 1 tablet (500 mg) by mouth daily as needed for heartburn      cholecalciferol (VITAMIN D3) 25 mcg (1000 units) capsule Take 2 capsules by mouth daily      empagliflozin (JARDIANCE) 10 MG TABS tablet 1 tablet (10 mg) by Oral or Feeding Tube route daily 30 tablet 0    furosemide (LASIX) 40 MG tablet 1 tablet (40 mg) by Oral or Feeding Tube route daily 30 tablet 0    melatonin 10 MG TABS tablet Take 1 tablet (10 mg) by mouth nightly as needed for sleep      melatonin 10 MG TABS tablet 1 tablet (10 mg) by Oral or Feeding Tube route every evening      metoprolol succinate ER (TOPROL XL) 50 MG 24 hr tablet Take 1 tablet (50 mg) by mouth daily 30 tablet 0    multivitamin, therapeutic (THERA-VIT) TABS tablet Take 1 tablet by mouth daily      pantoprazole (PROTONIX) 40 MG EC tablet Take 1 tablet (40 mg) by mouth every morning (before breakfast) 30  tablet 0    potassium chloride khushboo ER (KLOR-CON M20) 20 MEQ CR tablet Take 2 tablets (40 mEq) by mouth daily 60 tablet 0    rosuvastatin (CRESTOR) 20 MG tablet Take 1 tablet (20 mg) by mouth daily 30 tablet 0    sacubitril-valsartan (ENTRESTO) 24-26 MG per tablet 1 tablet by Oral or Feeding Tube route 2 times daily 60 tablet 0    spironolactone (ALDACTONE) 25 MG tablet Take 1 tablet (25 mg) by mouth daily 30 tablet 0    ticagrelor (BRILINTA) 90 MG tablet Take 1 tablet (90 mg) by mouth 2 times daily 60 tablet 0    venlafaxine (EFFEXOR XR) 75 MG 24 hr capsule Take 75 mg by mouth daily       No current facility-administered medications for this visit.       Allergies:    No Known Allergies    Family and social history:    No family history on file.    Social History     Socioeconomic History    Marital status:      Spouse name: Not on file    Number of children: Not on file    Years of education: Not on file    Highest education level: Not on file   Occupational History    Not on file   Tobacco Use    Smoking status: Not on file    Smokeless tobacco: Not on file   Substance and Sexual Activity    Alcohol use: Not on file    Drug use: Not on file    Sexual activity: Not on file   Other Topics Concern    Not on file   Social History Narrative    Not on file     Social Determinants of Health     Financial Resource Strain: Not At Risk (7/31/2023)    Received from [employer]    Financial Resource Strain     Is it hard for you to pay for the very basics like food, housing, medical care or heating?: No   Food Insecurity: Not At Risk (7/31/2023)    Received from [employer]    Food Insecurity     Does your food run out before you have the money to buy more?: No   Transportation Needs: Not At Risk (7/31/2023)    Received from [employer]    Transportation Needs     Does a lack of transportation keep you from your medical appointments or from getting your medications?: No   Physical Activity: Not on file    Stress: Not on file   Social Connections: Not on file   Interpersonal Safety: Not on file   Housing Stability: Not on file     Review of Systems:  Skin: No skin rash or ulcers.  Respiratory: No cough or hemoptysis.  Cardiovascular: See HPI.    Gastrointestinal: No abdominal pain, nausea, vomiting, hematemesis or melena.  Genitourinary: No increased frequency or urgency of urine. No dysuria or hematuria.  Musculoskeletal: No polyarthralgia or myalgias.  Neurologic: No headaches, seizure or focal weakness.  Hematologic/Lymphatic/Immunologic: No bleeding tendency.    Vital signs:  /69 (BP Location: Right arm, Patient Position: Chair, Cuff Size: Adult Regular)   Pulse 88   Wt 112.8 kg (248 lb 9.6 oz)   SpO2 96%   BMI 32.80 kg/m     Wt Readings from Last 2 Encounters:   06/16/24 114.3 kg (251 lb 15.8 oz)   06/15/24 118.2 kg (260 lb 8 oz)       Physical Exam:  Gen: NAD.    HEENT: No conjunctival pallor or scleral icterus, MMM. Clear oropharynx.    Neck: No JVD. No thyroid enlargement or cervical adenopathy.    Chest: Clear to auscultation bilaterally.    CV: Normal first and second heart sounds. No murmurs or gallop appreciated.    Abdomen: Soft, non-tender, non-distended, BS+.  Ext: No edema. Warm and well perfused with normal capillary refill.    Skin: No skin rash or ulcers.  Neuro: alert, oriented and appropriately conversant.    Psych: Normal affect and speech.    Labs:  Last Basic Metabolic Panel:  Lab Results   Component Value Date     06/17/2024      Lab Results   Component Value Date    POTASSIUM 4.1 06/17/2024    POTASSIUM 3.5 06/07/2024     Lab Results   Component Value Date    CHLORIDE 102 06/17/2024     Lab Results   Component Value Date    RANDA 8.5 06/17/2024     Lab Results   Component Value Date    CO2 19 06/17/2024     Lab Results   Component Value Date    BUN 37.5 06/17/2024     Lab Results   Component Value Date    CR 0.98 06/17/2024     Lab Results   Component Value Date      06/18/2024     06/05/2024       Lab Results   Component Value Date    WBC 8.5 06/17/2024     Lab Results   Component Value Date    RBC 3.64 06/17/2024     Lab Results   Component Value Date    HGB 11.1 06/17/2024     Lab Results   Component Value Date    HCT 33.8 06/17/2024     Lab Results   Component Value Date    MCV 93 06/17/2024     Lab Results   Component Value Date    MCH 30.5 06/17/2024     Lab Results   Component Value Date    MCHC 32.8 06/17/2024     Lab Results   Component Value Date    RDW 14.6 06/17/2024     Lab Results   Component Value Date     06/17/2024       Lab Results   Component Value Date    INR 1.20 06/11/2024    INR 1.13 06/10/2024    INR 1.08 06/09/2024    INR 1.09 06/08/2024    INR 1.15 06/07/2024    INR 1.16 06/06/2024    INR 1.11 06/05/2024    INR 1.21 06/04/2024    INR 1.18 06/04/2024    INR 1.23 06/04/2024    INR 1.19 06/04/2024    INR 1.18 06/03/2024         Diagnostics:    EKG 12 Lead 6/10/24:        ECHO 6/8/24:  Interpretation Summary  Left ventricular function is decreased. The ejection fraction is 40-45%  (mildly reduced). There are wall motion abnormalities that are consistent with  prior LAD/LCx culprit infarction.  Global right ventricular function is normal. The right ventricle is normal  size.  No significant valvular abnormalities present.  This study was compared with the study from 06/03/2024. The biventricular  function has improved.  ______________________________________________________________________________  Left Ventricle  Left ventricular wall thickness is normal. Left ventricular size is normal.  Left ventricular function is decreased. The ejection fraction is 40-45%  (mildly reduced). There is severe hypokinesis of the mid inferolateral, mid  anterolateral, mid anteroseptal and apical segments with mild diffuse  hypokinesis.     Right Ventricle  Global right ventricular function is normal. The right ventricle is normal  size.     Atria  Both atria  appear normal.     Mitral Valve  The mitral valve is normal. Trace mitral insufficiency is present.     Aortic Valve  The aortic valve is tricuspid. On Doppler interrogation, there is no  significant stenosis or regurgitation.     Tricuspid Valve  The tricuspid valve is normal. Trace tricuspid insufficiency is present.  Pulmonary artery systolic pressure cannot be assessed.     Pulmonic Valve  The pulmonic valve cannot be assessed.     Vessels  The aorta root cannot be assessed. The thoracic aorta cannot be assessed.  Unable to assess mean RA pressure given the patient is on a ventilator. IVC  1.8 cm and collapsing.     Pericardium  No pericardial effusion is present.     Miscellaneous  No significant valvular abnormalities present.     Compared to Previous Study  This study was compared with the study from 06/03/2024 . The biventricular  function has improved.  ______________________________________________________________________________  MMode/2D Measurements & Calculations  IVSd: 0.69 cm  LVIDd: 5.7 cm  LVPWd: 0.87 cm  LV mass(C)d: 162.6 grams  LV mass(C)dI: 64.7 grams/m2     EF(MOD-bp): 43.8 %  RWT: 0.30     Coronary Angiogram 6/6/24:  Left Anterior Descending   Previously placed Ost LAD to Prox LAD stent of unknown type is widely patent.   First Diagonal Branch   The vessel is large. There is mild diffuse disease throughout the vessel.   1st Diag lesion is 60% stenosed.   Right Coronary Artery   The vessel is small.   Right Posterior Descending Artery   The vessel is small.   Right Posterior Atrioventricular Artery   The vessel is small.     Intervention     1st Diag lesion   Stent   A stent was successfully placed.   There is a 0% residual stenosis post intervention.     Coronary angiogram 6/1/24:    Conclusions    1. Refractory out-of-hospital VT/VF cardiac arrest status-post VA ECMO cannulation (ECPR) in the emergency department.     2. Anterolateral STEMI due to thrombotic occlusion (100%) of the ostial  LAD with SARATH 0 flow.     3. Two vessel obstructive coronary artery disease of the LAD/D1 and OM2.     4. Successful thrombectomy of the LAD with Penumbra.     5. Successful Shockwave lithotripsy of the LAD with a 3.5 mm balloon.     6. Successful IVUS-guided PCI of the ostial to mid LAD with a 3.5x38mm Synergy KAYLYN post-dilated to 4.5mm proximally.     Recommendations    * Ticagrelor 90mg BID for 12 months (180 mg load given in the cath lab via an OG tube). Aspirin 81 mg daily indefinitely.     * Transfer to the [facility] for further ECMO care.     * Consider staged PCI of the D1 and OM2 versus medical management pending his clinical recovery and symptoms in the future.       Report Signatures   Finalized by Shaun Grove MD on 06/01/2024 07:08 PM       Assessment and Plan:  This is a 48 year old with:    # STEMI s/p KAYLYN to ostial LAD 6/1, PCI to D1 6/7  # Refractory VF Arrest s/p VA ECMO  # Chronic Systolic Heart Failure with moderately reduced EF (40-45%)   # Right groin incision  Patient presented to [facility] on 6/1/2024 with chest pain, had a cardiac arrest, received immediate bystander CPR. He was found be in VF, was shocked x 3. Estimated downtime was approximately 15 min. Cannulated in [facility] ED and brought to the Cath Lab for angiogram where he was found to have a 100% ostial LAD thrombotic occlusion s/p stenting. Decanned on 6/4. He had residual severe disease in D1. S/p staged PCI 6/7 to D1. Extubated on 6/10. Doing remarkably well. No cardiac symptoms. He is orthostatic today, will adjust medications accordingly. Removed staples from right groin, also treated for infection.   - DAPT: ASA 81mg daily + Ticagrelor 90mg BID lifelong as tolerated   - Volume Status: Reduce Lasix to 20mg daily and K to 20 mEq daily   - Patient to start weighing himself and call us if he gains > 3 lbs in a day or 5 lbs in a week  GDMT:   - BB: Toprol XL 50mg daily  - ACE/ARB/ARNI: Continue Entresto  24-26 mg BID  - SGLT2i: Continue Jardiance 10mg daily  - AA: Continue Spironolactone 12.5mg daily  - ICD: Not indicated, EF > 35%  - Continue Amiodarone 200mg daily  - Keflex for possible groin site infection   - Cardiac MRI to assess EF and scar burden    Follow-up: RTC in critical care 7/15/24 with labs prior       A total of 45 minutes spent face to face with patient and > 50% of the time spent counseling and coordinating care.       Please do not hesitate to contact me if you have any questions/concerns.     Sincerely,     Aretha Godwin NP

## 2024-06-24 NOTE — PATIENT INSTRUCTIONS
You were seen today in the Cardiovascular Clinic at the [facility] by:       VJ SANCHEZ CNP     Your visit summary and instructions are as follows:     Continue jovanna at 12.5 mg daily   Reduce lasix to 20 mg daily and potassium to 20 mEq daily  Start Keflex four times daily x 5 days  Continue lifting restrictions no > 10 lbs until your groin site fully heals  Weigh yourself and call us if you gain > 3 lbs in a day or 5 lbs in a week  Call if your lightheadedness persists  Follow up with critical care in 1 month with labs prior          Thank you for your visit today!      Please MyChart message me or call my nurse if you have any questions or concerns.     VJ Sanchez CNP  Structural Heart Nurse Practitioner  [facility]  Clinic: [phone]  Pager: [phone]     After hours, weekends or holidays:   [phone], Option #4  Ask to speak to the On-Call Cardiologist. Inform them you are a cardiology patient at the [facility].

## 2024-06-24 NOTE — NURSING NOTE
Chief Complaint   Patient presents with    Follow Up     Reason for visit: General Cardiac clinic for cardiac arrest on 6/2 with STEMI       Vitals were taken, medications reconciled, and EKG was performed.    Guy Haro, EMT  1:04 PM

## 2024-06-25 ENCOUNTER — HOSPITAL ENCOUNTER (OUTPATIENT)
Dept: CARDIAC REHAB | Facility: CLINIC | Age: 49
Discharge: HOME | End: 2024-06-25
Attending: PHYSICIAN ASSISTANT
Payer: COMMERCIAL

## 2024-06-25 DIAGNOSIS — I21.3 ST ELEVATION MYOCARDIAL INFARCTION (STEMI), UNSPECIFIED ARTERY (H): ICD-10-CM

## 2024-06-25 LAB
ATRIAL RATE - MUSE: 89 BPM
DIASTOLIC BLOOD PRESSURE - MUSE: NORMAL MMHG
INTERPRETATION ECG - MUSE: NORMAL
P AXIS - MUSE: 36 DEGREES
PR INTERVAL - MUSE: 144 MS
QRS DURATION - MUSE: 88 MS
QT - MUSE: 356 MS
QTC - MUSE: 433 MS
R AXIS - MUSE: 43 DEGREES
SYSTOLIC BLOOD PRESSURE - MUSE: NORMAL MMHG
T AXIS - MUSE: 93 DEGREES
VENTRICULAR RATE- MUSE: 89 BPM

## 2024-06-25 PROCEDURE — 93798 PHYS/QHP OP CAR RHAB W/ECG: CPT

## 2024-06-27 ENCOUNTER — HOSPITAL ENCOUNTER (OUTPATIENT)
Dept: CARDIAC REHAB | Facility: CLINIC | Age: 49
Discharge: HOME | End: 2024-06-27
Attending: INTERNAL MEDICINE
Payer: COMMERCIAL

## 2024-06-27 PROCEDURE — 93798 PHYS/QHP OP CAR RHAB W/ECG: CPT

## 2024-07-02 ENCOUNTER — APPOINTMENT (OUTPATIENT)
Dept: GENERAL RADIOLOGY | Facility: CLINIC | Age: 49
End: 2024-07-02
Attending: EMERGENCY MEDICINE
Payer: COMMERCIAL

## 2024-07-02 ENCOUNTER — NURSE TRIAGE (OUTPATIENT)
Dept: CARDIOLOGY | Facility: CLINIC | Age: 49
End: 2024-07-02

## 2024-07-02 ENCOUNTER — HOSPITAL ENCOUNTER (OUTPATIENT)
Dept: CARDIAC REHAB | Facility: CLINIC | Age: 49
Discharge: HOME | End: 2024-07-02
Attending: INTERNAL MEDICINE
Payer: COMMERCIAL

## 2024-07-02 ENCOUNTER — APPOINTMENT (OUTPATIENT)
Dept: ULTRASOUND IMAGING | Facility: CLINIC | Age: 49
End: 2024-07-02
Attending: EMERGENCY MEDICINE
Payer: COMMERCIAL

## 2024-07-02 ENCOUNTER — HOSPITAL ENCOUNTER (EMERGENCY)
Facility: CLINIC | Age: 49
Discharge: HOME | End: 2024-07-02
Attending: EMERGENCY MEDICINE | Admitting: EMERGENCY MEDICINE
Payer: COMMERCIAL

## 2024-07-02 VITALS
TEMPERATURE: 98 F | RESPIRATION RATE: 18 BRPM | OXYGEN SATURATION: 99 % | SYSTOLIC BLOOD PRESSURE: 104 MMHG | DIASTOLIC BLOOD PRESSURE: 70 MMHG | HEART RATE: 66 BPM

## 2024-07-02 DIAGNOSIS — R42 DIZZINESS: ICD-10-CM

## 2024-07-02 DIAGNOSIS — I80.8 SUPERFICIAL THROMBOPHLEBITIS OF LEFT UPPER EXTREMITY: ICD-10-CM

## 2024-07-02 LAB
ALBUMIN SERPL BCG-MCNC: 4 G/DL (ref 3.5–5.2)
ALP SERPL-CCNC: 89 U/L (ref 40–150)
ALT SERPL W P-5'-P-CCNC: 51 U/L (ref 0–70)
ANION GAP SERPL CALCULATED.3IONS-SCNC: 12 MMOL/L (ref 7–15)
AST SERPL W P-5'-P-CCNC: 37 U/L (ref 0–45)
ATRIAL RATE - MUSE: 63 BPM
BASOPHILS # BLD AUTO: 0 10E3/UL (ref 0–0.2)
BASOPHILS NFR BLD AUTO: 0 %
BILIRUB SERPL-MCNC: 0.4 MG/DL
BUN SERPL-MCNC: 20.1 MG/DL (ref 6–20)
CALCIUM SERPL-MCNC: 9.3 MG/DL (ref 8.6–10)
CHLORIDE SERPL-SCNC: 103 MMOL/L (ref 98–107)
CREAT SERPL-MCNC: 1.04 MG/DL (ref 0.67–1.17)
DEPRECATED HCO3 PLAS-SCNC: 21 MMOL/L (ref 22–29)
DIASTOLIC BLOOD PRESSURE - MUSE: NORMAL MMHG
EGFRCR SERPLBLD CKD-EPI 2021: 88 ML/MIN/1.73M2
EOSINOPHIL # BLD AUTO: 0.2 10E3/UL (ref 0–0.7)
EOSINOPHIL NFR BLD AUTO: 4 %
ERYTHROCYTE [DISTWIDTH] IN BLOOD BY AUTOMATED COUNT: 14.5 % (ref 10–15)
GLUCOSE SERPL-MCNC: 105 MG/DL (ref 70–99)
HCT VFR BLD AUTO: 41.2 % (ref 40–53)
HGB BLD-MCNC: 14 G/DL (ref 13.3–17.7)
IMM GRANULOCYTES # BLD: 0 10E3/UL
IMM GRANULOCYTES NFR BLD: 0 %
INTERPRETATION ECG - MUSE: NORMAL
LYMPHOCYTES # BLD AUTO: 1.5 10E3/UL (ref 0.8–5.3)
LYMPHOCYTES NFR BLD AUTO: 32 %
MCH RBC QN AUTO: 31 PG (ref 26.5–33)
MCHC RBC AUTO-ENTMCNC: 34 G/DL (ref 31.5–36.5)
MCV RBC AUTO: 91 FL (ref 78–100)
MONOCYTES # BLD AUTO: 0.5 10E3/UL (ref 0–1.3)
MONOCYTES NFR BLD AUTO: 11 %
NEUTROPHILS # BLD AUTO: 2.5 10E3/UL (ref 1.6–8.3)
NEUTROPHILS NFR BLD AUTO: 53 %
NRBC # BLD AUTO: 0 10E3/UL
NRBC BLD AUTO-RTO: 0 /100
NT-PROBNP SERPL-MCNC: 2077 PG/ML (ref 0–450)
P AXIS - MUSE: 13 DEGREES
PLATELET # BLD AUTO: 192 10E3/UL (ref 150–450)
POTASSIUM SERPL-SCNC: 4.1 MMOL/L (ref 3.4–5.3)
PR INTERVAL - MUSE: 152 MS
PROT SERPL-MCNC: 7.3 G/DL (ref 6.4–8.3)
QRS DURATION - MUSE: 88 MS
QT - MUSE: 424 MS
QTC - MUSE: 433 MS
R AXIS - MUSE: 41 DEGREES
RBC # BLD AUTO: 4.52 10E6/UL (ref 4.4–5.9)
SODIUM SERPL-SCNC: 136 MMOL/L (ref 135–145)
SYSTOLIC BLOOD PRESSURE - MUSE: NORMAL MMHG
T AXIS - MUSE: 101 DEGREES
TROPONIN T SERPL HS-MCNC: 34 NG/L
TROPONIN T SERPL HS-MCNC: 35 NG/L
VENTRICULAR RATE- MUSE: 63 BPM
WBC # BLD AUTO: 4.7 10E3/UL (ref 4–11)

## 2024-07-02 PROCEDURE — 93005 ELECTROCARDIOGRAM TRACING: CPT | Mod: 59 | Performed by: EMERGENCY MEDICINE

## 2024-07-02 PROCEDURE — 93971 EXTREMITY STUDY: CPT | Mod: LT

## 2024-07-02 PROCEDURE — 84484 ASSAY OF TROPONIN QUANT: CPT | Performed by: EMERGENCY MEDICINE

## 2024-07-02 PROCEDURE — 80053 COMPREHEN METABOLIC PANEL: CPT | Performed by: EMERGENCY MEDICINE

## 2024-07-02 PROCEDURE — 93010 ELECTROCARDIOGRAM REPORT: CPT | Mod: 59 | Performed by: EMERGENCY MEDICINE

## 2024-07-02 PROCEDURE — 85004 AUTOMATED DIFF WBC COUNT: CPT | Performed by: EMERGENCY MEDICINE

## 2024-07-02 PROCEDURE — 71046 X-RAY EXAM CHEST 2 VIEWS: CPT | Mod: 26 | Performed by: RADIOLOGY

## 2024-07-02 PROCEDURE — 36415 COLL VENOUS BLD VENIPUNCTURE: CPT | Performed by: EMERGENCY MEDICINE

## 2024-07-02 PROCEDURE — 83880 ASSAY OF NATRIURETIC PEPTIDE: CPT | Performed by: EMERGENCY MEDICINE

## 2024-07-02 PROCEDURE — 99285 EMERGENCY DEPT VISIT HI MDM: CPT | Performed by: EMERGENCY MEDICINE

## 2024-07-02 PROCEDURE — 93798 PHYS/QHP OP CAR RHAB W/ECG: CPT

## 2024-07-02 PROCEDURE — 93971 EXTREMITY STUDY: CPT | Mod: 26 | Performed by: RADIOLOGY

## 2024-07-02 PROCEDURE — 93308 TTE F-UP OR LMTD: CPT | Mod: 26 | Performed by: EMERGENCY MEDICINE

## 2024-07-02 PROCEDURE — 93308 TTE F-UP OR LMTD: CPT | Performed by: EMERGENCY MEDICINE

## 2024-07-02 PROCEDURE — 71046 X-RAY EXAM CHEST 2 VIEWS: CPT

## 2024-07-02 PROCEDURE — 99285 EMERGENCY DEPT VISIT HI MDM: CPT | Mod: 25 | Performed by: EMERGENCY MEDICINE

## 2024-07-02 ASSESSMENT — COLUMBIA-SUICIDE SEVERITY RATING SCALE - C-SSRS
6. HAVE YOU EVER DONE ANYTHING, STARTED TO DO ANYTHING, OR PREPARED TO DO ANYTHING TO END YOUR LIFE?: NO
2. HAVE YOU ACTUALLY HAD ANY THOUGHTS OF KILLING YOURSELF IN THE PAST MONTH?: NO
1. IN THE PAST MONTH, HAVE YOU WISHED YOU WERE DEAD OR WISHED YOU COULD GO TO SLEEP AND NOT WAKE UP?: NO

## 2024-07-02 ASSESSMENT — ACTIVITIES OF DAILY LIVING (ADL)
ADLS_ACUITY_SCORE: 38
ADLS_ACUITY_SCORE: 38
ADLS_ACUITY_SCORE: 36
ADLS_ACUITY_SCORE: 38

## 2024-07-02 NOTE — TELEPHONE ENCOUNTER
Patient and spouse calling clinic reporting patient has been experiencing increased fatigue and lightheadedness since yesterday. Spouse reported patient's most recent BP was 106/67 with a HR of 66. Patient denied chest pain/pressure and no difficulty breathing.     Due to patient recent cardiac arrest and stenting, RN advised patient to be seen in the ED for evaluation of symptoms. Patient and spouse agreed with plan of care and will go to ED now.    Reason for Disposition   Major surgery in the past month    Additional Information   Negative: Systolic BP < 90 and feeling dizzy, lightheaded, or weak   Negative: Started suddenly after an allergic medicine, an allergic food, or bee sting   Negative: Shock suspected (e.g., cold/pale/clammy skin, too weak to stand, low BP, rapid pulse)   Negative: Difficult to awaken or acting confused (e.g., disoriented, slurred speech)   Negative: Fainted   Negative: Chest pain   Negative: Bleeding (e.g., vomiting blood, rectal bleeding or tarry stools, severe vaginal bleeding)   Negative: Extra heartbeats, irregular heart beating, or heart is beating very fast (i.e., 'palpitations')   Negative: Sounds like a life-threatening emergency to the triager   Negative: Systolic BP < 80 and NOT dizzy, lightheaded or weak (feels normal)   Negative: Abdominal pain    Protocols used: Blood Pressure - Low-A-OH

## 2024-07-02 NOTE — ED TRIAGE NOTES
From home for hypotension and associated dizziness that started this morning  Hx vfib cardiac arrest and STEMI s/p ECMO, KAYLYN in june        Triage Assessment (Adult)       Row Name 07/02/24 1522          Triage Assessment    Airway WDL WDL        Respiratory WDL    Respiratory WDL WDL        Skin Circulation/Temperature WDL    Skin Circulation/Temperature WDL WDL        Cardiac WDL    Cardiac WDL WDL        Peripheral/Neurovascular WDL    Peripheral Neurovascular WDL WDL        Cognitive/Neuro/Behavioral WDL    Cognitive/Neuro/Behavioral WDL WDL

## 2024-07-02 NOTE — ED PROVIDER NOTES
[facility] EMERGENCY DEPARTMENT ([facility])    7/02/24       ED PROVIDER NOTE       History     Chief Complaint   Patient presents with    Hypotension    Dizziness     HPI  Cullen Reardon is a 48 year old male with history of STEMI with recent cardiac arrest and stenting who presents emergency department with increased fatigue and lightheadedness that started yesterday.  He had recent admission from 6/1-6/16/2024 after he had a witnessed VT fib cardiac arrest.  He was cannulated on ECMO and had stenting of 100% thrombotic culprit lesion of the LAD as well as to D1.  He was extubated on 6/10/2024.  He was discharged to acute rehab unit and then discharged to an outpatient rehab unit.  On 6/24/2024 his wife noticed some orthostatic hypotension with supine /80 and standing BP 85/65.  His spironolactone was decreased and his IV fluids were pushed.  Today in Physical Therapy he was noted to have low BP 89/60.  Spouse reported patient's most recent BP was 106/67 with a HR of 66. However yesterday he started having fatigue and lightheadedness. Wife called nurse triage line and patient was advised to be seen in the Emergency Department.     The patient reports he was recently here for heart attack. He states that he checked his blood pressure this morning and found it to be 105/55. He reports lightheadedness when he stands up and moves but is fine when sitting. Patient is nauseas on assessment. The patient notes urinary frequency from his diuretic medication and states that both his spironolactone and lasix have been halved. Patient notes worry of bump on left arm and suspects it's from the amount of recent IV in that area from 2 weeks ago. Denies chest pain, shortness of breath, pressure, fever, vomiting, diarrhea, abdominal pain, leg swelling, leg pain, palpitations, sweating, and CHF. The patient notes CAD. Patient states he has an appointment with his nurse practitioner to get an update on the  medication he uses.     Past Medical History  No past medical history on file.  Past Surgical History:   Procedure Laterality Date    CV CORONARY ANGIOGRAM N/A 6/6/2024    Procedure: Coronary Angiogram;  Surgeon: Chase Gonsalez MD;  Location:  HEART CARDIAC CATH LAB    PICC INSERTION - TRIPLE LUMEN Left 06/03/2024    left basilic 5 fr tl power picc 50 cm    REMOVE EXTRACORPORAL MEMBRANE OXYGENATOR ADULT N/A 6/4/2024    Procedure: REMOVAL, CANNULA, ADULT, FOR ECMO right femoral artery and right femoral vein, primary repair of right femoral artery.;  Surgeon: Demond Ladd MD;  Location:  OR     acetaminophen (TYLENOL) 325 MG tablet  amiodarone (PACERONE) 200 MG tablet  aspirin 81 MG EC tablet  calcium carbonate (TUMS) 500 MG chewable tablet  cephALEXin (KEFLEX) 500 MG capsule  cholecalciferol (VITAMIN D3) 25 mcg (1000 units) capsule  empagliflozin (JARDIANCE) 10 MG TABS tablet  furosemide (LASIX) 20 MG tablet  melatonin 10 MG TABS tablet  melatonin 10 MG TABS tablet  metoprolol succinate ER (TOPROL XL) 50 MG 24 hr tablet  multivitamin, therapeutic (THERA-VIT) TABS tablet  pantoprazole (PROTONIX) 40 MG EC tablet  potassium chloride khushboo ER (KLOR-CON M20) 20 MEQ CR tablet  rosuvastatin (CRESTOR) 20 MG tablet  sacubitril-valsartan (ENTRESTO) 24-26 MG per tablet  spironolactone (ALDACTONE) 25 MG tablet  ticagrelor (BRILINTA) 90 MG tablet  venlafaxine (EFFEXOR XR) 75 MG 24 hr capsule      No Known Allergies  Family History  No family history on file.  Social History   Social History     Tobacco Use    Smoking status: Never    Smokeless tobacco: Never      A medically appropriate review of systems was performed with pertinent positives and negatives noted in the HPI, and all other systems negative.    Physical Exam        Wt Readings from Last 10 Encounters:   06/24/24 112.8 kg (248 lb 9.6 oz)   06/16/24 114.3 kg (251 lb 15.8 oz)   06/15/24 118.2 kg (260 lb 8 oz)     Physical Exam  Physical Exam   Constitutional:  oriented to person, place, and time. appears well-developed and well-nourished.   HENT:   Head: Normocephalic and atraumatic.   Neck: Normal range of motion.   Pulmonary/Chest: Effort normal. No respiratory distress.   Cardiac: No murmurs, rubs, gallops. RRR.  Abdominal: Abdomen soft, nontender, nondistended. No rebound tenderness.  MSK: Long bones without deformity or evidence of trauma.  No lower extreme edema.  No tenderness of the calves.  Left arm with very small area of erythema and tenderness of the left forearm.  Radial pulses 2+ and symmetric.  Neurological: alert and oriented to person, place, and time.   Skin: Skin is warm and dry.   Psychiatric:  normal mood and affect.  behavior is normal. Thought content normal.       ED Course, Procedures, & Data      Procedures            EKG Interpretation:      Interpreted by Mamadou Polanco MD  Time reviewed: 1609  Symptoms at time of EKG: none   Rhythm: normal sinus   Rate: Normal  Axis: Normal  Ectopy: none  Conduction: Low voltage  ST Segments/ T Waves: 2 inversions in 1, aVL, V4 through V6 unchanged from prior  Q Waves: nonspecific  Comparison to prior: Unchanged    Clinical Impression: No acute changes from prior EKG suggesting ischemia or infarction.             Results for orders placed or performed during the hospital encounter of 07/02/24   XR Chest 2 Views     Status: None    Narrative    EXAM: XR CHEST 2 VIEWS  7/2/2024 4:28 PM      HISTORY: recent stent, presyncope    COMPARISON: 6/10/2024    FINDINGS: Two views of the chest. Trachea is midline. Cardiac  silhouette is within normal limits. Mild streaky perihilar opacities.  No pleural effusion or pneumothorax.      Impression    IMPRESSION: Mild streaky perihilar opacities, favor atelectasis versus  mild pulmonary edema.     I have personally reviewed the examination and initial interpretation  and I agree with the findings.    SAQIB GAINES MD         SYSTEM ID:  [id number]   POC US ECHO LIMITED      Status: None    Impression    Limited Bedside Cardiac Ultrasound, performed and interpreted by me.   Indication: Hypotension/shock.  Parasternal long axis, parasternal short axis, apical 4 chamber, and subcostal views were acquired.   Mildly diminished transthoracic echo views    Findings:    Global ejection fraction appears mildly reduced.  No pericardial effusion.    IMPRESSION: Mildly decreased ejection fraction.  No pericardial effusion..       US Upper Extremity Venous Duplex Left     Status: None    Narrative    EXAMINATION: DOPPLER VENOUS ULTRASOUND OF THE LEFT UPPER EXTREMITY,  7/2/2024 5:11 PM     COMPARISON: None.    HISTORY: Left forearm bump. Evaluate for clot.    TECHNIQUE:  Gray-scale evaluation with compression, spectral flow and  color Doppler assessment of the deep venous system of the left upper  extremity.    FINDINGS:  Left: Normal blood flow and waveforms are demonstrated in the internal  jugular, innominate, subclavian, and axillary veins. Partial  compressibility of the basilic vein at the mid upper arm and  antecubital fossa with full compressibility of the proximal forearm.  Occlusion of the cephalic vein from the upper arm to the distal  forearm.      Impression    IMPRESSION:  1.  No evidence of left upper extremity deep venous thrombosis.  2.  Partial occlusive thrombus of the basilic vein at the mid upper  arm antecubital fossa.  3.  Fluid occlusive thrombus of the cephalic vein from the upper arm  to the distal forearm.    I have personally reviewed the examination and initial interpretation  and I agree with the findings.    RONNIE GARZA MD         SYSTEM ID:  [id number]   Comprehensive metabolic panel     Status: Abnormal   Result Value Ref Range    Sodium 136 135 - 145 mmol/L    Potassium 4.1 3.4 - 5.3 mmol/L    Carbon Dioxide (CO2) 21 (L) 22 - 29 mmol/L    Anion Gap 12 7 - 15 mmol/L    Urea Nitrogen 20.1 (H) 6.0 - 20.0 mg/dL    Creatinine 1.04 0.67 - 1.17 mg/dL    GFR  Estimate 88 >60 mL/min/1.73m2    Calcium 9.3 8.6 - 10.0 mg/dL    Chloride 103 98 - 107 mmol/L    Glucose 105 (H) 70 - 99 mg/dL    Alkaline Phosphatase 89 40 - 150 U/L    AST 37 0 - 45 U/L    ALT 51 0 - 70 U/L    Protein Total 7.3 6.4 - 8.3 g/dL    Albumin 4.0 3.5 - 5.2 g/dL    Bilirubin Total 0.4 <=1.2 mg/dL   Troponin T, High Sensitivity     Status: Abnormal   Result Value Ref Range    Troponin T, High Sensitivity 35 (H) <=22 ng/L   Nt probnp inpatient (BNP)     Status: Abnormal   Result Value Ref Range    N terminal Pro BNP Inpatient 2,077 (H) 0 - 450 pg/mL   CBC with platelets and differential     Status: None   Result Value Ref Range    WBC Count 4.7 4.0 - 11.0 10e3/uL    RBC Count 4.52 4.40 - 5.90 10e6/uL    Hemoglobin 14.0 13.3 - 17.7 g/dL    Hematocrit 41.2 40.0 - 53.0 %    MCV 91 78 - 100 fL    MCH 31.0 26.5 - 33.0 pg    MCHC 34.0 31.5 - 36.5 g/dL    RDW 14.5 10.0 - 15.0 %    Platelet Count 192 150 - 450 10e3/uL    % Neutrophils 53 %    % Lymphocytes 32 %    % Monocytes 11 %    % Eosinophils 4 %    % Basophils 0 %    % Immature Granulocytes 0 %    NRBCs per 100 WBC 0 <1 /100    Absolute Neutrophils 2.5 1.6 - 8.3 10e3/uL    Absolute Lymphocytes 1.5 0.8 - 5.3 10e3/uL    Absolute Monocytes 0.5 0.0 - 1.3 10e3/uL    Absolute Eosinophils 0.2 0.0 - 0.7 10e3/uL    Absolute Basophils 0.0 0.0 - 0.2 10e3/uL    Absolute Immature Granulocytes 0.0 <=0.4 10e3/uL    Absolute NRBCs 0.0 10e3/uL   Troponin T, High Sensitivity     Status: Abnormal   Result Value Ref Range    Troponin T, High Sensitivity 34 (H) <=22 ng/L   EKG 12-lead, tracing only     Status: None   Result Value Ref Range    Systolic Blood Pressure  mmHg    Diastolic Blood Pressure  mmHg    Ventricular Rate 63 BPM    Atrial Rate 63 BPM    WA Interval 152 ms    QRS Duration 88 ms     ms    QTc 433 ms    P Axis 13 degrees    R AXIS 41 degrees    T Axis 101 degrees    Interpretation ECG       Sinus rhythm  Low voltage QRS  Anterolateral infarct , age  undetermined  Abnormal ECG  Unconfirmed report - interpretation of this ECG is computer generated - see medical record for final interpretation  Confirmed by - EMERGENCY ROOM, PHYSICIAN ([id number]),  TESSA GUTIÉRREZ ([id number]) on 7/2/2024 7:33:52 PM     CBC with platelets differential     Status: None    Narrative    The following orders were created for panel order CBC with platelets differential.  Procedure                               Abnormality         Status                     ---------                               -----------         ------                     CBC with platelets and d...[[id number]]                      Final result                 Please view results for these tests on the individual orders.     Medications - No data to display  Labs Ordered and Resulted from Time of ED Arrival to Time of ED Departure - No data to display  No orders to display          Critical care was not performed.     Medical Decision Making  The patient's presentation was of high complexity (a chronic illness severe exacerbation, progression, or side effect of treatment).    The patient's evaluation involved:  review of external note(s) from 2 sources (nurse triage notes from today)  ordering and/or review of 3+ test(s) in this encounter (see separate area of note for details)  discussion of management or test interpretation with another health professional (on-call cardiologist)    The patient's management necessitated only low risk treatment.    Assessment & Plan    MDM  Patient presenting with reports of dizziness.  Here he appears well with minimal symptoms.  He seems to have mostly orthostatic hypotension from standing.  He sounds like he is urinating quite significantly and unclear if he is keeping up with his oral fluids.  He is not tachycardic or symptomatic here.  Troponins x 2 are stable without significant increase.  He does not have symptoms of ACS such as chest pain, diaphoresis, vomiting or exertional  worsening.  No symptoms suggest pulmonary embolism.  He has a benign abdomen.  BNP is elevated however he does not appear to be fluid overloaded as he does not have pulmonary edema, extremity edema.  I did discuss his case briefly with cardiology who thought it be reasonable to hold his diuretics until discussing further with his cardiologist.  He did send a message to his cardiologist.  He does have follow-up in less than a week in their clinic.  Discussed return precautions.  Patient understands and agrees with this plan.    I have reviewed the nursing notes. I have reviewed the findings, diagnosis, plan and need for follow up with the patient.    New Prescriptions    No medications on file       Final diagnoses:   Dizziness   Superficial thrombophlebitis of left upper extremity       Mamadou Polanco MD    [facility] EMERGENCY DEPARTMENT  7/2/2024     Mamadou Polanco MD  07/02/24 [id number]

## 2024-07-03 NOTE — DISCHARGE INSTRUCTIONS
I did speak with the cardiologist on-call who states that is reasonable to stop her Lasix until seeing a cardiologist.    Please follow-up with your cardiologist as planned.  Please call your cardiologist regarding stopping your Lasix or furosemide    If you develop worsening dizziness, chest pain, shortness of breath or if you have any further concerns return to the emergency department.    You have a small clot in your arm.  Please place warm compresses on this throughout the day.  If you get worsening pain, new chest pain or shortness of breath he should be evaluated again.  Please have a repeat ultrasound which I will order.  Please call your primary doctor for follow-up.

## 2024-07-03 NOTE — TELEPHONE ENCOUNTER
Called and spoke to Arlyn. They said he has been doing okay today and nothing was found in the ER. Possibly positional dizziness. The ER advised to hold lasix at this time.   Agreed with holding lasix until seeing team in clinic on 07/15. Also gave parameters that if his SBP is under 100, okay to hold jardiance. No further questions at this time. They will call clinic with any changes or questions.

## 2024-07-06 LAB — BACTERIA BRONCH: NO GROWTH

## 2024-07-09 ENCOUNTER — HOSPITAL ENCOUNTER (OUTPATIENT)
Dept: CARDIAC REHAB | Facility: CLINIC | Age: 49
Discharge: HOME | End: 2024-07-09
Attending: INTERNAL MEDICINE
Payer: COMMERCIAL

## 2024-07-09 PROCEDURE — 93798 PHYS/QHP OP CAR RHAB W/ECG: CPT

## 2024-07-10 NOTE — TELEPHONE ENCOUNTER
Action 7/10/24 [facility]  Fax #[phone]   Action Taken Requested EKG 6-1-24    Sent EKG to scanning 7/10      [facility] 7/10/24 [facility]  Fax #[phone]   Action Taken Requested cardiac cath 6-1-24    Resolved images in PACS 7/15

## 2024-07-11 ENCOUNTER — HOSPITAL ENCOUNTER (OUTPATIENT)
Dept: CARDIAC REHAB | Facility: CLINIC | Age: 49
Discharge: HOME | End: 2024-07-11
Attending: INTERNAL MEDICINE
Payer: COMMERCIAL

## 2024-07-11 PROCEDURE — 93798 PHYS/QHP OP CAR RHAB W/ECG: CPT

## 2024-07-15 ENCOUNTER — OFFICE VISIT (OUTPATIENT)
Dept: CARDIOLOGY | Facility: CLINIC | Age: 49
End: 2024-07-15
Attending: INTERNAL MEDICINE
Payer: COMMERCIAL

## 2024-07-15 ENCOUNTER — PRE VISIT (OUTPATIENT)
Dept: CARDIOLOGY | Facility: CLINIC | Age: 49
End: 2024-07-15

## 2024-07-15 ENCOUNTER — LAB (OUTPATIENT)
Dept: LAB | Facility: CLINIC | Age: 49
End: 2024-07-15
Payer: COMMERCIAL

## 2024-07-15 VITALS
SYSTOLIC BLOOD PRESSURE: 112 MMHG | DIASTOLIC BLOOD PRESSURE: 79 MMHG | BODY MASS INDEX: 32.46 KG/M2 | WEIGHT: 246 LBS | OXYGEN SATURATION: 98 % | HEART RATE: 64 BPM

## 2024-07-15 DIAGNOSIS — I46.9 CARDIAC ARREST (H): ICD-10-CM

## 2024-07-15 DIAGNOSIS — Z98.61 STATUS POST PERCUTANEOUS TRANSLUMINAL CORONARY ANGIOPLASTY: ICD-10-CM

## 2024-07-15 DIAGNOSIS — I50.23 ACUTE ON CHRONIC SYSTOLIC HEART FAILURE (H): ICD-10-CM

## 2024-07-15 DIAGNOSIS — I25.10 CORONARY ARTERY DISEASE INVOLVING NATIVE CORONARY ARTERY OF NATIVE HEART WITHOUT ANGINA PECTORIS: ICD-10-CM

## 2024-07-15 DIAGNOSIS — E55.9 VITAMIN D DEFICIENCY: Primary | ICD-10-CM

## 2024-07-15 LAB
ALBUMIN SERPL BCG-MCNC: 4.4 G/DL (ref 3.5–5.2)
ALP SERPL-CCNC: 85 U/L (ref 40–150)
ALT SERPL W P-5'-P-CCNC: 40 U/L (ref 0–70)
ANION GAP SERPL CALCULATED.3IONS-SCNC: 14 MMOL/L (ref 7–15)
AST SERPL W P-5'-P-CCNC: 35 U/L (ref 0–45)
BILIRUB SERPL-MCNC: 0.4 MG/DL
BUN SERPL-MCNC: 16.8 MG/DL (ref 6–20)
CALCIUM SERPL-MCNC: 9.9 MG/DL (ref 8.6–10)
CHLORIDE SERPL-SCNC: 102 MMOL/L (ref 98–107)
CHOLEST SERPL-MCNC: 137 MG/DL
CREAT SERPL-MCNC: 1.01 MG/DL (ref 0.67–1.17)
EGFRCR SERPLBLD CKD-EPI 2021: >90 ML/MIN/1.73M2
ERYTHROCYTE [DISTWIDTH] IN BLOOD BY AUTOMATED COUNT: 13.2 % (ref 10–15)
FASTING STATUS PATIENT QL REPORTED: YES
FASTING STATUS PATIENT QL REPORTED: YES
GLUCOSE SERPL-MCNC: 136 MG/DL (ref 70–99)
HCO3 SERPL-SCNC: 23 MMOL/L (ref 22–29)
HCT VFR BLD AUTO: 45 % (ref 40–53)
HDLC SERPL-MCNC: 51 MG/DL
HGB BLD-MCNC: 14.8 G/DL (ref 13.3–17.7)
LDLC SERPL CALC-MCNC: 62 MG/DL
MCH RBC QN AUTO: 30.2 PG (ref 26.5–33)
MCHC RBC AUTO-ENTMCNC: 32.9 G/DL (ref 31.5–36.5)
MCV RBC AUTO: 92 FL (ref 78–100)
NONHDLC SERPL-MCNC: 86 MG/DL
PLATELET # BLD AUTO: 232 10E3/UL (ref 150–450)
POTASSIUM SERPL-SCNC: 4.5 MMOL/L (ref 3.4–5.3)
PROT SERPL-MCNC: 7.9 G/DL (ref 6.4–8.3)
RBC # BLD AUTO: 4.9 10E6/UL (ref 4.4–5.9)
SODIUM SERPL-SCNC: 139 MMOL/L (ref 135–145)
TRIGL SERPL-MCNC: 118 MG/DL
WBC # BLD AUTO: 4.3 10E3/UL (ref 4–11)

## 2024-07-15 PROCEDURE — 36415 COLL VENOUS BLD VENIPUNCTURE: CPT | Performed by: PATHOLOGY

## 2024-07-15 PROCEDURE — 99214 OFFICE O/P EST MOD 30 MIN: CPT | Performed by: INTERNAL MEDICINE

## 2024-07-15 PROCEDURE — 80061 LIPID PANEL: CPT | Performed by: PATHOLOGY

## 2024-07-15 PROCEDURE — G0463 HOSPITAL OUTPT CLINIC VISIT: HCPCS

## 2024-07-15 PROCEDURE — 80053 COMPREHEN METABOLIC PANEL: CPT | Performed by: PATHOLOGY

## 2024-07-15 PROCEDURE — 85027 COMPLETE CBC AUTOMATED: CPT | Performed by: PATHOLOGY

## 2024-07-15 RX ORDER — FUROSEMIDE 20 MG
20 TABLET ORAL PRN
Qty: 90 TABLET | Refills: 3 | Status: SHIPPED | OUTPATIENT
Start: 2024-07-15

## 2024-07-15 RX ORDER — SILDENAFIL CITRATE 20 MG/1
10 TABLET ORAL PRN
COMMUNITY
Start: 2023-09-27

## 2024-07-15 ASSESSMENT — PAIN SCALES - GENERAL: PAINLEVEL: NO PAIN (0)

## 2024-07-15 NOTE — PATIENT INSTRUCTIONS
Critical Care Cardiology Survivor Clinic                                                                You were seen today in the Critical Care Cardiology Survivor Clinic at the [facility]:       Dr. Alpesh Gonsalez        Your visit summary and instructions are as follows:    We have taken tums, keflex, lasix, and potassium from your medication list. Stop taking the potassium (it is only needed when you take the lasix).   Continue with physical therapy.    Continue to work toward the recommendation of 150 minutes of moderate intensity exercise/week and maintain a healthy lifestyle (avoid illicit drugs, smoking and moderate alcohol consumption)    Return to Critical Care Cardiology [facility]or Clinic with device check prior (if applicable) in 3-6 months with tte     -Support group: This is not required but can be helpful to connect to other survivors.   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     -What is Health Psychology?  Health Psychology is a specialty that helps people cope with the stress and anxiety that often occurs with illness, emotional and psychological issues, and preparation for medical treatment including surgical procedures.    Please contact our psychologists directly with questions or to make an appointment.    Simon Lou, Ph.D.    [phone]  Belen Salinas, Ph.D.,                 [phone]  Christal Dawson, Ph.D.                      [phone]  Sadaf Bustos, Ph.D.,                    [phone]          Chance Barrera, Ph.D., [facility],   [phone]       Thank you for your visit today!     Please MyChart message me or call Janie Chavez RN or Esther Cabrera RN if you have any questions or concerns.      During Business Hours:  [phone], option # 1 (University) then option # 4 (medical questions) and  ask to speak with my nurse.     After hours, weekends or holidays:   [phone], Option #4  Ask to speak to the On-Call Cardiologist. Inform them you are a heart failure patient at the [facility].

## 2024-07-15 NOTE — LETTER
7/15/2024      RE: Cullen Reardon  [address]  [place] [address]       Dear Colleague,    Thank you for the opportunity to participate in the care of your patient, Cullen Reardon, at the [facility] [place] at [facility]. Please see a copy of my visit note below.    Critical Care Cardiology Survivor Clinic  07/15/2024        History of Presenting Illness:  Cullen Reardon is a 48 year old male who presents to clinic today to establish care for after a recent cardiac arrest. Mr. Reardon presented to [facility] 6/1/2024 after driving himself to the ER for chest pain, suffered a witnessed CA there, was shocked 3 times with 15 mint down time, he was cannulated for VA ECMO then sent to the [facility] and found to have a 100% oLAD thrombosis, which was stented. He was decannualted from ECMO 6/4/2024, he had a staged PCI to D1 on 6/7/2024 and then was extubated 6/10/2024. He was discharged to acute rehab on 6/15/204 and since has been sent home. He is doing well there with a nearly full recovery. He is cognitively intact but his main complaint is of fatigue and difficulty with sleep which is new since his arrest. He has also had some orthostatic hypotension that has improved with decreasing his spironolactone and stopping his lasix. (He was seen in ER 7/2/204 for dizziness). He also continues to have chest pain which is largely sternal and worsened with pressure and not related to exertion. Lastly he has noted his taste is 'off' but has been slowly returning to near normal.       Home medications:     Past Medical History:  Cardiac arrest 6/1/2024  anxiety  Ischemic CMP: last LV EF 40-45% on echo 6/8/2024  Low testosterone - on supplement historically    Family History:  History reviewed. No pertinent family history.    Social History:  Social History     Tobacco Use     Smoking status: Never     Smokeless tobacco: Never      ROS:  A complete  10-point ROS was negative except as above.    Physical Exam:  /79 (BP Location: Right arm, Patient Position: Chair, Cuff Size: Adult Large)   Pulse 64   Wt 111.6 kg (246 lb)   SpO2 98%   BMI 32.46 kg/m      Gen: alert, interactive, NAD  HEENT: atraumatic, EOMI, MMM  Neck: supple, no JVP elevation appreciated.  CV: RRR, no murmurs, rubs.  Chest: CTAB, no wheezes or crackles  Abd: soft, NT, ND, +BS  Ext: no LE edema, 2+ peripheral pulses  Skin: warm and dry, no rashes on exposed surfaces  Neuro: alert and oriented, no focal deficits    Labs:   Labs and cardiac data reviewed personally in clinic.    CMP:  Sodium   Date Value Ref Range Status   07/02/2024 136 135 - 145 mmol/L Final     Potassium   Date Value Ref Range Status   07/02/2024 4.1 3.4 - 5.3 mmol/L Final     Potassium Whole Blood   Date Value Ref Range Status   06/07/2024 3.5 3.4 - 5.3 mmol/L Final     Chloride   Date Value Ref Range Status   07/02/2024 103 98 - 107 mmol/L Final     Carbon Dioxide (CO2)   Date Value Ref Range Status   07/02/2024 21 (L) 22 - 29 mmol/L Final     Anion Gap   Date Value Ref Range Status   07/02/2024 12 7 - 15 mmol/L Final     Glucose   Date Value Ref Range Status   07/02/2024 105 (H) 70 - 99 mg/dL Final   06/05/2024 130 (H) 70 - 99 mg/dL Final     GLUCOSE BY METER POCT   Date Value Ref Range Status   06/18/2024 133 (H) 70 - 99 mg/dL Final     Urea Nitrogen   Date Value Ref Range Status   07/02/2024 20.1 (H) 6.0 - 20.0 mg/dL Final     Creatinine   Date Value Ref Range Status   07/02/2024 1.04 0.67 - 1.17 mg/dL Final     GFR Estimate   Date Value Ref Range Status   07/02/2024 88 >60 mL/min/1.73m2 Final     Comment:     eGFR calculated using 2021 CKD-EPI equation.     Calcium   Date Value Ref Range Status   07/02/2024 9.3 8.6 - 10.0 mg/dL Final     Bilirubin Total   Date Value Ref Range Status   07/02/2024 0.4 <=1.2 mg/dL Final     Alkaline Phosphatase   Date Value Ref Range Status   07/02/2024 89 40 - 150 U/L Final     ALT    Date Value Ref Range Status   07/02/2024 51 0 - 70 U/L Final     Comment:     Reference intervals for this test were updated on 6/12/2023 to more accurately reflect our healthy population. There may be differences in the flagging of prior results with similar values performed with this method. Interpretation of those prior results can be made in the context of the updated reference intervals.       AST   Date Value Ref Range Status   07/02/2024 37 0 - 45 U/L Final     Comment:     Reference intervals for this test were updated on 6/12/2023 to more accurately reflect our healthy population. There may be differences in the flagging of prior results with similar values performed with this method. Interpretation of those prior results can be made in the context of the updated reference intervals.       CBC  CBC RESULTS:   Recent Labs   Lab Test 07/02/24  1621   WBC 4.7   RBC 4.52   HGB 14.0   HCT 41.2   MCV 91   MCH 31.0   MCHC 34.0   RDW 14.5          LIPIDS  Recent Labs   Lab Test 06/10/24  0415 06/03/24  0322   CHOL  --  149   HDL  --  47   LDL  --  82   TRIG 297* 99       TSH  TSH   Date Value Ref Range Status   06/05/2024 1.00 0.30 - 4.20 uIU/mL Final       HBA1C  Lab Results   Component Value Date    A1C 6.1 06/01/2024       Cardiac Data:     EKG 6/24/24: NSR LAE low voltage   TTE 6/8/2024: 40-45% LVEF, wma -LAD/cx , rv nl   Cardiac cath 6/6/2024: prior patent stent to LAD, D1 with iFR 0.82 --> s/p KAYLYN with nenita 3 flow    Assessment/Plan:  Cullen Reardon is a 48 year old male who presents to clinic today to establish care for his recent ischemic vt/vf arrest (presented to [facility] and arrested in the ER) on 6/1/2024.    Refractory PEA? Eventually shocked cardiac arrest date 6/1/2024    Etiology: Ischemic s/p PCI to  LAD and later D1  Fully revasc:  yes  LVEF:  TTE 6/8/24 LVEF: 40-45 RV function: nl  ICD:  Not indicated    Neuropsych referral (  Behavioral health/psychology information provided.  Provided  [place] sudden cardiac arrest  group details ([email or web address]; [phone]).  Continue work with OT/PT and cardiac rehab.  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   Reviewed med list; eliminated unnecessary meds as mentioned above/below  (potassium stopped, not taking PPI, not taking tums, not taking keflex, only prn lasix)   Discussed signing paper work to share his story      Ischemic cardiomyopathy LVEF 40-45%  Entresto 24-26 bid  Spironolactone 12.5mg daily  Jardiance 10mg daily   Toprolol XL 50mg daily  SCD prophylaxis does not meet criteria for implant  Fluid status: euvolemic  Amiodarone, will discuss stopping at next visit (discussed today but opted to wait for now)    #orthostatic hypotension  -less than ideal dosing for meds (could titrate higher ideally) but the patient had OH requiring dose reduction, reporting now that symptoms largely resolved though he does have occasional short lived lightheadedness with standing in particular, he is aware to take it slow and these are very manageable/short lived episodes per his report.     #CAD s/p stenting last on 6/7/2024  -asp 81 mg life long, remains on ticag until at least 6/7/2025 then can discuss or if bleeding occurs sooner  -rosuvastatin 20 daily     #Mood:   -[place] network, long discussion today about normalcy, psychology resources offered   -remains on Effexor    #insomnia: new, uncertain etiology  -on melatonin 10 mg at bedtime, helps  -discussed addition of other meds but will hold off for now.     #hypogonadism/low testosterone  -hold of on testosterone supplement for now, discussed increased thrombotic risk (largely venous but given recent hospitalization will hold off for a few months)    #other meds  -MVI - ? Discuss if continued to need this at  next visit to minimize medications    Return to clinic in 3-6 months with tte/cMRI    Alpesh Cobb MD      Please do not hesitate to contact me if you have any questions/concerns.     Sincerely,     Critical Care

## 2024-07-15 NOTE — PROGRESS NOTES
Critical Care Cardiology Survivor Clinic  07/15/2024        History of Presenting Illness:  Cullen Reardon is a 48 year old male who presents to clinic today to establish care for after a recent cardiac arrest. Mr. Reardon presented to [facility] 6/1/2024 after driving himself to the ER for chest pain, suffered a witnessed CA there, was shocked 3 times with 15 mint down time, he was cannulated for VA ECMO then sent to the [facility] and found to have a 100% oLAD thrombosis, which was stented. He was decannualted from ECMO 6/4/2024, he had a staged PCI to D1 on 6/7/2024 and then was extubated 6/10/2024. He was discharged to acute rehab on 6/15/204 and since has been sent home. He is doing well there with a nearly full recovery. He is cognitively intact but his main complaint is of fatigue and difficulty with sleep which is new since his arrest. He has also had some orthostatic hypotension that has improved with decreasing his spironolactone and stopping his lasix. (He was seen in ER 7/2/204 for dizziness). He also continues to have chest pain which is largely sternal and worsened with pressure and not related to exertion. Lastly he has noted his taste is 'off' but has been slowly returning to near normal.       Home medications:     Past Medical History:  Cardiac arrest 6/1/2024  anxiety  Ischemic CMP: last LV EF 40-45% on echo 6/8/2024  Low testosterone - on supplement historically    Family History:  History reviewed. No pertinent family history.    Social History:  Social History     Tobacco Use    Smoking status: Never    Smokeless tobacco: Never      ROS:  A complete 10-point ROS was negative except as above.    Physical Exam:  /79 (BP Location: Right arm, Patient Position: Chair, Cuff Size: Adult Large)   Pulse 64   Wt 111.6 kg (246 lb)   SpO2 98%   BMI 32.46 kg/m      Gen: alert, interactive, NAD  HEENT: atraumatic, EOMI, MMM  Neck: supple, no JVP elevation appreciated.  CV: RRR, no murmurs,  rubs.  Chest: CTAB, no wheezes or crackles  Abd: soft, NT, ND, +BS  Ext: no LE edema, 2+ peripheral pulses  Skin: warm and dry, no rashes on exposed surfaces  Neuro: alert and oriented, no focal deficits    Labs:   Labs and cardiac data reviewed personally in clinic.    CMP:  Sodium   Date Value Ref Range Status   07/02/2024 136 135 - 145 mmol/L Final     Potassium   Date Value Ref Range Status   07/02/2024 4.1 3.4 - 5.3 mmol/L Final     Potassium Whole Blood   Date Value Ref Range Status   06/07/2024 3.5 3.4 - 5.3 mmol/L Final     Chloride   Date Value Ref Range Status   07/02/2024 103 98 - 107 mmol/L Final     Carbon Dioxide (CO2)   Date Value Ref Range Status   07/02/2024 21 (L) 22 - 29 mmol/L Final     Anion Gap   Date Value Ref Range Status   07/02/2024 12 7 - 15 mmol/L Final     Glucose   Date Value Ref Range Status   07/02/2024 105 (H) 70 - 99 mg/dL Final   06/05/2024 130 (H) 70 - 99 mg/dL Final     GLUCOSE BY METER POCT   Date Value Ref Range Status   06/18/2024 133 (H) 70 - 99 mg/dL Final     Urea Nitrogen   Date Value Ref Range Status   07/02/2024 20.1 (H) 6.0 - 20.0 mg/dL Final     Creatinine   Date Value Ref Range Status   07/02/2024 1.04 0.67 - 1.17 mg/dL Final     GFR Estimate   Date Value Ref Range Status   07/02/2024 88 >60 mL/min/1.73m2 Final     Comment:     eGFR calculated using 2021 CKD-EPI equation.     Calcium   Date Value Ref Range Status   07/02/2024 9.3 8.6 - 10.0 mg/dL Final     Bilirubin Total   Date Value Ref Range Status   07/02/2024 0.4 <=1.2 mg/dL Final     Alkaline Phosphatase   Date Value Ref Range Status   07/02/2024 89 40 - 150 U/L Final     ALT   Date Value Ref Range Status   07/02/2024 51 0 - 70 U/L Final     Comment:     Reference intervals for this test were updated on 6/12/2023 to more accurately reflect our healthy population. There may be differences in the flagging of prior results with similar values performed with this method. Interpretation of those prior results can be  made in the context of the updated reference intervals.       AST   Date Value Ref Range Status   07/02/2024 37 0 - 45 U/L Final     Comment:     Reference intervals for this test were updated on 6/12/2023 to more accurately reflect our healthy population. There may be differences in the flagging of prior results with similar values performed with this method. Interpretation of those prior results can be made in the context of the updated reference intervals.       CBC  CBC RESULTS:   Recent Labs   Lab Test 07/02/24  1621   WBC 4.7   RBC 4.52   HGB 14.0   HCT 41.2   MCV 91   MCH 31.0   MCHC 34.0   RDW 14.5          LIPIDS  Recent Labs   Lab Test 06/10/24  0415 06/03/24  0322   CHOL  --  149   HDL  --  47   LDL  --  82   TRIG 297* 99       TSH  TSH   Date Value Ref Range Status   06/05/2024 1.00 0.30 - 4.20 uIU/mL Final       HBA1C  Lab Results   Component Value Date    A1C 6.1 06/01/2024       Cardiac Data:     EKG 6/24/24: NSR LAE low voltage   TTE 6/8/2024: 40-45% LVEF, wma -LAD/cx , rv nl   Cardiac cath 6/6/2024: prior patent stent to LAD, D1 with iFR 0.82 --> s/p KAYLYN with nenita 3 flow    Assessment/Plan:  Cullen Reardon is a 48 year old male who presents to clinic today to establish care for his recent ischemic vt/vf arrest (presented to [facility] and arrested in the ER) on 6/1/2024.    Refractory PEA? Eventually shocked cardiac arrest date 6/1/2024    Etiology: Ischemic s/p PCI to  LAD and later D1  Fully revasc:  yes  LVEF:  TTE 6/8/24 LVEF: 40-45 RV function: nl  ICD:  Not indicated    Neuropsych referral (  Behavioral health/psychology information provided.  Provided [place] sudden cardiac arrest  group details ([email or web address]; [phone]).  Continue work with OT/PT and cardiac rehab.  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   Reviewed med list; eliminated unnecessary meds as mentioned above/below  (potassium stopped, not taking PPI, not taking tums, not taking keflex, only prn lasix)   Discussed signing paper work to share his story      Ischemic cardiomyopathy LVEF 40-45%  Entresto 24-26 bid  Spironolactone 12.5mg daily  Jardiance 10mg daily   Toprolol XL 50mg daily  SCD prophylaxis does not meet criteria for implant  Fluid status: euvolemic  Amiodarone, will discuss stopping at next visit (discussed today but opted to wait for now)    #orthostatic hypotension  -less than ideal dosing for meds (could titrate higher ideally) but the patient had OH requiring dose reduction, reporting now that symptoms largely resolved though he does have occasional short lived lightheadedness with standing in particular, he is aware to take it slow and these are very manageable/short lived episodes per his report.     #CAD s/p stenting last on 6/7/2024  -asp 81 mg life long, remains on ticag until at least 6/7/2025 then can discuss or if bleeding occurs sooner  -rosuvastatin 20 daily     #Mood:   -resources [place] network, long discussion today about normalcy, psychology resources offered   -remains on Effexor    #insomnia: new, uncertain etiology  -on melatonin 10 mg at bedtime, helps  -discussed addition of other meds but will hold off for now.     #hypogonadism/low testosterone  -hold of on testosterone supplement for now, discussed increased thrombotic risk (largely venous but given recent hospitalization will hold off for a few months)    #other meds  -MVI - ? Discuss if continued to need this at next visit to minimize medications    Return to clinic in 3-6 months with tte/cMRI    Alpesh Cobb MD

## 2024-07-15 NOTE — NURSING NOTE
Chief Complaint   Patient presents with    New Patient     July 15, 2024- reason for visit: NEW arrest pt       Vitals were taken and medications reconciled.    Guy Haro, EMT  12:21 PM

## 2024-07-16 ENCOUNTER — HOSPITAL ENCOUNTER (OUTPATIENT)
Dept: CARDIAC REHAB | Facility: CLINIC | Age: 49
Discharge: HOME | End: 2024-07-16
Attending: INTERNAL MEDICINE
Payer: COMMERCIAL

## 2024-07-16 PROCEDURE — 93798 PHYS/QHP OP CAR RHAB W/ECG: CPT

## 2024-07-17 DIAGNOSIS — Z98.61 STATUS POST PERCUTANEOUS TRANSLUMINAL CORONARY ANGIOPLASTY: ICD-10-CM

## 2024-07-17 DIAGNOSIS — I46.9 CARDIAC ARREST (H): ICD-10-CM

## 2024-07-17 DIAGNOSIS — I25.10 CORONARY ARTERY DISEASE INVOLVING NATIVE CORONARY ARTERY OF NATIVE HEART WITHOUT ANGINA PECTORIS: ICD-10-CM

## 2024-07-17 DIAGNOSIS — E87.6 HYPOKALEMIA: ICD-10-CM

## 2024-07-17 DIAGNOSIS — I50.23 ACUTE ON CHRONIC SYSTOLIC HEART FAILURE (H): ICD-10-CM

## 2024-07-18 ENCOUNTER — HOSPITAL ENCOUNTER (OUTPATIENT)
Dept: CARDIAC REHAB | Facility: CLINIC | Age: 49
Discharge: HOME | End: 2024-07-18
Attending: INTERNAL MEDICINE
Payer: COMMERCIAL

## 2024-07-18 PROCEDURE — 93798 PHYS/QHP OP CAR RHAB W/ECG: CPT

## 2024-07-19 RX ORDER — CEPHALEXIN 500 MG/1
500 CAPSULE ORAL 4 TIMES DAILY
Qty: 20 CAPSULE | Refills: 0 | OUTPATIENT
Start: 2024-07-19

## 2024-07-19 NOTE — TELEPHONE ENCOUNTER
Name from pharmacy: CEPHALEXIN 500 MG CAPS 500 Capsule         Will file in chart as: cephALEXin (KEFLEX) 500 MG capsule    The original prescription was discontinued on 7/15/2024 by Esther Cabrera RN for the following reason: Med Rec(No AVS / No eCancel). Renewing this prescription may not be appropriate.     Refill denied per above     Leslie Tijerina RN  P Red Flag Triage/MRT

## 2024-07-20 ENCOUNTER — HEALTH MAINTENANCE LETTER (OUTPATIENT)
Age: 49
End: 2024-07-20

## 2024-07-23 ENCOUNTER — HOSPITAL ENCOUNTER (OUTPATIENT)
Dept: CARDIAC REHAB | Facility: CLINIC | Age: 49
Discharge: HOME | End: 2024-07-23
Attending: INTERNAL MEDICINE
Payer: COMMERCIAL

## 2024-07-23 PROCEDURE — 93798 PHYS/QHP OP CAR RHAB W/ECG: CPT

## 2024-07-25 ENCOUNTER — HOSPITAL ENCOUNTER (OUTPATIENT)
Dept: CARDIAC REHAB | Facility: CLINIC | Age: 49
Discharge: HOME | End: 2024-07-25
Attending: INTERNAL MEDICINE
Payer: COMMERCIAL

## 2024-07-25 PROCEDURE — 93798 PHYS/QHP OP CAR RHAB W/ECG: CPT

## 2024-08-03 LAB
ACID FAST STAIN (ARUP): NORMAL

## 2024-08-13 ENCOUNTER — HOSPITAL ENCOUNTER (OUTPATIENT)
Dept: CARDIAC REHAB | Facility: CLINIC | Age: 49
Discharge: HOME | End: 2024-08-13
Attending: INTERNAL MEDICINE
Payer: COMMERCIAL

## 2024-08-13 PROCEDURE — 93798 PHYS/QHP OP CAR RHAB W/ECG: CPT

## 2024-08-15 ENCOUNTER — HOSPITAL ENCOUNTER (OUTPATIENT)
Dept: CARDIAC REHAB | Facility: CLINIC | Age: 49
Discharge: HOME | End: 2024-08-15
Attending: INTERNAL MEDICINE
Payer: COMMERCIAL

## 2024-08-15 PROCEDURE — 93798 PHYS/QHP OP CAR RHAB W/ECG: CPT

## 2024-08-18 DIAGNOSIS — I46.9 CARDIAC ARREST (H): ICD-10-CM

## 2024-08-19 ENCOUNTER — HOSPITAL ENCOUNTER (OUTPATIENT)
Dept: CARDIAC REHAB | Facility: CLINIC | Age: 49
Discharge: HOME | End: 2024-08-19
Attending: INTERNAL MEDICINE
Payer: COMMERCIAL

## 2024-08-19 PROCEDURE — 93798 PHYS/QHP OP CAR RHAB W/ECG: CPT

## 2024-08-19 PROCEDURE — 93797 PHYS/QHP OP CAR RHAB WO ECG: CPT | Mod: XU

## 2024-08-20 ENCOUNTER — HOSPITAL ENCOUNTER (OUTPATIENT)
Dept: CARDIAC REHAB | Facility: CLINIC | Age: 49
Discharge: HOME | End: 2024-08-20
Attending: INTERNAL MEDICINE
Payer: COMMERCIAL

## 2024-08-20 PROCEDURE — 93798 PHYS/QHP OP CAR RHAB W/ECG: CPT

## 2024-08-22 ENCOUNTER — HOSPITAL ENCOUNTER (OUTPATIENT)
Dept: CARDIAC REHAB | Facility: CLINIC | Age: 49
Discharge: HOME | End: 2024-08-22
Attending: INTERNAL MEDICINE
Payer: COMMERCIAL

## 2024-08-22 PROCEDURE — 93798 PHYS/QHP OP CAR RHAB W/ECG: CPT

## 2024-08-22 RX ORDER — AMIODARONE HYDROCHLORIDE 200 MG/1
200 TABLET ORAL DAILY
Qty: 90 TABLET | Refills: 2 | Status: SHIPPED | OUTPATIENT
Start: 2024-08-22

## 2024-08-22 NOTE — TELEPHONE ENCOUNTER
amiodarone (PACERONE) 200 MG tablet 30 tablet 0 6/24/2024     Last Office Visit: 7/15/24  Future Office visit:   none    Routing refill request to provider for review/approval because:  Not on cardiology refill protocol    Leslie Tijerina RN  [facility] Central Nursing/Red Flag Triage & Med Refill Team

## 2024-08-27 ENCOUNTER — HOSPITAL ENCOUNTER (OUTPATIENT)
Dept: CARDIAC REHAB | Facility: CLINIC | Age: 49
Discharge: HOME | End: 2024-08-27
Attending: INTERNAL MEDICINE
Payer: COMMERCIAL

## 2024-08-27 PROCEDURE — 93798 PHYS/QHP OP CAR RHAB W/ECG: CPT

## 2024-08-29 ENCOUNTER — HOSPITAL ENCOUNTER (OUTPATIENT)
Dept: CARDIAC REHAB | Facility: CLINIC | Age: 49
Discharge: HOME | End: 2024-08-29
Attending: INTERNAL MEDICINE
Payer: COMMERCIAL

## 2024-08-29 PROCEDURE — 93798 PHYS/QHP OP CAR RHAB W/ECG: CPT

## 2024-09-03 ENCOUNTER — HOSPITAL ENCOUNTER (OUTPATIENT)
Dept: CARDIAC REHAB | Facility: CLINIC | Age: 49
Discharge: HOME | End: 2024-09-03
Attending: INTERNAL MEDICINE
Payer: COMMERCIAL

## 2024-09-03 PROCEDURE — 93798 PHYS/QHP OP CAR RHAB W/ECG: CPT

## 2024-09-05 ENCOUNTER — HOSPITAL ENCOUNTER (OUTPATIENT)
Dept: CARDIAC REHAB | Facility: CLINIC | Age: 49
Discharge: HOME | End: 2024-09-05
Attending: INTERNAL MEDICINE
Payer: COMMERCIAL

## 2024-09-05 PROCEDURE — 93798 PHYS/QHP OP CAR RHAB W/ECG: CPT

## 2024-09-10 ENCOUNTER — HOSPITAL ENCOUNTER (OUTPATIENT)
Dept: CARDIAC REHAB | Facility: CLINIC | Age: 49
Discharge: HOME | End: 2024-09-10
Attending: INTERNAL MEDICINE
Payer: COMMERCIAL

## 2024-09-10 PROCEDURE — 93798 PHYS/QHP OP CAR RHAB W/ECG: CPT

## 2024-09-12 ENCOUNTER — HOSPITAL ENCOUNTER (OUTPATIENT)
Dept: CARDIAC REHAB | Facility: CLINIC | Age: 49
Discharge: HOME | End: 2024-09-12
Attending: INTERNAL MEDICINE
Payer: COMMERCIAL

## 2024-09-12 PROCEDURE — 93798 PHYS/QHP OP CAR RHAB W/ECG: CPT

## 2024-09-17 ENCOUNTER — HOSPITAL ENCOUNTER (OUTPATIENT)
Dept: CARDIAC REHAB | Facility: CLINIC | Age: 49
Discharge: HOME | End: 2024-09-17
Attending: INTERNAL MEDICINE
Payer: COMMERCIAL

## 2024-09-17 PROCEDURE — 93798 PHYS/QHP OP CAR RHAB W/ECG: CPT

## 2024-09-20 ENCOUNTER — TELEPHONE (OUTPATIENT)
Dept: CARDIOLOGY | Facility: CLINIC | Age: 49
End: 2024-09-20
Payer: COMMERCIAL

## 2024-09-20 NOTE — TELEPHONE ENCOUNTER
I called pt to review Cardiac MRI patient instructions.  I spoke with pt and provided instruction.  Patient did not have any follow up questions and I provided him the instruction via [employer] as well.  Sherry Montgomery RN on 9/20/2024 at 12:34 PM        Pre-procedure instructions - MR Cardiac with/without Contrast  Patient Education    Your arrival time is 2:15.  Location is [address] [facility]    How do I prepare for my exam? (Food and drink instructions)   Take your medicines as usual, unless your doctor tells you not to. You may or may not receive IV contrast for this exam pending the discretion of the Radiologist.  You don't need to do anything special to prepare.  You will need to arrive 1 hour early if you will be taking an anxiety medication for the test.     What Should I wear? The MRI machine uses a strong magnet. Due to increased risk of metallic materials/threading in clothing, for your safety, you will be requested to change into a hospital gown. Please remove any body piercings and hair or magnetic eyelash extensions before you arrive. You will also remove watches, jewelry, hairpins, wallets, dentures, partial dental plates and hearing aids. You may wear contact lenses, and you may be able to wear your rings. We have a safe place to keep your personal items, but it is safer to leave them at home.     How long does the Exam Take? Most tests take up to 90 minutes.       What Should I Bring? If you have any implants please bring a card or surgical report with the implant information.  We may be unable to scan you if we do not have this information. Bring the results of similar scans you may have had previously. If you are a minor (under age 18) you will need to bring a parent or legal.     Do I need a ? No     What Should I do after the Exam? No restrictions, you may resume normal activities.     What is this  "test MRI (magnetic resonance imaging) uses a strong magnet and radio waves to look inside the body. An MRA (magnetic resonance angiogram) does the same thing, but it lets us look at your blood vessels. A computer turns the radio waves into pictures showing cross sections of the body, much like slices of bread. This helps us see any problems more clearly. You may receive fluid (called \"contrast\") before or during your scan. The fluid helps us see the pictures better. We give the fluid through an IV (small needle in your arm).  "

## 2024-09-24 ENCOUNTER — HOSPITAL ENCOUNTER (OUTPATIENT)
Dept: MRI IMAGING | Facility: CLINIC | Age: 49
Discharge: HOME | End: 2024-09-24
Attending: NURSE PRACTITIONER | Admitting: NURSE PRACTITIONER
Payer: COMMERCIAL

## 2024-09-24 DIAGNOSIS — I25.10 CORONARY ARTERY DISEASE INVOLVING NATIVE CORONARY ARTERY OF NATIVE HEART WITHOUT ANGINA PECTORIS: ICD-10-CM

## 2024-09-24 PROCEDURE — 75561 CARDIAC MRI FOR MORPH W/DYE: CPT

## 2024-09-24 PROCEDURE — A9585 GADOBUTROL INJECTION: HCPCS | Performed by: NURSE PRACTITIONER

## 2024-09-24 PROCEDURE — 75561 CARDIAC MRI FOR MORPH W/DYE: CPT | Mod: 26 | Performed by: INTERNAL MEDICINE

## 2024-09-24 PROCEDURE — 255N000002 HC RX 255 OP 636: Performed by: NURSE PRACTITIONER

## 2024-09-24 RX ORDER — GADOBUTROL 604.72 MG/ML
15 INJECTION INTRAVENOUS ONCE
Status: COMPLETED | OUTPATIENT
Start: 2024-09-24 | End: 2024-09-24

## 2024-09-24 RX ADMIN — GADOBUTROL 15 ML: 604.72 INJECTION INTRAVENOUS at 15:06

## 2024-09-25 ENCOUNTER — MYC MEDICAL ADVICE (OUTPATIENT)
Dept: CARDIOLOGY | Facility: CLINIC | Age: 49
End: 2024-09-25
Payer: COMMERCIAL

## 2024-10-12 DIAGNOSIS — E55.9 VITAMIN D DEFICIENCY: ICD-10-CM

## 2024-10-16 NOTE — TELEPHONE ENCOUNTER
cholecalciferol (VITAMIN D3) 25 mcg (1000 units) capsule 60 capsule 1 7/15/2024     Last Office Visit: 7/15/24  Future Office visit:   none    Vitamin Supplements Protocol Passed        Leslie Tijerina RN  [facility] Central Nursing/Red Flag Triage & Med Refill Team

## 2025-01-06 ENCOUNTER — LAB (OUTPATIENT)
Dept: LAB | Facility: CLINIC | Age: 50
End: 2025-01-06
Payer: COMMERCIAL

## 2025-01-06 ENCOUNTER — OFFICE VISIT (OUTPATIENT)
Dept: CARDIOLOGY | Facility: CLINIC | Age: 50
End: 2025-01-06
Attending: INTERNAL MEDICINE
Payer: COMMERCIAL

## 2025-01-06 VITALS
DIASTOLIC BLOOD PRESSURE: 77 MMHG | OXYGEN SATURATION: 96 % | SYSTOLIC BLOOD PRESSURE: 115 MMHG | BODY MASS INDEX: 34.37 KG/M2 | HEART RATE: 63 BPM | WEIGHT: 260.5 LBS

## 2025-01-06 DIAGNOSIS — I46.9 CARDIAC ARREST (H): Primary | ICD-10-CM

## 2025-01-06 DIAGNOSIS — I25.10 CORONARY ARTERY DISEASE INVOLVING NATIVE CORONARY ARTERY OF NATIVE HEART WITHOUT ANGINA PECTORIS: ICD-10-CM

## 2025-01-06 LAB
ALBUMIN SERPL BCG-MCNC: 4.2 G/DL (ref 3.5–5.2)
ALP SERPL-CCNC: 77 U/L (ref 40–150)
ALT SERPL W P-5'-P-CCNC: 35 U/L (ref 0–70)
ANION GAP SERPL CALCULATED.3IONS-SCNC: 10 MMOL/L (ref 7–15)
AST SERPL W P-5'-P-CCNC: 26 U/L (ref 0–45)
BILIRUB SERPL-MCNC: 0.3 MG/DL
BUN SERPL-MCNC: 16.4 MG/DL (ref 6–20)
CALCIUM SERPL-MCNC: 9.3 MG/DL (ref 8.8–10.4)
CHLORIDE SERPL-SCNC: 104 MMOL/L (ref 98–107)
CREAT SERPL-MCNC: 1.05 MG/DL (ref 0.67–1.17)
EGFRCR SERPLBLD CKD-EPI 2021: 86 ML/MIN/1.73M2
ERYTHROCYTE [DISTWIDTH] IN BLOOD BY AUTOMATED COUNT: 12.8 % (ref 10–15)
GLUCOSE SERPL-MCNC: 92 MG/DL (ref 70–99)
HCO3 SERPL-SCNC: 26 MMOL/L (ref 22–29)
HCT VFR BLD AUTO: 44.3 % (ref 40–53)
HGB BLD-MCNC: 15.3 G/DL (ref 13.3–17.7)
MCH RBC QN AUTO: 30.2 PG (ref 26.5–33)
MCHC RBC AUTO-ENTMCNC: 34.5 G/DL (ref 31.5–36.5)
MCV RBC AUTO: 87 FL (ref 78–100)
PLATELET # BLD AUTO: 248 10E3/UL (ref 150–450)
POTASSIUM SERPL-SCNC: 4.2 MMOL/L (ref 3.4–5.3)
PROT SERPL-MCNC: 7.4 G/DL (ref 6.4–8.3)
RBC # BLD AUTO: 5.07 10E6/UL (ref 4.4–5.9)
SODIUM SERPL-SCNC: 140 MMOL/L (ref 135–145)
WBC # BLD AUTO: 6 10E3/UL (ref 4–11)

## 2025-01-06 PROCEDURE — 80053 COMPREHEN METABOLIC PANEL: CPT | Performed by: PATHOLOGY

## 2025-01-06 PROCEDURE — G0463 HOSPITAL OUTPT CLINIC VISIT: HCPCS | Performed by: STUDENT IN AN ORGANIZED HEALTH CARE EDUCATION/TRAINING PROGRAM

## 2025-01-06 PROCEDURE — 36415 COLL VENOUS BLD VENIPUNCTURE: CPT | Performed by: PATHOLOGY

## 2025-01-06 PROCEDURE — 85027 COMPLETE CBC AUTOMATED: CPT | Performed by: PATHOLOGY

## 2025-01-06 PROCEDURE — 99215 OFFICE O/P EST HI 40 MIN: CPT | Mod: GC | Performed by: STUDENT IN AN ORGANIZED HEALTH CARE EDUCATION/TRAINING PROGRAM

## 2025-01-06 ASSESSMENT — PAIN SCALES - GENERAL: PAINLEVEL_OUTOF10: NO PAIN (0)

## 2025-01-06 NOTE — LETTER
1/6/2025      RE: Cullen Reardon  [address]  [place] [address]       Dear Colleague,    Thank you for the opportunity to participate in the care of your patient, Cullen Reardon, at the [facility] [place] [facility]. Please see a copy of my visit note below.    Critical Care Cardiology Survivor Clinic  01/06/2025        History of Presenting Illness:  Cullen Reardon is a 48 year old male who presents to clinic today to establish care for after a recent cardiac arrest. Mr. Reardon presented to [facility] 6/1/2024 after driving himself to the ER for chest pain, suffered a witnessed CA there, was shocked 3 times with 15 mint down time, he was cannulated for VA ECMO then sent to the [facility] and found to have a 100% oLAD thrombosis, which was stented. He was decannualted from ECMO 6/4/2024, he had a staged PCI to D1 on 6/7/2024 and then was extubated 6/10/2024. He was discharged to acute rehab on 6/15/204 and since has been sent home. He is doing well there with a nearly full recovery. He is cognitively intact but his main complaint is of fatigue and difficulty with sleep which is new since his arrest. He has also had some orthostatic hypotension that has improved with decreasing his spironolactone and stopping his lasix. (He was seen in ER 7/2/204 for dizziness). He also continues to have chest pain which is largely sternal and worsened with pressure and not related to exertion. Lastly he has noted his taste is 'off' but has been slowly returning to near normal.     Interval clinic visit 06/01/2025:  He has been feeling well. He is still exercising 4 times a week and denies any cardiac symptoms. 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 He has not had any palpitations. He has been off amiodarone, but is currently unsure whether he is on the metoprolol. Otherwise no complains. His mood is still recovering, today he feels  more depressed but he has not really decided on seeing therapy at this time. Insomina and orthostatic hypotension have resolved.      Past Medical History:  Cardiac arrest 6/1/2024  anxiety  Ischemic CMP: last LV EF 40-45% on echo 6/8/2024  Low testosterone - on supplement historically    Family History:  No family history on file.    Social History:  Social History     Tobacco Use     Smoking status: Never     Smokeless tobacco: Never      ROS:  A complete 10-point ROS was negative except as above.    Physical Exam:  /77 (BP Location: Right arm, Patient Position: Chair, Cuff Size: Adult Large)   Pulse 63   Wt 118.2 kg (260 lb 8 oz)   SpO2 96%   BMI 34.37 kg/m      Gen: alert, interactive, NAD  HEENT: atraumatic, EOMI, MMM  Neck: supple, no JVP elevation appreciated.  CV: RRR, no murmurs, rubs.  Chest: CTAB, no wheezes or crackles  Abd: soft, NT, ND, +BS  Ext: no LE edema, 2+ peripheral pulses  Skin: warm and dry, no rashes on exposed surfaces  Neuro: alert and oriented, no focal deficits    Labs:   Labs and cardiac data reviewed personally in clinic.    CMP:  Sodium   Date Value Ref Range Status   07/15/2024 139 135 - 145 mmol/L Final     Potassium   Date Value Ref Range Status   07/15/2024 4.5 3.4 - 5.3 mmol/L Final     Potassium Whole Blood   Date Value Ref Range Status   06/07/2024 3.5 3.4 - 5.3 mmol/L Final     Chloride   Date Value Ref Range Status   07/15/2024 102 98 - 107 mmol/L Final     Carbon Dioxide (CO2)   Date Value Ref Range Status   07/15/2024 23 22 - 29 mmol/L Final     Anion Gap   Date Value Ref Range Status   07/15/2024 14 7 - 15 mmol/L Final     Glucose   Date Value Ref Range Status   07/15/2024 136 (H) 70 - 99 mg/dL Final   06/05/2024 130 (H) 70 - 99 mg/dL Final     GLUCOSE BY METER POCT   Date Value Ref Range Status   06/18/2024 133 (H) 70 - 99 mg/dL Final     Urea Nitrogen   Date Value Ref Range Status   07/15/2024 16.8 6.0 - 20.0 mg/dL Final     Creatinine   Date Value Ref Range  Status   07/15/2024 1.01 0.67 - 1.17 mg/dL Final     GFR Estimate   Date Value Ref Range Status   07/15/2024 >90 >60 mL/min/1.73m2 Final     Comment:     eGFR calculated using 2021 CKD-EPI equation.     Calcium   Date Value Ref Range Status   07/15/2024 9.9 8.6 - 10.0 mg/dL Final     Bilirubin Total   Date Value Ref Range Status   07/15/2024 0.4 <=1.2 mg/dL Final     Alkaline Phosphatase   Date Value Ref Range Status   07/15/2024 85 40 - 150 U/L Final     ALT   Date Value Ref Range Status   07/15/2024 40 0 - 70 U/L Final     Comment:     Reference intervals for this test were updated on 6/12/2023 to more accurately reflect our healthy population. There may be differences in the flagging of prior results with similar values performed with this method. Interpretation of those prior results can be made in the context of the updated reference intervals.       AST   Date Value Ref Range Status   07/15/2024 35 0 - 45 U/L Final     Comment:     Reference intervals for this test were updated on 6/12/2023 to more accurately reflect our healthy population. There may be differences in the flagging of prior results with similar values performed with this method. Interpretation of those prior results can be made in the context of the updated reference intervals.       CBC  CBC RESULTS:   Recent Labs   Lab Test 07/02/24  1621   WBC 4.7   RBC 4.52   HGB 14.0   HCT 41.2   MCV 91   MCH 31.0   MCHC 34.0   RDW 14.5          LIPIDS  Recent Labs   Lab Test 06/10/24  0415 06/03/24  0322   CHOL  --  149   HDL  --  47   LDL  --  82   TRIG 297* 99       TSH  TSH   Date Value Ref Range Status   06/05/2024 1.00 0.30 - 4.20 uIU/mL Final       HBA1C  Lab Results   Component Value Date    A1C 6.1 06/01/2024       Cardiac Data:     EKG 6/24/24: NSR LAE low voltage   TTE 6/8/2024: 40-45% LVEF, wma -LAD/cx , rv nl   Cardiac cath 6/6/2024: prior patent stent to LAD, D1 with iFR 0.82 --> s/p KAYLYN with nenita 3 flow    CMR:  Clinical history:  48-year-old man with CAD, witnessed cardiac arrest due to LAD STEMI, on ECMO, staged PCI  to D1, all in 06/2024.  Comparison CMR: None  1. The LV is normal in cavity size. The LV wall thickness is normal. The global systolic function is  mild-to-moderately reduced. The LVEF is 44%. There is mild to moderate hypokinesis of the basal to mid  anteroseptal, anterior, anterolateral LV segments. The is severe akinesis of the apical LV segments. The  true LV apex is akinetic.   2. The RV is normal in cavity size. The global systolic function is normal. The RVEF is 59%.  3. Both atria are normal in size.  4. There is no significant valvular disease.  5. There is subendocardial hyperenhancement in the basal anteroseptal, basal anterior and basal  anterolateral LV segments. There is near transmural hyperenhancement involving the septal and anterior  segments extending from the mid to the apex, including the true apex. Overall, this is consistent with a  large MI in the LAD territory.  6. There is no pericardial effusion or thickening.  7. There is no intracardiac thrombus.     CONCLUSIONS: Ischemic cardiomyopathy with LVEF of 44% and RVEF of 59% due to a large MI in the LAD  territory.    Assessment/Plan:  Cullen Reardon is a 48 year old male who presents to clinic today to establish care for his recent ischemic vt/vf arrest (presented to [facility] and arrested in the ER) on 6/1/2024.    Refractory PEA? Eventually shocked cardiac arrest date 6/1/2024    Etiology: Ischemic s/p PCI to  LAD and later D1  Fully revasc:  yes  LVEF:  TTE 6/8/24 LVEF: 40-45 RV function: nl  ICD:  Not indicated    Neuropsych referral   Behavioral health/psychology information provided.  Provided [place] sudden cardiac arrest  group details ([email or web address]; [phone]).  Continue work with OT/PT and cardiac rehab.  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   Reviewed med list; eliminated unnecessary meds as mentioned above/below  (potassium stopped, not taking PPI, not taking tums, not taking keflex, only prn lasix)   Discussed signing paper work to share his story    Ischemic cardiomyopathy LVEF 40-45%  Entresto 24-26 bid  Spironolactone 12.5mg daily  Jardiance 10mg daily   Toprolol XL 50mg daily  SCD prophylaxis does not meet criteria for implant  Fluid status: euvolemic  Off amiodarone    - Will ensure he is on metoprolol    #CAD s/p stenting last on 6/7/2024  -asp 81 mg life long, remains on ticag until at least 6/7/2025 then can discuss or if bleeding occurs sooner  -rosuvastatin 20 daily     #Mood:   -resources included [place] survivors network, long discussion today about normalcy, psychology resources offered; wishes to defer for now, but will consider in the future.  -remains on Effexor    #insomnia: resolved  -on melatonin 10 mg at bedtime prn    #hypogonadism/low testosterone  -hold of on testosterone supplement for now, discussed increased thrombotic risk (largely venous but given recent hospitalization will hold off for a few months)  - Will see endocrinology and repeat labs for now.      Return to clinic with an echo in 6 months.    Mike Medina MD  Cardiology fellow    Seen and discussed with Dr Gavin.        Attestation signed by Florencia Salomon MD at 1/7/2025  8:40 PM:  Physician Attestation  I, Florencia Madrid MD, saw this patient and agree with the findings and plan of care as documented in the note.      Items personally reviewed/procedural attestation: vitals, labs, imaging and agree with the interpretation documented in the note, and EKG and agree with the interpretation documented in the note.      Briefly this is a 50 yo who presented to an OSH with chest pain and had a cardiac arrest witnessed in the setting of oLAD disease  s/p PCI and va ecmo with great recovery.   Was seen by us on 7/2024 and was doing well. Last EF 40-45% on entresto spironolactone jardiance and toprol.   Since then had cMRI with LVEF 44% with some akinesis in LAD territory normal RV fx. He is feeling well, is exercising and is on GDMT with some lightheadedness when changing positions and unsure if taking metoprolol Will not make changes at this time.   Is interested in starting testosterone use again given hypogonadism. We discussed the association with MI with injectables being higher than gels. Ultimately it is a risk vs benefit choice, at this time he feels the risk is not worth the benefit.     We will follow up in 6m      Florencia Madrid MD      Please do not hesitate to contact me if you have any questions/concerns.     Sincerely,     Florencia Madrid MD

## 2025-01-06 NOTE — NURSING NOTE
Chief Complaint   Patient presents with    Follow Up     Return Critical Care- 6 month follow-up arrest pt     Vitals were taken and medications reconciled.    JUVENTINO Carr  4:10 PM

## 2025-01-06 NOTE — PATIENT INSTRUCTIONS
Critical Care Cardiology Survivor Clinic                                                                                                     You were seen today in the Critical Care Cardiology Survivor Clinic at the [facility]:       Dr. 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     -What is Health Psychology?  Health Psychology is a specialty that helps people cope with the stress and anxiety that often occurs with illness, emotional and psychological issues, and preparation for medical treatment including surgical procedures.    Please contact our psychologists directly with questions or to make an appointment.    Simon Lou, Ph.D.    [phone]  Belen Salinas, Ph.D.,                 [phone]  Christal Dawson, Ph.D.                      [phone]  Sadaf Bustos, Ph.D.,                    [phone]          Chance Barrera, Ph.D., [facility],   [phone]       Thank you for your visit today!     Please MyChart message me or call Janie Chavez RN or Esther Cabrera RN if you have any questions or concerns.      During Business Hours:  [phone], option # 1 (University) then option # 4 (medical questions) and ask to speak with my nurse.     After hours, weekends or holidays:   [phone], Option #4  Ask to speak to the On-Call Cardiologist. Inform them you are a heart failure patient at the [facility].

## 2025-01-06 NOTE — PROGRESS NOTES
Critical Care Cardiology Survivor Clinic  01/06/2025        History of Presenting Illness:  Cullen Reardon is a 48 year old male who presents to clinic today to establish care for after a recent cardiac arrest. Mr. Reardon presented to [facility] 6/1/2024 after driving himself to the ER for chest pain, suffered a witnessed CA there, was shocked 3 times with 15 mint down time, he was cannulated for VA ECMO then sent to the [facility] and found to have a 100% oLAD thrombosis, which was stented. He was decannualted from ECMO 6/4/2024, he had a staged PCI to D1 on 6/7/2024 and then was extubated 6/10/2024. He was discharged to acute rehab on 6/15/204 and since has been sent home. He is doing well there with a nearly full recovery. He is cognitively intact but his main complaint is of fatigue and difficulty with sleep which is new since his arrest. He has also had some orthostatic hypotension that has improved with decreasing his spironolactone and stopping his lasix. (He was seen in ER 7/2/204 for dizziness). He also continues to have chest pain which is largely sternal and worsened with pressure and not related to exertion. Lastly he has noted his taste is 'off' but has been slowly returning to near normal.     Interval clinic visit 06/01/2025:  He has been feeling well. He is still exercising 4 times a week and denies any cardiac symptoms. 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 He has not had any palpitations. He has been off amiodarone, but is currently unsure whether he is on the metoprolol. Otherwise no complains. His mood is still recovering, today he feels more depressed but he has not really decided on seeing therapy at this time. Insomina and orthostatic hypotension have resolved.      Past Medical History:  Cardiac arrest 6/1/2024  anxiety  Ischemic CMP: last LV EF 40-45% on echo 6/8/2024  Low testosterone - on supplement historically    Family History:  No family history on  file.    Social History:  Social History     Tobacco Use    Smoking status: Never    Smokeless tobacco: Never      ROS:  A complete 10-point ROS was negative except as above.    Physical Exam:  /77 (BP Location: Right arm, Patient Position: Chair, Cuff Size: Adult Large)   Pulse 63   Wt 118.2 kg (260 lb 8 oz)   SpO2 96%   BMI 34.37 kg/m      Gen: alert, interactive, NAD  HEENT: atraumatic, EOMI, MMM  Neck: supple, no JVP elevation appreciated.  CV: RRR, no murmurs, rubs.  Chest: CTAB, no wheezes or crackles  Abd: soft, NT, ND, +BS  Ext: no LE edema, 2+ peripheral pulses  Skin: warm and dry, no rashes on exposed surfaces  Neuro: alert and oriented, no focal deficits    Labs:   Labs and cardiac data reviewed personally in clinic.    CMP:  Sodium   Date Value Ref Range Status   07/15/2024 139 135 - 145 mmol/L Final     Potassium   Date Value Ref Range Status   07/15/2024 4.5 3.4 - 5.3 mmol/L Final     Potassium Whole Blood   Date Value Ref Range Status   06/07/2024 3.5 3.4 - 5.3 mmol/L Final     Chloride   Date Value Ref Range Status   07/15/2024 102 98 - 107 mmol/L Final     Carbon Dioxide (CO2)   Date Value Ref Range Status   07/15/2024 23 22 - 29 mmol/L Final     Anion Gap   Date Value Ref Range Status   07/15/2024 14 7 - 15 mmol/L Final     Glucose   Date Value Ref Range Status   07/15/2024 136 (H) 70 - 99 mg/dL Final   06/05/2024 130 (H) 70 - 99 mg/dL Final     GLUCOSE BY METER POCT   Date Value Ref Range Status   06/18/2024 133 (H) 70 - 99 mg/dL Final     Urea Nitrogen   Date Value Ref Range Status   07/15/2024 16.8 6.0 - 20.0 mg/dL Final     Creatinine   Date Value Ref Range Status   07/15/2024 1.01 0.67 - 1.17 mg/dL Final     GFR Estimate   Date Value Ref Range Status   07/15/2024 >90 >60 mL/min/1.73m2 Final     Comment:     eGFR calculated using 2021 CKD-EPI equation.     Calcium   Date Value Ref Range Status   07/15/2024 9.9 8.6 - 10.0 mg/dL Final     Bilirubin Total   Date Value Ref Range Status    07/15/2024 0.4 <=1.2 mg/dL Final     Alkaline Phosphatase   Date Value Ref Range Status   07/15/2024 85 40 - 150 U/L Final     ALT   Date Value Ref Range Status   07/15/2024 40 0 - 70 U/L Final     Comment:     Reference intervals for this test were updated on 6/12/2023 to more accurately reflect our healthy population. There may be differences in the flagging of prior results with similar values performed with this method. Interpretation of those prior results can be made in the context of the updated reference intervals.       AST   Date Value Ref Range Status   07/15/2024 35 0 - 45 U/L Final     Comment:     Reference intervals for this test were updated on 6/12/2023 to more accurately reflect our healthy population. There may be differences in the flagging of prior results with similar values performed with this method. Interpretation of those prior results can be made in the context of the updated reference intervals.       CBC  CBC RESULTS:   Recent Labs   Lab Test 07/02/24  1621   WBC 4.7   RBC 4.52   HGB 14.0   HCT 41.2   MCV 91   MCH 31.0   MCHC 34.0   RDW 14.5          LIPIDS  Recent Labs   Lab Test 06/10/24  0415 06/03/24  0322   CHOL  --  149   HDL  --  47   LDL  --  82   TRIG 297* 99       TSH  TSH   Date Value Ref Range Status   06/05/2024 1.00 0.30 - 4.20 uIU/mL Final       HBA1C  Lab Results   Component Value Date    A1C 6.1 06/01/2024       Cardiac Data:     EKG 6/24/24: NSR LAE low voltage   TTE 6/8/2024: 40-45% LVEF, wma -LAD/cx , rv nl   Cardiac cath 6/6/2024: prior patent stent to LAD, D1 with iFR 0.82 --> s/p KAYLYN with nenita 3 flow    CMR:  Clinical history: 48-year-old man with CAD, witnessed cardiac arrest due to LAD STEMI, on ECMO, staged PCI  to D1, all in 06/2024.  Comparison CMR: None  1. The LV is normal in cavity size. The LV wall thickness is normal. The global systolic function is  mild-to-moderately reduced. The LVEF is 44%. There is mild to moderate hypokinesis of the basal  to mid  anteroseptal, anterior, anterolateral LV segments. The is severe akinesis of the apical LV segments. The  true LV apex is akinetic.   2. The RV is normal in cavity size. The global systolic function is normal. The RVEF is 59%.  3. Both atria are normal in size.  4. There is no significant valvular disease.  5. There is subendocardial hyperenhancement in the basal anteroseptal, basal anterior and basal  anterolateral LV segments. There is near transmural hyperenhancement involving the septal and anterior  segments extending from the mid to the apex, including the true apex. Overall, this is consistent with a  large MI in the LAD territory.  6. There is no pericardial effusion or thickening.  7. There is no intracardiac thrombus.     CONCLUSIONS: Ischemic cardiomyopathy with LVEF of 44% and RVEF of 59% due to a large MI in the LAD  territory.    Assessment/Plan:  Cullen Reardon is a 48 year old male who presents to clinic today to establish care for his recent ischemic vt/vf arrest (presented to [facility] and arrested in the ER) on 6/1/2024.    Refractory PEA? Eventually shocked cardiac arrest date 6/1/2024    Etiology: Ischemic s/p PCI to  LAD and later D1  Fully revasc:  yes  LVEF:  TTE 6/8/24 LVEF: 40-45 RV function: nl  ICD:  Not indicated    Neuropsych referral   Behavioral health/psychology information provided.  Provided [place] sudden cardiac arrest  group details ([email or web address]; [phone]).  Continue work with OT/PT and cardiac rehab.  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   Reviewed med list; eliminated unnecessary meds as mentioned above/below  (potassium stopped, not taking PPI, not taking tums, not taking keflex, only prn lasix)   Discussed signing paper work to share his story    Ischemic cardiomyopathy LVEF  40-45%  Entresto 24-26 bid  Spironolactone 12.5mg daily  Jardiance 10mg daily   Toprolol XL 50mg daily  SCD prophylaxis does not meet criteria for implant  Fluid status: euvolemic  Off amiodarone    - Will ensure he is on metoprolol    #CAD s/p stenting last on 6/7/2024  -asp 81 mg life long, remains on ticag until at least 6/7/2025 then can discuss or if bleeding occurs sooner  -rosuvastatin 20 daily     #Mood:   -resources included [place] survivors network, long discussion today about normalcy, psychology resources offered; wishes to defer for now, but will consider in the future.  -remains on Effexor    #insomnia: resolved  -on melatonin 10 mg at bedtime prn    #hypogonadism/low testosterone  -hold of on testosterone supplement for now, discussed increased thrombotic risk (largely venous but given recent hospitalization will hold off for a few months)  - Will see endocrinology and repeat labs for now.      Return to clinic with an echo in 6 months.    Mike Medina MD  Cardiology fellow    Seen and discussed with Dr Gavin.

## 2025-03-10 ENCOUNTER — TELEPHONE (OUTPATIENT)
Dept: CARDIOLOGY | Facility: CLINIC | Age: 50
End: 2025-03-10
Payer: COMMERCIAL

## 2025-03-10 NOTE — TELEPHONE ENCOUNTER
Left Voicemail (1st Attempt) and Sent Mychart (1st Attempt) for the patient to call back and schedule the following:    Appointment type: [facility] CRITICAL CARE  Provider: [facility]  Return date: 7/6/2025  Specialty phone number: [phone] OPT 1  Additional appointment(s) needed: ECHO PRIOR  Additonal Notes: FOLLOW-UP WITH CRITICAL CARE CLINIC AND ECHO PRIOR

## 2025-03-12 NOTE — TELEPHONE ENCOUNTER
Left Voicemail (2nd Attempt) for the patient to call back and schedule the following:    Appointment type: RTN CRITICAL CARE  Provider: [facility]  Return date: 7/6/2025  Specialty phone number: [phone] OPT 1  Additional appointment(s) needed: ECHO PRIOR  Additonal Notes: FOLLOW-UP WITH CRITICAL CARE CLINIC AND ECHO PRIOR

## 2025-03-18 ENCOUNTER — TELEPHONE (OUTPATIENT)
Dept: CARDIOLOGY | Facility: CLINIC | Age: 50
End: 2025-03-18
Payer: COMMERCIAL

## 2025-03-18 NOTE — TELEPHONE ENCOUNTER
Left Voicemail (1st Attempt) for the patient to call back and schedule the following:    Appointment type: Return Critical care  Provider: Any  Return date: July  Specialty phone number: [phone] opt 1  Additional appointment(s) needed: Echo  Additonal Notes: N/A

## 2025-03-31 ENCOUNTER — TELEPHONE (OUTPATIENT)
Dept: CARDIOLOGY | Facility: CLINIC | Age: 50
End: 2025-03-31
Payer: COMMERCIAL

## 2025-03-31 NOTE — TELEPHONE ENCOUNTER
Left Voicemail (1st Attempt) for the patient to call back and schedule the following:    Appointment type: Return critical care   Provider: critical care   Return date: July  Specialty phone number: [phone] opt 1  Additional appointment(s) needed: Echo  Additonal Notes: Someone scheduled his ECHO for 04/02. Please reschedule for July and also schedule a return critical care appt following same day.

## 2025-04-03 ENCOUNTER — TELEPHONE (OUTPATIENT)
Dept: CARDIOLOGY | Facility: CLINIC | Age: 50
End: 2025-04-03

## 2025-04-03 NOTE — TELEPHONE ENCOUNTER
Patient Contacted for the patient to call back and schedule the following:    Appointment type: Return CRITICAL CARE   Provider: CRITICAL CARE   Return date: 07/21  Specialty phone number: [phone] opt 1  Additional appointment(s) needed: Echo  Additonal Notes: N/A

## 2025-07-02 DIAGNOSIS — I46.9 CARDIAC ARREST (H): ICD-10-CM

## 2025-07-02 DIAGNOSIS — I25.10 CORONARY ARTERY DISEASE INVOLVING NATIVE CORONARY ARTERY OF NATIVE HEART WITHOUT ANGINA PECTORIS: ICD-10-CM

## 2025-07-02 DIAGNOSIS — Z98.61 STATUS POST PERCUTANEOUS TRANSLUMINAL CORONARY ANGIOPLASTY: ICD-10-CM

## 2025-07-02 DIAGNOSIS — I50.23 ACUTE ON CHRONIC SYSTOLIC HEART FAILURE (H): ICD-10-CM

## 2025-07-03 RX ORDER — ROSUVASTATIN CALCIUM 20 MG/1
20 TABLET, COATED ORAL DAILY
Qty: 90 TABLET | Refills: 0 | Status: SHIPPED | OUTPATIENT
Start: 2025-07-03

## 2025-07-03 RX ORDER — SACUBITRIL AND VALSARTAN 24; 26 MG/1; MG/1
1 TABLET, FILM COATED ORAL 2 TIMES DAILY
Qty: 180 TABLET | Refills: 1 | Status: SHIPPED | OUTPATIENT
Start: 2025-07-03

## 2025-07-03 RX ORDER — ASPIRIN 81 MG/1
81 TABLET, COATED ORAL DAILY
Qty: 90 TABLET | Refills: 1 | Status: SHIPPED | OUTPATIENT
Start: 2025-07-03

## 2025-07-03 RX ORDER — TICAGRELOR 90 MG/1
90 TABLET, FILM COATED ORAL 2 TIMES DAILY
Qty: 180 TABLET | Refills: 1 | Status: SHIPPED | OUTPATIENT
Start: 2025-07-03

## 2025-07-03 NOTE — TELEPHONE ENCOUNTER
Last Written Prescription:   Disp Refills Start End DENNY   rosuvastatin (CRESTOR) 20 MG tablet 90 tablet 3 6/24/2024 -- No   Sig - Route: Take 1 tablet (20 mg) by mouth daily - Oral   90 days sent - due for lipid panel   sacubitril-valsartan (ENTRESTO) 24-26 MG per tablet 180 tablet 3 6/24/2024 -- No   Sig - Route: 1 tablet by Oral or Feeding Tube route 2 times daily - Oral or Feeding Tube     aspirin 81 MG EC tablet 90 tablet 3 6/24/2024 -- No   Sig - Route: Take 1 tablet (81 mg) by mouth daily - Oral      Disp Refills Start End DENNY   ticagrelor (BRILINTA) 90 MG tablet 180 tablet 3 6/24/2024 -- No   Sig - Route: Take 1 tablet (90 mg) by mouth 2 times daily - Oral     ----------------------  Last Visit Date:   1/6/2025  [facility] Visit Date:    8/4/2025 2:00 PM (60 min)  Izzy   Arrive by:  1:45 PM   ECHO COMPLETE   Rfl Status: Authorized   [facility] ([facility])   [id number]     ----------------------      Refill decision:   [x] Medication refilled per  Medication Refill in Ambulatory Care  policy.  Warnings Override History for sacubitril-valsartan (ENTRESTO) 24-26 MG per tablet [[id number]]    Overridden by Alexandra Hendrix RN on Jun 20, 2025 11:39 AM   Drug-Drug   1. POTASSIUM-SPARING DIURETICS / ANGIOTENSIN II RECEPTOR ANTAGONISTS [Level: Major] [Reason: Tolerated medication/side effects in past]   Other Orders: spironolactone (ALDACTONE) 25 MG tablet         Overridden by Aretha Godwin NP on Jun 24, 2024 2:12 PM   Drug-Drug   1. POTASSIUM-SPARING DIURETICS / ANGIOTENSIN II RECEPTOR ANTAGONISTS [Level: Major] [Reason: Will monitor drug levels/drug effects ]   Other Orders: spironolactone (ALDACTONE) 25 MG tablet          Recent Labs   Lab Test 07/15/24  1150 06/10/24  0415 06/03/24  0322   CHOL 137  --  149   HDL 51  --  47   LDL 62  --  82   TRIG 118 297* 99     Last Comprehensive Metabolic Panel:  Lab Results   Component Value Date     01/06/2025    POTASSIUM 4.2  01/06/2025    CHLORIDE 104 01/06/2025    CO2 26 01/06/2025    ANIONGAP 10 01/06/2025    GLC 92 01/06/2025    BUN 16.4 01/06/2025    CR 1.05 01/06/2025    GFRESTIMATED 86 01/06/2025    RANDA 9.3 01/06/2025     BP Readings from Last 3 Encounters:   01/06/25 115/77   07/15/24 112/79   07/02/24 104/70     CBC RESULTS:   Recent Labs   Lab Test 01/06/25  1558   WBC 6.0   RBC 5.07   HGB 15.3   HCT 44.3   MCV 87   MCH 30.2   MCHC 34.5   RDW 12.8        Request from pharmacy:  Requested Prescriptions   Pending Prescriptions Disp Refills    rosuvastatin (CRESTOR) 20 MG tablet [Pharmacy Med Name: ROSUVASTATIN 20 MG TAB 20 Tablet] 90 tablet 3     Sig: TAKE 1 TABLET (20 MG) BY MOUTH DAILY       Antihyperlipidemic agents Passed - 7/3/2025 12:31 PM        Passed - LDL on file in the past 12 months        Passed - Medication is active on med list and the sig matches. RN to manually verify dose and sig if red X/fail.     If the protocol passes (green check), you do not need to verify med dose and sig.    A prescription matches if they are the same clinical intention.    For Example: once daily and every morning are the same.    The protocol can not identify upper and lower case letters as matching and will fail.     For Example: Take 1 tablet (50 mg) by mouth daily     TAKE 1 TABLET (50 MG) BY MOUTH DAILY    For all fails (red x), verify dose and sig.    If the refill does match what is on file, the RN can still proceed to approve the refill request.       If they do not match, route to the appropriate provider.             Passed - Recent (12 month) or future (90 days) visit with authorizing provider's specialty (provided they have been seen in the past 15 months)     The patient must have completed an in-person or virtual visit within the past 12 months or has a future visit scheduled within the next 90 days with the authorizing provider s specialty.  Urgent care and e-visits do not qualify as an office visit for this  protocol.          Passed - Patient is age 18 years or older          ENTRESTO 24-26 MG per tablet [Pharmacy Med Name: ENTRESTO 24-26 MG TABS 24-26 Tablet] 180 tablet 3     Sig: TAKE 1 TABLET BY MOUTH OR FEEDING TUBE TWICE A DAY       Angiotensin-II Receptors Failed - 7/3/2025 12:31 PM        Failed - Medication is active on med list and the sig matches. RN to manually verify dose and sig if red X/fail.     If the protocol passes (green check), you do not need to verify med dose and sig.    A prescription matches if they are the same clinical intention.    For Example: once daily and every morning are the same.    The protocol can not identify upper and lower case letters as matching and will fail.     For Example: Take 1 tablet (50 mg) by mouth daily     TAKE 1 TABLET (50 MG) BY MOUTH DAILY    For all fails (red x), verify dose and sig.    If the refill does match what is on file, the RN can still proceed to approve the refill request.       If they do not match, route to the appropriate provider.             Passed - Most recent blood pressure under 140/90 in past 12 months     BP Readings from Last 3 Encounters:   01/06/25 115/77   07/15/24 112/79   07/02/24 104/70       No data recorded            Passed - Has GFR on file in past 12 months and most recent value is normal        Passed - Medication indicated for associated diagnosis     The medication is prescribed for one or more of the following conditions:    Chronic Kidney Disease (CDK)    Heart Failure (HF)    Diabetes, Nephropathy   Hypertension    Coronary Artery Disease (CAD)   Raynaud's Disease   Ischemic cardiomyopathy   Cardiomyopathy   Pulmonary Hypertension          Passed - Recent (12 month) or future (90 days) visit with authorizing provider's specialty (provided they have been seen in the past 15 months)     The patient must have completed an in-person or virtual visit within the past 12 months or has a future visit scheduled within the next 90  days with the authorizing provider s specialty.  Urgent care and e-visits do not qualify as an office visit for this protocol.          Passed - Patient is age 18 or older        Passed - Normal serum potassium on file in past 12 months     Recent Labs   Lab Test 01/06/25  1558   POTASSIUM 4.2                   Angiostensin Receptor Neprilysin Inhibitors (ARNI) Protocol Failed - 7/3/2025 12:31 PM        Failed - Medication is active on med list and the sig matches. RN to manually verify dose and sig if red X/fail.     If the protocol passes (green check), you do not need to verify med dose and sig.    A prescription matches if they are the same clinical intention.    For Example: once daily and every morning are the same.    The protocol can not identify upper and lower case letters as matching and will fail.     For Example: Take 1 tablet (50 mg) by mouth daily     TAKE 1 TABLET (50 MG) BY MOUTH DAILY    For all fails (red x), verify dose and sig.    If the refill does match what is on file, the RN can still proceed to approve the refill request.       If they do not match, route to the appropriate provider.             Passed - Most recent blood pressure under 140/90 in past 12 months     BP Readings from Last 3 Encounters:   01/06/25 115/77   07/15/24 112/79   07/02/24 104/70       No data recorded            Passed - Has GFR on file in past 12 months and most recent value is normal        Passed - Recent (12 month) or future (90 days) visit with authorizing provider's specialty (provided they have been seen in the past 15 months)     The patient must have completed an in-person or virtual visit within the past 12 months or has a future visit scheduled within the next 90 days with the authorizing provider s specialty.  Urgent care and e-visits do not qualify as an office visit for this protocol.          Passed - Medication indicated for associated diagnosis        Passed - Patient is on an ARB        Passed -  Patient is on an Ace inhibitor          ASPIRIN LOW DOSE 81 MG EC tablet [Pharmacy Med Name: ASPIRIN LOW DOSE 81 MG TBEC 81 Tablet] 90 tablet 3     Sig: TAKE 1 TABLET (81 MG) BY MOUTH DAILY       Analgesics (Non-Narcotic Tylenol and ASA Only) Passed - 7/3/2025 12:31 PM        Passed - Patient is age 20 years or older     If ASA is flagged for ages under 20 years old. Forward to provider for confirmation Ryes Syndrome is not a concern.              Passed - Medication is active on med list and the sig matches. RN to manually verify dose and sig if red X/fail.     If the protocol passes (green check), you do not need to verify med dose and sig.    A prescription matches if they are the same clinical intention.    For Example: once daily and every morning are the same.    The protocol can not identify upper and lower case letters as matching and will fail.     For Example: Take 1 tablet (50 mg) by mouth daily     TAKE 1 TABLET (50 MG) BY MOUTH DAILY    For all fails (red x), verify dose and sig.    If the refill does match what is on file, the RN can still proceed to approve the refill request.       If they do not match, route to the appropriate provider.             Passed - Medication matches indication     Aspirin is associated with one or more of the following diagnoses:  Pain  Fever  Cerebrovascular Accident  Headache  Myocardial Infarction  Osteoarthritis  Rheumatoid Arthritis  Systemic Lupus  TIA  Angina            Passed - Recent (12 month) or future (90 days) visit with authorizing provider's specialty (provided they have been seen in the past 15 months)     The patient must have completed an in-person or virtual visit within the past 12 months or has a future visit scheduled within the next 90 days with the authorizing provider s specialty.  Urgent care and e-visits do not qualify as an office visit for this protocol.            ticagrelor (BRILINTA) 90 MG tablet [Pharmacy Med Name: TICAGRELOR 90 MG TABS 90  Tablet] 180 tablet 3     Sig: TAKE 1 TABLET (90 MG) BY MOUTH 2 TIMES DAILY       Platelet Inhibitors Failed - 7/3/2025 12:31 PM        Failed - Medication indicated for associated diagnosis     Medication is associated with one or more of the following diagnoses:     Cerebrovascular accident   Myocardial infarction   Non-ST segment elevation myocardial infarction, acute - Percutaneous coronary   intervention - Thrombosis; Prophylaxis   Transient ischemic attack; Treatment and Prophylaxis   Percutaneous coronary intervention - Thrombosis; Prophylaxis - Unstable angina   Unstable angina   Raynaud's Disease          Passed - Normal HGB on file in past 12 months     Recent Labs   Lab Test 01/06/25  1558   HGB 15.3               Passed - Normal Platelets on file in past 12 months     Recent Labs   Lab Test 01/06/25  1558                  Passed - Medication is active on med list and the sig matches. RN to manually verify dose and sig if red X/fail.     If the protocol passes (green check), you do not need to verify med dose and sig.    A prescription matches if they are the same clinical intention.    For Example: once daily and every morning are the same.    The protocol can not identify upper and lower case letters as matching and will fail.     For Example: Take 1 tablet (50 mg) by mouth daily     TAKE 1 TABLET (50 MG) BY MOUTH DAILY    For all fails (red x), verify dose and sig.    If the refill does match what is on file, the RN can still proceed to approve the refill request.       If they do not match, route to the appropriate provider.             Passed - Recent (12 month) or future (90 days) visit with authorizing provider's specialty (provided they have been seen in the past 15 months)     The patient must have completed an in-person or virtual visit within the past 12 months or has a future visit scheduled within the next 90 days with the authorizing provider s specialty.  Urgent care and e-visits do  not qualify as an office visit for this protocol.          Passed - Patient is age 18 or older

## 2025-07-09 DIAGNOSIS — I25.10 CORONARY ARTERY DISEASE INVOLVING NATIVE CORONARY ARTERY OF NATIVE HEART WITHOUT ANGINA PECTORIS: ICD-10-CM

## 2025-07-09 DIAGNOSIS — I50.23 ACUTE ON CHRONIC SYSTOLIC HEART FAILURE (H): ICD-10-CM

## 2025-07-09 RX ORDER — ROSUVASTATIN CALCIUM 20 MG/1
20 TABLET, COATED ORAL DAILY
Qty: 90 TABLET | Refills: 0 | OUTPATIENT
Start: 2025-07-09

## 2025-07-09 RX ORDER — METOPROLOL SUCCINATE 50 MG/1
50 TABLET, EXTENDED RELEASE ORAL DAILY
Qty: 90 TABLET | Refills: 1 | Status: SHIPPED | OUTPATIENT
Start: 2025-07-09

## 2025-07-09 NOTE — TELEPHONE ENCOUNTER
Last Written Prescription:   Disp Refills Start End DENNY   empagliflozin (JARDIANCE) 10 MG TABS tablet 90 tablet 3 6/24/2024 -- No   Sig - Route: 1 tablet (10 mg) by Oral or Feeding Tube route daily - Oral or Feeding Tube     metoprolol succinate ER (TOPROL XL) 50 MG 24 hr tablet 90 tablet 3 6/24/2024 -- No   Sig - Route: Take 1 tablet (50 mg) by mouth daily - Oral     ----------------------  Last Visit Date: 1/6/2025  [facility]      Future Visit Date: 0  ----------------------      Refill decision:   [x] Medication refilled per  Medication Refill in Ambulatory Care  policy.  Medication refilled per Cardiology Refill policy (2025).   *Labs WNL per protocol criteria not required.    BP Readings from Last 3 Encounters:   01/06/25 115/77   07/15/24 112/79   07/02/24 104/70     Last Comprehensive Metabolic Panel:  Lab Results   Component Value Date     01/06/2025    POTASSIUM 4.2 01/06/2025    CHLORIDE 104 01/06/2025    CO2 26 01/06/2025    ANIONGAP 10 01/06/2025    GLC 92 01/06/2025    BUN 16.4 01/06/2025    CR 1.05 01/06/2025    GFRESTIMATED 86 01/06/2025    RANDA 9.3 01/06/2025           Request from pharmacy:  Requested Prescriptions   Pending Prescriptions Disp Refills    metoprolol succinate ER (TOPROL XL) 50 MG 24 hr tablet [Pharmacy Med Name: METOPROLOL SUCC ER 50 MG TA 50 Tablet] 90 tablet 3     Sig: TAKE 1 TABLET (50 MG) BY MOUTH DAILY       Beta-Blockers Protocol Passed - 7/9/2025  1:01 PM        Passed - Patient is age 6 or older        Passed - Medication is active on med list and the sig matches. RN to manually verify dose and sig if red X/fail.     If the protocol passes (green check), you do not need to verify med dose and sig.    A prescription matches if they are the same clinical intention.    For Example: once daily and every morning are the same.    The protocol can not identify upper and lower case letters as matching and will fail.     For Example: Take 1 tablet (50  mg) by mouth daily     TAKE 1 TABLET (50 MG) BY MOUTH DAILY    For all fails (red x), verify dose and sig.    If the refill does match what is on file, the RN can still proceed to approve the refill request.       If they do not match, route to the appropriate provider.             Passed - Medication indicated for associated diagnosis     Medication is associated with one or more of the following diagnoses:     Hypertension (HTN)   Atrial fibrillation/flutter   Angina   ASCVD   Migraine   Heart Failure   Tremor   Anxiety   Ocular hypertension   Glaucoma   PTSD   Obstructive hypertrophic cardiomyopathy   History of myocarditis   Palpitations   POTS (postural orthostatic tachycardia syndrome)   SVT (supraventricular tachycardia)   Hyperthyroidism   Tachycardia   Acute non-st segment elevation myocardial infarction   Subsequent non-st segment elevation myocardial infarction   Ischemic myocardial dysfunction          Passed - Most recent blood pressure on file in last 12 months     BP Readings from Last 3 Encounters:   01/06/25 115/77   07/15/24 112/79   07/02/24 104/70       No data recorded            Passed - Recent (12 month) or future (90 days) visit with authorizing provider's specialty (provided they have been seen in the past 15 months)     The patient must have completed an in-person or virtual visit within the past 12 months or has a future visit scheduled within the next 90 days with the authorizing provider s specialty.  Urgent care and e-visits do not qualify as an office visit for this protocol.            rosuvastatin (CRESTOR) 20 MG tablet [Pharmacy Med Name: ROSUVASTATIN 20 MG TAB 20 Tablet] 90 tablet 0     Sig: TAKE 1 TABLET (20 MG) BY MOUTH DAILY       Antihyperlipidemic agents Passed - 7/9/2025  1:01 PM        Passed - LDL on file in the past 12 months        Passed - Medication is active on med list and the sig matches. RN to manually verify dose and sig if red X/fail.     If the protocol passes  (green check), you do not need to verify med dose and sig.    A prescription matches if they are the same clinical intention.    For Example: once daily and every morning are the same.    The protocol can not identify upper and lower case letters as matching and will fail.     For Example: Take 1 tablet (50 mg) by mouth daily     TAKE 1 TABLET (50 MG) BY MOUTH DAILY    For all fails (red x), verify dose and sig.    If the refill does match what is on file, the RN can still proceed to approve the refill request.       If they do not match, route to the appropriate provider.             Passed - Recent (12 month) or future (90 days) visit with authorizing provider's specialty (provided they have been seen in the past 15 months)     The patient must have completed an in-person or virtual visit within the past 12 months or has a future visit scheduled within the next 90 days with the authorizing provider s specialty.  Urgent care and e-visits do not qualify as an office visit for this protocol.          Passed - Patient is age 18 years or older          JARDIANCE 10 MG TABS tablet [Pharmacy Med Name: JARDIANCE 10 MG TABLET 10 Tablet] 90 tablet 3     Sig: TAKE 1 TABLET (10 MG) BY MOUTH OR FEEDING TUBE ROUTE DAILY       Sodium Glucose Co-Transport Inhibitor Agents Failed - 7/9/2025  1:01 PM        Failed - Patient has documented A1c within the specified period of time.     If HgbA1C is 8 or greater, it needs to be on file within the past 3 months.  If less than 8, must be on file within the past 6 months.     Recent Labs   Lab Test 06/01/24  2040   A1C 6.1*             Failed - Medication is active on med list and the sig matches. RN to manually verify dose and sig if red X/fail.     If the protocol passes (green check), you do not need to verify med dose and sig.    A prescription matches if they are the same clinical intention.    For Example: once daily and every morning are the same.    The protocol can not identify  upper and lower case letters as matching and will fail.     For Example: Take 1 tablet (50 mg) by mouth daily     TAKE 1 TABLET (50 MG) BY MOUTH DAILY    For all fails (red x), verify dose and sig.    If the refill does match what is on file, the RN can still proceed to approve the refill request.       If they do not match, route to the appropriate provider.             Failed - Recent (6 month) or future (90 days) visit with the authorizing provider's specialty (provided they have been seen in the past 9 months)     The patient must have completed an in-person or virtual visit within the past 6 months or has a future visit scheduled within the next 90 days with the authorizing provider s specialty.  Urgent care and e-visits do not quality as an office visit for this protocol.          Passed - Medication indicated for associated diagnosis     Medication is associated with one or more of the following diagnoses:     Diabetic nephropathy, With Albuminuria - Type 2 diabetes mellitus     Disorder of cardiovascular system; Prophylaxis - Type 2 diabetes mellitus     Type 2 diabetes mellitus    Disorder of cardiovascular system; Prophylaxis - Heart failure   Chronic kidney disease, (At risk of progression) to reduce the risk of sustained   estimated GFR decline, end-stage kidney disease, cardiovascular death,   and hospitalization for heart failure     Heart failure, (NYHA class II to IV, reduced ejection fraction) to reduce risk of  cardiovascular death and hospitalization           Passed - Has GFR on file in past 12 months and most recent value is >30        Passed - Patient is age 18 or older        Passed - Patient has documented normal Potassium within the last 12 mos.     Recent Labs   Lab Test 01/06/25  1558   POTASSIUM 4.2

## 2025-08-04 ENCOUNTER — LAB (OUTPATIENT)
Dept: LAB | Facility: CLINIC | Age: 50
End: 2025-08-04
Payer: COMMERCIAL

## 2025-08-04 ENCOUNTER — OFFICE VISIT (OUTPATIENT)
Dept: CARDIOLOGY | Facility: CLINIC | Age: 50
End: 2025-08-04
Attending: STUDENT IN AN ORGANIZED HEALTH CARE EDUCATION/TRAINING PROGRAM
Payer: COMMERCIAL

## 2025-08-04 VITALS
SYSTOLIC BLOOD PRESSURE: 124 MMHG | DIASTOLIC BLOOD PRESSURE: 86 MMHG | WEIGHT: 264.1 LBS | OXYGEN SATURATION: 96 % | BODY MASS INDEX: 34.84 KG/M2 | HEART RATE: 78 BPM

## 2025-08-04 DIAGNOSIS — I25.10 CORONARY ARTERY DISEASE INVOLVING NATIVE CORONARY ARTERY OF NATIVE HEART WITHOUT ANGINA PECTORIS: ICD-10-CM

## 2025-08-04 DIAGNOSIS — I46.9 CARDIAC ARREST (H): ICD-10-CM

## 2025-08-04 DIAGNOSIS — I25.10 CORONARY ARTERY DISEASE INVOLVING NATIVE CORONARY ARTERY OF NATIVE HEART WITHOUT ANGINA PECTORIS: Primary | ICD-10-CM

## 2025-08-04 LAB
ALBUMIN SERPL BCG-MCNC: 4.5 G/DL (ref 3.5–5.2)
ALP SERPL-CCNC: 65 U/L (ref 40–150)
ALT SERPL W P-5'-P-CCNC: 27 U/L (ref 0–70)
ANION GAP SERPL CALCULATED.3IONS-SCNC: 11 MMOL/L (ref 7–15)
AST SERPL W P-5'-P-CCNC: 25 U/L (ref 0–45)
BILIRUB SERPL-MCNC: 0.4 MG/DL
BUN SERPL-MCNC: 14.7 MG/DL (ref 6–20)
CALCIUM SERPL-MCNC: 9.7 MG/DL (ref 8.8–10.4)
CHLORIDE SERPL-SCNC: 104 MMOL/L (ref 98–107)
CHOLEST SERPL-MCNC: 151 MG/DL
CREAT SERPL-MCNC: 0.93 MG/DL (ref 0.67–1.17)
EGFRCR SERPLBLD CKD-EPI 2021: >90 ML/MIN/1.73M2
ERYTHROCYTE [DISTWIDTH] IN BLOOD BY AUTOMATED COUNT: 12.2 % (ref 10–15)
FASTING STATUS PATIENT QL REPORTED: NO
FASTING STATUS PATIENT QL REPORTED: NORMAL
GLUCOSE SERPL-MCNC: 92 MG/DL (ref 70–99)
HCO3 SERPL-SCNC: 24 MMOL/L (ref 22–29)
HCT VFR BLD AUTO: 47.5 % (ref 40–53)
HDLC SERPL-MCNC: 61 MG/DL
HGB BLD-MCNC: 16.2 G/DL (ref 13.3–17.7)
LDLC SERPL CALC-MCNC: 69 MG/DL
MCH RBC QN AUTO: 30.5 PG (ref 26.5–33)
MCHC RBC AUTO-ENTMCNC: 34.1 G/DL (ref 31.5–36.5)
MCV RBC AUTO: 90 FL (ref 78–100)
NONHDLC SERPL-MCNC: 90 MG/DL
PLATELET # BLD AUTO: 216 10E3/UL (ref 150–450)
POTASSIUM SERPL-SCNC: 4.1 MMOL/L (ref 3.4–5.3)
PROT SERPL-MCNC: 7.7 G/DL (ref 6.4–8.3)
RBC # BLD AUTO: 5.31 10E6/UL (ref 4.4–5.9)
SODIUM SERPL-SCNC: 139 MMOL/L (ref 135–145)
TRIGL SERPL-MCNC: 104 MG/DL
WBC # BLD AUTO: 6.1 10E3/UL (ref 4–11)

## 2025-08-04 PROCEDURE — 3074F SYST BP LT 130 MM HG: CPT

## 2025-08-04 PROCEDURE — 36415 COLL VENOUS BLD VENIPUNCTURE: CPT | Performed by: PATHOLOGY

## 2025-08-04 PROCEDURE — 3079F DIAST BP 80-89 MM HG: CPT

## 2025-08-04 PROCEDURE — 80061 LIPID PANEL: CPT | Performed by: PATHOLOGY

## 2025-08-04 PROCEDURE — 1126F AMNT PAIN NOTED NONE PRSNT: CPT

## 2025-08-04 PROCEDURE — 99215 OFFICE O/P EST HI 40 MIN: CPT | Mod: GC

## 2025-08-04 PROCEDURE — 80053 COMPREHEN METABOLIC PANEL: CPT | Performed by: PATHOLOGY

## 2025-08-04 PROCEDURE — 85027 COMPLETE CBC AUTOMATED: CPT | Performed by: PATHOLOGY

## 2025-08-04 PROCEDURE — G0463 HOSPITAL OUTPT CLINIC VISIT: HCPCS

## 2025-08-04 RX ORDER — METOPROLOL SUCCINATE 100 MG/1
100 TABLET, EXTENDED RELEASE ORAL DAILY
Qty: 90 TABLET | Refills: 3 | Status: SHIPPED | OUTPATIENT
Start: 2025-08-04

## 2025-08-04 ASSESSMENT — PAIN SCALES - GENERAL: PAINLEVEL_OUTOF10: NO PAIN (0)

## 2025-08-05 ENCOUNTER — TELEPHONE (OUTPATIENT)
Dept: CARDIOLOGY | Facility: CLINIC | Age: 50
End: 2025-08-05
Payer: COMMERCIAL

## 2025-08-05 DIAGNOSIS — I46.9 CARDIAC ARREST (H): Primary | ICD-10-CM

## 2025-08-05 DIAGNOSIS — R73.9 HYPERGLYCEMIA: ICD-10-CM

## 2025-08-05 DIAGNOSIS — I50.23 ACUTE ON CHRONIC SYSTOLIC HEART FAILURE (H): ICD-10-CM

## 2025-08-05 DIAGNOSIS — E66.9 OBESITY: ICD-10-CM

## 2025-08-07 ENCOUNTER — VIRTUAL VISIT (OUTPATIENT)
Facility: CLINIC | Age: 50
End: 2025-08-07
Attending: STUDENT IN AN ORGANIZED HEALTH CARE EDUCATION/TRAINING PROGRAM
Payer: COMMERCIAL

## 2025-08-07 DIAGNOSIS — I46.9 CARDIAC ARREST (H): Primary | ICD-10-CM

## 2025-08-07 DIAGNOSIS — E66.9 OBESITY: ICD-10-CM

## 2025-08-09 ENCOUNTER — HEALTH MAINTENANCE LETTER (OUTPATIENT)
Age: 50
End: 2025-08-09

## 2025-08-19 ENCOUNTER — ANCILLARY PROCEDURE (OUTPATIENT)
Dept: CARDIOLOGY | Facility: CLINIC | Age: 50
End: 2025-08-19
Payer: COMMERCIAL

## 2025-08-19 DIAGNOSIS — I46.9 CARDIAC ARREST (H): ICD-10-CM

## 2025-08-19 LAB
BI-PLANE LVEF ECHO: NORMAL
LVEF ECHO: NORMAL

## 2025-08-19 PROCEDURE — 93306 TTE W/DOPPLER COMPLETE: CPT | Performed by: INTERNAL MEDICINE

## 2025-08-19 RX ADMIN — Medication 5 ML (DILUTED): at 17:16

## (undated) DEVICE — CANISTER WOUND VAC W/GEL 1000ML M8275093/5

## (undated) DEVICE — SU SILK 0 TIE 6X30" A306H

## (undated) DEVICE — GLOVE BIOGEL PI SZ 8.0 40880

## (undated) DEVICE — DRSG ADAPTIC 3X16"  6114

## (undated) DEVICE — ESU ELEC BLADE 2.75" COATED/INSULATED E1455

## (undated) DEVICE — GRAFT PATCH VASC XENOSURE BIOLOGIC 0.8X08CM E0.8P8: Type: IMPLANTABLE DEVICE | Site: ILIAC/FEMORALS | Status: NON-FUNCTIONAL

## (undated) DEVICE — INTRO GLIDESHEATH SLENDER 6FR 10X45CM 60-1060

## (undated) DEVICE — SU VICRYL 2-0 CT-1 27" UND J259H

## (undated) DEVICE — BLADE SAW STRK STERNAL 6207-97-101

## (undated) DEVICE — TUBING PRESSURE 30"

## (undated) DEVICE — ESU ELEC BLADE E-SEP INSULATED NEPTUNE 70MM 0703-070-002

## (undated) DEVICE — GUIDEWIRE VASC 0.014INX180CM RUNTHROUGH 25-1011

## (undated) DEVICE — DEFIB PRO-PADZ LVP LQD GEL ADULT 8900-2105-01

## (undated) DEVICE — PREP POVIDONE-IODINE 10% SOLUTION 4OZ BOTTLE MDS093944

## (undated) DEVICE — VESSEL LOOPS YELLOW MAXI 31145694

## (undated) DEVICE — DRSG TELFA 3X8" 1238

## (undated) DEVICE — CLIP HORIZON MED BLUE 002200

## (undated) DEVICE — CATH ANGIO INFINITI 3DRC 6FRX100CM 534676T

## (undated) DEVICE — LINEN TOWEL PACK X6 WHITE 5487

## (undated) DEVICE — KIT DVC ANGIO IBASIXCOMPAK 13INX20ML 3WAY IN4430

## (undated) DEVICE — PREP SKIN SCRUB TRAY 4461A

## (undated) DEVICE — SU VICRYL+ 3-0 FS1 27IN UND VCP442H

## (undated) DEVICE — DRAPE TIBURON CARDIOVASCULAR PERI-GROIN LF 9154

## (undated) DEVICE — MANIFOLD KIT ANGIO AUTOMATED 014613

## (undated) DEVICE — GUIDEWIRE OPTOWIRE 3 W/O GAUGE FACTOR CONNECTOR F1032

## (undated) DEVICE — Device

## (undated) DEVICE — SU VICRYL 0 CTX 36" J370H

## (undated) DEVICE — SURGICEL HEMOSTAT 4X8" 1952

## (undated) DEVICE — KIT HAND CONTROL ACIST 016795

## (undated) DEVICE — LINEN TOWEL PACK X30 5481

## (undated) DEVICE — SOL NACL 0.9% 10ML VIAL 0409-4888-02

## (undated) DEVICE — DECANTER BAG 2002S

## (undated) DEVICE — CATH GUIDING BLUE YELLOW PTFE XB3.5 6FRX100CM 67005400

## (undated) DEVICE — PREP POVIDONE-IODINE 7.5% SCRUB 4OZ BOTTLE MDS093945

## (undated) DEVICE — INTRO SHEATH 6FRX25CM PINNACLE RSS606

## (undated) DEVICE — SU ETHIBOND 0 CT-1 CR 8X18" CX21D

## (undated) DEVICE — SU VICRYL 3-0 FS-1 27" J442H

## (undated) DEVICE — ESU GROUND PAD ADULT W/CORD E7507

## (undated) DEVICE — CLIP HORIZON SM RED WIDE SLOT 001201

## (undated) DEVICE — SYR 30ML LL W/O NDL 302832

## (undated) DEVICE — PREP CHLORAPREP 26ML TINTED HI-LITE ORANGE 930815

## (undated) DEVICE — VALVE HEMOSTASIS .096" COPILOT MECH 1003331

## (undated) DEVICE — PACK HEART LEFT CUSTOM

## (undated) DEVICE — CATH BALLOON EMERGE 2.5X12MM H7493918912250

## (undated) DEVICE — TAPE MEDIPORE 4"X2YD 2864

## (undated) DEVICE — GLOVE BIOGEL PI SZ 7.0 40870

## (undated) DEVICE — SU DERMABOND ADVANCED .7ML DNX12

## (undated) DEVICE — SYR 10ML LL W/O NDL 302995

## (undated) DEVICE — SU PROLENE 6-0 C-1DA 30" 8706H

## (undated) DEVICE — RX SURGIFLO HEMOSTATIC MATRIX W/THROMBIN 8ML 2994

## (undated) DEVICE — PROTECTOR ARM ONE-STEP TRENDELENBURG 40418

## (undated) DEVICE — SU PLEDGET SOFT TFE 3/8"X3/26"X1/16" PCP40

## (undated) DEVICE — SU PROLENE 4-0 RB-1DA 36" 8557H

## (undated) DEVICE — DRAPE SHEET REV FOLD 3/4 9349

## (undated) DEVICE — TOURNIQUET VASCULAR KIT 7 1/2" 79012

## (undated) DEVICE — CATH BALLOON NC EMERGE 3.50X12MM H7493926712350

## (undated) DEVICE — SUCTION MANIFOLD NEPTUNE 2 SYS 4 PORT 0702-020-000

## (undated) DEVICE — SU PROLENE 5-0 RB-2DA 30" 8710H

## (undated) DEVICE — DRAPE IOBAN INCISE 23X17" 6650EZ

## (undated) DEVICE — BLADE CLIPPER SGL USE 9680

## (undated) DEVICE — GLOVE BIOGEL PI MICRO INDICATOR UNDERGLOVE SZ 8.0 48980

## (undated) DEVICE — LINEN GOWN XLG 5407

## (undated) DEVICE — CATH BALLOON NC EMERGE 3.50X15MM H7493926715350

## (undated) DEVICE — SUTURE BOOTS 051003PBX

## (undated) DEVICE — ADH LIQUID MASTISOL TOPICAL VIAL 2-3ML 0523-48

## (undated) DEVICE — PITCHER STERILE 1000ML  SSK9004A

## (undated) DEVICE — CATH GUIDING BLUE YELLOW PTFE XB4 6FRX100CM 67005600

## (undated) DEVICE — SU PROLENE 4-0 SHDA 36" 8521H

## (undated) RX ORDER — CEFAZOLIN SODIUM 1 G/3ML
INJECTION, POWDER, FOR SOLUTION INTRAMUSCULAR; INTRAVENOUS
Status: DISPENSED
Start: 2024-06-04

## (undated) RX ORDER — HEPARIN SODIUM 1000 [USP'U]/ML
INJECTION, SOLUTION INTRAVENOUS; SUBCUTANEOUS
Status: DISPENSED
Start: 2024-06-06

## (undated) RX ORDER — HEPARIN SODIUM 1000 [USP'U]/ML
INJECTION, SOLUTION INTRAVENOUS; SUBCUTANEOUS
Status: DISPENSED
Start: 2024-06-04

## (undated) RX ORDER — FENTANYL CITRATE 50 UG/ML
INJECTION, SOLUTION INTRAMUSCULAR; INTRAVENOUS
Status: DISPENSED
Start: 2024-06-04

## (undated) RX ORDER — CALCIUM CHLORIDE 100 MG/ML
INJECTION INTRAVENOUS; INTRAVENTRICULAR
Status: DISPENSED
Start: 2024-06-04

## (undated) RX ORDER — POTASSIUM CHLORIDE 29.8 MG/ML
INJECTION INTRAVENOUS
Status: DISPENSED
Start: 2024-06-06